# Patient Record
Sex: FEMALE | Race: BLACK OR AFRICAN AMERICAN | NOT HISPANIC OR LATINO | Employment: OTHER | URBAN - METROPOLITAN AREA
[De-identification: names, ages, dates, MRNs, and addresses within clinical notes are randomized per-mention and may not be internally consistent; named-entity substitution may affect disease eponyms.]

---

## 2017-05-09 ENCOUNTER — HOSPITAL ENCOUNTER (EMERGENCY)
Facility: HOSPITAL | Age: 52
Discharge: HOME | End: 2017-05-09
Attending: EMERGENCY MEDICINE
Payer: COMMERCIAL

## 2017-05-09 VITALS
TEMPERATURE: 98 F | SYSTOLIC BLOOD PRESSURE: 157 MMHG | DIASTOLIC BLOOD PRESSURE: 77 MMHG | RESPIRATION RATE: 20 BRPM | WEIGHT: 147 LBS | OXYGEN SATURATION: 97 % | BODY MASS INDEX: 24.49 KG/M2 | HEART RATE: 92 BPM | HEIGHT: 65 IN

## 2017-05-09 DIAGNOSIS — J45.51 SEVERE PERSISTENT ASTHMA WITH ACUTE EXACERBATION: Primary | ICD-10-CM

## 2017-05-09 LAB
ALBUMIN SERPL BCP-MCNC: 3.4 G/DL
ALP SERPL-CCNC: 46 U/L
ALT SERPL W/O P-5'-P-CCNC: 8 U/L
ANION GAP SERPL CALC-SCNC: 5 MMOL/L
AST SERPL-CCNC: 16 U/L
BASOPHILS # BLD AUTO: 0.03 K/UL
BASOPHILS NFR BLD: 0.6 %
BILIRUB SERPL-MCNC: 0.4 MG/DL
BNP SERPL-MCNC: 82 PG/ML
BUN SERPL-MCNC: 9 MG/DL
CALCIUM SERPL-MCNC: 8.3 MG/DL
CHLORIDE SERPL-SCNC: 104 MMOL/L
CO2 SERPL-SCNC: 27 MMOL/L
CREAT SERPL-MCNC: 0.7 MG/DL
DIFFERENTIAL METHOD: ABNORMAL
EOSINOPHIL # BLD AUTO: 0.4 K/UL
EOSINOPHIL NFR BLD: 7.9 %
ERYTHROCYTE [DISTWIDTH] IN BLOOD BY AUTOMATED COUNT: 14.5 %
EST. GFR  (AFRICAN AMERICAN): >60 ML/MIN/1.73 M^2
EST. GFR  (NON AFRICAN AMERICAN): >60 ML/MIN/1.73 M^2
GLUCOSE SERPL-MCNC: 98 MG/DL
HCT VFR BLD AUTO: 34.4 %
HGB BLD-MCNC: 11.1 G/DL
LYMPHOCYTES # BLD AUTO: 1.8 K/UL
LYMPHOCYTES NFR BLD: 37.8 %
MCH RBC QN AUTO: 28 PG
MCHC RBC AUTO-ENTMCNC: 32.3 %
MCV RBC AUTO: 87 FL
MONOCYTES # BLD AUTO: 0.4 K/UL
MONOCYTES NFR BLD: 9.2 %
NEUTROPHILS # BLD AUTO: 2.1 K/UL
NEUTROPHILS NFR BLD: 44.5 %
PLATELET # BLD AUTO: 289 K/UL
PMV BLD AUTO: 9 FL
POTASSIUM SERPL-SCNC: 4.1 MMOL/L
PROT SERPL-MCNC: 7.1 G/DL
RBC # BLD AUTO: 3.97 M/UL
SODIUM SERPL-SCNC: 136 MMOL/L
TROPONIN I SERPL DL<=0.01 NG/ML-MCNC: <0.006 NG/ML
WBC # BLD AUTO: 4.66 K/UL

## 2017-05-09 PROCEDURE — 25000003 PHARM REV CODE 250: Performed by: EMERGENCY MEDICINE

## 2017-05-09 PROCEDURE — 84484 ASSAY OF TROPONIN QUANT: CPT

## 2017-05-09 PROCEDURE — 80053 COMPREHEN METABOLIC PANEL: CPT

## 2017-05-09 PROCEDURE — 94640 AIRWAY INHALATION TREATMENT: CPT

## 2017-05-09 PROCEDURE — 83880 ASSAY OF NATRIURETIC PEPTIDE: CPT

## 2017-05-09 PROCEDURE — 93005 ELECTROCARDIOGRAM TRACING: CPT

## 2017-05-09 PROCEDURE — 99284 EMERGENCY DEPT VISIT MOD MDM: CPT

## 2017-05-09 PROCEDURE — 63600175 PHARM REV CODE 636 W HCPCS: Performed by: EMERGENCY MEDICINE

## 2017-05-09 PROCEDURE — 85025 COMPLETE CBC W/AUTO DIFF WBC: CPT

## 2017-05-09 PROCEDURE — 25000242 PHARM REV CODE 250 ALT 637 W/ HCPCS: Performed by: EMERGENCY MEDICINE

## 2017-05-09 RX ORDER — AZITHROMYCIN 250 MG/1
250 TABLET, FILM COATED ORAL DAILY
Qty: 4 TABLET | Refills: 0 | Status: SHIPPED | OUTPATIENT
Start: 2017-05-10 | End: 2017-05-14

## 2017-05-09 RX ORDER — ALBUTEROL SULFATE 90 UG/1
1-2 AEROSOL, METERED RESPIRATORY (INHALATION) EVERY 4 HOURS PRN
Qty: 1 INHALER | Refills: 1 | Status: ON HOLD | OUTPATIENT
Start: 2017-05-09 | End: 2018-04-14

## 2017-05-09 RX ORDER — BUDESONIDE AND FORMOTEROL FUMARATE DIHYDRATE 160; 4.5 UG/1; UG/1
2 AEROSOL RESPIRATORY (INHALATION) EVERY 12 HOURS
Qty: 3 INHALER | Refills: 0 | Status: ON HOLD | OUTPATIENT
Start: 2017-05-09 | End: 2018-04-14

## 2017-05-09 RX ORDER — AZITHROMYCIN 250 MG/1
500 TABLET, FILM COATED ORAL
Status: COMPLETED | OUTPATIENT
Start: 2017-05-09 | End: 2017-05-09

## 2017-05-09 RX ORDER — ASPIRIN 325 MG
325 TABLET ORAL
Status: COMPLETED | OUTPATIENT
Start: 2017-05-09 | End: 2017-05-09

## 2017-05-09 RX ORDER — IPRATROPIUM BROMIDE AND ALBUTEROL SULFATE 2.5; .5 MG/3ML; MG/3ML
3 SOLUTION RESPIRATORY (INHALATION)
Status: COMPLETED | OUTPATIENT
Start: 2017-05-09 | End: 2017-05-09

## 2017-05-09 RX ORDER — FLUTICASONE FUROATE AND VILANTEROL 200; 25 UG/1; UG/1
1 POWDER RESPIRATORY (INHALATION) DAILY
COMMUNITY
End: 2017-05-09

## 2017-05-09 RX ORDER — PREDNISONE 20 MG/1
40 TABLET ORAL DAILY
Qty: 12 TABLET | Refills: 0 | Status: SHIPPED | OUTPATIENT
Start: 2017-05-09 | End: 2017-05-15

## 2017-05-09 RX ORDER — ALBUTEROL SULFATE 2.5 MG/.5ML
2.5 SOLUTION RESPIRATORY (INHALATION) EVERY 4 HOURS PRN
Qty: 50 MG | Refills: 1 | Status: ON HOLD | OUTPATIENT
Start: 2017-05-09 | End: 2018-04-14

## 2017-05-09 RX ORDER — PREDNISONE 20 MG/1
60 TABLET ORAL
Status: COMPLETED | OUTPATIENT
Start: 2017-05-09 | End: 2017-05-09

## 2017-05-09 RX ADMIN — ASPIRIN 325 MG ORAL TABLET 325 MG: 325 PILL ORAL at 08:05

## 2017-05-09 RX ADMIN — PREDNISONE 60 MG: 20 TABLET ORAL at 08:05

## 2017-05-09 RX ADMIN — IPRATROPIUM BROMIDE AND ALBUTEROL SULFATE 3 ML: .5; 3 SOLUTION RESPIRATORY (INHALATION) at 08:05

## 2017-05-09 RX ADMIN — AZITHROMYCIN 500 MG: 250 TABLET, FILM COATED ORAL at 09:05

## 2017-05-09 NOTE — ED TRIAGE NOTES
Pt presents to ER with c/o difficulty breathing.  Audible wheezing noted.  Pt reports she is out of neb medication.  PMx asthma.  Productive cough, white sputum.

## 2017-05-09 NOTE — ED NOTES
Verbal order to discontinue second order for chest X ray. One chest x ray had already been completed. Second order entered in error.

## 2017-05-09 NOTE — ED AVS SNAPSHOT
[facility]  [place] [address]               Aurora Ornelas   2017  7:31 AM   ED    Description:  Female : 1965   Department:  [facility]           Your Care was Coordinated By:     Provider Role From To    Rukhsana Barton MD Attending Provider 17 0734 --      Reason for Visit     Chest Pain           Diagnoses this Visit        Comments    Severe persistent asthma with acute exacerbation    -  Primary       ED Disposition     None           To Do List           Follow-up Information     Schedule an appointment as soon as possible for a visit with Follow up with your primary care doctor this week.        Follow up with [facility].    Specialty:  Emergency Medicine    Why:  If symptoms worsen    Contact information:    [place] [id number] [phone]       These Medications        Disp Refills Start End    albuterol 90 mcg/actuation inhaler 1 Inhaler 1 2017    Inhale 1-2 puffs into the lungs every 4 (four) hours as needed for Wheezing. - Inhalation    Pharmacy: [id number] - [place], [place] - [address] Ph #: [phone]       albuterol sulfate 2.5 mg/0.5 mL Nebu 50 mg 1 2017    Take 2.5 mg by nebulization every 4 (four) hours as needed. - Nebulization    Pharmacy: [facility] - [place], [place] - [address] Ph #: [phone]       predniSONE (DELTASONE) 20 MG tablet 12 tablet 0 2017 5/15/2017    Take 2 tablets (40 mg total) by mouth once daily. - Oral    Pharmacy: [facility] - [place], [place] - [address] Ph #: [phone]       azithromycin (Z-KATHY) 250 MG tablet 4 tablet 0 5/10/2017 2017    Take 1 tablet (250 mg total) by mouth once daily. - Oral    Pharmacy: [facility] - [place], [place] - [address] Ph #: [phone]       budesonide-formoterol 160-4.5 mcg (SYMBICORT)  160-4.5 mcg/actuation HFAA 3 Inhaler 0 5/9/2017 5/9/2018    Inhale 2 puffs into the lungs every 12 (twelve) hours. Controller - Inhalation    Pharmacy: [address] [place] [address] [address]  #: [phone]         [facility]     [facility] Nurse Care Line - 24/7 Assistance  Unless otherwise directed by your provider, please contact [facility], our nurse care line that is available for 24/7 assistance.     Registered nurses in the [facility] Center provide: appointment scheduling, clinical advisement, health education, and other advisory services.  Call: [phone] (toll free)               Medications           Message regarding Medications     Verify the changes and/or additions to your medication regime listed below are the same as discussed with your clinician today.  If any of these changes or additions are incorrect, please notify your healthcare provider.        START taking these NEW medications        Refills    azithromycin (Z-KATHY) 250 MG tablet 0    Starting on: 5/10/2017    Sig: Take 1 tablet (250 mg total) by mouth once daily.    Class: Normal    Route: Oral    budesonide-formoterol 160-4.5 mcg (SYMBICORT) 160-4.5 mcg/actuation HFAA 0    Sig: Inhale 2 puffs into the lungs every 12 (twelve) hours. Controller    Class: Print    Route: Inhalation      These medications were administered today        Dose Freq    aspirin tablet 325 mg 325 mg ED 1 Time    Sig: Take 1 tablet (325 mg total) by mouth ED 1 Time.    Class: Normal    Route: Oral    albuterol-ipratropium 2.5mg-0.5mg/3mL nebulizer solution 3 mL 3 mL Every 5 min    Sig: Take 3 mLs by nebulization every 5 (five) minutes.    Class: Normal    Route: Nebulization    predniSONE tablet 60 mg 60 mg ED 1 Time    Sig: Take 3 tablets (60 mg total) by mouth ED 1 Time.    Class: Normal    Route: Oral    azithromycin tablet 500 mg 500 mg ED 1 Time    Sig: Take 2 tablets (500 mg total) by mouth ED 1 Time.    Class:  Normal    Route: Oral      CHANGE how you are taking these medications     Start Taking Instead of    albuterol 90 mcg/actuation inhaler albuterol 90 mcg/actuation inhaler    Dosage:  Inhale 1-2 puffs into the lungs every 4 (four) hours as needed for Wheezing. Dosage:  Inhale 1 puff into the lungs every 4 (four) hours as needed for Wheezing.    predniSONE (DELTASONE) 20 MG tablet predniSONE (DELTASONE) 20 MG tablet    Dosage:  Take 2 tablets (40 mg total) by mouth once daily. Dosage:  Take 20 mg by mouth once daily.      STOP taking these medications     fluticasone-vilanterol (BREO) 200-25 mcg/dose DsDv diskus inhaler Inhale 1 puff into the lungs once daily. Controller    hydrocodone-acetaminophen 5-325mg (NORCO) 5-325 mg per tablet Take 1 tablet by mouth every 6 (six) hours as needed (breakthrough pain).    fluticasone-salmeterol 500-50 mcg/dose (ADVAIR DISKUS) 500-50 mcg/dose DsDv diskus inhaler Inhale 1 puff into the lungs 2 (two) times daily.           Verify that the below list of medications is an accurate representation of the medications you are currently taking.  If none reported, the list may be blank. If incorrect, please contact your healthcare provider. Carry this list with you in case of emergency.           Current Medications     lisinopril-hydrochlorothiazide (PRINZIDE,ZESTORETIC) 20-12.5 mg per tablet Take 1 tablet by mouth once daily.    albuterol 90 mcg/actuation inhaler Inhale 1-2 puffs into the lungs every 4 (four) hours as needed for Wheezing.    albuterol sulfate 2.5 mg/0.5 mL Nebu Take 2.5 mg by nebulization every 4 (four) hours as needed.    azithromycin (Z-KATHY) 250 MG tablet Starting on May 10, 2017. Take 1 tablet (250 mg total) by mouth once daily.    budesonide-formoterol 160-4.5 mcg (SYMBICORT) 160-4.5 mcg/actuation HFAA Inhale 2 puffs into the lungs every 12 (twelve) hours. Controller    meloxicam (MOBIC) 7.5 MG tablet Take 1 tablet (7.5 mg total) by mouth once daily.    pantoprazole  "(PROTONIX) 40 MG tablet Take 40 mg by mouth once daily.    predniSONE (DELTASONE) 20 MG tablet Take 2 tablets (40 mg total) by mouth once daily.           Clinical Reference Information           Your Vitals Were     BP Pulse Temp Resp Height Weight    157/77 (BP Location: Left arm, Patient Position: Lying, BP Method: Automatic) 92 98.2 °F (36.8 °C) 20 5' 5" (1.651 m) 66.7 kg (147 lb)    SpO2 BMI             97% 24.46 kg/m2         Allergies as of 5/9/2017     No Known Allergies      Immunizations Administered on Date of Encounter - 5/9/2017     None      ED Micro, Lab, POCT     Start Ordered       Status Ordering Provider    05/09/17 0738 05/09/17 0738  CBC auto differential  STAT      Final result     05/09/17 0738 05/09/17 0738  Comprehensive metabolic panel  STAT      Final result     05/09/17 0738 05/09/17 0738  Troponin I  Once      Final result     05/09/17 0738 05/09/17 0738  B-Type natriuretic peptide (BNP)  STAT      Final result       ED Imaging Orders     Start Ordered       Status Ordering Provider    05/09/17 0801 05/09/17 0800  X-Ray Chest PA And Lateral  1 time imaging      Acknowledged     05/09/17 0738 05/09/17 0738  X-Ray Chest PA And Lateral  1 time imaging      Final result       Discharge References/Attachments     ASTHMA, ACUTE (ADULT) (ENGLISH)       [facility] complies with applicable Federal civil rights laws and does not discriminate on the basis of race, color, national origin, age, disability, or sex.        Language Assistance Services     ATTENTION: Language assistance services are available, free of charge. Please call [phone].      ATENCIÓN: Si habla español, tiene a mendez disposición servicios gratuitos de asistencia lingüística. Llame al [phone].     CHÚ Ý: N?u b?n nói Ti?ng Vi?t, có các d?ch v? h? tr? ngôn ng? mi?n phí dành cho b?n. G?i s? [phone].        "

## 2017-05-09 NOTE — ED PROVIDER NOTES
"Encounter Date: 5/9/2017    SCRIBE #1 NOTE: I, Charis Callejas, am scribing for, and in the presence of,  Rukhsana Barton MD. I have scribed the following portions of the note - Other sections scribed: HPI and ROS.       History     Chief Complaint   Patient presents with    Chest Pain     States she has chest pains that started this morning     Review of patient's allergies indicates:  No Known Allergies  Chief Complaint: Chest Pain    HPI: This 51 y.o. Female with deafness, asthma, arthritis, anemia, DM and HTN presents to the ED c/o generalized chest pain and shortness of breath. Symptoms have been constant since onset yesterday. Symptoms worsened at 6 am this morning. Chest pain is described as "lungs hurting". There's associated SOB, fatigue, congestion and a productive cough with white phlegm. Patient is out of her nebulizer treatment. Symptoms consistent with previous acute asthma exacerbations.  She denies any fevers at this time.       The history is provided by the patient. A  was used.     Past Medical History:   Diagnosis Date    Anemia     Arthritis     Asthma     Deaf     Hypertension      No past surgical history on file.  No family history on file.  Social History   Substance Use Topics    Smoking status: Never Smoker    Smokeless tobacco: Not on file    Alcohol use No     Review of Systems   Constitutional: Positive for fatigue. Negative for chills and fever.   HENT: Positive for congestion. Negative for ear pain and sore throat.    Eyes: Negative for visual disturbance.   Respiratory: Positive for cough and shortness of breath.    Cardiovascular: Positive for chest pain.   Gastrointestinal: Negative for abdominal pain, constipation, diarrhea, nausea and vomiting.   Genitourinary: Negative for difficulty urinating and dysuria.   Musculoskeletal: Negative for back pain.   Skin: Negative for rash.   Neurological: Negative for dizziness, weakness, light-headedness and " headaches.       Physical Exam   Initial Vitals   BP Pulse Resp Temp SpO2   05/09/17 0729 05/09/17 0729 05/09/17 0729 05/09/17 0729 05/09/17 0729   152/81 90 18 98.2 °F (36.8 °C) 96 %     Physical Exam   Constitutional: Well-developed, Well-nourished, No acute distressed, Alert  HENT: Normocephalic, Atraumatic, Moist mucous membranes  Eyes: Conjunctiva normal, PERRL, EOMI  Neck: Supple, ROM normal, no JVD  Cardiac: RRR, no murmurs  Pulmonary/Chest wall: Expiratory wheezing throughout, no chest wall tenderness  Abdomen: Soft, nontender, nondistended, no rebound, no guarding  Musc: Normal ROM, No obvious joint swelling  Lymph: No lower extremity edema  Neuro: oriented x 3, no focal neurologic deficit  Skin: Pink, warm, dry.  No rashes  Psych: Behavior normal, Mood and affect normal    Previous medical record and nursing documentation reviewed where available.          ED Course   Procedures  Labs Reviewed - No data to display  EKG Readings: (Independently Interpreted)   Initial Reading: No STEMI. Rhythm: Normal Sinus Rhythm. Ectopy: No Ectopy. Conduction: Normal. ST Segments: Normal ST Segments. T Waves: Normal. Clinical Impression: Normal Sinus Rhythm       X-Rays:   Independently Interpreted Readings:   Chest X-Ray: Normal heart size.  No infiltrates.  No acute abnormalities.     Medical Decision Making:   Independently Interpreted Test(s):   I have ordered and independently interpreted X-rays - see prior notes.  I have ordered and independently interpreted EKG Reading(s) - see prior notes  Clinical Tests:   Lab Tests: Ordered and Reviewed  Radiological Study: Ordered and Reviewed  Medical Tests: Ordered and Reviewed  ED Management:  51 year old female with history of asthma presents to the ED with chest pain and shortness of breath.  Patient out of her medication.  DDx would include acute asthma exacerbation, pneumonia, bronchitis, ACS, heart failure, PTX.  EKG nonischemic and troponin normal in setting of symptoms  for >6 hours - doubt ACS.  BNP negative and no pulmonary edema on CXR - doubt CHF.  CXR without signs of PNA or PTX.  Patient was treated with duonebs, prednisone with significant improvement in symptoms.  I suspect that her symptoms are secondary to acute asthma exacerbation.  Will discharge home.  Will refill all medication and prescribe azithromycin to cover for possible atypical pneumonia triggering symtpoms.  Patient encouraged to return if worse and follow up closely with PCP.             Scribe Attestation:   Scribe #1: I performed the above scribed service and the documentation accurately describes the services I performed. I attest to the accuracy of the note.    Attending Attestation:           Physician Attestation for Scribe:  Physician Attestation Statement for Scribe #1: I, Rukhsana Barton MD, reviewed documentation, as scribed by Charis Callejas in my presence, and it is both accurate and complete.                 ED Course     Clinical Impression:   There were no encounter diagnoses.     ...............................     Rukhsana Barton MD  06/19/17 0573

## 2017-05-09 NOTE — ED NOTES
HECTOR at bedside for interpretation.  Pt hearing impaired.  Physician at bedside.   Ashlee [id number].

## 2017-08-04 ENCOUNTER — HOSPITAL ENCOUNTER (EMERGENCY)
Facility: HOSPITAL | Age: 52
Discharge: HOME | End: 2017-08-05
Attending: EMERGENCY MEDICINE
Payer: COMMERCIAL

## 2017-08-04 DIAGNOSIS — T14.90XA INJURY: ICD-10-CM

## 2017-08-04 DIAGNOSIS — S93.402A SPRAIN OF LEFT ANKLE, UNSPECIFIED LIGAMENT, INITIAL ENCOUNTER: Primary | ICD-10-CM

## 2017-08-04 PROCEDURE — 96372 THER/PROPH/DIAG INJ SC/IM: CPT

## 2017-08-04 PROCEDURE — 99284 EMERGENCY DEPT VISIT MOD MDM: CPT | Mod: 25

## 2017-08-05 VITALS
DIASTOLIC BLOOD PRESSURE: 60 MMHG | RESPIRATION RATE: 18 BRPM | OXYGEN SATURATION: 100 % | BODY MASS INDEX: 24.13 KG/M2 | WEIGHT: 145 LBS | TEMPERATURE: 99 F | SYSTOLIC BLOOD PRESSURE: 125 MMHG | HEART RATE: 83 BPM

## 2017-08-05 PROCEDURE — 63600175 PHARM REV CODE 636 W HCPCS: Performed by: NURSE PRACTITIONER

## 2017-08-05 RX ORDER — KETOROLAC TROMETHAMINE 30 MG/ML
30 INJECTION, SOLUTION INTRAMUSCULAR; INTRAVENOUS
Status: COMPLETED | OUTPATIENT
Start: 2017-08-05 | End: 2017-08-05

## 2017-08-05 RX ORDER — NAPROXEN 500 MG/1
500 TABLET ORAL 2 TIMES DAILY PRN
Qty: 30 TABLET | Refills: 0 | Status: SHIPPED | OUTPATIENT
Start: 2017-08-05 | End: 2019-02-27

## 2017-08-05 RX ADMIN — KETOROLAC TROMETHAMINE 30 MG: 30 INJECTION, SOLUTION INTRAMUSCULAR at 02:08

## 2017-08-05 NOTE — ED NOTES
[facility] 's  service used to communicate with pt , inform her of plan of care, and answer her questions.

## 2017-08-05 NOTE — DISCHARGE INSTRUCTIONS
Follow-up with your primary care physician and/or orthopedic surgery for further evaluation and management.    Take pain medication as needed and only as prescribed.    Return to the emergency department for any new or worsening symptoms.

## 2017-08-05 NOTE — ED PROVIDER NOTES
Encounter Date: 8/4/2017    SCRIBE #1 NOTE: I, Lázaro Lamas, am scribing for, and in the presence of, Sterilng Up NP. Other sections scribed: HPI, ROS.       History     Chief Complaint   Patient presents with    Ankle Pain     L ankle pain after slip and fall     CC: Ankle Pain, Fall  HPI: This 51 y.o. female with deafness, arthritis, asthma, HTN and Hx of hysterectomy presents to the ED c/o  L ankle pain s/p fall at home tonight. Pt reports that she got up from bed and then suddenly fell due to unexpected leg cramps. Pt denies LOC, syncope, head injury. Pt denies any other injuries.             The history is provided by the patient. The history is limited by a language barrier (pt is deaf). No  was used (communicated via written English).     Review of patient's allergies indicates:  No Known Allergies  Past Medical History:   Diagnosis Date    Anemia     Arthritis     Asthma     Deaf     Hypertension      Past Surgical History:   Procedure Laterality Date    HYSTERECTOMY       No family history on file.  Social History   Substance Use Topics    Smoking status: Never Smoker    Smokeless tobacco: Not on file    Alcohol use No     Review of Systems   Constitutional: Negative for chills and fever.   HENT: Negative for congestion and sore throat.    Respiratory: Negative for apnea, shortness of breath and wheezing.    Cardiovascular: Negative for chest pain and leg swelling.   Gastrointestinal: Negative for abdominal pain, nausea and vomiting.   Musculoskeletal: Positive for arthralgias (L ankle) and myalgias (leg cramps, resolved).   Skin: Negative for rash.   Neurological: Negative for syncope, weakness and light-headedness.   Psychiatric/Behavioral: The patient is not nervous/anxious.        Physical Exam     Initial Vitals [08/04/17 2105]   BP Pulse Resp Temp SpO2   136/84 (!) 112 18 98.3 °F (36.8 °C) 100 %      MAP       101.33         Physical Exam    Nursing note and vitals  reviewed.  Constitutional: She appears well-developed and well-nourished. She is not diaphoretic. No distress.   Patient is deaf. Communication achieved via writing   HENT:   Head: Normocephalic and atraumatic.   Right Ear: External ear normal.   Left Ear: External ear normal.   Nose: Nose normal.   Eyes: Conjunctivae and EOM are normal. Pupils are equal, round, and reactive to light. Right eye exhibits no discharge. Left eye exhibits no discharge. No scleral icterus.   Neck: Normal range of motion. Neck supple. No thyromegaly present. No tracheal deviation present. No JVD present.   Cardiovascular: Normal rate, regular rhythm, normal heart sounds and intact distal pulses. Exam reveals no gallop and no friction rub.    No murmur heard.  Pulmonary/Chest: Breath sounds normal. No stridor. No respiratory distress. She has no wheezes. She has no rhonchi. She has no rales.   Abdominal: Soft. Bowel sounds are normal. She exhibits no distension and no mass. There is no tenderness. There is no rebound and no guarding.   Musculoskeletal: Normal range of motion. She exhibits no edema or tenderness.   Diffuse left ankle pain and TTP secondary to tripping and falling   Lymphadenopathy:     She has no cervical adenopathy.   Neurological: She is alert and oriented to person, place, and time. She has normal strength and normal reflexes. She displays normal reflexes. No cranial nerve deficit or sensory deficit.   Skin: Skin is warm and dry. No rash and no abscess noted. No erythema. No pallor.   Psychiatric: She has a normal mood and affect. Her behavior is normal. Judgment and thought content normal.         ED Course   Procedures  Labs Reviewed - No data to display          Medical Decision Making:   ED Management:  This is a 51-year-old female presenting emergency department complaining of left ankle pain secondary to getting out of bed and experiencing cramping in her calves causing her to fall and twist her ankle. Of note,  patient is deaf and communication was achieved via writing. Patient denies head injury, LOC, syncope, or any other associated injuries or symptoms. On exam patient is afebrile, well-appearing, and in no apparent distress. There is diffuse tenderness to palpation of the left ankle and left shin. No palpable or obvious deformity, bony step-off, or other abnormality. There is no swelling, erythema, or warmth. No evidence of compartment syndrome. Range of motion intact but slightly limited secondary to pain. X-ray of the left ankle is negative. Based on these findings I suspect left ankle sprain secondary to mechanical fall. I considered but doubt acute fracture, dislocation, septic arthritis, Lisfranc injury, among others.    Instructed patient to follow-up with PCP. Applied Ace wrap and ordered Toradol in the ED. Strict return precautions given. Patient is stable for discharge and outpatient follow-up. Pt discharged home with instructions for follow up and supportive care. Pt expressed understanding of all information and instructions given.   Other:   I have discussed this case with another health care provider.       <> Summary of the Discussion: Case discussed with my attending physician Franki Oakes M.D. who agreed with the assessment and plan.            Scribe Attestation:   Scribe #1: I performed the above scribed service and the documentation accurately describes the services I performed. I attest to the accuracy of the note.    Attending Attestation:           Physician Attestation for Scribe:  Physician Attestation Statement for Scribe #1: I, Sterling Up NP, reviewed documentation, as scribed by Lázaro Lamas in my presence, and it is both accurate and complete.                 ED Course     Clinical Impression:   The primary encounter diagnosis was Sprain of left ankle, unspecified ligament, initial encounter. A diagnosis of Injury was also pertinent to this visit.    Disposition:   Disposition:  Discharged  Condition: Stable                        Sterling Up NP  08/05/17 0159       Sterling Up NP  08/05/17 0204       Sterling Up, ZAIN  08/05/17 0205

## 2017-12-20 ENCOUNTER — HOSPITAL ENCOUNTER (EMERGENCY)
Facility: HOSPITAL | Age: 52
Discharge: HOME | End: 2017-12-20
Attending: EMERGENCY MEDICINE
Payer: COMMERCIAL

## 2017-12-20 VITALS
RESPIRATION RATE: 19 BRPM | WEIGHT: 150 LBS | SYSTOLIC BLOOD PRESSURE: 129 MMHG | TEMPERATURE: 99 F | DIASTOLIC BLOOD PRESSURE: 75 MMHG | BODY MASS INDEX: 25.61 KG/M2 | HEART RATE: 103 BPM | OXYGEN SATURATION: 97 % | HEIGHT: 64 IN

## 2017-12-20 DIAGNOSIS — J45.901 REACTIVE AIRWAY DISEASE WITH ACUTE EXACERBATION, UNSPECIFIED ASTHMA SEVERITY, UNSPECIFIED WHETHER PERSISTENT: Primary | ICD-10-CM

## 2017-12-20 DIAGNOSIS — R05.9 COUGH: ICD-10-CM

## 2017-12-20 DIAGNOSIS — J45.901 EXACERBATION OF ASTHMA, UNSPECIFIED ASTHMA SEVERITY, UNSPECIFIED WHETHER PERSISTENT: ICD-10-CM

## 2017-12-20 PROCEDURE — 63600175 PHARM REV CODE 636 W HCPCS: Performed by: PHYSICIAN ASSISTANT

## 2017-12-20 PROCEDURE — 99284 EMERGENCY DEPT VISIT MOD MDM: CPT | Mod: 25

## 2017-12-20 PROCEDURE — 94640 AIRWAY INHALATION TREATMENT: CPT

## 2017-12-20 PROCEDURE — 25000242 PHARM REV CODE 250 ALT 637 W/ HCPCS: Performed by: PHYSICIAN ASSISTANT

## 2017-12-20 RX ORDER — AZITHROMYCIN 250 MG/1
TABLET, FILM COATED ORAL
Qty: 6 TABLET | Refills: 0 | Status: SHIPPED | OUTPATIENT
Start: 2017-12-20 | End: 2017-12-25

## 2017-12-20 RX ORDER — ALBUTEROL SULFATE 90 UG/1
1-2 AEROSOL, METERED RESPIRATORY (INHALATION) EVERY 6 HOURS PRN
Qty: 3 INHALER | Refills: 2 | Status: ON HOLD | OUTPATIENT
Start: 2017-12-20 | End: 2018-04-14 | Stop reason: HOSPADM

## 2017-12-20 RX ORDER — IPRATROPIUM BROMIDE AND ALBUTEROL SULFATE 2.5; .5 MG/3ML; MG/3ML
3 SOLUTION RESPIRATORY (INHALATION) EVERY 6 HOURS PRN
Qty: 1 BOX | Refills: 2 | Status: ON HOLD | OUTPATIENT
Start: 2017-12-20 | End: 2018-04-14

## 2017-12-20 RX ORDER — IPRATROPIUM BROMIDE AND ALBUTEROL SULFATE 2.5; .5 MG/3ML; MG/3ML
3 SOLUTION RESPIRATORY (INHALATION)
Status: COMPLETED | OUTPATIENT
Start: 2017-12-20 | End: 2017-12-20

## 2017-12-20 RX ORDER — FERROUS SULFATE 325(65) MG
325 TABLET, DELAYED RELEASE (ENTERIC COATED) ORAL
COMMUNITY
End: 2019-02-27

## 2017-12-20 RX ORDER — PREDNISONE 20 MG/1
TABLET ORAL
Qty: 18 TABLET | Refills: 0 | Status: ON HOLD | OUTPATIENT
Start: 2017-12-20 | End: 2018-04-14 | Stop reason: HOSPADM

## 2017-12-20 RX ORDER — PREDNISONE 20 MG/1
60 TABLET ORAL
Status: COMPLETED | OUTPATIENT
Start: 2017-12-20 | End: 2017-12-20

## 2017-12-20 RX ADMIN — PREDNISONE 60 MG: 20 TABLET ORAL at 09:12

## 2017-12-20 RX ADMIN — IPRATROPIUM BROMIDE AND ALBUTEROL SULFATE 3 ML: .5; 3 SOLUTION RESPIRATORY (INHALATION) at 09:12

## 2017-12-20 RX ADMIN — IPRATROPIUM BROMIDE AND ALBUTEROL SULFATE 3 ML: .5; 3 SOLUTION RESPIRATORY (INHALATION) at 08:12

## 2017-12-20 NOTE — DISCHARGE INSTRUCTIONS
Take medications as prescribed.  Take entire course of antibiotics to protect from possible pneumonia.  Follow-up with your primary care physician for reevaluation and further management.  Return to this ED if you begin with chest pain, if you begin with shortness of breath, if you begin with fever, if any other problems occur.

## 2017-12-20 NOTE — ED PROVIDER NOTES
"Encounter Date: 12/20/2017    SCRIBE #1 NOTE: I, Elisabeth Sukhjinder, am scribing for, and in the presence of,  Talon Azul PA-C. I have scribed the following portions of the note - Other sections scribed: HPI and ROS.       History     Chief Complaint   Patient presents with    Cough     and chest congestion since this morning around 6am, "I also ran out of my medicine for my breathing treatment"     CC: Cough    HPI: This 52 y.o female who has asthma, anemia, arthritis, deaf, and hypertension presents to the ED c/o acute onset, constant cough with associated shortness of breath and wheezing for the past 2 days.  Patient states her symptoms are a result of an exacerbation of her asthma.  Patient reports she has been attempting treatment with inhaler.  Patient also reports having a nebulizer at home, but reports she is currently out of the solution for her treatments.  Patient denies fever, chills, nausea, emesis, diarrhea, abdominal pain, chest pain, back pain, rash, or any other associated symptoms.  No alleviating factors.      The history is provided by the patient. A  was used (Yes[place]).     Review of patient's allergies indicates:  No Known Allergies  Past Medical History:   Diagnosis Date    Anemia     Arthritis     Asthma     Deaf     Hypertension      Past Surgical History:   Procedure Laterality Date    HYSTERECTOMY       History reviewed. No pertinent family history.  Social History   Substance Use Topics    Smoking status: Never Smoker    Smokeless tobacco: Never Used    Alcohol use No     Review of Systems   Constitutional: Negative for chills and fever.   HENT: Negative for ear pain and sore throat.    Eyes: Negative for pain.   Respiratory: Positive for cough, shortness of breath and wheezing.    Cardiovascular: Negative for chest pain.   Gastrointestinal: Negative for abdominal pain, diarrhea, nausea and vomiting.   Genitourinary: Negative for dysuria.   Musculoskeletal: " Negative for back pain.   Skin: Negative for rash.   Neurological: Negative for headaches.       Physical Exam     Initial Vitals [12/20/17 0755]   BP Pulse Resp Temp SpO2   (!) 147/70 (!) 117 20 98.1 °F (36.7 °C) 97 %      MAP       95.67         Physical Exam    Nursing note and vitals reviewed.  Constitutional: She appears well-developed and well-nourished. She is not diaphoretic. No distress.   HENT:   Head: Normocephalic and atraumatic.   Eyes: Conjunctivae and EOM are normal. Pupils are equal, round, and reactive to light.   Neck: Normal range of motion. Neck supple. No tracheal deviation present.   Cardiovascular: Normal heart sounds.   Pulmonary/Chest: No stridor.   Mild inspiratory wheeze to left lung field.  No rhonchi.  No stridor.  Normal air entry.  No rib retractions, no nasal flaring.   Abdominal: Soft. Bowel sounds are normal. She exhibits no distension. There is no tenderness.   Musculoskeletal: Normal range of motion. She exhibits no tenderness.   Lymphadenopathy:     She has no cervical adenopathy.   Neurological: She is alert and oriented to person, place, and time.   Skin: Skin is warm and dry. Capillary refill takes less than 2 seconds.   Psychiatric: She has a normal mood and affect. Her behavior is normal. Judgment and thought content normal.         ED Course   Procedures  Labs Reviewed - No data to display          Medical Decision Making:   Initial Assessment:   52-year-old female chief complaint asthma exacerbation times 2-3 days.  Differential Diagnosis:   Reactive airway disease, asthma exacerbation, URI, influenza, pharyngitis, pneumonia, bronchitis  Clinical Tests:   Radiological Study: Ordered and Reviewed  ED Management:  Patient overall well-appearing, in no acute distress, afebrile, she is tachycardic but normotensive.    Patient presents to ED complaining of asthma exacerbation ×2-3 days.  She admits to nonproductive cough and wheeze.  She denies fever or chills.  She denies  recent illness or recent sick contacts.  No recent antibiotic usage, no recent hospitalization.  She denies shortness of breath, but does admit to worsening wheeze.  On exam she is well-appearing, nontoxic.  She does exhibit mild expiratory wheeze to left lung field.  No rhonchi.  No rib retractions or nasal flaring, no accessory muscle usage.  She is in no acute respiratory distress.  Normal air entry, oropharynx unremarkable.  Bilateral tympanic membranes and ear canals within normal limits.  No mastoid tenderness.  Neck remains supple.  I do suspect viral URI which is most likely exacerbated patient's underlying asthma.  Patient was treated with 3 nebulized treatments in ED.  She states she feels much better after treatments.  I will discharge with short course of steroids, refill all of her asthma medications, and have her follow-up with her primary care physician.  Patient does understand and agree with this treatment plan.  X-ray negative for bony abnormality, consolidation, effusion, or pneumothorax.  I will empirically cover with azithromycin to prevent atypical pneumonia as patient states she does have frequent exacerbations.  Vitals are reassuring.  She is afebrile.  I do feel she is safe and stable for discharge and management as an outpatient.  Return precautions given.      Other:   I have discussed this case with another health care provider.       <> Summary of the Discussion: I have discussed this case with .             Scribe Attestation:   Scribe #1: I performed the above scribed service and the documentation accurately describes the services I performed. I attest to the accuracy of the note.    Attending Attestation:           Physician Attestation for Scribe:  Physician Attestation Statement for Scribe #1: I, Talon Azul PA-C, reviewed documentation, as scribed by Elisabeth Slaughter in my presence, and it is both accurate and complete.                 ED Course      Clinical Impression:    The primary encounter diagnosis was Reactive airway disease with acute exacerbation, unspecified asthma severity, unspecified whether persistent. Diagnoses of Exacerbation of asthma, unspecified asthma severity, unspecified whether persistent and Cough were also pertinent to this visit.    Disposition:   Disposition: Discharged  Condition: Stable                        Talon Azul PA-C  12/20/17 1204

## 2018-03-11 ENCOUNTER — HOSPITAL ENCOUNTER (EMERGENCY)
Facility: HOSPITAL | Age: 53
Discharge: HOME | End: 2018-03-11
Payer: COMMERCIAL

## 2018-03-11 VITALS
BODY MASS INDEX: 26.66 KG/M2 | WEIGHT: 160 LBS | HEART RATE: 86 BPM | OXYGEN SATURATION: 96 % | DIASTOLIC BLOOD PRESSURE: 75 MMHG | HEIGHT: 65 IN | SYSTOLIC BLOOD PRESSURE: 149 MMHG | RESPIRATION RATE: 17 BRPM | TEMPERATURE: 99 F

## 2018-03-11 DIAGNOSIS — M79.673 FOOT PAIN: ICD-10-CM

## 2018-03-11 LAB
B-HCG UR QL: NEGATIVE
CTP QC/QA: YES

## 2018-03-11 PROCEDURE — 81025 URINE PREGNANCY TEST: CPT | Performed by: NURSE PRACTITIONER

## 2018-03-11 PROCEDURE — 25000003 PHARM REV CODE 250: Performed by: NURSE PRACTITIONER

## 2018-03-11 PROCEDURE — 99284 EMERGENCY DEPT VISIT MOD MDM: CPT | Mod: 25

## 2018-03-11 RX ORDER — TRAMADOL HYDROCHLORIDE 50 MG/1
50 TABLET ORAL EVERY 8 HOURS PRN
Qty: 12 TABLET | Refills: 0 | Status: SHIPPED | OUTPATIENT
Start: 2018-03-11 | End: 2019-02-27

## 2018-03-11 RX ORDER — TRAMADOL HYDROCHLORIDE 50 MG/1
50 TABLET ORAL
Status: COMPLETED | OUTPATIENT
Start: 2018-03-11 | End: 2018-03-11

## 2018-03-11 RX ADMIN — TRAMADOL HYDROCHLORIDE 50 MG: 50 TABLET, FILM COATED ORAL at 11:03

## 2018-03-12 NOTE — DISCHARGE INSTRUCTIONS
Take medication as directed.  Follow-up with your primary care physician for continued care and management.  You may ice, elevate and rest your foot.    Please follow up with podiatry for continued management.    Please return to the Emergency Department for any new or worsening symptoms including:  fever, chest pain, shortness of breath, loss of consciousness, dizziness, weakness, or any other concerns.     Please follow up with your Primary Care Provider within in the week. If you do not have one, you may contact the one listed on your discharge paperwork or you may also call the [facility] Appointment Desk at [phone] to schedule an appointment with one.     Please take all medication as prescribed.

## 2018-03-12 NOTE — ED NOTES
Past Medical History:   Diagnosis Date    Anemia     Arthritis     Asthma     Deaf     Hypertension      Pt  Presented to ed with co right foot ( toes)cramping that started 6 wks ago and she was prescribed Tramadol, but she has run out. Pt reports hx of arthritis, and state that the cramps come and goes, but the last couple of times she was nt able to stand because the pain was so bad.

## 2018-03-12 NOTE — ED PROVIDER NOTES
Encounter Date: 3/11/2018    SCRIBE #1 NOTE: I, Yury Cabrera, am scribing for, and in the presence of,  Crista Rice NP. I have scribed the following portions of the note - Other sections scribed: HPI, ROS.       History     Chief Complaint   Patient presents with    Foot Pain     Pt c/o right foot pain after trip and fall. Pt states that its painful to stand or walk, states it feels like cramps.. Pt thinks she may have arthritis. Denies hitting head or LOC     CC: Foot Pain    HPI: This 52 y.o. Female with PMHx HTN, anemia, arthritis, asthma, deaf, and PSHx hysterectomy presents to the ED c/o worsening R plantar foot pain which began tonight, resolved on its own, and came back after a few minutes. Pt says she tried to stand up and the ball of her foot began cramping. Pt says she could not put her foot flat on the ground. Pt reports exacerbation when bearing weight. Pt says she can still ambulate with difficulty. Pt denies falling. Pt denies chest pain, SOB, and dizziness. No prior tx reported. Pt reports hx of arthritis in her b/l feet for the past couple years. Pt says she usually takes Tramadol prescribed by her PCP as tx but ran out 2 months ago; pt says she needs a refill. Pt denies seeing an orthopedic surgeon.      The history is provided by the patient. No  was used.     Review of patient's allergies indicates:  No Known Allergies  Past Medical History:   Diagnosis Date    Anemia     Arthritis     Asthma     Deaf     Hypertension      Past Surgical History:   Procedure Laterality Date    HYSTERECTOMY       No family history on file.  Social History   Substance Use Topics    Smoking status: Never Smoker    Smokeless tobacco: Never Used    Alcohol use No     Review of Systems   Constitutional: Negative for fever.   HENT: Negative for sore throat.    Respiratory: Negative for shortness of breath.    Cardiovascular: Negative for chest pain.   Gastrointestinal: Negative for nausea.    Genitourinary: Negative for dysuria.   Musculoskeletal: Negative for back pain.        (+) R plantar foot cramping   Skin: Negative for rash.   Neurological: Negative for dizziness and weakness.   Hematological: Does not bruise/bleed easily.       Physical Exam     Initial Vitals [03/11/18 2149]   BP Pulse Resp Temp SpO2   139/70 86 18 98.8 °F (37.1 °C) 98 %      MAP       93         Physical Exam    Nursing note and vitals reviewed.  Constitutional: Vital signs are normal. She appears well-developed and well-nourished. She is cooperative.  Non-toxic appearance. She does not have a sickly appearance. She does not appear ill. No distress.   Patient is deaf and thus uses sign language.  The information for this encounter was derived using an    HENT:   Head: Normocephalic and atraumatic.   Eyes: Conjunctivae and EOM are normal. Pupils are equal, round, and reactive to light.   Neck: Normal range of motion.   Cardiovascular: Normal rate, regular rhythm and normal heart sounds.   No murmur heard.  Pulmonary/Chest: Effort normal and breath sounds normal. No respiratory distress. She has no wheezes. She exhibits no tenderness.   Abdominal: Soft. Bowel sounds are normal. She exhibits no distension and no mass. There is no tenderness. There is no rebound and no guarding.   Musculoskeletal: Normal range of motion.   Neurological: She is alert and oriented to person, place, and time. She has normal reflexes.   Skin: Skin is warm and dry. Capillary refill takes less than 2 seconds. No pallor.   Psychiatric: She has a normal mood and affect. Her behavior is normal. Judgment and thought content normal.         ED Course   Procedures  Labs Reviewed   POCT URINE PREGNANCY          X-Rays:   Independently Interpreted Readings:   Other Readings:  Right foot x-ray shows no acute fractures, no osseous destruction, joint spaces are congruent, tarsometatarsal alignment is preserved, subcutaneous tissues are within normal  limits          APC / Resident Notes:   Aurora Ornelas is a 52 y.o. female who presents to the Emergency Department for evaluation of  Foot pain. Patient is deaf and thus uses sign language.  The information for this encounter was derived using an .  She reports having right foot pain for several months.  She was seen at her primary care physician's office last month and given tramadol.  She reports taking this daily however she ran out a couple days ago.  She called his office on Friday to have a refill however the physician was not in the office.  She states that the pain is progressively worsened over the weekend.  It is located to the plantar side on the ball of the foot.  She states today she was up moving around more than normal and this aggravated the pain.  She was walking and states her foot cramped up.  She reports that she is walking at an odd angle trying to alleviate the pain and cant walk flat on her foot.  She denies numbness, tingling, weakness, injury, swelling, chest pain, shortness of breath, calf pain.    Physical Exam shows a non-toxic, afebrile, and well appearing female. Pertinent exam findings include breath sounds are clear and equal in all fields, normal effort, heart tones normal, abdomen is soft and nontender with bowel sounds present in all quadrants, no calf pain or tenderness,no skin rashes or lesions noted, right foot has no erythema, edema or ecchymosis, distal pulses are intact bilaterally, sensation is intact bilaterally, full range of motion of both ankles, refill less than 2 seconds, tenderness to palpation noted to ball of right foot.     Vital Signs Are Reassuring. If available, previous records reviewed.     RESULTS: Right foot x-ray shows no acute fractures, no osseous destruction, joint spaces are congruent, tarsometatarsal alignment is preserved, subcutaneous tissues are within normal limits.    My overall impression is foot pain. I considered but do not suspect  at this time sprain/sprain, fracture, dislocation, spur.     The patient was deemed safe and stable for discharge.    ED Course: Tramadol. D/C Meds: Tramadol. Additional D/C Information: Follow-up with primary care provider and podiatry as soon as possible.  Take medications as directed.  Rest and elevate foot.  ED return precautions given.  The diagnosis, treatment plan, instructions for follow-up and reevaluation with PCP as well as ED return precautions were discussed and understanding was verbalized. All questions or concerns have been addressed. Patient was discharged home with an instructional sheet which gave not only information regarding the most likely diagnoses but also information regarding when to return to the emergency department for alarming symptoms and when to seek further care.      This case was discussed with Dr. Quinteros who is in agreement with my assessment and plan.          Scribe Attestation:   Scribe #1: I performed the above scribed service and the documentation accurately describes the services I performed. I attest to the accuracy of the note.    Attending Attestation:           Physician Attestation for Scribe:  Physician Attestation Statement for Scribe #1: I, Shari Rice NP, reviewed documentation, as scribed by Yury Cabrera in my presence, and it is both accurate and complete.                    Clinical Impression:   The encounter diagnosis was Foot pain.    Disposition:   Disposition: Discharged  Condition: Stable                        Shari Rice NP  03/11/18 1485

## 2018-04-13 ENCOUNTER — HOSPITAL ENCOUNTER (INPATIENT)
Facility: HOSPITAL | Age: 53
LOS: 1 days | Discharge: HOME | DRG: 190 | End: 2018-04-14
Attending: EMERGENCY MEDICINE | Admitting: INTERNAL MEDICINE
Payer: COMMERCIAL

## 2018-04-13 DIAGNOSIS — J45.51 SEVERE PERSISTENT ASTHMA WITH ACUTE EXACERBATION: Primary | ICD-10-CM

## 2018-04-13 DIAGNOSIS — J44.9 CHRONIC OBSTRUCTIVE PULMONARY DISEASE, UNSPECIFIED COPD TYPE: ICD-10-CM

## 2018-04-13 DIAGNOSIS — R07.9 CHEST PAIN, UNSPECIFIED TYPE: ICD-10-CM

## 2018-04-13 DIAGNOSIS — R06.02 SOB (SHORTNESS OF BREATH): ICD-10-CM

## 2018-04-13 DIAGNOSIS — R07.9 CHEST PAIN: ICD-10-CM

## 2018-04-13 PROBLEM — E87.6 HYPOKALEMIA: Status: ACTIVE | Noted: 2018-04-13

## 2018-04-13 PROBLEM — D64.9 ANEMIA: Status: ACTIVE | Noted: 2018-04-13

## 2018-04-13 PROBLEM — J96.01 ACUTE HYPOXEMIC RESPIRATORY FAILURE: Status: ACTIVE | Noted: 2018-04-13

## 2018-04-13 PROBLEM — E44.1 MILD MALNUTRITION: Status: ACTIVE | Noted: 2018-04-13

## 2018-04-13 PROBLEM — I10 HYPERTENSION: Status: ACTIVE | Noted: 2018-04-13

## 2018-04-13 PROBLEM — R07.89 CHEST PAIN, ATYPICAL: Status: ACTIVE | Noted: 2018-04-13

## 2018-04-13 PROBLEM — R07.89 CHEST PAIN, ATYPICAL: Status: RESOLVED | Noted: 2018-04-13 | Resolved: 2018-04-13

## 2018-04-13 PROBLEM — R73.9 HYPERGLYCEMIA: Status: ACTIVE | Noted: 2018-04-13

## 2018-04-13 PROBLEM — R06.03 RESPIRATORY DISTRESS: Status: ACTIVE | Noted: 2018-04-13

## 2018-04-13 PROBLEM — H91.90 DEAF: Status: ACTIVE | Noted: 2018-04-13

## 2018-04-13 LAB
ALBUMIN SERPL BCP-MCNC: 3.4 G/DL
ALP SERPL-CCNC: 52 U/L
ALT SERPL W/O P-5'-P-CCNC: 19 U/L
ANION GAP SERPL CALC-SCNC: 6 MMOL/L
AST SERPL-CCNC: 28 U/L
BASOPHILS # BLD AUTO: 0.01 K/UL
BASOPHILS NFR BLD: 0.2 %
BILIRUB SERPL-MCNC: 0.4 MG/DL
BILIRUB UR QL STRIP: NEGATIVE
BNP SERPL-MCNC: 227 PG/ML
BUN SERPL-MCNC: 7 MG/DL
CALCIUM SERPL-MCNC: 8.6 MG/DL
CHLORIDE SERPL-SCNC: 100 MMOL/L
CLARITY UR: CLEAR
CO2 SERPL-SCNC: 29 MMOL/L
COLOR UR: ABNORMAL
CREAT SERPL-MCNC: 0.8 MG/DL
DIFFERENTIAL METHOD: ABNORMAL
EOSINOPHIL # BLD AUTO: 0.4 K/UL
EOSINOPHIL NFR BLD: 6.8 %
ERYTHROCYTE [DISTWIDTH] IN BLOOD BY AUTOMATED COUNT: 19.9 %
EST. GFR  (AFRICAN AMERICAN): >60 ML/MIN/1.73 M^2
EST. GFR  (NON AFRICAN AMERICAN): >60 ML/MIN/1.73 M^2
ESTIMATED PA SYSTOLIC PRESSURE: 37.81
GLUCOSE SERPL-MCNC: 176 MG/DL
GLUCOSE UR QL STRIP: ABNORMAL
HCT VFR BLD AUTO: 31.1 %
HGB BLD-MCNC: 9.1 G/DL
HGB UR QL STRIP: NEGATIVE
KETONES UR QL STRIP: NEGATIVE
LEUKOCYTE ESTERASE UR QL STRIP: NEGATIVE
LYMPHOCYTES # BLD AUTO: 1.3 K/UL
LYMPHOCYTES NFR BLD: 23.9 %
MCH RBC QN AUTO: 22.6 PG
MCHC RBC AUTO-ENTMCNC: 29.3 G/DL
MCV RBC AUTO: 77 FL
MONOCYTES # BLD AUTO: 0.3 K/UL
MONOCYTES NFR BLD: 5.1 %
NEUTROPHILS # BLD AUTO: 3.4 K/UL
NEUTROPHILS NFR BLD: 64 %
NITRITE UR QL STRIP: NEGATIVE
PH UR STRIP: 6 [PH] (ref 5–8)
PLATELET # BLD AUTO: 328 K/UL
PMV BLD AUTO: 8.9 FL
POTASSIUM SERPL-SCNC: 3.4 MMOL/L
PROT SERPL-MCNC: 7.3 G/DL
PROT UR QL STRIP: NEGATIVE
RBC # BLD AUTO: 4.02 M/UL
RETIRED EF AND QEF - SEE NOTES: 55 (ref 55–65)
SODIUM SERPL-SCNC: 135 MMOL/L
SP GR UR STRIP: 1.01 (ref 1–1.03)
TRICUSPID VALVE REGURGITATION: NORMAL
TROPONIN I SERPL DL<=0.01 NG/ML-MCNC: 0.02 NG/ML
TROPONIN I SERPL DL<=0.01 NG/ML-MCNC: 0.22 NG/ML
URN SPEC COLLECT METH UR: ABNORMAL
UROBILINOGEN UR STRIP-ACNC: NEGATIVE EU/DL
WBC # BLD AUTO: 5.31 K/UL

## 2018-04-13 PROCEDURE — 25000003 PHARM REV CODE 250: Performed by: INTERNAL MEDICINE

## 2018-04-13 PROCEDURE — 93306 TTE W/DOPPLER COMPLETE: CPT

## 2018-04-13 PROCEDURE — 99285 EMERGENCY DEPT VISIT HI MDM: CPT | Mod: 25

## 2018-04-13 PROCEDURE — 83880 ASSAY OF NATRIURETIC PEPTIDE: CPT

## 2018-04-13 PROCEDURE — 99222 1ST HOSP IP/OBS MODERATE 55: CPT | Mod: ,,, | Performed by: INTERNAL MEDICINE

## 2018-04-13 PROCEDURE — 63600175 PHARM REV CODE 636 W HCPCS: Performed by: EMERGENCY MEDICINE

## 2018-04-13 PROCEDURE — 80053 COMPREHEN METABOLIC PANEL: CPT

## 2018-04-13 PROCEDURE — 81003 URINALYSIS AUTO W/O SCOPE: CPT

## 2018-04-13 PROCEDURE — 93306 TTE W/DOPPLER COMPLETE: CPT | Mod: 26,,, | Performed by: INTERNAL MEDICINE

## 2018-04-13 PROCEDURE — 85025 COMPLETE CBC W/AUTO DIFF WBC: CPT

## 2018-04-13 PROCEDURE — 93010 ELECTROCARDIOGRAM REPORT: CPT | Mod: ,,, | Performed by: INTERNAL MEDICINE

## 2018-04-13 PROCEDURE — 94640 AIRWAY INHALATION TREATMENT: CPT

## 2018-04-13 PROCEDURE — 25000242 PHARM REV CODE 250 ALT 637 W/ HCPCS: Performed by: EMERGENCY MEDICINE

## 2018-04-13 PROCEDURE — 63600175 PHARM REV CODE 636 W HCPCS: Performed by: INTERNAL MEDICINE

## 2018-04-13 PROCEDURE — 21400001 HC TELEMETRY ROOM

## 2018-04-13 PROCEDURE — 25000003 PHARM REV CODE 250: Performed by: EMERGENCY MEDICINE

## 2018-04-13 PROCEDURE — 36415 COLL VENOUS BLD VENIPUNCTURE: CPT

## 2018-04-13 PROCEDURE — 84484 ASSAY OF TROPONIN QUANT: CPT

## 2018-04-13 PROCEDURE — 25000242 PHARM REV CODE 250 ALT 637 W/ HCPCS: Performed by: INTERNAL MEDICINE

## 2018-04-13 PROCEDURE — 93005 ELECTROCARDIOGRAM TRACING: CPT

## 2018-04-13 RX ORDER — IBUPROFEN 200 MG
16 TABLET ORAL
Status: DISCONTINUED | OUTPATIENT
Start: 2018-04-13 | End: 2018-04-14 | Stop reason: HOSPADM

## 2018-04-13 RX ORDER — LISINOPRIL AND HYDROCHLOROTHIAZIDE 12.5; 2 MG/1; MG/1
1 TABLET ORAL DAILY
Status: DISCONTINUED | OUTPATIENT
Start: 2018-04-13 | End: 2018-04-13

## 2018-04-13 RX ORDER — LISINOPRIL 20 MG/1
20 TABLET ORAL DAILY
Status: DISCONTINUED | OUTPATIENT
Start: 2018-04-13 | End: 2018-04-14 | Stop reason: HOSPADM

## 2018-04-13 RX ORDER — IPRATROPIUM BROMIDE AND ALBUTEROL SULFATE 2.5; .5 MG/3ML; MG/3ML
3 SOLUTION RESPIRATORY (INHALATION)
Status: DISCONTINUED | OUTPATIENT
Start: 2018-04-13 | End: 2018-04-13

## 2018-04-13 RX ORDER — BUDESONIDE AND FORMOTEROL FUMARATE DIHYDRATE 160; 4.5 UG/1; UG/1
2 AEROSOL RESPIRATORY (INHALATION) EVERY 12 HOURS
Status: DISCONTINUED | OUTPATIENT
Start: 2018-04-13 | End: 2018-04-13

## 2018-04-13 RX ORDER — HYDROCHLOROTHIAZIDE 12.5 MG/1
12.5 TABLET ORAL DAILY
Status: DISCONTINUED | OUTPATIENT
Start: 2018-04-13 | End: 2018-04-14 | Stop reason: HOSPADM

## 2018-04-13 RX ORDER — GLUCAGON 1 MG
1 KIT INJECTION
Status: DISCONTINUED | OUTPATIENT
Start: 2018-04-13 | End: 2018-04-14 | Stop reason: HOSPADM

## 2018-04-13 RX ORDER — FLUTICASONE FUROATE AND VILANTEROL 100; 25 UG/1; UG/1
1 POWDER RESPIRATORY (INHALATION) DAILY
Status: DISCONTINUED | OUTPATIENT
Start: 2018-04-13 | End: 2018-04-14 | Stop reason: HOSPADM

## 2018-04-13 RX ORDER — PREDNISONE 20 MG/1
40 TABLET ORAL DAILY
Status: COMPLETED | OUTPATIENT
Start: 2018-04-13 | End: 2018-04-14

## 2018-04-13 RX ORDER — ASPIRIN 325 MG
325 TABLET ORAL
Status: COMPLETED | OUTPATIENT
Start: 2018-04-13 | End: 2018-04-13

## 2018-04-13 RX ORDER — INSULIN ASPART 100 [IU]/ML
0-5 INJECTION, SOLUTION INTRAVENOUS; SUBCUTANEOUS
Status: DISCONTINUED | OUTPATIENT
Start: 2018-04-13 | End: 2018-04-14 | Stop reason: HOSPADM

## 2018-04-13 RX ORDER — IBUPROFEN 200 MG
24 TABLET ORAL
Status: DISCONTINUED | OUTPATIENT
Start: 2018-04-13 | End: 2018-04-14 | Stop reason: HOSPADM

## 2018-04-13 RX ORDER — ENOXAPARIN SODIUM 100 MG/ML
40 INJECTION SUBCUTANEOUS EVERY 24 HOURS
Status: DISCONTINUED | OUTPATIENT
Start: 2018-04-13 | End: 2018-04-14 | Stop reason: HOSPADM

## 2018-04-13 RX ORDER — IPRATROPIUM BROMIDE AND ALBUTEROL SULFATE 2.5; .5 MG/3ML; MG/3ML
3 SOLUTION RESPIRATORY (INHALATION)
Status: COMPLETED | OUTPATIENT
Start: 2018-04-13 | End: 2018-04-13

## 2018-04-13 RX ORDER — IPRATROPIUM BROMIDE AND ALBUTEROL SULFATE 2.5; .5 MG/3ML; MG/3ML
3 SOLUTION RESPIRATORY (INHALATION) EVERY 6 HOURS PRN
Status: DISCONTINUED | OUTPATIENT
Start: 2018-04-13 | End: 2018-04-14 | Stop reason: HOSPADM

## 2018-04-13 RX ADMIN — HYDROCHLOROTHIAZIDE 12.5 MG: 12.5 TABLET ORAL at 04:04

## 2018-04-13 RX ADMIN — ASPIRIN 325 MG ORAL TABLET 325 MG: 325 PILL ORAL at 08:04

## 2018-04-13 RX ADMIN — PREDNISONE 40 MG: 20 TABLET ORAL at 11:04

## 2018-04-13 RX ADMIN — IPRATROPIUM BROMIDE AND ALBUTEROL SULFATE 3 ML: .5; 2.5 SOLUTION RESPIRATORY (INHALATION) at 08:04

## 2018-04-13 RX ADMIN — LISINOPRIL 20 MG: 20 TABLET ORAL at 04:04

## 2018-04-13 RX ADMIN — IPRATROPIUM BROMIDE AND ALBUTEROL SULFATE 3 ML: .5; 2.5 SOLUTION RESPIRATORY (INHALATION) at 07:04

## 2018-04-13 RX ADMIN — ENOXAPARIN SODIUM 40 MG: 100 INJECTION SUBCUTANEOUS at 04:04

## 2018-04-13 NOTE — ED TRIAGE NOTES
Pt arrived to ed via ems. Ems reported pt was 74% on Ra. Given 125 mg solumedrol, on Bipap. Pt's O2 now 97% on 3L nasal cannula. Pt presents audible wheezing after breathing treatment, tightness of the chest, denies nausea, vomiting, fever, chill. Pt reports dizziness, asking for medication for dizziness.

## 2018-04-13 NOTE — CONSULTS
[facility]  Cardiology  Consult Note    Patient Name: Aurora Ornelas  MRN: [id number]  Admission Date: 4/13/2018  Hospital Length of Stay: 0 days  Code Status: Full Code   Attending Provider: Sander Godoy MD   Consulting Provider: Burak Burger MD  Primary Care Physician: Primary Doctor No  Principal Problem:<principal problem not specified>    Patient information was obtained from patient and ER records.     Consults  Subjective:     Chief Complaint:  SOB     HPI:   HPI: This 52 y.o F with anemia, arthritis, asthma, deaf and HTN presents to the ED via EMS c/o acute onset of constant and worsening SOB with associated chest tightness and chest pain which began when she woke up at 0702. The chest tightness increases with walking. SOB worse over the last week. Also reports SOB with ling flat in bed over the last 1 week. Has been out of daily inhalers x 1-2 month. The pt reports wheezing, fatigue and dizziness. EMS reports the pt was tachycardic, with a BP of 190/100, O2 of 74% on RA and 36 breaths per minute on arrival. EMS administered solumedrol, combivent and CPAP with significant improvement. Currently, the pt reports gradual onset of a headache. The pt denies fever, chills, N/V/D, dysuria, urinary frequency and urgency. The pt does not smoke and no one at home smokes. No recent chemical or dust exposure. No hx of blood clots/PE risk factors or recent falls. No recent hospital admissions. The pt states she was intubated in 2014.     EKG NSR NSSTT changes    Denies prior cardiac Hx    Past Medical History:   Diagnosis Date    Anemia     Arthritis     Asthma     Deaf     Hypertension        Past Surgical History:   Procedure Laterality Date    HYSTERECTOMY         Review of patient's allergies indicates:  No Known Allergies    No current facility-administered medications on file prior to encounter.      Current Outpatient Prescriptions on File Prior to Encounter   Medication Sig     albuterol 90 mcg/actuation inhaler Inhale 1-2 puffs into the lungs every 4 (four) hours as needed for Wheezing.    albuterol 90 mcg/actuation inhaler Inhale 1-2 puffs into the lungs every 6 (six) hours as needed for Wheezing or Shortness of Breath. Rescue    albuterol sulfate 2.5 mg/0.5 mL Nebu Take 2.5 mg by nebulization every 4 (four) hours as needed.    albuterol-ipratropium 2.5mg-0.5mg/3mL (DUO-NEB) 0.5 mg-3 mg(2.5 mg base)/3 mL nebulizer solution Take 3 mLs by nebulization every 6 (six) hours as needed for Wheezing or Shortness of Breath. Rescue    budesonide-formoterol 160-4.5 mcg (SYMBICORT) 160-4.5 mcg/actuation HFAA Inhale 2 puffs into the lungs every 12 (twelve) hours. Controller    ferrous sulfate 325 (65 FE) MG EC tablet Take 325 mg by mouth 3 (three) times daily with meals.    lisinopril-hydrochlorothiazide (PRINZIDE,ZESTORETIC) 20-12.5 mg per tablet Take 1 tablet by mouth once daily.    meloxicam (MOBIC) 7.5 MG tablet Take 1 tablet (7.5 mg total) by mouth once daily.    naproxen (NAPROSYN) 500 MG tablet Take 1 tablet (500 mg total) by mouth 2 (two) times daily as needed (for pain).    pantoprazole (PROTONIX) 40 MG tablet Take 40 mg by mouth once daily.    predniSONE (DELTASONE) 20 MG tablet Take 60mg (3 tabs) for 3 days, then take 40mg (2 tabs) for 3 days, then take 20mg (1 tab) for 3 days.    traMADol (ULTRAM) 50 mg tablet Take 1 tablet (50 mg total) by mouth every 8 (eight) hours as needed for Pain.     Family History     None        Social History Main Topics    Smoking status: Never Smoker    Smokeless tobacco: Never Used    Alcohol use No    Drug use: No    Sexual activity: Yes     Partners: Male     Birth control/ protection: None     Review of Systems   Constitution: Negative for decreased appetite.   HENT: Negative for ear discharge.    Eyes: Negative for blurred vision.   Respiratory: Negative for hemoptysis.    Endocrine: Negative for polyphagia.   Hematologic/Lymphatic:  Negative for adenopathy.   Skin: Negative for color change.   Musculoskeletal: Negative for joint swelling.   Neurological: Negative for brief paralysis.   Psychiatric/Behavioral: Negative for hallucinations.     Objective:     Vital Signs (Most Recent):  Pulse: 94 (04/13/18 1224)  Resp: 20 (04/13/18 0847)  BP: (!) 156/84 (04/13/18 1224)  SpO2: 97 % (04/13/18 1224) Vital Signs (24h Range):  Pulse:  [] 94  Resp:  [20-28] 20  SpO2:  [97 %-100 %] 97 %  BP: (129-156)/(81-85) 156/84     Weight: 68 kg (150 lb)  Body mass index is 24.21 kg/m².    SpO2: 97 %  O2 Device (Oxygen Therapy): nasal cannula    No intake or output data in the 24 hours ending 04/13/18 1236    Lines/Drains/Airways     Peripheral Intravenous Line                 Peripheral IV - Single Lumen 04/13/18 0700 Left Hand less than 1 day                Physical Exam   Constitutional: She is oriented to person, place, and time. She appears well-developed and well-nourished.   HENT:   Head: Normocephalic and atraumatic.   Eyes: Conjunctivae are normal. Pupils are equal, round, and reactive to light.   Neck: Normal range of motion. Neck supple.   Cardiovascular: Normal rate, normal heart sounds and intact distal pulses.    Pulmonary/Chest: Effort normal and breath sounds normal.   Abdominal: Soft. Bowel sounds are normal.   Musculoskeletal: Normal range of motion.   Neurological: She is alert and oriented to person, place, and time.   Skin: Skin is warm and dry.       Significant Labs: All pertinent lab results from the last 24 hours have been reviewed.    Significant Imaging: Echocardiogram: 2D echo with color flow doppler: No results found for this or any previous visit.    Assessment and Plan:     Chest pain, atypical    R/O MI. Check echo. If no MI ok to do out patient stress test        Asthma exacerbation    Per primary            VTE Risk Mitigation         Ordered     Place HÉCTOR hose  Until discontinued      04/13/18 1121     IP VTE LOW RISK PATIENT   Once      04/13/18 1121          Thank you for your consult. I will sign off. Please contact us if you have any additional questions.    Burak Burger MD  Cardiology   [facility]

## 2018-04-13 NOTE — SUBJECTIVE & OBJECTIVE
Past Medical History:   Diagnosis Date    Anemia     Arthritis     Asthma     Deaf     Hypertension        Past Surgical History:   Procedure Laterality Date    HYSTERECTOMY         Review of patient's allergies indicates:  No Known Allergies    No current facility-administered medications on file prior to encounter.      Current Outpatient Prescriptions on File Prior to Encounter   Medication Sig    albuterol 90 mcg/actuation inhaler Inhale 1-2 puffs into the lungs every 4 (four) hours as needed for Wheezing.    albuterol 90 mcg/actuation inhaler Inhale 1-2 puffs into the lungs every 6 (six) hours as needed for Wheezing or Shortness of Breath. Rescue    albuterol sulfate 2.5 mg/0.5 mL Nebu Take 2.5 mg by nebulization every 4 (four) hours as needed.    albuterol-ipratropium 2.5mg-0.5mg/3mL (DUO-NEB) 0.5 mg-3 mg(2.5 mg base)/3 mL nebulizer solution Take 3 mLs by nebulization every 6 (six) hours as needed for Wheezing or Shortness of Breath. Rescue    budesonide-formoterol 160-4.5 mcg (SYMBICORT) 160-4.5 mcg/actuation HFAA Inhale 2 puffs into the lungs every 12 (twelve) hours. Controller    ferrous sulfate 325 (65 FE) MG EC tablet Take 325 mg by mouth 3 (three) times daily with meals.    lisinopril-hydrochlorothiazide (PRINZIDE,ZESTORETIC) 20-12.5 mg per tablet Take 1 tablet by mouth once daily.    meloxicam (MOBIC) 7.5 MG tablet Take 1 tablet (7.5 mg total) by mouth once daily.    naproxen (NAPROSYN) 500 MG tablet Take 1 tablet (500 mg total) by mouth 2 (two) times daily as needed (for pain).    pantoprazole (PROTONIX) 40 MG tablet Take 40 mg by mouth once daily.    predniSONE (DELTASONE) 20 MG tablet Take 60mg (3 tabs) for 3 days, then take 40mg (2 tabs) for 3 days, then take 20mg (1 tab) for 3 days.    traMADol (ULTRAM) 50 mg tablet Take 1 tablet (50 mg total) by mouth every 8 (eight) hours as needed for Pain.     Family History     None        Social History Main Topics    Smoking status: Never  Smoker    Smokeless tobacco: Never Used    Alcohol use No    Drug use: No    Sexual activity: Yes     Partners: Male     Birth control/ protection: None     Review of Systems   Constitutional: Negative for activity change, appetite change, chills, diaphoresis, fatigue and fever.   HENT: Negative for congestion.    Respiratory: Positive for shortness of breath and wheezing. Negative for choking and chest tightness.    Gastrointestinal: Negative for abdominal distention, nausea and vomiting.   Genitourinary: Negative for difficulty urinating.   Musculoskeletal: Negative for arthralgias.   Psychiatric/Behavioral: Negative for agitation and behavioral problems.     Objective:     Vital Signs (Most Recent):  Pulse: 94 (04/13/18 1224)  Resp: 20 (04/13/18 0847)  BP: (!) 156/84 (04/13/18 1224)  SpO2: 97 % (04/13/18 1224) Vital Signs (24h Range):  Pulse:  [] 94  Resp:  [20-28] 20  SpO2:  [97 %-100 %] 97 %  BP: (129-156)/(81-85) 156/84     Weight: 68 kg (150 lb)  Body mass index is 24.21 kg/m².    Physical Exam   Constitutional: She is oriented to person, place, and time. She appears well-developed and well-nourished.   HENT:   Head: Normocephalic and atraumatic.   Cardiovascular: Normal rate and regular rhythm.  Exam reveals no gallop and no friction rub.    Pulmonary/Chest: Effort normal and breath sounds normal. No respiratory distress. She has no wheezes. She has no rales. She exhibits no tenderness.   Abdominal: Soft. Bowel sounds are normal. She exhibits no distension. There is no tenderness. There is no rebound and no guarding.   Neurological: She is alert and oriented to person, place, and time.   Skin: Skin is warm and dry.   No lower ext edema.    Psychiatric: She has a normal mood and affect. Her behavior is normal.   Vitals reviewed.          Significant Labs:   BMP:   Recent Labs  Lab 04/13/18 0824   *   *   K 3.4*      CO2 29   BUN 7   CREATININE 0.8   CALCIUM 8.6*     CBC:   Recent  Labs  Lab 04/13/18  0824   WBC 5.31   HGB 9.1*   HCT 31.1*          Significant Imaging:  CXR negative.   .  Sinus tachy- no acute changes on EKG.     All labs reviewed and interpreted by myself.

## 2018-04-13 NOTE — PLAN OF CARE
Problem: Fall Risk (Adult)  Goal: Identify Related Risk Factors and Signs and Symptoms  Related risk factors and signs and symptoms are identified upon initiation of Human Response Clinical Practice Guideline (CPG)   Outcome: Ongoing (interventions implemented as appropriate)   04/13/18 1708   Fall Risk   Related Risk Factors (Fall Risk) fatigue/slow reaction;polypharmacy;environment unfamiliar      04/13/18 1708   Fall Risk   Related Risk Factors (Fall Risk) fatigue/slow reaction;polypharmacy;environment unfamiliar   Signs and Symptoms (Fall Risk) presence of risk factors

## 2018-04-13 NOTE — ASSESSMENT & PLAN NOTE
Ran out of inhalers recently.  Improved greatly with breathing treatments and solumedrol.  Now no 02 requirements and normal lung exam.  Continue po prednisone. Breathing treatments.  Home likely on 4/13.

## 2018-04-13 NOTE — H&P
[facility] Hospital Medicine  History & Physical    Patient Name: Aurora Ornelas  MRN: [id number]  Admission Date: 4/13/2018  Attending Physician: Sander Godoy MD   Primary Care Provider: Primary Doctor No         Patient information was obtained from patient and ER records.     Subjective:     Principal Problem:Asthma exacerbation    Chief Complaint:   Chief Complaint   Patient presents with    Shortness of Breath     pt started with sob this am approx 7 am.  also w/ chest tightness and dizziness.  EMS states original sat at 74%.  with CPAP, combivent, and solumedrol...sats up to 100%.  Presents with audiable wheezing.        HPI: Mrs. Ornelas is a 53 yo F who is deaf, presents to the ED with a CC of SOB that started this am.  The history is limited at this time due to unable to find the code for the [employer] sign language Ipad.  The history is mainly obtained from the ED record.  The patient has not been admitted to the hospital in the past here. She woke up this am wheezing and SOB. She was noted to have run out of her inhalers a couple of weeks ago. EMS found her with 02 sats of 72% and started on breathing treatments, steroids and bi-pap.  She evidently responded very well and now is in her room resting comfortably on RA and has a completely normal lung exam. There is a history of some SOB lying flat over the past week and she presents with a BNP of 227 in the setting of normal renal function. She currently does not indicate any complaint.     Past Medical History:   Diagnosis Date    Anemia     Arthritis     Asthma     Deaf     Hypertension        Past Surgical History:   Procedure Laterality Date    HYSTERECTOMY         Review of patient's allergies indicates:  No Known Allergies    No current facility-administered medications on file prior to encounter.      Current Outpatient Prescriptions on File Prior to Encounter   Medication Sig    albuterol 90 mcg/actuation inhaler Inhale  1-2 puffs into the lungs every 4 (four) hours as needed for Wheezing.    albuterol 90 mcg/actuation inhaler Inhale 1-2 puffs into the lungs every 6 (six) hours as needed for Wheezing or Shortness of Breath. Rescue    albuterol sulfate 2.5 mg/0.5 mL Nebu Take 2.5 mg by nebulization every 4 (four) hours as needed.    albuterol-ipratropium 2.5mg-0.5mg/3mL (DUO-NEB) 0.5 mg-3 mg(2.5 mg base)/3 mL nebulizer solution Take 3 mLs by nebulization every 6 (six) hours as needed for Wheezing or Shortness of Breath. Rescue    budesonide-formoterol 160-4.5 mcg (SYMBICORT) 160-4.5 mcg/actuation HFAA Inhale 2 puffs into the lungs every 12 (twelve) hours. Controller    ferrous sulfate 325 (65 FE) MG EC tablet Take 325 mg by mouth 3 (three) times daily with meals.    lisinopril-hydrochlorothiazide (PRINZIDE,ZESTORETIC) 20-12.5 mg per tablet Take 1 tablet by mouth once daily.    meloxicam (MOBIC) 7.5 MG tablet Take 1 tablet (7.5 mg total) by mouth once daily.    naproxen (NAPROSYN) 500 MG tablet Take 1 tablet (500 mg total) by mouth 2 (two) times daily as needed (for pain).    pantoprazole (PROTONIX) 40 MG tablet Take 40 mg by mouth once daily.    predniSONE (DELTASONE) 20 MG tablet Take 60mg (3 tabs) for 3 days, then take 40mg (2 tabs) for 3 days, then take 20mg (1 tab) for 3 days.    traMADol (ULTRAM) 50 mg tablet Take 1 tablet (50 mg total) by mouth every 8 (eight) hours as needed for Pain.     Family History     None        Social History Main Topics    Smoking status: Never Smoker    Smokeless tobacco: Never Used    Alcohol use No    Drug use: No    Sexual activity: Yes     Partners: Male     Birth control/ protection: None     Review of Systems   Constitutional: Negative for activity change, appetite change, chills, diaphoresis, fatigue and fever.   HENT: Negative for congestion.    Respiratory: Positive for shortness of breath and wheezing. Negative for choking and chest tightness.    Gastrointestinal: Negative  for abdominal distention, nausea and vomiting.   Genitourinary: Negative for difficulty urinating.   Musculoskeletal: Negative for arthralgias.   Psychiatric/Behavioral: Negative for agitation and behavioral problems.     Objective:     Vital Signs (Most Recent):  Pulse: 94 (04/13/18 1224)  Resp: 20 (04/13/18 0847)  BP: (!) 156/84 (04/13/18 1224)  SpO2: 97 % (04/13/18 1224) Vital Signs (24h Range):  Pulse:  [] 94  Resp:  [20-28] 20  SpO2:  [97 %-100 %] 97 %  BP: (129-156)/(81-85) 156/84     Weight: 68 kg (150 lb)  Body mass index is 24.21 kg/m².    Physical Exam   Constitutional: She is oriented to person, place, and time. She appears well-developed and well-nourished.   HENT:   Head: Normocephalic and atraumatic.   Cardiovascular: Normal rate and regular rhythm.  Exam reveals no gallop and no friction rub.    Pulmonary/Chest: Effort normal and breath sounds normal. No respiratory distress. She has no wheezes. She has no rales. She exhibits no tenderness.   Abdominal: Soft. Bowel sounds are normal. She exhibits no distension. There is no tenderness. There is no rebound and no guarding.   Neurological: She is alert and oriented to person, place, and time.   Skin: Skin is warm and dry.   No lower ext edema.    Psychiatric: She has a normal mood and affect. Her behavior is normal.   Vitals reviewed.          Significant Labs:   BMP:   Recent Labs  Lab 04/13/18  0824   *   *   K 3.4*      CO2 29   BUN 7   CREATININE 0.8   CALCIUM 8.6*     CBC:   Recent Labs  Lab 04/13/18 0824   WBC 5.31   HGB 9.1*   HCT 31.1*          Significant Imaging:  CXR negative.   .  Sinus tachy- no acute changes on EKG.     All labs reviewed and interpreted by myself.       Assessment/Plan:     * Asthma exacerbation    Ran out of inhalers recently.  Improved greatly with breathing treatments and solumedrol.  Now no 02 requirements and normal lung exam.  Continue po prednisone. Breathing treatments.  Home  likely on 4/13.           Acute hypoxemic respiratory failure    From above- has resolved.  72% on RA by EMS.           Respiratory distress    From #1- has resolved.           Hypokalemia    Minimal- will follow.             Mild malnutrition    Will discuss with patient          Hyperglycemia    Likely from steroids.           Hypertension    Will continue home meds.           Anemia    Anemia of chronic disease- no acute issues.           Deaf    Noted.         atypical CP. Cards consulted.  Signed off.    Elevated BNP- will check echo.     VTE Risk Mitigation         Ordered     Place HÉCTOR hose  Until discontinued      04/13/18 1121     IP VTE LOW RISK PATIENT  Once      04/13/18 1121        Echo of heart.  Prednisone. Breathing treatments. Likely home in am.  On RA. Lung exam normal.      Sander Morales MD  Department of Hospital Medicine   [facility] [place]

## 2018-04-13 NOTE — PLAN OF CARE
Problem: Patient Care Overview  Goal: Plan of Care Review  Outcome: Ongoing (interventions implemented as appropriate)   04/13/18 3327   Coping/Psychosocial   Plan Of Care Reviewed With patient

## 2018-04-13 NOTE — HOSPITAL COURSE
The patient presents to the hospital with a CC of sob and hypoxia. The patient was noted to have wheezing by EMS and ED doctor and quickly improved with steroids. By the time I examined her just a couple hours after admission- she was resting comfortably on RA in her room and had a completely normal lung exam. Echo was obtained and was normal. Her initial trop was negative but subsequent ones were positive. Cards was consulted. Of note, the patient was found to have an 02 sat of 72% when EMS arrived.  I spoke to Dr. Burger who was aware of the elevated troponin and noted that the patient could go home and follow up with him as out patient for out patient stress.  The patient clinically improved and her CC resolved. She will go home on a steroid taper.  Inhalers Rx's were refilled.  Activity as tolerated. Diet- low NA.

## 2018-04-13 NOTE — HPI
HPI: This 52 y.o F with anemia, arthritis, asthma, deaf and HTN presents to the ED via EMS c/o acute onset of constant and worsening SOB with associated chest tightness and chest pain which began when she woke up at 0702. The chest tightness increases with walking. SOB worse over the last week. Also reports SOB with ling flat in bed over the last 1 week. Has been out of daily inhalers x 1-2 month. The pt reports wheezing, fatigue and dizziness. EMS reports the pt was tachycardic, with a BP of 190/100, O2 of 74% on RA and 36 breaths per minute on arrival. EMS administered solumedrol, combivent and CPAP with significant improvement. Currently, the pt reports gradual onset of a headache. The pt denies fever, chills, N/V/D, dysuria, urinary frequency and urgency. The pt does not smoke and no one at home smokes. No recent chemical or dust exposure. No hx of blood clots/PE risk factors or recent falls. No recent hospital admissions. The pt states she was intubated in 2014.     EKG NSR NSSTT changes    Denies prior cardiac Hx

## 2018-04-13 NOTE — ED PROVIDER NOTES
Encounter Date: 4/13/2018    SCRIBE #1 NOTE: I, Jagdeep Marley, am scribing for, and in the presence of,  Jakub Renteria MD. I have scribed the following portions of the note - Other sections scribed: HPI, ROS and PE.       History     Chief Complaint   Patient presents with    Shortness of Breath     pt started with sob this am approx 7 am.  also w/ chest tightness and dizziness.  EMS states original sat at 74%.  with CPAP, combivent, and solumedrol...sats up to 100%.  Presents with audiable wheezing.     CC: Shortness of Breath     HPI: This 52 y.o F with anemia, arthritis, asthma, deaf and HTN presents to the ED via EMS c/o acute onset of constant and worsening SOB with associated chest tightness and chest pain which began when she woke up at 0702. The chest tightness increases with walking. SOB worse over the last week. Also reports SOB with ling flat in bed over the last 1 week. Has been out of daily inhalers x 1-2 month. The pt reports wheezing, fatigue and dizziness. EMS reports the pt was tachycardic, with a BP of 190/100, O2 of 74% on RA and 36 breaths per minute on arrival. EMS administered solumedrol, combivent and CPAP with significant improvement. Currently, the pt reports gradual onset of a headache. The pt denies fever, chills, N/V/D, dysuria, urinary frequency and urgency. The pt does not smoke and no one at home smokes. No recent chemical or dust exposure. No hx of blood clots/PE risk factors or recent falls. No recent hospital admissions. The pt states she was intubated in 2014.          The history is provided by the patient. A  was used ([employer]).     Review of patient's allergies indicates:  No Known Allergies  Past Medical History:   Diagnosis Date    Anemia     Arthritis     Asthma     Deaf     Hypertension      Past Surgical History:   Procedure Laterality Date    HYSTERECTOMY       History reviewed. No pertinent family history.  Social History   Substance Use  Topics    Smoking status: Never Smoker    Smokeless tobacco: Never Used    Alcohol use No     Review of Systems   Constitutional: Positive for fatigue. Negative for chills, diaphoresis and fever.   HENT: Negative for rhinorrhea and sore throat.    Eyes: Negative for redness.   Respiratory: Positive for chest tightness, shortness of breath and wheezing. Negative for cough.    Cardiovascular: Positive for chest pain.   Gastrointestinal: Negative for abdominal pain, diarrhea, nausea and vomiting.   Genitourinary: Negative for dysuria, frequency and urgency.   Musculoskeletal: Negative for back pain and neck pain.   Skin: Negative for rash.   Neurological: Positive for dizziness.   Psychiatric/Behavioral: The patient is not nervous/anxious.        Physical Exam     Initial Vitals [04/13/18 0753]   BP Pulse Resp Temp SpO2   129/84 (!) 117 (!) 28 -- 97 %      MAP       99         Physical Exam    Nursing note and vitals reviewed.  Constitutional: She appears well-developed and well-nourished.  Non-toxic appearance. She does not appear ill.   HENT:   Head: Normocephalic and atraumatic.   Eyes: EOM are normal.   Neck: Neck supple.   Cardiovascular: Normal rate, regular rhythm, normal heart sounds and intact distal pulses.   Pulmonary/Chest: Effort normal. No respiratory distress. She has wheezes (diffuse, expiratory) in the right upper field, the right middle field, the right lower field, the left upper field, the left middle field and the left lower field.   No orthopnea.    Abdominal: Soft. Normal appearance and bowel sounds are normal. She exhibits no distension. There is no tenderness.   Musculoskeletal: Normal range of motion. She exhibits no edema.   No lower extremity edema. No signs of fluid overload.   Neurological: She is alert and oriented to person, place, and time.   No focal deficits noted on exam, following commands and moving all 4 extremities spontaneously.    Skin: Skin is warm and dry.   Psychiatric:  She has a normal mood and affect.         ED Course   Procedures  Labs Reviewed   CBC W/ AUTO DIFFERENTIAL - Abnormal; Notable for the following:        Result Value    Hemoglobin 9.1 (*)     Hematocrit 31.1 (*)     MCV 77 (*)     MCH 22.6 (*)     MCHC 29.3 (*)     RDW 19.9 (*)     MPV 8.9 (*)     All other components within normal limits   COMPREHENSIVE METABOLIC PANEL - Abnormal; Notable for the following:     Sodium 135 (*)     Potassium 3.4 (*)     Glucose 176 (*)     Calcium 8.6 (*)     Albumin 3.4 (*)     Alkaline Phosphatase 52 (*)     Anion Gap 6 (*)     All other components within normal limits   B-TYPE NATRIURETIC PEPTIDE - Abnormal; Notable for the following:      (*)     All other components within normal limits   URINALYSIS - Abnormal; Notable for the following:     Glucose, UA 1+ (*)     All other components within normal limits   TROPONIN I     EKG Readings: (Independently Interpreted)   Initial Reading: No STEMI. Previous EKG: Compared with most recent EKG Previous EKG Date: 5/17/2017. Rhythm: Normal Sinus Rhythm. Heart Rate: 88.       X-Rays:   Independently Interpreted Readings:   Chest X-Ray: Cardiomegaly present. Mild cardiomegaly. Mild increased pulmonary vasculature     Medical Decision Making:   Initial Assessment:   52 y.o F with anemia, arthritis, asthma, deaf and HTN presents to the ED via EMS c/o acute onset of constant and worsening SOB with associated chest tightness and chest pain which began when she woke up at 0702.   Differential Diagnosis:   Differential dx includes COPD/asthma exacerbation vs CHF/ACS vs pneumonia vs anemia vs metabolic derangement. Exam consistent with COPD/asthama exacerbation but will get cardiac w/u 2/2 to complaints.   Clinical Tests:   Lab Tests: Ordered  Radiological Study: Ordered  Medical Tests: Ordered            Scribe Attestation:   Scribe #1: I performed the above scribed service and the documentation accurately describes the services I  performed. I attest to the accuracy of the note.    Attending Attestation:           Physician Attestation for Scribe:  Physician Attestation Statement for Scribe #1: I, Binta Renteria MD, reviewed documentation, as scribed by Jagdeep Marley in my presence, and it is both accurate and complete.          update note: Patient CP has relsoved. Breathing has improved. Labs show troponin at 0.024 (previous <0.006) with BNP at 227. CXR with mild cardiomegaly and mildly increased pulmonary vasculature. Will consult for further ACS/CHF w/u and nebs. Awaiting hospitalitis acceptance.    10:28 AM 4/13/2017  Binta Renteria MD     Update note: Dr. Godoy is accepting MD.    11:35 AM 4/13/2018  Binta Renteria MD               Clinical Impression:   The primary encounter diagnosis was Chronic obstructive pulmonary disease, unspecified COPD type. Diagnoses of Chest pain and Chest pain, unspecified type were also pertinent to this visit.                           Binta Renteria MD  04/13/18 9221

## 2018-04-13 NOTE — HPI
Mrs. Ornelas is a 51 yo F who is deaf, presents to the ED with a CC of SOB that started this am.  The history is limited at this time due to unable to find the code for the [employer] sign language Ipad.  The history is mainly obtained from the ED record.  The patient has not been admitted to the hospital in the past here. She woke up this am wheezing and SOB. She was noted to have run out of her inhalers a couple of weeks ago. EMS found her with 02 sats of 72% and started on breathing treatments, steroids and bi-pap.  She evidently responded very well and now is in her room resting comfortably on RA and has a completely normal lung exam. There is a history of some SOB lying flat over the past week and she presents with a BNP of 227 in the setting of normal renal function. She currently does not indicate any complaint.

## 2018-04-13 NOTE — SUBJECTIVE & OBJECTIVE
Past Medical History:   Diagnosis Date    Anemia     Arthritis     Asthma     Deaf     Hypertension        Past Surgical History:   Procedure Laterality Date    HYSTERECTOMY         Review of patient's allergies indicates:  No Known Allergies    No current facility-administered medications on file prior to encounter.      Current Outpatient Prescriptions on File Prior to Encounter   Medication Sig    albuterol 90 mcg/actuation inhaler Inhale 1-2 puffs into the lungs every 4 (four) hours as needed for Wheezing.    albuterol 90 mcg/actuation inhaler Inhale 1-2 puffs into the lungs every 6 (six) hours as needed for Wheezing or Shortness of Breath. Rescue    albuterol sulfate 2.5 mg/0.5 mL Nebu Take 2.5 mg by nebulization every 4 (four) hours as needed.    albuterol-ipratropium 2.5mg-0.5mg/3mL (DUO-NEB) 0.5 mg-3 mg(2.5 mg base)/3 mL nebulizer solution Take 3 mLs by nebulization every 6 (six) hours as needed for Wheezing or Shortness of Breath. Rescue    budesonide-formoterol 160-4.5 mcg (SYMBICORT) 160-4.5 mcg/actuation HFAA Inhale 2 puffs into the lungs every 12 (twelve) hours. Controller    ferrous sulfate 325 (65 FE) MG EC tablet Take 325 mg by mouth 3 (three) times daily with meals.    lisinopril-hydrochlorothiazide (PRINZIDE,ZESTORETIC) 20-12.5 mg per tablet Take 1 tablet by mouth once daily.    meloxicam (MOBIC) 7.5 MG tablet Take 1 tablet (7.5 mg total) by mouth once daily.    naproxen (NAPROSYN) 500 MG tablet Take 1 tablet (500 mg total) by mouth 2 (two) times daily as needed (for pain).    pantoprazole (PROTONIX) 40 MG tablet Take 40 mg by mouth once daily.    predniSONE (DELTASONE) 20 MG tablet Take 60mg (3 tabs) for 3 days, then take 40mg (2 tabs) for 3 days, then take 20mg (1 tab) for 3 days.    traMADol (ULTRAM) 50 mg tablet Take 1 tablet (50 mg total) by mouth every 8 (eight) hours as needed for Pain.     Family History     None        Social History Main Topics    Smoking status: Never  Smoker    Smokeless tobacco: Never Used    Alcohol use No    Drug use: No    Sexual activity: Yes     Partners: Male     Birth control/ protection: None     Review of Systems   Constitution: Negative for decreased appetite.   HENT: Negative for ear discharge.    Eyes: Negative for blurred vision.   Respiratory: Negative for hemoptysis.    Endocrine: Negative for polyphagia.   Hematologic/Lymphatic: Negative for adenopathy.   Skin: Negative for color change.   Musculoskeletal: Negative for joint swelling.   Neurological: Negative for brief paralysis.   Psychiatric/Behavioral: Negative for hallucinations.     Objective:     Vital Signs (Most Recent):  Pulse: 94 (04/13/18 1224)  Resp: 20 (04/13/18 0847)  BP: (!) 156/84 (04/13/18 1224)  SpO2: 97 % (04/13/18 1224) Vital Signs (24h Range):  Pulse:  [] 94  Resp:  [20-28] 20  SpO2:  [97 %-100 %] 97 %  BP: (129-156)/(81-85) 156/84     Weight: 68 kg (150 lb)  Body mass index is 24.21 kg/m².    SpO2: 97 %  O2 Device (Oxygen Therapy): nasal cannula    No intake or output data in the 24 hours ending 04/13/18 1236    Lines/Drains/Airways     Peripheral Intravenous Line                 Peripheral IV - Single Lumen 04/13/18 0700 Left Hand less than 1 day                Physical Exam   Constitutional: She is oriented to person, place, and time. She appears well-developed and well-nourished.   HENT:   Head: Normocephalic and atraumatic.   Eyes: Conjunctivae are normal. Pupils are equal, round, and reactive to light.   Neck: Normal range of motion. Neck supple.   Cardiovascular: Normal rate, normal heart sounds and intact distal pulses.    Pulmonary/Chest: Effort normal and breath sounds normal.   Abdominal: Soft. Bowel sounds are normal.   Musculoskeletal: Normal range of motion.   Neurological: She is alert and oriented to person, place, and time.   Skin: Skin is warm and dry.       Significant Labs: All pertinent lab results from the last 24 hours have been  reviewed.    Significant Imaging: Echocardiogram: 2D echo with color flow doppler: No results found for this or any previous visit.

## 2018-04-14 VITALS
SYSTOLIC BLOOD PRESSURE: 127 MMHG | OXYGEN SATURATION: 97 % | BODY MASS INDEX: 25.01 KG/M2 | HEART RATE: 80 BPM | RESPIRATION RATE: 20 BRPM | TEMPERATURE: 98 F | DIASTOLIC BLOOD PRESSURE: 63 MMHG | WEIGHT: 155.63 LBS | HEIGHT: 66 IN

## 2018-04-14 PROBLEM — E87.6 HYPOKALEMIA: Status: RESOLVED | Noted: 2018-04-13 | Resolved: 2018-04-14

## 2018-04-14 PROBLEM — R79.89 ELEVATED TROPONIN: Status: RESOLVED | Noted: 2018-04-14 | Resolved: 2018-04-14

## 2018-04-14 PROBLEM — J96.01 ACUTE HYPOXEMIC RESPIRATORY FAILURE: Status: RESOLVED | Noted: 2018-04-13 | Resolved: 2018-04-14

## 2018-04-14 PROBLEM — R79.89 ELEVATED TROPONIN: Status: ACTIVE | Noted: 2018-04-14

## 2018-04-14 PROBLEM — R06.03 RESPIRATORY DISTRESS: Status: RESOLVED | Noted: 2018-04-13 | Resolved: 2018-04-14

## 2018-04-14 LAB
POCT GLUCOSE: 106 MG/DL (ref 70–110)
POCT GLUCOSE: 118 MG/DL (ref 70–110)
POCT GLUCOSE: 140 MG/DL (ref 70–110)
POCT GLUCOSE: 99 MG/DL (ref 70–110)
TROPONIN I SERPL DL<=0.01 NG/ML-MCNC: 0.18 NG/ML

## 2018-04-14 PROCEDURE — 63600175 PHARM REV CODE 636 W HCPCS: Performed by: EMERGENCY MEDICINE

## 2018-04-14 PROCEDURE — 93005 ELECTROCARDIOGRAM TRACING: CPT

## 2018-04-14 PROCEDURE — 25000003 PHARM REV CODE 250: Performed by: INTERNAL MEDICINE

## 2018-04-14 PROCEDURE — 84484 ASSAY OF TROPONIN QUANT: CPT

## 2018-04-14 PROCEDURE — 99232 SBSQ HOSP IP/OBS MODERATE 35: CPT | Mod: ,,, | Performed by: INTERNAL MEDICINE

## 2018-04-14 PROCEDURE — 36415 COLL VENOUS BLD VENIPUNCTURE: CPT

## 2018-04-14 RX ORDER — PREDNISONE 20 MG/1
TABLET ORAL
Qty: 18 TABLET | Refills: 0 | Status: SHIPPED | OUTPATIENT
Start: 2018-04-14 | End: 2019-02-27

## 2018-04-14 RX ORDER — ALBUTEROL SULFATE 2.5 MG/.5ML
2.5 SOLUTION RESPIRATORY (INHALATION) EVERY 4 HOURS PRN
Qty: 50 MG | Refills: 10 | Status: SHIPPED | OUTPATIENT
Start: 2018-04-14 | End: 2019-02-27

## 2018-04-14 RX ORDER — ALBUTEROL SULFATE 90 UG/1
1-2 AEROSOL, METERED RESPIRATORY (INHALATION) EVERY 4 HOURS PRN
Qty: 1 INHALER | Refills: 10 | Status: SHIPPED | OUTPATIENT
Start: 2018-04-14 | End: 2019-05-18

## 2018-04-14 RX ORDER — BUDESONIDE AND FORMOTEROL FUMARATE DIHYDRATE 160; 4.5 UG/1; UG/1
2 AEROSOL RESPIRATORY (INHALATION) EVERY 12 HOURS
Qty: 3 INHALER | Refills: 10 | Status: SHIPPED | OUTPATIENT
Start: 2018-04-14 | End: 2019-02-27

## 2018-04-14 RX ORDER — IPRATROPIUM BROMIDE AND ALBUTEROL SULFATE 2.5; .5 MG/3ML; MG/3ML
3 SOLUTION RESPIRATORY (INHALATION) EVERY 6 HOURS PRN
Qty: 1 BOX | Refills: 10 | Status: SHIPPED | OUTPATIENT
Start: 2018-04-14 | End: 2019-02-27

## 2018-04-14 RX ORDER — TRAMADOL HYDROCHLORIDE 50 MG/1
50 TABLET ORAL EVERY 8 HOURS PRN
Status: DISCONTINUED | OUTPATIENT
Start: 2018-04-14 | End: 2018-04-14 | Stop reason: HOSPADM

## 2018-04-14 RX ADMIN — PREDNISONE 40 MG: 20 TABLET ORAL at 10:04

## 2018-04-14 RX ADMIN — LISINOPRIL 20 MG: 20 TABLET ORAL at 10:04

## 2018-04-14 RX ADMIN — TRAMADOL HYDROCHLORIDE 50 MG: 50 TABLET, FILM COATED ORAL at 10:04

## 2018-04-14 RX ADMIN — HYDROCHLOROTHIAZIDE 12.5 MG: 12.5 TABLET ORAL at 10:04

## 2018-04-14 NOTE — ASSESSMENT & PLAN NOTE
Suspect demand ischemia from respiratory issues and not an ACS. Ok to do stress test as out patient. Could do stress test Monday if still in the hospital

## 2018-04-14 NOTE — NURSING
Report received from MANOJ Mera. Patient resting comfortably, no complaints, no acute distress noted. Plan of care reviewed with patient. Instructed patient to call for assistance before ambulating, side rails up x3, bed alarm set, call light in reach, non skid socks in use. Patient verbalized understanding of instructions. Will continue to monitor.

## 2018-04-14 NOTE — ASSESSMENT & PLAN NOTE
Ran out of inhalers recently.  Improved greatly with breathing treatments and solumedrol.  Now no 02 requirements and normal lung exam.  Continue po prednisone. Breathing treatments.  This as resolved.

## 2018-04-14 NOTE — NURSING
In preparation for discharge, d/c'ed patient's tele monitor,  NSR prior to removal, d/c'ed patient's saline lock, applied pressure to site, secured site with tape and gauze. Discharge instructions given to patient and spouse at bedside. Patient verbalized understanding of instructions. Patient states willingness to comply.

## 2018-04-14 NOTE — PROGRESS NOTES
[facility] Hospital Medicine  Progress Note    Patient Name: Aurora Ornelas  MRN: [id number]  Patient Class: IP- Inpatient   Admission Date: 4/13/2018  Length of Stay: 1 days  Attending Physician: Sander Godoy MD  Primary Care Provider: Primary Doctor No        Subjective:     Principal Problem:Asthma exacerbation    HPI:  Mrs. Ornelas is a 53 yo F who is deaf, presents to the ED with a CC of SOB that started this am.  The history is limited at this time due to unable to find the code for the Dip[employer] sign language Ipad.  The history is mainly obtained from the ED record.  The patient has not been admitted to the hospital in the past here. She woke up this am wheezing and SOB. She was noted to have run out of her inhalers a couple of weeks ago. EMS found her with 02 sats of 72% and started on breathing treatments, steroids and bi-pap.  She evidently responded very well and now is in her room resting comfortably on RA and has a completely normal lung exam. There is a history of some SOB lying flat over the past week and she presents with a BNP of 227 in the setting of normal renal function. She currently does not indicate any complaint.     Hospital Course:  The patient presents to the hospital with a CC of sob and hypoxia. The patient was noted to have wheezing by EMS and ED doctor and quickly improved with steroids. By the time I examined her just a couple hours after admission- she was resting comfortably on RA in her room and had a completely normal lung exam. Echo was obtained and was normal. Her initial trop was negative but subsequent ones were positive. Cards was consulted.     Interval History: No new issues. Comfortable on RA.     Review of Systems   Constitutional: Negative for activity change.   Respiratory: Negative for chest tightness and shortness of breath.    Cardiovascular: Negative for chest pain.   Gastrointestinal: Negative for abdominal pain.   Genitourinary: Negative for  difficulty urinating.     Objective:     Vital Signs (Most Recent):  Temp: 97.9 °F (36.6 °C) (04/14/18 0709)  Pulse: 85 (04/14/18 0709)  Resp: 18 (04/14/18 0506)  BP: 137/75 (04/14/18 0709)  SpO2: 97 % (04/14/18 0709) Vital Signs (24h Range):  Temp:  [97.4 °F (36.3 °C)-98.8 °F (37.1 °C)] 97.9 °F (36.6 °C)  Pulse:  [] 85  Resp:  [18-20] 18  SpO2:  [94 %-100 %] 97 %  BP: (131-157)/(69-85) 137/75     Weight: 70.6 kg (155 lb 10.3 oz)  Body mass index is 25.12 kg/m².    Intake/Output Summary (Last 24 hours) at 04/14/18 0759  Last data filed at 04/14/18 0506   Gross per 24 hour   Intake              240 ml   Output                0 ml   Net              240 ml      Physical Exam   Constitutional: She is oriented to person, place, and time. She appears well-developed and well-nourished.   HENT:   Head: Normocephalic and atraumatic.   Pulmonary/Chest: Effort normal and breath sounds normal. No respiratory distress. She has no wheezes. She has no rales.   Neurological: She is alert and oriented to person, place, and time.       Significant Labs:   BMP:   Recent Labs  Lab 04/13/18  0824   *   *   K 3.4*      CO2 29   BUN 7   CREATININE 0.8   CALCIUM 8.6*     CBC:   Recent Labs  Lab 04/13/18  0824   WBC 5.31   HGB 9.1*   HCT 31.1*          Significant Imaging:   Reviewed trop trend.       Assessment/Plan:      * Asthma exacerbation    Ran out of inhalers recently.  Improved greatly with breathing treatments and solumedrol.  Now no 02 requirements and normal lung exam.  Continue po prednisone. Breathing treatments.  This as resolved.           Acute hypoxemic respiratory failure    From above- has resolved.  72% on RA by EMS.           Elevated troponin    May have been from hypoxemic stress with 02 sats in the 70's.  Troponin bump. Will notify cards. Likely stress test today. NPO for now.         Respiratory distress    From #1- has resolved.           Hypokalemia    Minimal- will follow.              Mild malnutrition    Will discuss with patient          Hyperglycemia    Likely from steroids.           Hypertension    Will continue home meds.           Anemia    Anemia of chronic disease- no acute issues.           Deaf    Noted.           VTE Risk Mitigation         Ordered     enoxaparin injection 40 mg  Daily     Route:  Subcutaneous        04/13/18 1312     Place HÉCTOR hose  Until discontinued      04/13/18 1121     IP VTE LOW RISK PATIENT  Once      04/13/18 1121        Cards consult. Likely stress. NPO for now. Clinically doing well.    Addendum:  11:51am.  Spoke with cards.  OK to have stress test as out patient. Will d/c to home today.       Sander Morales MD  Department of Hospital Medicine   [facility]

## 2018-04-14 NOTE — HOSPITAL COURSE
Less SOB, denies CP  Troponin increased to 0.2 then 0.17    Echo 4/13/18    1 - Normal left ventricular systolic function (EF 55-60%).     2 - Concentric remodeling.     3 - The estimated PA systolic pressure is 38 mmHg.     4 - Trivial tricuspid regurgitation.

## 2018-04-14 NOTE — PROGRESS NOTES
[facility]  Cardiology  Progress Note    Patient Name: Aurora Ornelas  MRN: [id number]  Admission Date: 4/13/2018  Hospital Length of Stay: 1 days  Code Status: Full Code   Attending Physician: Sander Godoy MD   Primary Care Physician: Primary Doctor No  Expected Discharge Date:   Principal Problem:Asthma exacerbation    Subjective:     Hospital Course:   Less SOB, denies CP  Troponin increased to 0.2 then 0.17    Echo 4/13/18    1 - Normal left ventricular systolic function (EF 55-60%).     2 - Concentric remodeling.     3 - The estimated PA systolic pressure is 38 mmHg.     4 - Trivial tricuspid regurgitation.     Interval History:     Review of Systems   Constitution: Negative for decreased appetite.   HENT: Negative for ear discharge.    Eyes: Negative for blurred vision.   Respiratory: Negative for hemoptysis.    Endocrine: Negative for polyphagia.   Hematologic/Lymphatic: Negative for adenopathy.   Skin: Negative for color change.   Musculoskeletal: Negative for joint swelling.   Neurological: Negative for brief paralysis.   Psychiatric/Behavioral: Negative for hallucinations.     Objective:     Vital Signs (Most Recent):  Temp: 97.9 °F (36.6 °C) (04/14/18 0709)  Pulse: 85 (04/14/18 0715)  Resp: 18 (04/14/18 0506)  BP: 137/75 (04/14/18 0709)  SpO2: 97 % (04/14/18 0709) Vital Signs (24h Range):  Temp:  [97.4 °F (36.3 °C)-98.8 °F (37.1 °C)] 97.9 °F (36.6 °C)  Pulse:  [] 85  Resp:  [18] 18  SpO2:  [94 %-99 %] 97 %  BP: (131-157)/(69-85) 137/75     Weight: 70.6 kg (155 lb 10.3 oz)  Body mass index is 25.12 kg/m².     SpO2: 97 %  O2 Device (Oxygen Therapy): room air      Intake/Output Summary (Last 24 hours) at 04/14/18 0947  Last data filed at 04/14/18 0900   Gross per 24 hour   Intake              360 ml   Output                0 ml   Net              360 ml       Lines/Drains/Airways     Peripheral Intravenous Line                 Peripheral IV - Single Lumen 04/13/18 0700 Left Hand  1 day                Physical Exam   Constitutional: She is oriented to person, place, and time. She appears well-developed and well-nourished.   HENT:   Head: Normocephalic and atraumatic.   Eyes: Conjunctivae are normal. Pupils are equal, round, and reactive to light.   Neck: Normal range of motion. Neck supple.   Cardiovascular: Normal rate, normal heart sounds and intact distal pulses.    Pulmonary/Chest: Effort normal and breath sounds normal.   Abdominal: Soft. Bowel sounds are normal.   Musculoskeletal: Normal range of motion.   Neurological: She is alert and oriented to person, place, and time.   Skin: Skin is warm and dry.       Significant Labs: All pertinent lab results from the last 24 hours have been reviewed.    Significant Imaging: Echocardiogram:   2D echo with color flow doppler:   Results for orders placed or performed during the hospital encounter of 04/13/18   2D echo with color flow doppler   Result Value Ref Range    EF 55 55 - 65    Est. PA Systolic Pressure 37.81     Tricuspid Valve Regurgitation TRIVIAL      Assessment and Plan:     Brief HPI:     * Asthma exacerbation    Per primary        Elevated troponin    Suspect demand ischemia from respiratory issues and not an ACS. Ok to do stress test as out patient. Could do stress test Monday if still in the hospital            VTE Risk Mitigation         Ordered     enoxaparin injection 40 mg  Daily     Route:  Subcutaneous        04/13/18 1312     Place HÉCTOR hose  Until discontinued      04/13/18 1121     IP VTE LOW RISK PATIENT  Once      04/13/18 1121          Burak Burger MD  Cardiology  [facility]

## 2018-04-14 NOTE — SUBJECTIVE & OBJECTIVE
Interval History: No new issues. Comfortable on RA.     Review of Systems   Constitutional: Negative for activity change.   Respiratory: Negative for chest tightness and shortness of breath.    Cardiovascular: Negative for chest pain.   Gastrointestinal: Negative for abdominal pain.   Genitourinary: Negative for difficulty urinating.     Objective:     Vital Signs (Most Recent):  Temp: 97.9 °F (36.6 °C) (04/14/18 0709)  Pulse: 85 (04/14/18 0709)  Resp: 18 (04/14/18 0506)  BP: 137/75 (04/14/18 0709)  SpO2: 97 % (04/14/18 0709) Vital Signs (24h Range):  Temp:  [97.4 °F (36.3 °C)-98.8 °F (37.1 °C)] 97.9 °F (36.6 °C)  Pulse:  [] 85  Resp:  [18-20] 18  SpO2:  [94 %-100 %] 97 %  BP: (131-157)/(69-85) 137/75     Weight: 70.6 kg (155 lb 10.3 oz)  Body mass index is 25.12 kg/m².    Intake/Output Summary (Last 24 hours) at 04/14/18 0759  Last data filed at 04/14/18 0506   Gross per 24 hour   Intake              240 ml   Output                0 ml   Net              240 ml      Physical Exam   Constitutional: She is oriented to person, place, and time. She appears well-developed and well-nourished.   HENT:   Head: Normocephalic and atraumatic.   Pulmonary/Chest: Effort normal and breath sounds normal. No respiratory distress. She has no wheezes. She has no rales.   Neurological: She is alert and oriented to person, place, and time.       Significant Labs:   BMP:   Recent Labs  Lab 04/13/18  0824   *   *   K 3.4*      CO2 29   BUN 7   CREATININE 0.8   CALCIUM 8.6*     CBC:   Recent Labs  Lab 04/13/18 0824   WBC 5.31   HGB 9.1*   HCT 31.1*          Significant Imaging:   Reviewed trop trend.

## 2018-04-14 NOTE — PROGRESS NOTES
WRITTEN DISCHARGE INFORMATION:    Follow-up Information     Burak Burger MD. Schedule an appointment as soon as possible for a visit in 1 week.    Specialty:  Cardiology  Contact information:  [address] [place] [address] [phone]             Priyanka Castle MD. Call in 3 days.    Specialty:  Internal Medicine  Contact information:  [address] [id number] [phone]               Things that YOU are responsible for to Manage Your Care At Home:  1. Getting your prescriptions filled.  2. Taking you medications as directed. DO NOT MISS ANY DOSES!  3. Going to your follow-up doctor appointments. This is important because it allows the doctor to monitor your progress and to determine if any changes need to be made to your treatment plan.                                                         Help at Home  After discharge for assistance [facility] Line 24/7 assistance  [phone]   Thank you for choosing [facility] for your care.  Please answer any calls you may receive from [facility]. Your [facility] Healthcare Manager is,  Aileen Garcia RN [facility] [phone]

## 2018-04-14 NOTE — NURSING
Patient unable to afford her prescription for Symbicort ($374 her cost). MD Godoy notified. No new orders received

## 2018-04-14 NOTE — PLAN OF CARE
Problem: Fall Risk (Adult)  Goal: Identify Related Risk Factors and Signs and Symptoms  Related risk factors and signs and symptoms are identified upon initiation of Human Response Clinical Practice Guideline (CPG)   Outcome: Ongoing (interventions implemented as appropriate)   04/14/18 0224   Fall Risk   Related Risk Factors (Fall Risk) inadequate lighting;slipper/uneven surfaces;environment unfamiliar   Signs and Symptoms (Fall Risk) presence of risk factors     Goal: Absence of Falls  Patient will demonstrate the desired outcomes by discharge/transition of care.   Outcome: Ongoing (interventions implemented as appropriate)   04/14/18 0224   Fall Risk (Adult)   Absence of Falls making progress toward outcome       Problem: Pressure Ulcer Risk (Emanuel Scale) (Adult,Obstetrics,Pediatric)  Goal: Skin Integrity  Patient will demonstrate the desired outcomes by discharge/transition of care.   Outcome: Ongoing (interventions implemented as appropriate)   04/14/18 0224   Pressure Ulcer Risk (Emanuel Scale) (Adult,Obstetrics,Pediatric)   Skin Integrity making progress toward outcome

## 2018-04-14 NOTE — ASSESSMENT & PLAN NOTE
May have been from hypoxemic stress with 02 sats in the 70's.  Troponin bump. Will notify cards. Likely stress test today. NPO for now.

## 2018-04-14 NOTE — PLAN OF CARE
Problem: Pressure Ulcer Risk (Emanuel Scale) (Adult,Obstetrics,Pediatric)  Intervention: Prevent/Manage Excess Moisture   04/14/18 1635   Hygiene Care   Perineal Care absorbent pad changed   Skin Interventions   Skin Protection Tubing/devices free from skin contact;Pouching devices     Intervention: Maintain Head of Bed Elevation Less Than 30 Degrees as Tolerated   04/14/18 1500   Positioning   Head of Bed (HOB) HOB at 30-45 degrees     Intervention: Prevent/Minimize Sheer/Friction Injuries   04/13/18 1935 04/14/18 0715 04/14/18 1635   Skin Interventions   Pressure Reduction Devices Pressure-redistributing mattress utilized --  --    Pressure Reduction Techniques --  weight shift assistance provided;frequent weight shift encouraged --    Positioning   Positioning/Transfer Devices --  --  pillows     Intervention: Turn/Reposition Often   04/14/18 0715 04/14/18 1500   Skin Interventions   Pressure Reduction Techniques weight shift assistance provided;frequent weight shift encouraged --    Positioning   Body Position --  positioned/repositioned independently       Goal: Identify Related Risk Factors and Signs and Symptoms  Related risk factors and signs and symptoms are identified upon initiation of Human Response Clinical Practice Guideline (CPG)   Outcome: Ongoing (interventions implemented as appropriate)   04/14/18 1635   Pressure Ulcer Risk (Emanuel Scale)   Related Risk Factors (Pressure Ulcer Risk (Emanuel Scale)) mobility impaired;tissue perfusion altered     Goal: Skin Integrity  Patient will demonstrate the desired outcomes by discharge/transition of care.   Outcome: Ongoing (interventions implemented as appropriate)   04/14/18 0224   Pressure Ulcer Risk (Emanuel Scale) (Adult,Obstetrics,Pediatric)   Skin Integrity making progress toward outcome

## 2018-04-14 NOTE — DISCHARGE SUMMARY
[facility] Hospital Medicine  Discharge Summary      Patient Name: Aurora Ornelas  MRN: [id number]  Admission Date: 4/13/2018  Hospital Length of Stay: 1 days  Discharge Date and Time:  04/14/2018 12:01 PM  Attending Physician: Sander Godoy MD   Discharging Provider: Sander Godoy MD  Primary Care Provider: Primary Doctor No      HPI:   Mrs. Ornelas is a 53 yo F who is deaf, presents to the ED with a CC of SOB that started this am.  The history is limited at this time due to unable to find the code for the [employer] sign language Ipad.  The history is mainly obtained from the ED record.  The patient has not been admitted to the hospital in the past here. She woke up this am wheezing and SOB. She was noted to have run out of her inhalers a couple of weeks ago. EMS found her with 02 sats of 72% and started on breathing treatments, steroids and bi-pap.  She evidently responded very well and now is in her room resting comfortably on RA and has a completely normal lung exam. There is a history of some SOB lying flat over the past week and she presents with a BNP of 227 in the setting of normal renal function. She currently does not indicate any complaint.     * No surgery found *      Hospital Course:   The patient presents to the hospital with a CC of sob and hypoxia. The patient was noted to have wheezing by EMS and ED doctor and quickly improved with steroids. By the time I examined her just a couple hours after admission- she was resting comfortably on RA in her room and had a completely normal lung exam. Echo was obtained and was normal. Her initial trop was negative but subsequent ones were positive. Cards was consulted. Of note, the patient was found to have an 02 sat of 72% when EMS arrived.  I spoke to Dr. Burger who was aware of the elevated troponin and noted that the patient could go home and follow up with him as out patient for out patient stress.  The patient clinically improved  and her CC resolved. She will go home on a steroid taper.  Inhalers Rx's were refilled.  Activity as tolerated. Diet- low NA.           Consults:   Consults         Status Ordering Provider     Inpatient consult to Cardiology  Once     Provider:  Burak Burger MD    Acknowledged MARILU CHAN          No new Assessment & Plan notes have been filed under this hospital service since the last note was generated.  Service: Hospital Medicine    Final Active Diagnoses:    Diagnosis Date Noted POA    Deaf [H91.90] 04/13/2018 Yes    Anemia [D64.9] 04/13/2018 Yes    Hypertension [I10] 04/13/2018 Yes    Hyperglycemia [R73.9] 04/13/2018 Yes    Mild malnutrition [E44.1] 04/13/2018 Yes      Problems Resolved During this Admission:    Diagnosis Date Noted Date Resolved POA    PRINCIPAL PROBLEM:  Asthma exacerbation [J45.901]  04/14/2018 Yes    Acute hypoxemic respiratory failure [J96.01] 04/13/2018 04/14/2018 Yes    Elevated troponin [R74.8] 04/14/2018 04/14/2018 No    Respiratory distress [R06.03] 04/13/2018 04/14/2018 Yes    Chronic obstructive pulmonary disease [J44.9] 04/13/2018 04/13/2018 Yes    Chest pain, atypical [R07.89] 04/13/2018 04/13/2018 Yes    Hypokalemia [E87.6] 04/13/2018 04/14/2018 Yes       Discharged Condition: good    Disposition: Home or Self Care    Follow Up:  Follow-up Information     Primary Doctor No In 1 week.               Patient Instructions:     Diet Cardiac     Activity as tolerated         Significant Diagnostic Studies:     Pending Diagnostic Studies:     None         Medications:  Reconciled Home Medications:      Medication List      CHANGE how you take these medications    * albuterol 90 mcg/actuation inhaler  Inhale 1-2 puffs into the lungs every 4 (four) hours as needed for Wheezing.  What changed:  Another medication with the same name was removed. Continue taking this medication, and follow the directions you see here.     * albuterol sulfate 2.5 mg/0.5 mL Nebu  Take  2.5 mg by nebulization every 4 (four) hours as needed.  What changed:  Another medication with the same name was removed. Continue taking this medication, and follow the directions you see here.     predniSONE 20 MG tablet  Commonly known as:  DELTASONE  3 tablets by mouth daily x 3 days 2 tablets by mouth daily x 3 days 1 tablet by mouth daily x 3 days  What changed:  additional instructions        * This list has 2 medication(s) that are the same as other medications prescribed for you. Read the directions carefully, and ask your doctor or other care provider to review them with you.            CONTINUE taking these medications    albuterol-ipratropium 2.5mg-0.5mg/3mL 0.5 mg-3 mg(2.5 mg base)/3 mL nebulizer solution  Commonly known as:  DUO-NEB  Take 3 mLs by nebulization every 6 (six) hours as needed for Wheezing or Shortness of Breath. Rescue     budesonide-formoterol 160-4.5 mcg 160-4.5 mcg/actuation Hfaa  Commonly known as:  SYMBICORT  Inhale 2 puffs into the lungs every 12 (twelve) hours. Controller     ferrous sulfate 325 (65 FE) MG EC tablet  Take 325 mg by mouth 3 (three) times daily with meals.     lisinopril-hydrochlorothiazide 20-12.5 mg per tablet  Commonly known as:  PRINZIDE,ZESTORETIC  Take 1 tablet by mouth once daily.     meloxicam 7.5 MG tablet  Commonly known as:  MOBIC  Take 1 tablet (7.5 mg total) by mouth once daily.     naproxen 500 MG tablet  Commonly known as:  NAPROSYN  Take 1 tablet (500 mg total) by mouth 2 (two) times daily as needed (for pain).     pantoprazole 40 MG tablet  Commonly known as:  PROTONIX  Take 40 mg by mouth once daily.     traMADol 50 mg tablet  Commonly known as:  ULTRAM  Take 1 tablet (50 mg total) by mouth every 8 (eight) hours as needed for Pain.            Indwelling Lines/Drains at time of discharge:   Lines/Drains/Airways          No matching active lines, drains, or airways          Time spent on the discharge of patient < 30 minutes  Patient was seen and  examined on the date of discharge and determined to be suitable for discharge.    Patient had a better than expected response to treatment and will be discharged to home today.     Sander Morales MD  Department of Hospital Medicine  [facility]

## 2018-04-14 NOTE — SUBJECTIVE & OBJECTIVE
Interval History:     Review of Systems   Constitution: Negative for decreased appetite.   HENT: Negative for ear discharge.    Eyes: Negative for blurred vision.   Respiratory: Negative for hemoptysis.    Endocrine: Negative for polyphagia.   Hematologic/Lymphatic: Negative for adenopathy.   Skin: Negative for color change.   Musculoskeletal: Negative for joint swelling.   Neurological: Negative for brief paralysis.   Psychiatric/Behavioral: Negative for hallucinations.     Objective:     Vital Signs (Most Recent):  Temp: 97.9 °F (36.6 °C) (04/14/18 0709)  Pulse: 85 (04/14/18 0715)  Resp: 18 (04/14/18 0506)  BP: 137/75 (04/14/18 0709)  SpO2: 97 % (04/14/18 0709) Vital Signs (24h Range):  Temp:  [97.4 °F (36.3 °C)-98.8 °F (37.1 °C)] 97.9 °F (36.6 °C)  Pulse:  [] 85  Resp:  [18] 18  SpO2:  [94 %-99 %] 97 %  BP: (131-157)/(69-85) 137/75     Weight: 70.6 kg (155 lb 10.3 oz)  Body mass index is 25.12 kg/m².     SpO2: 97 %  O2 Device (Oxygen Therapy): room air      Intake/Output Summary (Last 24 hours) at 04/14/18 0947  Last data filed at 04/14/18 0900   Gross per 24 hour   Intake              360 ml   Output                0 ml   Net              360 ml       Lines/Drains/Airways     Peripheral Intravenous Line                 Peripheral IV - Single Lumen 04/13/18 0700 Left Hand 1 day                Physical Exam   Constitutional: She is oriented to person, place, and time. She appears well-developed and well-nourished.   HENT:   Head: Normocephalic and atraumatic.   Eyes: Conjunctivae are normal. Pupils are equal, round, and reactive to light.   Neck: Normal range of motion. Neck supple.   Cardiovascular: Normal rate, normal heart sounds and intact distal pulses.    Pulmonary/Chest: Effort normal and breath sounds normal.   Abdominal: Soft. Bowel sounds are normal.   Musculoskeletal: Normal range of motion.   Neurological: She is alert and oriented to person, place, and time.   Skin: Skin is warm and dry.        Significant Labs: All pertinent lab results from the last 24 hours have been reviewed.    Significant Imaging: Echocardiogram:   2D echo with color flow doppler:   Results for orders placed or performed during the hospital encounter of 04/13/18   2D echo with color flow doppler   Result Value Ref Range    EF 55 55 - 65    Est. PA Systolic Pressure 37.81     Tricuspid Valve Regurgitation TRIVIAL

## 2018-04-30 ENCOUNTER — OFFICE VISIT (OUTPATIENT)
Dept: CARDIOLOGY | Facility: CLINIC | Age: 53
End: 2018-04-30
Payer: MEDICARE

## 2018-04-30 VITALS
HEART RATE: 105 BPM | SYSTOLIC BLOOD PRESSURE: 136 MMHG | HEIGHT: 66 IN | WEIGHT: 156 LBS | OXYGEN SATURATION: 98 % | DIASTOLIC BLOOD PRESSURE: 78 MMHG | BODY MASS INDEX: 25.07 KG/M2

## 2018-04-30 DIAGNOSIS — H91.90 DEAFNESS, UNSPECIFIED LATERALITY: Primary | ICD-10-CM

## 2018-04-30 DIAGNOSIS — R79.89 ELEVATED TROPONIN: ICD-10-CM

## 2018-04-30 DIAGNOSIS — I10 HYPERTENSION, UNSPECIFIED TYPE: ICD-10-CM

## 2018-04-30 DIAGNOSIS — Z91.89 AT RISK FOR CORONARY ARTERY DISEASE: ICD-10-CM

## 2018-04-30 DIAGNOSIS — R07.89 CHEST PAIN, ATYPICAL: ICD-10-CM

## 2018-04-30 DIAGNOSIS — J45.51 SEVERE PERSISTENT ASTHMA WITH ACUTE EXACERBATION: ICD-10-CM

## 2018-04-30 PROCEDURE — 99214 OFFICE O/P EST MOD 30 MIN: CPT | Mod: S$PBB,,, | Performed by: INTERNAL MEDICINE

## 2018-04-30 PROCEDURE — 99214 OFFICE O/P EST MOD 30 MIN: CPT | Mod: PBBFAC,PO | Performed by: INTERNAL MEDICINE

## 2018-04-30 PROCEDURE — 99999 PR PBB SHADOW E&M-EST. PATIENT-LVL IV: CPT | Mod: PBBFAC,,, | Performed by: INTERNAL MEDICINE

## 2018-04-30 NOTE — PROGRESS NOTES
Subjective:    Patient ID:  Aurora Ornelas is a 52 y.o. female who presents for follow-up of Hospital Follow Up      HPI     Admitted 4/13/18  Mrs. Ornelas is a 51 yo F who is deaf, presents to the ED with a CC of SOB that started this am.  The history is limited at this time due to unable to find the code for the [employer] sign language Ipad.  The history is mainly obtained from the ED record.  The patient has not been admitted to the hospital in the past here. She woke up this am wheezing and SOB. She was noted to have run out of her inhalers a couple of weeks ago. EMS found her with 02 sats of 72% and started on breathing treatments, steroids and bi-pap.  She evidently responded very well and now is in her room resting comfortably on RA and has a completely normal lung exam. There is a history of some SOB lying flat over the past week and she presents with a BNP of 227 in the setting of normal renal function. She currently does not indicate any complaint.     The patient presents to the hospital with a CC of sob and hypoxia. The patient was noted to have wheezing by EMS and ED doctor and quickly improved with steroids. By the time I examined her just a couple hours after admission- she was resting comfortably on RA in her room and had a completely normal lung exam. Echo was obtained and was normal. Her initial trop was negative but subsequent ones were positive. Cards was consulted. Of note, the patient was found to have an 02 sat of 72% when EMS arrived.  I spoke to Dr. Burger who was aware of the elevated troponin and noted that the patient could go home and follow up with him as out patient for out patient stress.  The patient clinically improved and her CC resolved. She will go home on a steroid taper.  Inhalers Rx's were refilled.  Activity as tolerated. Diet- low NA.      Troponin increased to 0.2 then 0.17      Suspect demand ischemia from respiratory issues and not an ACS. Ok to do stress test as out patient           Echo 4/13/18    1 - Normal left ventricular systolic function (EF 55-60%).     2 - Concentric remodeling.     3 - The estimated PA systolic pressure is 38 mmHg.     4 - Trivial tricuspid regurgitation.      Less SOB since discharge  Denies CP      Review of Systems   Constitution: Negative for decreased appetite.   HENT: Negative for ear discharge.    Eyes: Negative for blurred vision.   Respiratory: Negative for hemoptysis.    Endocrine: Negative for polyphagia.   Hematologic/Lymphatic: Negative for adenopathy.   Skin: Negative for color change.   Musculoskeletal: Negative for joint swelling.   Neurological: Negative for brief paralysis.   Psychiatric/Behavioral: Negative for hallucinations.        Objective:    Physical Exam   Constitutional: She is oriented to person, place, and time. She appears well-developed and well-nourished.   HENT:   Head: Normocephalic and atraumatic.   Eyes: Conjunctivae are normal. Pupils are equal, round, and reactive to light.   Neck: Normal range of motion. Neck supple.   Cardiovascular: Normal rate, normal heart sounds and intact distal pulses.    Pulmonary/Chest: Effort normal and breath sounds normal.   Abdominal: Soft. Bowel sounds are normal.   Musculoskeletal: Normal range of motion.   Neurological: She is alert and oriented to person, place, and time.   Skin: Skin is warm and dry.         Assessment:       1. Deafness, unspecified laterality    2. Hypertension, unspecified type    3. Severe persistent asthma with acute exacerbation         Plan:       [place] myoview for recently elevated troponin

## 2018-05-02 ENCOUNTER — HOSPITAL ENCOUNTER (OUTPATIENT)
Dept: CARDIOLOGY | Facility: HOSPITAL | Age: 53
Discharge: HOME | End: 2018-05-02
Attending: INTERNAL MEDICINE
Payer: COMMERCIAL

## 2018-05-02 ENCOUNTER — HOSPITAL ENCOUNTER (OUTPATIENT)
Dept: RADIOLOGY | Facility: HOSPITAL | Age: 53
Discharge: HOME | End: 2018-05-02
Attending: INTERNAL MEDICINE
Payer: COMMERCIAL

## 2018-05-02 DIAGNOSIS — Z91.89 AT RISK FOR CORONARY ARTERY DISEASE: ICD-10-CM

## 2018-05-02 DIAGNOSIS — R07.89 CHEST PAIN, ATYPICAL: ICD-10-CM

## 2018-05-02 DIAGNOSIS — H91.90 DEAFNESS, UNSPECIFIED LATERALITY: ICD-10-CM

## 2018-05-02 DIAGNOSIS — R79.89 ELEVATED TROPONIN: ICD-10-CM

## 2018-05-02 DIAGNOSIS — I10 HYPERTENSION, UNSPECIFIED TYPE: ICD-10-CM

## 2018-05-02 DIAGNOSIS — J45.51 SEVERE PERSISTENT ASTHMA WITH ACUTE EXACERBATION: ICD-10-CM

## 2018-05-02 LAB — DIASTOLIC DYSFUNCTION: NO

## 2018-05-02 PROCEDURE — 93016 CV STRESS TEST SUPVJ ONLY: CPT | Mod: ,,, | Performed by: INTERNAL MEDICINE

## 2018-05-02 PROCEDURE — 78452 HT MUSCLE IMAGE SPECT MULT: CPT | Mod: 26,,, | Performed by: INTERNAL MEDICINE

## 2018-05-02 PROCEDURE — 63600175 PHARM REV CODE 636 W HCPCS

## 2018-05-02 PROCEDURE — 93018 CV STRESS TEST I&R ONLY: CPT | Mod: ,,, | Performed by: INTERNAL MEDICINE

## 2018-05-02 PROCEDURE — A9502 TC99M TETROFOSMIN: HCPCS

## 2018-05-02 PROCEDURE — 93017 CV STRESS TEST TRACING ONLY: CPT

## 2018-05-02 RX ORDER — REGADENOSON 0.08 MG/ML
INJECTION, SOLUTION INTRAVENOUS
Status: DISPENSED
Start: 2018-05-02 | End: 2018-05-02

## 2018-05-18 ENCOUNTER — OFFICE VISIT (OUTPATIENT)
Dept: CARDIOLOGY | Facility: CLINIC | Age: 53
End: 2018-05-18
Payer: COMMERCIAL

## 2018-05-18 VITALS
DIASTOLIC BLOOD PRESSURE: 82 MMHG | HEART RATE: 97 BPM | RESPIRATION RATE: 15 BRPM | WEIGHT: 156.06 LBS | BODY MASS INDEX: 25.08 KG/M2 | SYSTOLIC BLOOD PRESSURE: 139 MMHG | HEIGHT: 66 IN

## 2018-05-18 DIAGNOSIS — H91.90 DEAFNESS, UNSPECIFIED LATERALITY: Primary | ICD-10-CM

## 2018-05-18 DIAGNOSIS — R07.89 CHEST PAIN, ATYPICAL: ICD-10-CM

## 2018-05-18 DIAGNOSIS — J45.51 SEVERE PERSISTENT ASTHMA WITH ACUTE EXACERBATION: ICD-10-CM

## 2018-05-18 DIAGNOSIS — I10 HYPERTENSION, UNSPECIFIED TYPE: ICD-10-CM

## 2018-05-18 PROCEDURE — 99213 OFFICE O/P EST LOW 20 MIN: CPT | Mod: S$GLB,,, | Performed by: INTERNAL MEDICINE

## 2018-05-18 PROCEDURE — 99999 PR PBB SHADOW E&M-EST. PATIENT-LVL III: CPT | Mod: PBBFAC,,, | Performed by: INTERNAL MEDICINE

## 2018-05-18 NOTE — PROGRESS NOTES
Subjective:    Patient ID:  Aurora Ornelas is a 52 y.o. female who presents for follow-up of Results      HPI     Admitted 4/13/18  Mrs. Ornelas is a 53 yo F who is deaf, presents to the ED with a CC of SOB that started this am.  The history is limited at this time due to unable to find the code for the [employer] sign language Ipad.  The history is mainly obtained from the ED record.  The patient has not been admitted to the hospital in the past here. She woke up this am wheezing and SOB. She was noted to have run out of her inhalers a couple of weeks ago. EMS found her with 02 sats of 72% and started on breathing treatments, steroids and bi-pap.  She evidently responded very well and now is in her room resting comfortably on RA and has a completely normal lung exam. There is a history of some SOB lying flat over the past week and she presents with a BNP of 227 in the setting of normal renal function. She currently does not indicate any complaint.      The patient presents to the hospital with a CC of sob and hypoxia. The patient was noted to have wheezing by EMS and ED doctor and quickly improved with steroids. By the time I examined her just a couple hours after admission- she was resting comfortably on RA in her room and had a completely normal lung exam. Echo was obtained and was normal. Her initial trop was negative but subsequent ones were positive. Cards was consulted. Of note, the patient was found to have an 02 sat of 72% when EMS arrived.  I spoke to Dr. Burger who was aware of the elevated troponin and noted that the patient could go home and follow up with him as out patient for out patient stress.  The patient clinically improved and her CC resolved. She will go home on a steroid taper.  Inhalers Rx's were refilled.  Activity as tolerated. Diet- low NA.       Troponin increased to 0.2 then 0.17       Suspect demand ischemia from respiratory issues and not an ACS. Ok to do stress test as out patient             Echo 4/13/18    1 - Normal left ventricular systolic function (EF 55-60%).     2 - Concentric remodeling.     3 - The estimated PA systolic pressure is 38 mmHg.     4 - Trivial tricuspid regurgitation.     Stress test 5/2/18  LVEF: >= 70 %  Impression: NORMAL MYOCARDIAL PERFUSION  1. The perfusion scan is free of evidence for myocardial ischemia or injury.   2. Resting wall motion is physiologic.   3. Resting LV function is normal.     Denies CP or SOB  Needs clearance for upcoming carpal tunnel surgery      Review of Systems   Constitution: Negative for decreased appetite.   HENT: Negative for ear discharge.    Eyes: Negative for blurred vision.   Respiratory: Negative for hemoptysis.    Endocrine: Negative for polyphagia.   Hematologic/Lymphatic: Negative for adenopathy.   Skin: Negative for color change.   Musculoskeletal: Negative for joint swelling.   Neurological: Negative for brief paralysis.   Psychiatric/Behavioral: Negative for hallucinations.        Objective:    Physical Exam   Constitutional: She is oriented to person, place, and time. She appears well-developed and well-nourished.   HENT:   Head: Normocephalic and atraumatic.   Eyes: Conjunctivae are normal. Pupils are equal, round, and reactive to light.   Neck: Normal range of motion. Neck supple.   Cardiovascular: Normal rate, normal heart sounds and intact distal pulses.    Pulmonary/Chest: Effort normal and breath sounds normal.   Abdominal: Soft. Bowel sounds are normal.   Musculoskeletal: Normal range of motion.   Neurological: She is alert and oriented to person, place, and time.   Skin: Skin is warm and dry.         Assessment:       1. Deafness, unspecified laterality    2. Severe persistent asthma with acute exacerbation    3. Hypertension, unspecified type    4. Chest pain, atypical         Plan:       Cleared for carpal tunnel surgery at low cardiac risk  OV 6 months

## 2018-08-30 NOTE — NURSING
New orders noted for NPO due to elevated troponin. Notified patient however patient has already consumed 100% of breakfast tray. MD Godoy notified of patient eating. Notified Dr. Burger of patients elevated troponin. Per MD ok for patient to eat, stress test will not be done today.   Bill For Surgical Tray: no

## 2019-02-27 ENCOUNTER — HOSPITAL ENCOUNTER (INPATIENT)
Facility: HOSPITAL | Age: 54
LOS: 2 days | Discharge: HOME HEALTH | DRG: 812 | End: 2019-03-01
Attending: EMERGENCY MEDICINE | Admitting: EMERGENCY MEDICINE
Payer: COMMERCIAL

## 2019-02-27 ENCOUNTER — ANESTHESIA EVENT (OUTPATIENT)
Dept: ENDOSCOPY | Facility: HOSPITAL | Age: 54
DRG: 812 | End: 2019-02-27
Payer: COMMERCIAL

## 2019-02-27 DIAGNOSIS — R07.9 CHEST PAIN: ICD-10-CM

## 2019-02-27 DIAGNOSIS — R07.89 CHEST PAIN, ATYPICAL: ICD-10-CM

## 2019-02-27 DIAGNOSIS — K44.9 LARGE HIATAL HERNIA: Chronic | ICD-10-CM

## 2019-02-27 DIAGNOSIS — E44.1 MILD MALNUTRITION: ICD-10-CM

## 2019-02-27 DIAGNOSIS — I10 ESSENTIAL HYPERTENSION: ICD-10-CM

## 2019-02-27 DIAGNOSIS — H91.90 DEAFNESS, UNSPECIFIED LATERALITY: ICD-10-CM

## 2019-02-27 DIAGNOSIS — R79.89 ELEVATED TROPONIN: ICD-10-CM

## 2019-02-27 DIAGNOSIS — D64.9 SYMPTOMATIC ANEMIA: Primary | ICD-10-CM

## 2019-02-27 DIAGNOSIS — K29.01 GASTROINTESTINAL HEMORRHAGE ASSOCIATED WITH ACUTE GASTRITIS: ICD-10-CM

## 2019-02-27 PROBLEM — K92.2 GI BLEEDING: Status: ACTIVE | Noted: 2019-02-27

## 2019-02-27 LAB
ABO + RH BLD: NORMAL
ALBUMIN SERPL BCP-MCNC: 3.2 G/DL
ALBUMIN SERPL BCP-MCNC: 3.5 G/DL
ALP SERPL-CCNC: 51 U/L
ALP SERPL-CCNC: 56 U/L
ALT SERPL W/O P-5'-P-CCNC: 11 U/L
ALT SERPL W/O P-5'-P-CCNC: 12 U/L
ANION GAP SERPL CALC-SCNC: 8 MMOL/L
ANION GAP SERPL CALC-SCNC: 9 MMOL/L
ANISOCYTOSIS BLD QL SMEAR: ABNORMAL
AORTIC ROOT ANNULUS: 2.31 CM
AORTIC VALVE CUSP SEPERATION: 1.61 CM
ASCENDING AORTA: 2.13 CM
AST SERPL-CCNC: 15 U/L
AST SERPL-CCNC: 17 U/L
AV INDEX (PROSTH): 0.97
AV MEAN GRADIENT: 3.98 MMHG
AV PEAK GRADIENT: 7.18 MMHG
AV VALVE AREA: 3.12 CM2
AV VELOCITY RATIO: 0.91
BASOPHILS # BLD AUTO: 0.02 K/UL
BASOPHILS NFR BLD: 0.2 %
BILIRUB SERPL-MCNC: 0.3 MG/DL
BILIRUB SERPL-MCNC: 0.3 MG/DL
BLD GP AB SCN CELLS X3 SERPL QL: NORMAL
BLD PROD TYP BPU: NORMAL
BLOOD UNIT EXPIRATION DATE: NORMAL
BLOOD UNIT TYPE CODE: 7300
BLOOD UNIT TYPE: NORMAL
BNP SERPL-MCNC: 222 PG/ML
BSA FOR ECHO PROCEDURE: 1.77 M2
BUN SERPL-MCNC: 8 MG/DL
BUN SERPL-MCNC: 8 MG/DL
CALCIUM SERPL-MCNC: 8.1 MG/DL
CALCIUM SERPL-MCNC: 8.3 MG/DL
CHLORIDE SERPL-SCNC: 107 MMOL/L
CHLORIDE SERPL-SCNC: 107 MMOL/L
CO2 SERPL-SCNC: 20 MMOL/L
CO2 SERPL-SCNC: 21 MMOL/L
CODING SYSTEM: NORMAL
CREAT SERPL-MCNC: 0.7 MG/DL
CREAT SERPL-MCNC: 0.7 MG/DL
CV ECHO LV RWT: 0.5 CM
DIFFERENTIAL METHOD: ABNORMAL
DISPENSE STATUS: NORMAL
DOP CALC AO PEAK VEL: 1.34 M/S
DOP CALC AO VTI: 25.27 CM
DOP CALC LVOT AREA: 3.2 CM2
DOP CALC LVOT DIAMETER: 2.02 CM
DOP CALC LVOT PEAK VEL: 1.22 M/S
DOP CALC LVOT STROKE VOLUME: 78.8 CM3
DOP CALCLVOT PEAK VEL VTI: 24.6 CM
E WAVE DECELERATION TIME: 85.46 MSEC
E/A RATIO: 1.58
E/E' RATIO: 21.71
ECHO LV POSTERIOR WALL: 1.11 CM (ref 0.6–1.1)
EOSINOPHIL # BLD AUTO: 0.3 K/UL
EOSINOPHIL NFR BLD: 2.9 %
ERYTHROCYTE [DISTWIDTH] IN BLOOD BY AUTOMATED COUNT: 20.3 %
EST. GFR  (AFRICAN AMERICAN): >60 ML/MIN/1.73 M^2
EST. GFR  (AFRICAN AMERICAN): >60 ML/MIN/1.73 M^2
EST. GFR  (NON AFRICAN AMERICAN): >60 ML/MIN/1.73 M^2
EST. GFR  (NON AFRICAN AMERICAN): >60 ML/MIN/1.73 M^2
FERRITIN SERPL-MCNC: 3 NG/ML
FOLATE SERPL-MCNC: 9.4 NG/ML
FRACTIONAL SHORTENING: 35 % (ref 28–44)
GLUCOSE SERPL-MCNC: 107 MG/DL
GLUCOSE SERPL-MCNC: 99 MG/DL
HCT VFR BLD AUTO: 28.9 %
HGB BLD-MCNC: 8.8 G/DL
HYPOCHROMIA BLD QL SMEAR: ABNORMAL
INTERVENTRICULAR SEPTUM: 1.21 CM (ref 0.6–1.1)
IRON SERPL-MCNC: 13 UG/DL
IVRT: 0.07 MSEC
LA MAJOR: 5.19 CM
LA MINOR: 5.23 CM
LA WIDTH: 4.41 CM
LEFT ATRIUM SIZE: 4.52 CM
LEFT ATRIUM VOLUME INDEX: 50.7 ML/M2
LEFT ATRIUM VOLUME: 88.27 CM3
LEFT INTERNAL DIMENSION IN SYSTOLE: 2.9 CM (ref 2.1–4)
LEFT VENTRICLE DIASTOLIC VOLUME INDEX: 52.71 ML/M2
LEFT VENTRICLE DIASTOLIC VOLUME: 91.72 ML
LEFT VENTRICLE MASS INDEX: 107.7 G/M2
LEFT VENTRICLE SYSTOLIC VOLUME INDEX: 18.5 ML/M2
LEFT VENTRICLE SYSTOLIC VOLUME: 32.12 ML
LEFT VENTRICULAR INTERNAL DIMENSION IN DIASTOLE: 4.48 CM (ref 3.5–6)
LEFT VENTRICULAR MASS: 187.4 G
LV LATERAL E/E' RATIO: 21.71
LV SEPTAL E/E' RATIO: 21.71
LYMPHOCYTES # BLD AUTO: 1.1 K/UL
LYMPHOCYTES NFR BLD: 12.8 %
MCH RBC QN AUTO: 22.7 PG
MCHC RBC AUTO-ENTMCNC: 30.4 G/DL
MCV RBC AUTO: 75 FL
MONOCYTES # BLD AUTO: 0.5 K/UL
MONOCYTES NFR BLD: 6.2 %
MV PEAK A VEL: 0.96 M/S
MV PEAK E VEL: 1.52 M/S
NEUTROPHILS # BLD AUTO: 6.8 K/UL
NEUTROPHILS NFR BLD: 77.9 %
OVALOCYTES BLD QL SMEAR: ABNORMAL
PISA TR MAX VEL: 3.59 M/S
PLATELET # BLD AUTO: 280 K/UL
PMV BLD AUTO: 10.1 FL
POLYCHROMASIA BLD QL SMEAR: ABNORMAL
POTASSIUM SERPL-SCNC: 4 MMOL/L
POTASSIUM SERPL-SCNC: 4.2 MMOL/L
PROT SERPL-MCNC: 6.5 G/DL
PROT SERPL-MCNC: 6.7 G/DL
PULM VEIN S/D RATIO: 0.76
PV PEAK D VEL: 0.96 M/S
PV PEAK S VEL: 0.73 M/S
PV PEAK VELOCITY: 1.01 CM/S
RA MAJOR: 4.73 CM
RA PRESSURE: 3 MMHG
RA WIDTH: 3.83 CM
RBC # BLD AUTO: 3.87 M/UL
RETICS/RBC NFR AUTO: 1.7 %
RIGHT VENTRICULAR END-DIASTOLIC DIMENSION: 3.57 CM
RV TISSUE DOPPLER FREE WALL SYSTOLIC VELOCITY 1 (APICAL 4 CHAMBER VIEW): 55.71 M/S
SATURATED IRON: 2 %
SINUS: 2.4 CM
SODIUM SERPL-SCNC: 136 MMOL/L
SODIUM SERPL-SCNC: 136 MMOL/L
STJ: 2.01 CM
TDI LATERAL: 0.07
TDI SEPTAL: 0.07
TDI: 0.07
TOTAL IRON BINDING CAPACITY: 554 UG/DL
TR MAX PG: 51.55 MMHG
TRANS ERYTHROCYTES VOL PATIENT: NORMAL ML
TRANSFERRIN SERPL-MCNC: 374 MG/DL
TRICUSPID ANNULAR PLANE SYSTOLIC EXCURSION: 2.38 CM
TROPONIN I SERPL DL<=0.01 NG/ML-MCNC: 0.01 NG/ML
TROPONIN I SERPL DL<=0.01 NG/ML-MCNC: <0.006 NG/ML
TV REST PULMONARY ARTERY PRESSURE: 55 MMHG
VIT B12 SERPL-MCNC: 425 PG/ML
WBC # BLD AUTO: 8.73 K/UL

## 2019-02-27 PROCEDURE — 63600175 PHARM REV CODE 636 W HCPCS: Performed by: EMERGENCY MEDICINE

## 2019-02-27 PROCEDURE — 21400001 HC TELEMETRY ROOM

## 2019-02-27 PROCEDURE — 86920 COMPATIBILITY TEST SPIN: CPT

## 2019-02-27 PROCEDURE — 93010 EKG 12-LEAD: ICD-10-PCS | Mod: ,,, | Performed by: INTERNAL MEDICINE

## 2019-02-27 PROCEDURE — 99223 PR INITIAL HOSPITAL CARE,LEVL III: ICD-10-PCS | Mod: ,,, | Performed by: INTERNAL MEDICINE

## 2019-02-27 PROCEDURE — 25000003 PHARM REV CODE 250: Performed by: EMERGENCY MEDICINE

## 2019-02-27 PROCEDURE — P9021 RED BLOOD CELLS UNIT: HCPCS

## 2019-02-27 PROCEDURE — 99285 EMERGENCY DEPT VISIT HI MDM: CPT | Mod: 25

## 2019-02-27 PROCEDURE — 36430 TRANSFUSION BLD/BLD COMPNT: CPT

## 2019-02-27 PROCEDURE — 85025 COMPLETE CBC W/AUTO DIFF WBC: CPT

## 2019-02-27 PROCEDURE — 84484 ASSAY OF TROPONIN QUANT: CPT | Mod: 91

## 2019-02-27 PROCEDURE — 93010 ELECTROCARDIOGRAM REPORT: CPT | Mod: ,,, | Performed by: INTERNAL MEDICINE

## 2019-02-27 PROCEDURE — 99223 1ST HOSP IP/OBS HIGH 75: CPT | Mod: ,,, | Performed by: INTERNAL MEDICINE

## 2019-02-27 PROCEDURE — 25000003 PHARM REV CODE 250: Performed by: INTERNAL MEDICINE

## 2019-02-27 PROCEDURE — C9113 INJ PANTOPRAZOLE SODIUM, VIA: HCPCS | Performed by: EMERGENCY MEDICINE

## 2019-02-27 PROCEDURE — 82728 ASSAY OF FERRITIN: CPT

## 2019-02-27 PROCEDURE — 85045 AUTOMATED RETICULOCYTE COUNT: CPT

## 2019-02-27 PROCEDURE — 80053 COMPREHEN METABOLIC PANEL: CPT

## 2019-02-27 PROCEDURE — 96374 THER/PROPH/DIAG INJ IV PUSH: CPT

## 2019-02-27 PROCEDURE — 86901 BLOOD TYPING SEROLOGIC RH(D): CPT

## 2019-02-27 PROCEDURE — 36415 COLL VENOUS BLD VENIPUNCTURE: CPT

## 2019-02-27 PROCEDURE — 82607 VITAMIN B-12: CPT

## 2019-02-27 PROCEDURE — 83880 ASSAY OF NATRIURETIC PEPTIDE: CPT

## 2019-02-27 PROCEDURE — 83540 ASSAY OF IRON: CPT

## 2019-02-27 PROCEDURE — 93005 ELECTROCARDIOGRAM TRACING: CPT

## 2019-02-27 PROCEDURE — 82746 ASSAY OF FOLIC ACID SERUM: CPT

## 2019-02-27 RX ORDER — SODIUM CHLORIDE 9 MG/ML
INJECTION, SOLUTION INTRAVENOUS CONTINUOUS
Status: DISCONTINUED | OUTPATIENT
Start: 2019-02-27 | End: 2019-03-01 | Stop reason: HOSPADM

## 2019-02-27 RX ORDER — HYDROCODONE BITARTRATE AND ACETAMINOPHEN 500; 5 MG/1; MG/1
TABLET ORAL
Status: DISCONTINUED | OUTPATIENT
Start: 2019-02-27 | End: 2019-03-01 | Stop reason: HOSPADM

## 2019-02-27 RX ORDER — SODIUM CHLORIDE 0.9 % (FLUSH) 0.9 %
5 SYRINGE (ML) INJECTION
Status: DISCONTINUED | OUTPATIENT
Start: 2019-02-27 | End: 2019-03-01 | Stop reason: HOSPADM

## 2019-02-27 RX ORDER — ONDANSETRON 2 MG/ML
4 INJECTION INTRAMUSCULAR; INTRAVENOUS EVERY 8 HOURS PRN
Status: DISCONTINUED | OUTPATIENT
Start: 2019-02-27 | End: 2019-03-01 | Stop reason: HOSPADM

## 2019-02-27 RX ORDER — AMLODIPINE BESYLATE 5 MG/1
5 TABLET ORAL DAILY
Status: DISCONTINUED | OUTPATIENT
Start: 2019-02-27 | End: 2019-02-28

## 2019-02-27 RX ORDER — HYDRALAZINE HYDROCHLORIDE 20 MG/ML
10 INJECTION INTRAMUSCULAR; INTRAVENOUS EVERY 6 HOURS PRN
Status: DISCONTINUED | OUTPATIENT
Start: 2019-02-27 | End: 2019-03-01 | Stop reason: HOSPADM

## 2019-02-27 RX ORDER — AMOXICILLIN 250 MG
1 CAPSULE ORAL 2 TIMES DAILY
Status: DISCONTINUED | OUTPATIENT
Start: 2019-02-27 | End: 2019-03-01 | Stop reason: HOSPADM

## 2019-02-27 RX ORDER — PANTOPRAZOLE SODIUM 40 MG/10ML
80 INJECTION, POWDER, LYOPHILIZED, FOR SOLUTION INTRAVENOUS 2 TIMES DAILY
Status: DISCONTINUED | OUTPATIENT
Start: 2019-02-27 | End: 2019-02-28

## 2019-02-27 RX ORDER — MORPHINE SULFATE 10 MG/ML
2 INJECTION INTRAMUSCULAR; INTRAVENOUS; SUBCUTANEOUS EVERY 4 HOURS PRN
Status: DISCONTINUED | OUTPATIENT
Start: 2019-02-27 | End: 2019-03-01 | Stop reason: HOSPADM

## 2019-02-27 RX ORDER — ALBUTEROL SULFATE 90 UG/1
2 AEROSOL, METERED RESPIRATORY (INHALATION) EVERY 4 HOURS PRN
Status: DISCONTINUED | OUTPATIENT
Start: 2019-02-27 | End: 2019-03-01 | Stop reason: HOSPADM

## 2019-02-27 RX ORDER — ASPIRIN 325 MG
325 TABLET ORAL
Status: COMPLETED | OUTPATIENT
Start: 2019-02-27 | End: 2019-02-27

## 2019-02-27 RX ORDER — ACETAMINOPHEN 325 MG/1
650 TABLET ORAL EVERY 8 HOURS PRN
Status: DISCONTINUED | OUTPATIENT
Start: 2019-02-27 | End: 2019-03-01 | Stop reason: HOSPADM

## 2019-02-27 RX ADMIN — PANTOPRAZOLE SODIUM 80 MG: 40 INJECTION, POWDER, FOR SOLUTION INTRAVENOUS at 09:02

## 2019-02-27 RX ADMIN — SODIUM CHLORIDE: 0.9 INJECTION, SOLUTION INTRAVENOUS at 06:02

## 2019-02-27 RX ADMIN — PANTOPRAZOLE SODIUM 8 MG/HR: 40 INJECTION, POWDER, FOR SOLUTION INTRAVENOUS at 05:02

## 2019-02-27 RX ADMIN — PANTOPRAZOLE SODIUM 80 MG: 40 INJECTION, POWDER, FOR SOLUTION INTRAVENOUS at 05:02

## 2019-02-27 RX ADMIN — SENNOSIDES AND DOCUSATE SODIUM 1 TABLET: 8.6; 5 TABLET ORAL at 09:02

## 2019-02-27 RX ADMIN — AMLODIPINE BESYLATE 5 MG: 5 TABLET ORAL at 09:02

## 2019-02-27 RX ADMIN — SODIUM CHLORIDE: 0.9 INJECTION, SOLUTION INTRAVENOUS at 09:02

## 2019-02-27 RX ADMIN — ASPIRIN 325 MG ORAL TABLET 325 MG: 325 PILL ORAL at 02:02

## 2019-02-27 NOTE — ED PROVIDER NOTES
Encounter Date: 2019       History     Chief Complaint   Patient presents with    Chest Pain     Pt c/o mid sternal CP x2 days with nausea.     53-year-old deaf female with a history of hypertension, diabetes, hyperlipidemia presenting with chest pain while walking earlier today.  Patient states chest pain is midsternal, pinching like, similar to prior episodes of heartburn.  She states it has been constant since then.  She is attempted antacids at home without relief.  She notes a few episodes of nausea without vomiting. She also notes substantial shortness of breath while the chest pain is intense.  She denies hemoptysis, lower extremity edema, steroid or hormone use, history of PE.  She was admitted about a year and a half ago was noted to have an elevated troponin with a negative stress test.  Patient notes she has been out of her hypertension medications.  Of note, the patient's father  at a young age of likely heart attack though she is unsure exactly how old he was.  She also notes back pain that started approximately 3 days ago worse with movement and similar to prior episodes of low back pain.          Review of patient's allergies indicates:  No Known Allergies  Past Medical History:   Diagnosis Date    Anemia     Arthritis     Asthma     Deaf     Diabetes mellitus     Hyperlipidemia     Hypertension      Past Surgical History:   Procedure Laterality Date    HYSTERECTOMY       History reviewed. No pertinent family history.  Social History     Tobacco Use    Smoking status: Never Smoker    Smokeless tobacco: Never Used   Substance Use Topics    Alcohol use: No    Drug use: No     Review of Systems   Constitutional: Negative for fever.   HENT: Negative for sore throat.    Respiratory: Positive for chest tightness and shortness of breath.    Cardiovascular: Positive for chest pain and leg swelling.   Gastrointestinal: Positive for nausea.   Genitourinary: Negative for dysuria.    Musculoskeletal: Positive for back pain.   Skin: Negative for rash.   Neurological: Negative for weakness.   Hematological: Does not bruise/bleed easily.   Psychiatric/Behavioral: Negative for confusion.       Physical Exam     Initial Vitals [02/27/19 0101]   BP Pulse Resp Temp SpO2   (!) 140/65 99 16 98.3 °F (36.8 °C) 99 %      MAP       --         Physical Exam    Nursing note and vitals reviewed.  Constitutional: She appears well-developed and well-nourished. She is not diaphoretic. No distress.   HENT:   Head: Normocephalic and atraumatic.   Nose: Nose normal.   Eyes: EOM are normal. Pupils are equal, round, and reactive to light. No scleral icterus.   Neck: Normal range of motion. Neck supple.   Cardiovascular: Normal rate, regular rhythm, normal heart sounds and intact distal pulses. Exam reveals no gallop and no friction rub.    No murmur heard.  Pulmonary/Chest: Breath sounds normal. No stridor. No respiratory distress. She has no wheezes. She has no rhonchi. She has no rales.   Abdominal: Soft. Normal appearance and bowel sounds are normal. She exhibits no distension. There is no tenderness. There is no rebound and no guarding.   Genitourinary: Rectal exam shows guaiac positive stool. Guaiac positive stool. : Acceptable.  Genitourinary Comments: Guaiac positive dark stool   Musculoskeletal: Normal range of motion. She exhibits no edema or tenderness.   Neurological: She is alert and oriented to person, place, and time. No cranial nerve deficit. GCS score is 15. GCS eye subscore is 4. GCS verbal subscore is 5. GCS motor subscore is 6.   Skin: Skin is warm and dry. No rash noted.   Psychiatric: She has a normal mood and affect. Her behavior is normal.         ED Course   Procedures  Labs Reviewed   CBC W/ AUTO DIFFERENTIAL   COMPREHENSIVE METABOLIC PANEL   TROPONIN I   TROPONIN I   B-TYPE NATRIURETIC PEPTIDE          Imaging Results          X-Ray Chest PA And Lateral (Final result)   Result time 02/27/19 01:45:52    Final result by Vicotria Sams MD (02/27/19 01:45:52)                 Impression:      No acute cardiopulmonary process identified.    Moderate-sized hiatal hernia.      Electronically signed by: Victoria Sams MD  Date:    02/27/2019  Time:    01:45             Narrative:    EXAMINATION:  XR CHEST PA AND LATERAL    CLINICAL HISTORY:  Chest Pain;    TECHNIQUE:  PA and lateral views of the chest were performed.    COMPARISON:  April 2018.    FINDINGS:  Cardiac silhouette is normal in size.  Lungs are symmetrically expanded.  No evidence of focal consolidative process, pneumothorax, or significant effusion.  Unchanged rounded retrocardiac opacity and lucency is seen over the lower chest on the right likely reflecting a moderate-sized hiatal hernia.  No acute osseous abnormality identified.                                 Medical Decision Making:   Initial Assessment:   The interview and exam was performed using [employer]  service for sign language.  Patient presenting with chest pain with exertion associated with nausea and shortness of breath. Review of prior medical records reveals elevated troponin with a negative stress test.  Pain has subsided significantly but is still ongoing.  Patient also has a history of bronchitis apparently but did not have medications for her nebulizer.  On exam, patient is well-appearing and in no acute distress. Vital signs normal, afebrile, not hypertensive, breathing and satting well. Minimal if any wheezing bilaterally, good air movement.  Trace pitting edema to her lower extremities. Differential includes ACS, less likely PE, dissection, pneumonia, pneumothorax.  Patient without tachycardia, tachypnea, on arrival.  Wells score 0.  Unable to apply perc rule as the patient is greater than 50 but otherwise normal. Will get chest x-ray, labs, EKG, and reassess.  ED Management:  EKG shows normal sinus rhythm, rate 90s, no ST segment  elevation or depression concerning for acute ischemia.  Hemoglobin extremely low.  Guaiac positive. Suspect GI bleed resulting in anemia.  S patient got up for the bathroom, patient was does become significantly short of breath and noted chest pain.  EKG at that time showed significant ST segment depressions.  After sitting down at rest, her symptoms resolved and her EKG normalized.  Suspect she is having demand ischemia in the setting of critical anemia.  Patient ordered for 3 units blood.  Ordered bed rest for the patient.  Will admit the patient for anemia, get Cardiology and GI consult.  Patient started on Protonix drip Protonix bolus and made NPO.                      Clinical Impression:       ICD-10-CM ICD-9-CM   1. Chest pain R07.9 786.50         Disposition:   Disposition: Admitted  Condition: Stable                        Shalom Landa MD  02/27/19 0630

## 2019-02-27 NOTE — ASSESSMENT & PLAN NOTE
Likely secondary to anemia  Echocardiogram and stress test has been ordered by Cardiology at this time  Troponin appears to be within normal limits at this time  Monitor on telemetry

## 2019-02-27 NOTE — SUBJECTIVE & OBJECTIVE
Past Medical History:   Diagnosis Date    Anemia     Arthritis     Asthma     Deaf     Diabetes mellitus     Hyperlipidemia     Hypertension        Past Surgical History:   Procedure Laterality Date    HYSTERECTOMY         Review of patient's allergies indicates:  No Known Allergies    No current facility-administered medications on file prior to encounter.      Current Outpatient Medications on File Prior to Encounter   Medication Sig    albuterol 90 mcg/actuation inhaler Inhale 1-2 puffs into the lungs every 4 (four) hours as needed for Wheezing.    [DISCONTINUED] albuterol sulfate 2.5 mg/0.5 mL Nebu Take 2.5 mg by nebulization every 4 (four) hours as needed.    [DISCONTINUED] albuterol-ipratropium 2.5mg-0.5mg/3mL (DUO-NEB) 0.5 mg-3 mg(2.5 mg base)/3 mL nebulizer solution Take 3 mLs by nebulization every 6 (six) hours as needed for Wheezing or Shortness of Breath. Rescue    [DISCONTINUED] budesonide-formoterol 160-4.5 mcg (SYMBICORT) 160-4.5 mcg/actuation HFAA Inhale 2 puffs into the lungs every 12 (twelve) hours. Controller    [DISCONTINUED] ferrous sulfate 325 (65 FE) MG EC tablet Take 325 mg by mouth 3 (three) times daily with meals.    [DISCONTINUED] lisinopril-hydrochlorothiazide (PRINZIDE,ZESTORETIC) 20-12.5 mg per tablet Take 1 tablet by mouth once daily.    [DISCONTINUED] meloxicam (MOBIC) 7.5 MG tablet Take 1 tablet (7.5 mg total) by mouth once daily.    [DISCONTINUED] naproxen (NAPROSYN) 500 MG tablet Take 1 tablet (500 mg total) by mouth 2 (two) times daily as needed (for pain).    [DISCONTINUED] pantoprazole (PROTONIX) 40 MG tablet Take 40 mg by mouth once daily.    [DISCONTINUED] predniSONE (DELTASONE) 20 MG tablet 3 tablets by mouth daily x 3 days  2 tablets by mouth daily x 3 days  1 tablet by mouth daily x 3 days    [DISCONTINUED] traMADol (ULTRAM) 50 mg tablet Take 1 tablet (50 mg total) by mouth every 8 (eight) hours as needed for Pain.     Family History     None         Tobacco Use    Smoking status: Never Smoker    Smokeless tobacco: Never Used   Substance and Sexual Activity    Alcohol use: No    Drug use: No    Sexual activity: Yes     Partners: Male     Birth control/protection: None     Review of Systems   Respiratory: Negative for shortness of breath.    Cardiovascular: Positive for chest pain.   Gastrointestinal: Negative for abdominal pain, blood in stool, constipation, diarrhea, nausea and vomiting.   All other systems reviewed and are negative.    Objective:     Vital Signs (Most Recent):  Temp: 97.8 °F (36.6 °C) (02/27/19 0807)  Pulse: 91 (02/27/19 0807)  Resp: 16 (02/27/19 0807)  BP: (!) 166/80 (02/27/19 0807)  SpO2: 99 % (02/27/19 0807) Vital Signs (24h Range):  Temp:  [97.8 °F (36.6 °C)-98.4 °F (36.9 °C)] 97.8 °F (36.6 °C)  Pulse:  [] 91  Resp:  [16-43] 16  SpO2:  [99 %-100 %] 99 %  BP: (137-173)/(65-80) 166/80     Weight: 70.8 kg (156 lb 1.4 oz)  Body mass index is 27.65 kg/m².    Physical Exam   Constitutional: Vital signs are normal. She appears well-developed. She is cooperative. No distress.   HENT:   Head: Normocephalic and atraumatic.   Mouth/Throat: Mucous membranes are normal.   Eyes: Pupils are equal, round, and reactive to light.   Cardiovascular: Normal rate and regular rhythm.   No murmur heard.  No chest wall tenderness   Pulmonary/Chest: Effort normal and breath sounds normal.   Abdominal: Soft. Normal appearance and bowel sounds are normal. She exhibits no distension. There is no tenderness.   Neurological: She is alert. She has normal strength. No cranial nerve deficit or sensory deficit.   Psychiatric: She has a normal mood and affect. Judgment normal. Cognition and memory are normal.         CRANIAL NERVES     CN III, IV, VI   Pupils are equal, round, and reactive to light.       Significant Labs:   Recent Lab Results       02/27/19  0459   02/27/19  0315   02/27/19  0227        Unit Blood Type Code   7300[P]          7300[P]           7300[P]       Unit Expiration   [id number][P]          [id number][P]          [id number][P]       Unit Blood Type   B POS[P]          B POS[P]          B POS[P]       Albumin 3.2   3.5     Alkaline Phosphatase 51   56     ALT 11   12     Anion Gap 8   9     Aniso     Moderate     AST 15   17     Baso #     0.02     Basophil%     0.5     Total Bilirubin 0.3  Comment:  For infants and newborns, interpretation of results should be based  on gestational age, weight and in agreement with clinical  observations.  Premature Infant recommended reference ranges:  Up to 24 hours.............<8.0 mg/dL  Up to 48 hours............<12.0 mg/dL  3-5 days..................<15.0 mg/dL  6-29 days.................<15.0 mg/dL     0.3  Comment:  For infants and newborns, interpretation of results should be based  on gestational age, weight and in agreement with clinical  observations.  Premature Infant recommended reference ranges:  Up to 24 hours.............<8.0 mg/dL  Up to 48 hours............<12.0 mg/dL  3-5 days..................<15.0 mg/dL  6-29 days.................<15.0 mg/dL       BNP     222  Comment:  Values of less than 100 pg/ml are consistent with non-CHF populations.     BUN, Bld 8   8     Calcium 8.1   8.3     Chloride 107   107     CO2 21   20     CODING SYSTEM   [id number][P]          [id number][P]          [id number][P]       Creatinine 0.7   0.7     Differential Method     Automated     DISPENSE STATUS   CROSSMATCHED[P]          CROSSMATCHED[P]          CROSSMATCHED[P]       eGFR if  >60   >60     eGFR if non  >60  Comment:  Calculation used to obtain the estimated glomerular filtration  rate (eGFR) is the CKD-EPI equation.      >60  Comment:  Calculation used to obtain the estimated glomerular filtration  rate (eGFR) is the CKD-EPI equation.        Eos #     0.1     Eosinophil%     2.7     Ferritin     3     Glucose 99   107     Gran # (ANC)     2.3     Gran%     56.5     Group & Rh   B  POS       Hematocrit     17.9     Hemoglobin     4.9     Hypo     Moderate     INDIRECT GREGORY   NEG       Iron     13     Lymph #     1.2     Lymph%     30.4     MCH     19.7     MCHC     27.4     MCV     72     Mono #     0.4     Mono%     9.9     MPV     9.3     Ovalocytes     Occasional     Platelets     326     Potassium 4.0   4.2     PRODUCT CODE   [id number][P]          [id number][P]          [id number][P]       Total Protein 6.5   6.7     RBC     2.49     RDW     20.0     Retic     1.7     Saturated Iron     2     Sodium 136   136     Target Cells     Occasional     TIBC     554     Transferrin     374     Troponin I 0.013  Comment:  The reference interval for Troponin I represents the 99th percentile   cutoff   for our facility and is consistent with 3rd generation assay   performance.     0.009  Comment:  The reference interval for Troponin I represents the 99th percentile   cutoff   for our facility and is consistent with 3rd generation assay   performance.       UNIT NUMBER   [id number][P]          [id number][P]          [id number][P]       WBC     4.04           Significant Imaging: I have reviewed all pertinent imaging results/findings within the past 24 hours.

## 2019-02-27 NOTE — NURSING
Pt became tachycardic(120s), SOB, and had increased CP after ambulating from bathroom. New EKG printed and MD updated. MD then went over blood consent with patient and completed fecal occult test with myself as witness.

## 2019-02-27 NOTE — ASSESSMENT & PLAN NOTE
Patient with guaiac-positive stool  GI has been consulted by the ED, will await further recommendations  Continue on Protonix 80 mg IV b.i.d.  Keep NPO at this time until we can evaluate whether patient will need EGD during this hospitalization

## 2019-02-27 NOTE — HPI
53-year-old deaf female with a history of hypertension, diabetes, hyperlipidemia presenting with chest pain while walking earlier today.  Patient states chest pain is midsternal, pinching like, similar to prior episodes of heartburn.  She states it has been constant since then.  She is attempted antacids at home without relief.  She notes a few episodes of nausea without vomiting. She also notes substantial shortness of breath while the chest pain is intense.  She denies hemoptysis, lower extremity edema, steroid or hormone use, history of PE.  She was admitted about a year and a half ago was noted to have an elevated troponin with a negative stress test.  Patient notes she has been out of her hypertension medications.  Of note, the patient's father  at a young age of likely heart attack though she is unsure exactly how old he was.  She also notes back pain that started approximately 3 days ago worse with movement and similar to prior episodes of low back pain.    Currently CP free  EKG NSR minor NSSTT changes  Troponin negative x 2    Known to me  Admitted 18  Mrs. Ornelas is a 53 yo F who is deaf, presents to the ED with a CC of SOB that started this am.  The history is limited at this time due to unable to find the code for the [facility] sign language Ipad.  The history is mainly obtained from the ED record.  The patient has not been admitted to the hospital in the past here. She woke up this am wheezing and SOB. She was noted to have run out of her inhalers a couple of weeks ago. EMS found her with 02 sats of 72% and started on breathing treatments, steroids and bi-pap.  She evidently responded very well and now is in her room resting comfortably on RA and has a completely normal lung exam. There is a history of some SOB lying flat over the past week and she presents with a BNP of 227 in the setting of normal renal function. She currently does not indicate any complaint.      The patient presents to the  hospital with a CC of sob and hypoxia. The patient was noted to have wheezing by EMS and ED doctor and quickly improved with steroids. By the time I examined her just a couple hours after admission- she was resting comfortably on RA in her room and had a completely normal lung exam. Echo was obtained and was normal. Her initial trop was negative but subsequent ones were positive. Cards was consulted. Of note, the patient was found to have an 02 sat of 72% when EMS arrived.  I spoke to Dr. Burger who was aware of the elevated troponin and noted that the patient could go home and follow up with him as out patient for out patient stress.  The patient clinically improved and her CC resolved. She will go home on a steroid taper.  Inhalers Rx's were refilled.  Activity as tolerated. Diet- low NA.       Troponin increased to 0.2 then 0.17       Suspect demand ischemia from respiratory issues and not an ACS. Ok to do stress test as out patient            Echo 4/13/18    1 - Normal left ventricular systolic function (EF 55-60%).     2 - Concentric remodeling.     3 - The estimated PA systolic pressure is 38 mmHg.     4 - Trivial tricuspid regurgitation.      Stress test 5/2/18  LVEF: >= 70 %  Impression: NORMAL MYOCARDIAL PERFUSION  1. The perfusion scan is free of evidence for myocardial ischemia or injury.   2. Resting wall motion is physiologic.   3. Resting LV function is normal.

## 2019-02-27 NOTE — HPI
Patient is a 53-year-old female with a past medical history of hypertension, asthma, deafness that presents with chest pain. History is obtained through using a .  Patient states that approximately 3 days ago, she began having pain in the center of her chest that felt like a gripping sensation.  She says at rest it was 6/10 intensity, however worsened with ambulating.  Says that she did not have any shortness of breath along with ambulating, only chest pain. She denies any fevers, nausea, vomiting, abdominal pain, diarrhea, constipation.  Patient had not been taking any medications for the chest pain, but given the persistence of it, she decided to come into the ED.    On initial evaluation in the ED, she was noted to have a hemoglobin 4.9.  She denies having any recent rectal bleeding, no black stools, hematuria, hematemesis.  Patient has not had any alcohol use recently, denies any NSAID use other than Aleve 1 tablet twice a day.  Says that she was previously on tramadol, however she was taking this as prescribed and she has been out of this medication for several weeks.

## 2019-02-27 NOTE — CONSULTS
Gastroenterology    CC: Chest pain, weakness.    History of present illness: The patient is a 53 year old female with a history of HTN, DM and hyperlipidemia presenting with chest pain and severe anemia.  She reports acute, persistent, non-radiating mid-sternal chest pain that began yesterday.  The pain is described as a pinching sensation.  She notes some associated shortness of breath and weakness as well.  No history of similar episodes in the past.  She took an antacid without any relief in her symptoms.  She denies any abdominal pain, nausea or vomiting.  She was noted to be severely anemic in the ED.  She denies any overt gastrointestinal bleeding, including hematemesis, melena or hematochezia.  She does not take NSAIDs.  Her last colonoscopy was in Jan 2016 and revealed diverticulosis.    Past medical history:  Hypertension.  Diabetes mellitus, type 2.  Hyperlipidemia.    Surgical history:  Hysterectomy.    Social history:  Tobacco use: denies.  Alcohol use: denies.  Illicit drug use: denies.    Family history:  No history of GI malignancy.    Allergies:  No known drug allergies.    Medications:    Current Facility-Administered Medications:     0.9%  NaCl infusion (for blood administration), , Intravenous, Q24H PRN, Shalom Landa MD    0.9%  NaCl infusion, , Intravenous, Continuous, Shalom Landa MD    acetaminophen tablet 650 mg, 650 mg, Oral, Q8H PRN, Shalom Landa MD    albuterol inhaler 2 puff, 2 puff, Inhalation, Q4H PRN, Shalom Landa MD    amLODIPine tablet 5 mg, 5 mg, Oral, Daily, Burak Burger MD, 5 mg at 02/27/19 0931    hydrALAZINE injection 10 mg, 10 mg, Intravenous, Q6H PRN, Burak Burger MD    morphine injection 2 mg, 2 mg, Intravenous, Q4H PRN, Shalom Landa MD    ondansetron injection 4 mg, 4 mg, Intravenous, Q8H PRN, Shalom Landa MD    ondansetron injection 4 mg, 4 mg, Intravenous, Q8H PRN, Shalom Landa MD    pantoprazole  injection 80 mg, 80 mg, Intravenous, BID, Shalom Landa MD, 80 mg at 02/27/19 0533    senna-docusate 8.6-50 mg per tablet 1 tablet, 1 tablet, Oral, BID, Shalom Landa MD, 1 tablet at 02/27/19 0930    sodium chloride 0.9% flush 5 mL, 5 mL, Intravenous, PRN, Shalom Landa MD    sodium chloride 0.9% flush 5 mL, 5 mL, Intravenous, PRN, Shalom Landa MD    Review of Systems  Constitutional: Positive for weakness. No weight loss, fever or chills.  Skin: No rash or jaundice.    Eyes: No icterus or discharge   ENT: No congestion, ear pain, or sore throat.    Endocrine: No diabetes or thyroid problems.   Cardiovascular: Positive for chest pain. No palpitations.    Respiratory: Positive for dyspnea on exertion. No cough, congestion, or wheezing.  Gastrointestinal: Negative except as documented in history of present illness.    Genitourinary: No dysuria or hematuria.    Musculoskeletal: No back pain or joint pain.    Neurologic: No headache, dizziness or confusion.   Hematologic: No unusual bruising or bleeding.   Psychiatric: No anxiety or depression.    Physical Examination  Wt Readings from Last 3 Encounters:   02/27/19 70.8 kg (156 lb 1.4 oz)   05/18/18 70.8 kg (156 lb 1.4 oz)   04/30/18 70.8 kg (156 lb)     Temp Readings from Last 3 Encounters:   02/27/19 98.2 °F (36.8 °C) (Oral)   04/14/18 98.1 °F (36.7 °C) (Oral)   03/11/18 98.5 °F (36.9 °C) (Oral)     BP Readings from Last 3 Encounters:   02/27/19 (!) 168/75   05/18/18 139/82   04/30/18 136/78     Pulse Readings from Last 3 Encounters:   02/27/19 86   05/18/18 97   04/30/18 105     General: Obese AAF in no acute distress.    Head: Normocephalic, atraumatic. Neck is supple.  Eyes: EOMI, anicteric sclera, normal conjunctiva    Ears: Hearing grossly intact, normal external exam.  Chest: Chest nontender, with clear breath sounds bilaterally.  No wheezes, rales, or rhonchi.   Cardiovascular: Regular rate and rhythm.  S1 and S2, without  murmurs, gallops, or rubs.   Abdomen: Soft, nontender, nondistended, normal bowel sounds.  No guarding or rebound.  Musculoskeletal: Good range of motion.  Extremities without clubbing, cyanosis or edema.  Neurologic: Alert and oriented x3. No focal or sensory deficits.  Psychiatric: Appropriate mood and affect.  No suicidal ideation.  Skin: No rash, no pallor or lesions.    Labs:  Lab Results   Component Value Date    WBC 4.04 02/27/2019    HGB 4.9 (LL) 02/27/2019    MCV 72 (L) 02/27/2019    MCH 19.7 (L) 02/27/2019    MCHC 27.4 (L) 02/27/2019    RDW 20.0 (H) 02/27/2019     02/27/2019    MPV 9.3 02/27/2019    PLTEST Adequate 10/20/2010     Lab Results   Component Value Date    PROT 6.5 02/27/2019    ALBUMIN 3.2 (L) 02/27/2019    BILITOT 0.3 02/27/2019    AST 15 02/27/2019    ALKPHOS 51 (L) 02/27/2019    ALT 11 02/27/2019     Impression: 53 year old female with a history of HTN, DM and hyperlipidemia presenting with chest pain and severe iron deficiency anemia. Appears to be chronic component to anemia after reviewing prior labs (Hgb 9.1 in April). No overt bleeding. Last colon exam in Jan 2016 as detailed in HPI. May have chronic/slow blood loss from SB angiodysplasias. Receiving blood transfusion this afternoon. Undergoing cardiac work-up to rule out ischemia (planned for echo/stress test).    Plan:   1.  EGD tomorrow once resuscitated with pRBCs.  2.  NPO after MN. Anesthesia consult.  3.  PPI.

## 2019-02-27 NOTE — ED TRIAGE NOTES
Pt arrived via personal transportation with c/o cramping mid-sternal CP and SOB all day. C/O nausea she attributed to pain. Pt attempted to treat with her nebulizer (which she ran out of). Believes her SOB is due to weather and feels like bronchitis. Pt attempted to see PCP, but couldn't get an appointment til 4/29. Pt is deaf. ASL interpretor Ashlee, ID# [id number] used.

## 2019-02-27 NOTE — ASSESSMENT & PLAN NOTE
Currently noted to be 4.9 at time of admission  Transfuse 3 units PRBCs  Recheck hemoglobin 1 hr after transfusion is completed  Appears that on initial iron studies, patient is iron deficient  Will need to be started on iron supplementation after she has been evaluated by GI if she is okay for p.o. intake  Continue monitor chest pain, improved at time of examination, however still present.  Check q.day CBC

## 2019-02-27 NOTE — H&P
[facility] Medicine  History & Physical    Patient Name: Aurora Ornelas  MRN: [id number]  Admission Date: 2/27/2019  Attending Physician: George Argueta DO  Primary Care Provider: Primary Doctor No         Patient information was obtained from patient and ER records.     Subjective:     Principal Problem:Symptomatic anemia    Chief Complaint:   Chief Complaint   Patient presents with    Chest Pain     Pt c/o mid sternal CP x2 days with nausea.        HPI: Patient is a 53-year-old female with a past medical history of hypertension, asthma, deafness that presents with chest pain. History is obtained through using a .  Patient states that approximately 3 days ago, she began having pain in the center of her chest that felt like a gripping sensation.  She says at rest it was 6/10 intensity, however worsened with ambulating.  Says that she did not have any shortness of breath along with ambulating, only chest pain. She denies any fevers, nausea, vomiting, abdominal pain, diarrhea, constipation.  Patient had not been taking any medications for the chest pain, but given the persistence of it, she decided to come into the ED.    On initial evaluation in the ED, she was noted to have a hemoglobin 4.9.  She denies having any recent rectal bleeding, no black stools, hematuria, hematemesis.  Patient has not had any alcohol use recently, denies any NSAID use other than Aleve 1 tablet twice a day.  Says that she was previously on tramadol, however she was taking this as prescribed and she has been out of this medication for several weeks.    Past Medical History:   Diagnosis Date    Anemia     Arthritis     Asthma     Deaf     Diabetes mellitus     Hyperlipidemia     Hypertension        Past Surgical History:   Procedure Laterality Date    HYSTERECTOMY         Review of patient's allergies indicates:  No Known Allergies    No current facility-administered medications on  file prior to encounter.      Current Outpatient Medications on File Prior to Encounter   Medication Sig    albuterol 90 mcg/actuation inhaler Inhale 1-2 puffs into the lungs every 4 (four) hours as needed for Wheezing.    [DISCONTINUED] albuterol sulfate 2.5 mg/0.5 mL Nebu Take 2.5 mg by nebulization every 4 (four) hours as needed.    [DISCONTINUED] albuterol-ipratropium 2.5mg-0.5mg/3mL (DUO-NEB) 0.5 mg-3 mg(2.5 mg base)/3 mL nebulizer solution Take 3 mLs by nebulization every 6 (six) hours as needed for Wheezing or Shortness of Breath. Rescue    [DISCONTINUED] budesonide-formoterol 160-4.5 mcg (SYMBICORT) 160-4.5 mcg/actuation HFAA Inhale 2 puffs into the lungs every 12 (twelve) hours. Controller    [DISCONTINUED] ferrous sulfate 325 (65 FE) MG EC tablet Take 325 mg by mouth 3 (three) times daily with meals.    [DISCONTINUED] lisinopril-hydrochlorothiazide (PRINZIDE,ZESTORETIC) 20-12.5 mg per tablet Take 1 tablet by mouth once daily.    [DISCONTINUED] meloxicam (MOBIC) 7.5 MG tablet Take 1 tablet (7.5 mg total) by mouth once daily.    [DISCONTINUED] naproxen (NAPROSYN) 500 MG tablet Take 1 tablet (500 mg total) by mouth 2 (two) times daily as needed (for pain).    [DISCONTINUED] pantoprazole (PROTONIX) 40 MG tablet Take 40 mg by mouth once daily.    [DISCONTINUED] predniSONE (DELTASONE) 20 MG tablet 3 tablets by mouth daily x 3 days  2 tablets by mouth daily x 3 days  1 tablet by mouth daily x 3 days    [DISCONTINUED] traMADol (ULTRAM) 50 mg tablet Take 1 tablet (50 mg total) by mouth every 8 (eight) hours as needed for Pain.     Family History     None        Tobacco Use    Smoking status: Never Smoker    Smokeless tobacco: Never Used   Substance and Sexual Activity    Alcohol use: No    Drug use: No    Sexual activity: Yes     Partners: Male     Birth control/protection: None     Review of Systems   Respiratory: Negative for shortness of breath.    Cardiovascular: Positive for chest pain.    Gastrointestinal: Negative for abdominal pain, blood in stool, constipation, diarrhea, nausea and vomiting.   All other systems reviewed and are negative.    Objective:     Vital Signs (Most Recent):  Temp: 97.8 °F (36.6 °C) (02/27/19 0807)  Pulse: 91 (02/27/19 0807)  Resp: 16 (02/27/19 0807)  BP: (!) 166/80 (02/27/19 0807)  SpO2: 99 % (02/27/19 0807) Vital Signs (24h Range):  Temp:  [97.8 °F (36.6 °C)-98.4 °F (36.9 °C)] 97.8 °F (36.6 °C)  Pulse:  [] 91  Resp:  [16-43] 16  SpO2:  [99 %-100 %] 99 %  BP: (137-173)/(65-80) 166/80     Weight: 70.8 kg (156 lb 1.4 oz)  Body mass index is 27.65 kg/m².    Physical Exam   Constitutional: Vital signs are normal. She appears well-developed. She is cooperative. No distress.   HENT:   Head: Normocephalic and atraumatic.   Mouth/Throat: Mucous membranes are normal.   Eyes: Pupils are equal, round, and reactive to light.   Cardiovascular: Normal rate and regular rhythm.   No murmur heard.  No chest wall tenderness   Pulmonary/Chest: Effort normal and breath sounds normal.   Abdominal: Soft. Normal appearance and bowel sounds are normal. She exhibits no distension. There is no tenderness.   Neurological: She is alert. She has normal strength. No cranial nerve deficit or sensory deficit.   Psychiatric: She has a normal mood and affect. Judgment normal. Cognition and memory are normal.         CRANIAL NERVES     CN III, IV, VI   Pupils are equal, round, and reactive to light.       Significant Labs:   Recent Lab Results       02/27/19  0459   02/27/19  0315   02/27/19  0227        Unit Blood Type Code   7300[P]          7300[P]          7300[P]       Unit Expiration   [id number][P]          [id number][P]          [id number][P]       Unit Blood Type   B POS[P]          B POS[P]          B POS[P]       Albumin 3.2   3.5     Alkaline Phosphatase 51   56     ALT 11   12     Anion Gap 8   9     Aniso     Moderate     AST 15   17     Baso #     0.02     Basophil%     0.5      Total Bilirubin 0.3  Comment:  For infants and newborns, interpretation of results should be based  on gestational age, weight and in agreement with clinical  observations.  Premature Infant recommended reference ranges:  Up to 24 hours.............<8.0 mg/dL  Up to 48 hours............<12.0 mg/dL  3-5 days..................<15.0 mg/dL  6-29 days.................<15.0 mg/dL     0.3  Comment:  For infants and newborns, interpretation of results should be based  on gestational age, weight and in agreement with clinical  observations.  Premature Infant recommended reference ranges:  Up to 24 hours.............<8.0 mg/dL  Up to 48 hours............<12.0 mg/dL  3-5 days..................<15.0 mg/dL  6-29 days.................<15.0 mg/dL       BNP     222  Comment:  Values of less than 100 pg/ml are consistent with non-CHF populations.     BUN, Bld 8   8     Calcium 8.1   8.3     Chloride 107   107     CO2 21   20     CODING SYSTEM   [id number][P]          [id number][P]          [id number][P]       Creatinine 0.7   0.7     Differential Method     Automated     DISPENSE STATUS   CROSSMATCHED[P]          CROSSMATCHED[P]          CROSSMATCHED[P]       eGFR if  >60   >60     eGFR if non  >60  Comment:  Calculation used to obtain the estimated glomerular filtration  rate (eGFR) is the CKD-EPI equation.      >60  Comment:  Calculation used to obtain the estimated glomerular filtration  rate (eGFR) is the CKD-EPI equation.        Eos #     0.1     Eosinophil%     2.7     Ferritin     3     Glucose 99   107     Gran # (ANC)     2.3     Gran%     56.5     Group & Rh   B POS       Hematocrit     17.9     Hemoglobin     4.9     Hypo     Moderate     INDIRECT GREGORY   NEG       Iron     13     Lymph #     1.2     Lymph%     30.4     MCH     19.7     MCHC     27.4     MCV     72     Mono #     0.4     Mono%     9.9     MPV     9.3     Ovalocytes     Occasional     Platelets     326     Potassium 4.0   4.2      PRODUCT CODE   [id number][P]          [id number][P]          [id number][P]       Total Protein 6.5   6.7     RBC     2.49     RDW     20.0     Retic     1.7     Saturated Iron     2     Sodium 136   136     Target Cells     Occasional     TIBC     554     Transferrin     374     Troponin I 0.013  Comment:  The reference interval for Troponin I represents the 99th percentile   cutoff   for our facility and is consistent with 3rd generation assay   performance.     0.009  Comment:  The reference interval for Troponin I represents the 99th percentile   cutoff   for our facility and is consistent with 3rd generation assay   performance.       UNIT NUMBER   [id number][P]          [id number][P]          [id number][P]       WBC     4.04           Significant Imaging: I have reviewed all pertinent imaging results/findings within the past 24 hours.    Assessment/Plan:     * Symptomatic anemia    Currently noted to be 4.9 at time of admission  Transfuse 3 units PRBCs  Recheck hemoglobin 1 hr after transfusion is completed  Appears that on initial iron studies, patient is iron deficient  Will need to be started on iron supplementation after she has been evaluated by GI if she is okay for p.o. intake  Continue monitor chest pain, improved at time of examination, however still present.  Check q.day CBC     GI bleeding    Patient with guaiac-positive stool  GI has been consulted by the ED, will await further recommendations  Continue on Protonix 80 mg IV b.i.d.  Keep NPO at this time until we can evaluate whether patient will need EGD during this hospitalization         Chest pain    Likely secondary to anemia  Echocardiogram and stress test has been ordered by Cardiology at this time  Troponin appears to be within normal limits at this time  Monitor on telemetry       Hypertension    Blood pressure noted to be elevated this time  Cardiology has evaluated and started patient on Norvasc 5 mg daily  Add p.r.n. hydralazine for  systolic blood pressure greater than 160  Unable to reconcile home antihypertensives as patient does not have any medications with her at this time       Deaf    Patient able use sign language  Transition provided by ASL  using eye pad on the floor         VTE Risk Mitigation (From admission, onward)        Ordered     IP VTE LOW RISK PATIENT  Once      02/27/19 0609     Place HÉCTOR hose  Until discontinued      02/27/19 0609     Place sequential compression device  Until discontinued      02/27/19 0609             George Argueta DO  Department of Hospital Medicine   [facility]

## 2019-02-27 NOTE — CONSULTS
[facility]  Cardiology  Consult Note    Patient Name: Aurora Ornelas  MRN: [id number]  Admission Date: 2019  Hospital Length of Stay: 0 days  Code Status: Full Code   Attending Provider: George Argueta DO   Consulting Provider: Burak Burger MD  Primary Care Physician: Primary Doctor No  Principal Problem:<principal problem not specified>    Patient information was obtained from patient and ER records.     Consults  Subjective:     Chief Complaint:  CP     HPI:   53-year-old deaf female with a history of hypertension, diabetes, hyperlipidemia presenting with chest pain while walking earlier today.  Patient states chest pain is midsternal, pinching like, similar to prior episodes of heartburn.  She states it has been constant since then.  She is attempted antacids at home without relief.  She notes a few episodes of nausea without vomiting. She also notes substantial shortness of breath while the chest pain is intense.  She denies hemoptysis, lower extremity edema, steroid or hormone use, history of PE.  She was admitted about a year and a half ago was noted to have an elevated troponin with a negative stress test.  Patient notes she has been out of her hypertension medications.  Of note, the patient's father  at a young age of likely heart attack though she is unsure exactly how old he was.  She also notes back pain that started approximately 3 days ago worse with movement and similar to prior episodes of low back pain.    Currently CP free  EKG NSR minor NSSTT changes  Troponin negative x 2    Known to me  Admitted 18  Mrs. Ornelas is a 51 yo F who is deaf, presents to the ED with a CC of SOB that started this am.  The history is limited at this time due to unable to find the code for the [employer] sign language Ipad.  The history is mainly obtained from the ED record.  The patient has not been admitted to the hospital in the past here. She woke up this am wheezing and SOB. She was  noted to have run out of her inhalers a couple of weeks ago. EMS found her with 02 sats of 72% and started on breathing treatments, steroids and bi-pap.  She evidently responded very well and now is in her room resting comfortably on RA and has a completely normal lung exam. There is a history of some SOB lying flat over the past week and she presents with a BNP of 227 in the setting of normal renal function. She currently does not indicate any complaint.      The patient presents to the hospital with a CC of sob and hypoxia. The patient was noted to have wheezing by EMS and ED doctor and quickly improved with steroids. By the time I examined her just a couple hours after admission- she was resting comfortably on RA in her room and had a completely normal lung exam. Echo was obtained and was normal. Her initial trop was negative but subsequent ones were positive. Cards was consulted. Of note, the patient was found to have an 02 sat of 72% when EMS arrived.  I spoke to Dr. Burger who was aware of the elevated troponin and noted that the patient could go home and follow up with him as out patient for out patient stress.  The patient clinically improved and her CC resolved. She will go home on a steroid taper.  Inhalers Rx's were refilled.  Activity as tolerated. Diet- low NA.       Troponin increased to 0.2 then 0.17       Suspect demand ischemia from respiratory issues and not an ACS. Ok to do stress test as out patient            Echo 4/13/18    1 - Normal left ventricular systolic function (EF 55-60%).     2 - Concentric remodeling.     3 - The estimated PA systolic pressure is 38 mmHg.     4 - Trivial tricuspid regurgitation.      Stress test 5/2/18  LVEF: >= 70 %  Impression: NORMAL MYOCARDIAL PERFUSION  1. The perfusion scan is free of evidence for myocardial ischemia or injury.   2. Resting wall motion is physiologic.   3. Resting LV function is normal.       Past Medical History:   Diagnosis Date    Anemia      Arthritis     Asthma     Deaf     Diabetes mellitus     Hyperlipidemia     Hypertension        Past Surgical History:   Procedure Laterality Date    HYSTERECTOMY         Review of patient's allergies indicates:  No Known Allergies    No current facility-administered medications on file prior to encounter.      Current Outpatient Medications on File Prior to Encounter   Medication Sig    albuterol 90 mcg/actuation inhaler Inhale 1-2 puffs into the lungs every 4 (four) hours as needed for Wheezing.    [DISCONTINUED] albuterol sulfate 2.5 mg/0.5 mL Nebu Take 2.5 mg by nebulization every 4 (four) hours as needed.    [DISCONTINUED] albuterol-ipratropium 2.5mg-0.5mg/3mL (DUO-NEB) 0.5 mg-3 mg(2.5 mg base)/3 mL nebulizer solution Take 3 mLs by nebulization every 6 (six) hours as needed for Wheezing or Shortness of Breath. Rescue    [DISCONTINUED] budesonide-formoterol 160-4.5 mcg (SYMBICORT) 160-4.5 mcg/actuation HFAA Inhale 2 puffs into the lungs every 12 (twelve) hours. Controller    [DISCONTINUED] ferrous sulfate 325 (65 FE) MG EC tablet Take 325 mg by mouth 3 (three) times daily with meals.    [DISCONTINUED] lisinopril-hydrochlorothiazide (PRINZIDE,ZESTORETIC) 20-12.5 mg per tablet Take 1 tablet by mouth once daily.    [DISCONTINUED] meloxicam (MOBIC) 7.5 MG tablet Take 1 tablet (7.5 mg total) by mouth once daily.    [DISCONTINUED] naproxen (NAPROSYN) 500 MG tablet Take 1 tablet (500 mg total) by mouth 2 (two) times daily as needed (for pain).    [DISCONTINUED] pantoprazole (PROTONIX) 40 MG tablet Take 40 mg by mouth once daily.    [DISCONTINUED] predniSONE (DELTASONE) 20 MG tablet 3 tablets by mouth daily x 3 days  2 tablets by mouth daily x 3 days  1 tablet by mouth daily x 3 days    [DISCONTINUED] traMADol (ULTRAM) 50 mg tablet Take 1 tablet (50 mg total) by mouth every 8 (eight) hours as needed for Pain.     Family History     None        Tobacco Use    Smoking status: Never Smoker     Smokeless tobacco: Never Used   Substance and Sexual Activity    Alcohol use: No    Drug use: No    Sexual activity: Yes     Partners: Male     Birth control/protection: None     Review of Systems   Constitution: Negative for decreased appetite.   HENT: Negative for ear discharge.    Eyes: Negative for blurred vision.   Respiratory: Negative for hemoptysis.    Endocrine: Negative for polyphagia.   Hematologic/Lymphatic: Negative for adenopathy.   Skin: Negative for color change.   Musculoskeletal: Negative for joint swelling.   Genitourinary: Negative for bladder incontinence.   Neurological: Negative for brief paralysis.   Psychiatric/Behavioral: Negative for hallucinations.   Allergic/Immunologic: Negative for hives.     Objective:     Vital Signs (Most Recent):  Temp: 97.8 °F (36.6 °C) (02/27/19 0807)  Pulse: 91 (02/27/19 0807)  Resp: 16 (02/27/19 0807)  BP: (!) 166/80 (02/27/19 0807)  SpO2: 99 % (02/27/19 0807) Vital Signs (24h Range):  Temp:  [97.8 °F (36.6 °C)-98.4 °F (36.9 °C)] 97.8 °F (36.6 °C)  Pulse:  [] 91  Resp:  [16-43] 16  SpO2:  [99 %-100 %] 99 %  BP: (137-173)/(65-80) 166/80     Weight: 70.8 kg (156 lb 1.4 oz)  Body mass index is 27.65 kg/m².    SpO2: 99 %  O2 Device (Oxygen Therapy): room air    No intake or output data in the 24 hours ending 02/27/19 0919    Lines/Drains/Airways     Peripheral Intravenous Line                 Peripheral IV - Single Lumen 02/27/19 0227 Right Antecubital less than 1 day         Peripheral IV - Single Lumen 02/27/19 0526 Left Wrist less than 1 day                Physical Exam   Constitutional: She is oriented to person, place, and time. She appears well-developed and well-nourished.   HENT:   Head: Normocephalic and atraumatic.   Eyes: Conjunctivae are normal. Pupils are equal, round, and reactive to light.   Neck: Normal range of motion. Neck supple.   Cardiovascular: Normal rate, normal heart sounds and intact distal pulses.   Pulmonary/Chest: Effort normal  and breath sounds normal.   Abdominal: Soft. Bowel sounds are normal.   Musculoskeletal: Normal range of motion.   Neurological: She is alert and oriented to person, place, and time.   Skin: Skin is warm and dry.       Significant Labs: All pertinent lab results from the last 24 hours have been reviewed.    Significant Imaging: Echocardiogram:   2D echo with color flow doppler:   Results for orders placed or performed during the hospital encounter of 04/13/18   2D echo with color flow doppler   Result Value Ref Range    QEF 55 55 - 65    Est. PA Systolic Pressure 37.81     Tricuspid Valve Regurgitation TRIVIAL      Assessment and Plan:     Chest pain    Ruled out for MI. Echo and stress today - ok for d/c if negative for ischemia     Hypertension    Add norvasc     Deaf              VTE Risk Mitigation (From admission, onward)        Ordered     IP VTE LOW RISK PATIENT  Once      02/27/19 0609     Place HÉCTOR hose  Until discontinued      02/27/19 0609     Place sequential compression device  Until discontinued      02/27/19 0609        Hb 4.9. Patient receiving blood. Will reschedule stress test for AM if stable    Thank you for your consult. I will follow-up with patient. Please contact us if you have any additional questions.    Burak Burger MD  Cardiology   [facility]

## 2019-02-27 NOTE — SUBJECTIVE & OBJECTIVE
Past Medical History:   Diagnosis Date    Anemia     Arthritis     Asthma     Deaf     Diabetes mellitus     Hyperlipidemia     Hypertension        Past Surgical History:   Procedure Laterality Date    HYSTERECTOMY         Review of patient's allergies indicates:  No Known Allergies    No current facility-administered medications on file prior to encounter.      Current Outpatient Medications on File Prior to Encounter   Medication Sig    albuterol 90 mcg/actuation inhaler Inhale 1-2 puffs into the lungs every 4 (four) hours as needed for Wheezing.    [DISCONTINUED] albuterol sulfate 2.5 mg/0.5 mL Nebu Take 2.5 mg by nebulization every 4 (four) hours as needed.    [DISCONTINUED] albuterol-ipratropium 2.5mg-0.5mg/3mL (DUO-NEB) 0.5 mg-3 mg(2.5 mg base)/3 mL nebulizer solution Take 3 mLs by nebulization every 6 (six) hours as needed for Wheezing or Shortness of Breath. Rescue    [DISCONTINUED] budesonide-formoterol 160-4.5 mcg (SYMBICORT) 160-4.5 mcg/actuation HFAA Inhale 2 puffs into the lungs every 12 (twelve) hours. Controller    [DISCONTINUED] ferrous sulfate 325 (65 FE) MG EC tablet Take 325 mg by mouth 3 (three) times daily with meals.    [DISCONTINUED] lisinopril-hydrochlorothiazide (PRINZIDE,ZESTORETIC) 20-12.5 mg per tablet Take 1 tablet by mouth once daily.    [DISCONTINUED] meloxicam (MOBIC) 7.5 MG tablet Take 1 tablet (7.5 mg total) by mouth once daily.    [DISCONTINUED] naproxen (NAPROSYN) 500 MG tablet Take 1 tablet (500 mg total) by mouth 2 (two) times daily as needed (for pain).    [DISCONTINUED] pantoprazole (PROTONIX) 40 MG tablet Take 40 mg by mouth once daily.    [DISCONTINUED] predniSONE (DELTASONE) 20 MG tablet 3 tablets by mouth daily x 3 days  2 tablets by mouth daily x 3 days  1 tablet by mouth daily x 3 days    [DISCONTINUED] traMADol (ULTRAM) 50 mg tablet Take 1 tablet (50 mg total) by mouth every 8 (eight) hours as needed for Pain.     Family History     None         Tobacco Use    Smoking status: Never Smoker    Smokeless tobacco: Never Used   Substance and Sexual Activity    Alcohol use: No    Drug use: No    Sexual activity: Yes     Partners: Male     Birth control/protection: None     Review of Systems   Constitution: Negative for decreased appetite.   HENT: Negative for ear discharge.    Eyes: Negative for blurred vision.   Respiratory: Negative for hemoptysis.    Endocrine: Negative for polyphagia.   Hematologic/Lymphatic: Negative for adenopathy.   Skin: Negative for color change.   Musculoskeletal: Negative for joint swelling.   Genitourinary: Negative for bladder incontinence.   Neurological: Negative for brief paralysis.   Psychiatric/Behavioral: Negative for hallucinations.   Allergic/Immunologic: Negative for hives.     Objective:     Vital Signs (Most Recent):  Temp: 97.8 °F (36.6 °C) (02/27/19 0807)  Pulse: 91 (02/27/19 0807)  Resp: 16 (02/27/19 0807)  BP: (!) 166/80 (02/27/19 0807)  SpO2: 99 % (02/27/19 0807) Vital Signs (24h Range):  Temp:  [97.8 °F (36.6 °C)-98.4 °F (36.9 °C)] 97.8 °F (36.6 °C)  Pulse:  [] 91  Resp:  [16-43] 16  SpO2:  [99 %-100 %] 99 %  BP: (137-173)/(65-80) 166/80     Weight: 70.8 kg (156 lb 1.4 oz)  Body mass index is 27.65 kg/m².    SpO2: 99 %  O2 Device (Oxygen Therapy): room air    No intake or output data in the 24 hours ending 02/27/19 0919    Lines/Drains/Airways     Peripheral Intravenous Line                 Peripheral IV - Single Lumen 02/27/19 0227 Right Antecubital less than 1 day         Peripheral IV - Single Lumen 02/27/19 0526 Left Wrist less than 1 day                Physical Exam   Constitutional: She is oriented to person, place, and time. She appears well-developed and well-nourished.   HENT:   Head: Normocephalic and atraumatic.   Eyes: Conjunctivae are normal. Pupils are equal, round, and reactive to light.   Neck: Normal range of motion. Neck supple.   Cardiovascular: Normal rate, normal heart sounds and  intact distal pulses.   Pulmonary/Chest: Effort normal and breath sounds normal.   Abdominal: Soft. Bowel sounds are normal.   Musculoskeletal: Normal range of motion.   Neurological: She is alert and oriented to person, place, and time.   Skin: Skin is warm and dry.       Significant Labs: All pertinent lab results from the last 24 hours have been reviewed.    Significant Imaging: Echocardiogram:   2D echo with color flow doppler:   Results for orders placed or performed during the hospital encounter of 04/13/18   2D echo with color flow doppler   Result Value Ref Range    QEF 55 55 - 65    Est. PA Systolic Pressure 37.81     Tricuspid Valve Regurgitation TRIVIAL

## 2019-02-27 NOTE — ASSESSMENT & PLAN NOTE
Blood pressure noted to be elevated this time  Cardiology has evaluated and started patient on Norvasc 5 mg daily  Add p.r.n. hydralazine for systolic blood pressure greater than 160  Unable to reconcile home antihypertensives as patient does not have any medications with her at this time

## 2019-02-28 ENCOUNTER — ANESTHESIA (OUTPATIENT)
Dept: ENDOSCOPY | Facility: HOSPITAL | Age: 54
DRG: 812 | End: 2019-02-28
Payer: COMMERCIAL

## 2019-02-28 PROBLEM — K44.9 LARGE HIATAL HERNIA: Chronic | Status: ACTIVE | Noted: 2019-02-28

## 2019-02-28 LAB
ALBUMIN SERPL BCP-MCNC: 3.1 G/DL
ALP SERPL-CCNC: 54 U/L
ALT SERPL W/O P-5'-P-CCNC: 11 U/L
ANION GAP SERPL CALC-SCNC: 7 MMOL/L
ANISOCYTOSIS BLD QL SMEAR: ABNORMAL
AST SERPL-CCNC: 16 U/L
BASOPHILS # BLD AUTO: 0.02 K/UL
BASOPHILS # BLD AUTO: 0.02 K/UL
BASOPHILS NFR BLD: 0.5 %
BASOPHILS NFR BLD: 0.5 %
BILIRUB SERPL-MCNC: 1.1 MG/DL
BUN SERPL-MCNC: 6 MG/DL
CALCIUM SERPL-MCNC: 8.4 MG/DL
CHLORIDE SERPL-SCNC: 108 MMOL/L
CO2 SERPL-SCNC: 21 MMOL/L
CREAT SERPL-MCNC: 0.7 MG/DL
DIFFERENTIAL METHOD: ABNORMAL
DIFFERENTIAL METHOD: ABNORMAL
EOSINOPHIL # BLD AUTO: 0.1 K/UL
EOSINOPHIL # BLD AUTO: 0.2 K/UL
EOSINOPHIL NFR BLD: 2.7 %
EOSINOPHIL NFR BLD: 4.5 %
ERYTHROCYTE [DISTWIDTH] IN BLOOD BY AUTOMATED COUNT: 19.4 %
ERYTHROCYTE [DISTWIDTH] IN BLOOD BY AUTOMATED COUNT: 20 %
EST. GFR  (AFRICAN AMERICAN): >60 ML/MIN/1.73 M^2
EST. GFR  (NON AFRICAN AMERICAN): >60 ML/MIN/1.73 M^2
GLUCOSE SERPL-MCNC: 77 MG/DL
HCT VFR BLD AUTO: 17.9 %
HCT VFR BLD AUTO: 31.3 %
HGB BLD-MCNC: 4.9 G/DL
HGB BLD-MCNC: 9.6 G/DL
HYPOCHROMIA BLD QL SMEAR: ABNORMAL
LYMPHOCYTES # BLD AUTO: 1.1 K/UL
LYMPHOCYTES # BLD AUTO: 1.2 K/UL
LYMPHOCYTES NFR BLD: 26.3 %
LYMPHOCYTES NFR BLD: 30.4 %
MCH RBC QN AUTO: 19.7 PG
MCH RBC QN AUTO: 23.2 PG
MCHC RBC AUTO-ENTMCNC: 27.4 G/DL
MCHC RBC AUTO-ENTMCNC: 30.7 G/DL
MCV RBC AUTO: 72 FL
MCV RBC AUTO: 76 FL
MONOCYTES # BLD AUTO: 0.4 K/UL
MONOCYTES # BLD AUTO: 0.4 K/UL
MONOCYTES NFR BLD: 9.5 %
MONOCYTES NFR BLD: 9.9 %
NEUTROPHILS # BLD AUTO: 2.3 K/UL
NEUTROPHILS # BLD AUTO: 2.5 K/UL
NEUTROPHILS NFR BLD: 56.5 %
NEUTROPHILS NFR BLD: 59.2 %
OVALOCYTES BLD QL SMEAR: ABNORMAL
PLATELET # BLD AUTO: 260 K/UL
PLATELET # BLD AUTO: 326 K/UL
PMV BLD AUTO: 9.1 FL
PMV BLD AUTO: 9.3 FL
POTASSIUM SERPL-SCNC: 4.1 MMOL/L
PROT SERPL-MCNC: 6.5 G/DL
RBC # BLD AUTO: 2.49 M/UL
RBC # BLD AUTO: 4.14 M/UL
SODIUM SERPL-SCNC: 136 MMOL/L
TARGETS BLD QL SMEAR: ABNORMAL
WBC # BLD AUTO: 4.04 K/UL
WBC # BLD AUTO: 4.22 K/UL

## 2019-02-28 PROCEDURE — 99232 SBSQ HOSP IP/OBS MODERATE 35: CPT | Mod: GT,,, | Performed by: INTERNAL MEDICINE

## 2019-02-28 PROCEDURE — 36415 COLL VENOUS BLD VENIPUNCTURE: CPT

## 2019-02-28 PROCEDURE — 21400001 HC TELEMETRY ROOM

## 2019-02-28 PROCEDURE — D9220A PRA ANESTHESIA: Mod: CRNA,,, | Performed by: NURSE ANESTHETIST, CERTIFIED REGISTERED

## 2019-02-28 PROCEDURE — 25000003 PHARM REV CODE 250: Performed by: INTERNAL MEDICINE

## 2019-02-28 PROCEDURE — 85025 COMPLETE CBC W/AUTO DIFF WBC: CPT

## 2019-02-28 PROCEDURE — 99900035 HC TECH TIME PER 15 MIN (STAT)

## 2019-02-28 PROCEDURE — 63600175 PHARM REV CODE 636 W HCPCS: Performed by: EMERGENCY MEDICINE

## 2019-02-28 PROCEDURE — 25000003 PHARM REV CODE 250: Performed by: EMERGENCY MEDICINE

## 2019-02-28 PROCEDURE — D9220A PRA ANESTHESIA: Mod: ANES,,, | Performed by: ANESTHESIOLOGY

## 2019-02-28 PROCEDURE — D9220A PRA ANESTHESIA: ICD-10-PCS | Mod: CRNA,,, | Performed by: NURSE ANESTHETIST, CERTIFIED REGISTERED

## 2019-02-28 PROCEDURE — 25000242 PHARM REV CODE 250 ALT 637 W/ HCPCS: Performed by: INTERNAL MEDICINE

## 2019-02-28 PROCEDURE — 63600175 PHARM REV CODE 636 W HCPCS: Performed by: NURSE ANESTHETIST, CERTIFIED REGISTERED

## 2019-02-28 PROCEDURE — D9220A PRA ANESTHESIA: ICD-10-PCS | Mod: ANES,,, | Performed by: ANESTHESIOLOGY

## 2019-02-28 PROCEDURE — 94640 AIRWAY INHALATION TREATMENT: CPT

## 2019-02-28 PROCEDURE — C9113 INJ PANTOPRAZOLE SODIUM, VIA: HCPCS | Performed by: EMERGENCY MEDICINE

## 2019-02-28 PROCEDURE — 43235 EGD DIAGNOSTIC BRUSH WASH: CPT | Performed by: INTERNAL MEDICINE

## 2019-02-28 PROCEDURE — C9113 INJ PANTOPRAZOLE SODIUM, VIA: HCPCS | Performed by: INTERNAL MEDICINE

## 2019-02-28 PROCEDURE — 37000009 HC ANESTHESIA EA ADD 15 MINS: Performed by: INTERNAL MEDICINE

## 2019-02-28 PROCEDURE — 80053 COMPREHEN METABOLIC PANEL: CPT

## 2019-02-28 PROCEDURE — 99232 PR SUBSEQUENT HOSPITAL CARE,LEVL II: ICD-10-PCS | Mod: GT,,, | Performed by: INTERNAL MEDICINE

## 2019-02-28 PROCEDURE — 63600175 PHARM REV CODE 636 W HCPCS: Performed by: INTERNAL MEDICINE

## 2019-02-28 PROCEDURE — 37000008 HC ANESTHESIA 1ST 15 MINUTES: Performed by: INTERNAL MEDICINE

## 2019-02-28 RX ORDER — ALBUTEROL SULFATE 2.5 MG/.5ML
2.5 SOLUTION RESPIRATORY (INHALATION) EVERY 12 HOURS
Status: DISCONTINUED | OUTPATIENT
Start: 2019-02-28 | End: 2019-03-01 | Stop reason: HOSPADM

## 2019-02-28 RX ORDER — AMLODIPINE BESYLATE 5 MG/1
10 TABLET ORAL DAILY
Status: DISCONTINUED | OUTPATIENT
Start: 2019-02-28 | End: 2019-03-01 | Stop reason: HOSPADM

## 2019-02-28 RX ORDER — PANTOPRAZOLE SODIUM 40 MG/10ML
40 INJECTION, POWDER, LYOPHILIZED, FOR SOLUTION INTRAVENOUS
Status: DISCONTINUED | OUTPATIENT
Start: 2019-02-28 | End: 2019-03-01

## 2019-02-28 RX ORDER — PROPOFOL 10 MG/ML
VIAL (ML) INTRAVENOUS
Status: DISCONTINUED | OUTPATIENT
Start: 2019-02-28 | End: 2019-02-28

## 2019-02-28 RX ORDER — LIDOCAINE HCL/PF 100 MG/5ML
SYRINGE (ML) INTRAVENOUS
Status: DISCONTINUED | OUTPATIENT
Start: 2019-02-28 | End: 2019-02-28

## 2019-02-28 RX ORDER — ALBUTEROL SULFATE 2.5 MG/.5ML
2.5 SOLUTION RESPIRATORY (INHALATION) EVERY 4 HOURS PRN
Status: DISCONTINUED | OUTPATIENT
Start: 2019-02-28 | End: 2019-02-28

## 2019-02-28 RX ADMIN — ALBUTEROL SULFATE 2.5 MG: 2.5 SOLUTION RESPIRATORY (INHALATION) at 08:02

## 2019-02-28 RX ADMIN — SENNOSIDES AND DOCUSATE SODIUM 1 TABLET: 8.6; 5 TABLET ORAL at 09:02

## 2019-02-28 RX ADMIN — PROPOFOL 50 MG: 10 INJECTION, EMULSION INTRAVENOUS at 08:02

## 2019-02-28 RX ADMIN — SENNOSIDES AND DOCUSATE SODIUM 1 TABLET: 8.6; 5 TABLET ORAL at 10:02

## 2019-02-28 RX ADMIN — SODIUM CHLORIDE: 0.9 INJECTION, SOLUTION INTRAVENOUS at 12:02

## 2019-02-28 RX ADMIN — PANTOPRAZOLE SODIUM 40 MG: 40 INJECTION, POWDER, FOR SOLUTION INTRAVENOUS at 04:02

## 2019-02-28 RX ADMIN — IRON SUCROSE 100 MG: 20 INJECTION, SOLUTION INTRAVENOUS at 12:02

## 2019-02-28 RX ADMIN — LIDOCAINE HYDROCHLORIDE 100 MG: 20 INJECTION, SOLUTION INTRAVENOUS at 08:02

## 2019-02-28 RX ADMIN — SODIUM CHLORIDE: 0.9 INJECTION, SOLUTION INTRAVENOUS at 09:02

## 2019-02-28 RX ADMIN — AMLODIPINE BESYLATE 10 MG: 5 TABLET ORAL at 10:02

## 2019-02-28 NOTE — PROGRESS NOTES
[facility]  Cardiology  Progress Note    Patient Name: Aurora Ornelas  MRN: [id number]  Admission Date: 2/27/2019  Hospital Length of Stay: 1 days  Code Status: Full Code   Attending Physician: George Argueta DO   Primary Care Physician: Primary Doctor No  Expected Discharge Date:   Principal Problem:Symptomatic anemia    Subjective:     Hospital Course:   2-28 Denies CP. Currently in endoscopy. Stress test postponed due to anemia and need for transfusion\    Echo 2/27/19  · Normal left ventricular systolic function. The estimated ejection fraction is 60%  · Concentric left ventricular hypertrophy.  · Grade II (moderate) left ventricular diastolic dysfunction consistent with pseudonormalization.  · Normal right ventricular systolic function.  · Mild left atrial enlargement.  · Mild-to-moderate mitral regurgitation.  · Mild tricuspid regurgitation.  · The estimated PA systolic pressure is 55 mm Hg  Pulmonary hypertension present.    Interval History:     Review of Systems   Constitution: Negative for decreased appetite.   HENT: Negative for ear discharge.    Eyes: Negative for blurred vision.   Respiratory: Negative for hemoptysis.    Endocrine: Negative for polyphagia.   Hematologic/Lymphatic: Negative for adenopathy.   Skin: Negative for color change.   Musculoskeletal: Negative for joint swelling.   Genitourinary: Negative for bladder incontinence.   Neurological: Negative for brief paralysis.   Psychiatric/Behavioral: Negative for hallucinations.   Allergic/Immunologic: Negative for hives.     Objective:     Vital Signs (Most Recent):  Temp: 97.8 °F (36.6 °C) (02/28/19 0757)  Pulse: 86 (02/28/19 0757)  Resp: 18 (02/28/19 0757)  BP: (!) 181/90 (02/28/19 0757)  SpO2: 98 % (02/28/19 0757) Vital Signs (24h Range):  Temp:  [97.8 °F (36.6 °C)-98.4 °F (36.9 °C)] 97.8 °F (36.6 °C)  Pulse:  [76-98] 86  Resp:  [18] 18  SpO2:  [94 %-100 %] 98 %  BP: (142-181)/(67-98) 181/90     Weight: 70.6 kg (155 lb 10.3  oz)  Body mass index is 27.57 kg/m².     SpO2: 98 %  O2 Device (Oxygen Therapy): room air      Intake/Output Summary (Last 24 hours) at 2/28/2019 0836  Last data filed at 2/28/2019 0600  Gross per 24 hour   Intake 4005 ml   Output --   Net 4005 ml       Lines/Drains/Airways     Airway                 Airway - Non-Surgical 02/28/19 0759 Nasal Cannula less than 1 day          Peripheral Intravenous Line                 Peripheral IV - Single Lumen 02/27/19 0227 Right Antecubital 1 day         Peripheral IV - Single Lumen 02/27/19 0526 Left Wrist 1 day                Physical Exam   Constitutional: She is oriented to person, place, and time. She appears well-developed and well-nourished.   HENT:   Head: Normocephalic and atraumatic.   Eyes: Conjunctivae are normal. Pupils are equal, round, and reactive to light.   Neck: Normal range of motion. Neck supple.   Cardiovascular: Normal rate, normal heart sounds and intact distal pulses.   Pulmonary/Chest: Effort normal and breath sounds normal.   Abdominal: Soft. Bowel sounds are normal.   Musculoskeletal: Normal range of motion.   Neurological: She is alert and oriented to person, place, and time.   Skin: Skin is warm and dry.       Significant Labs: All pertinent lab results from the last 24 hours have been reviewed.    Significant Imaging: Echocardiogram:   2D echo with color flow doppler:   Results for orders placed or performed during the hospital encounter of 04/13/18   2D echo with color flow doppler   Result Value Ref Range    QEF 55 55 - 65    Est. PA Systolic Pressure 37.81     Tricuspid Valve Regurgitation TRIVIAL      Assessment and Plan:     Brief HPI:     * Symptomatic anemia    S/P transfusion. Further w/u per GI     Chest pain    Ruled out for MI. Echo with good EF. Likely triggered by anemia. Stress test in AM     Hypertension    Add norvasc     Deaf              VTE Risk Mitigation (From admission, onward)        Ordered     IP VTE LOW RISK PATIENT  Once       02/27/19 0609     Place HÉCTOR hose  Until discontinued      02/27/19 0609     Place sequential compression device  Until discontinued      02/27/19 0609          Burak Burger MD  Cardiology  [facility]

## 2019-02-28 NOTE — ASSESSMENT & PLAN NOTE
Likely secondary to anemia  Resolved  Echocardiogram and stress test has been ordered by Cardiology at this time  Troponin appears to be within normal limits at this time  Monitor on telemetry

## 2019-02-28 NOTE — ANESTHESIA PREPROCEDURE EVALUATION
02/28/2019  Aurora Ornelas is a 53 y.o., female.    Anesthesia Evaluation    I have reviewed the Patient Summary Reports.    I have reviewed the Nursing Notes.      Review of Systems  Anesthesia Hx:  No problems with previous Anesthesia  Denies Family Hx of Anesthesia complications.   Denies Personal Hx of Anesthesia complications.   Social:  Non-Smoker, No Alcohol Use    Hematology/Oncology:         -- Anemia: Hematology Comments: Symptomatic anemia. Hemoglobin 4.9 yesterday; s/p 3u PRBC    Hgb today 9.6   Cardiovascular:   Hypertension Denies MI.   Denies CABG/stent.  Denies Dysrhythmias.   Denies Angina. Elevated troponin; cardiology consulted; ruled out for ACS    2D echo:  · Normal left ventricular systolic function. The estimated ejection fraction is 60%  · Concentric left ventricular hypertrophy.  · Grade II (moderate) left ventricular diastolic dysfunction consistent with pseudonormalization.  · Normal right ventricular systolic function.  · Mild left atrial enlargement.  · Mild-to-moderate mitral regurgitation.  · Mild tricuspid regurgitation.  · The estimated PA systolic pressure is 55 mm Hg  · Pulmonary hypertension present.          Pulmonary:   Asthma Mild SOB currently   Hepatic/GI:  Hepatic/GI Normal    Neurological:  Neurology Normal    Endocrine:   Diabetes        Physical Exam  General:  Well nourished, Obesity    Airway/Jaw/Neck:  Airway Findings: Mouth Opening: Normal Tongue: Normal  Mallampati: II  TM Distance: 4 - 6 cm      Dental:  Dental Findings: In tact   Chest/Lungs:  Chest/Lungs Findings: Clear to auscultation     Heart/Vascular:  Heart Findings: Rate: Normal  Rhythm: Regular Rhythm        Mental Status:  Mental Status Findings:  Cooperative, Alert and Oriented       Wt Readings from Last 3 Encounters:   02/28/19 70.6 kg (155 lb 10.3 oz)   05/18/18 70.8 kg (156 lb 1.4 oz)    04/30/18 70.8 kg (156 lb)     Temp Readings from Last 3 Encounters:   02/28/19 36.6 °C (97.8 °F)   04/14/18 36.7 °C (98.1 °F) (Oral)   03/11/18 36.9 °C (98.5 °F) (Oral)     BP Readings from Last 3 Encounters:   02/28/19 (!) 167/88   05/18/18 139/82   04/30/18 136/78     Pulse Readings from Last 3 Encounters:   02/28/19 83   05/18/18 97   04/30/18 105     Lab Results   Component Value Date    WBC 8.73 02/27/2019    HGB 8.8 (L) 02/27/2019    HCT 28.9 (L) 02/27/2019    MCV 75 (L) 02/27/2019     02/27/2019     CMP  Sodium   Date Value Ref Range Status   02/27/2019 136 136 - 145 mmol/L Final     Potassium   Date Value Ref Range Status   02/27/2019 4.0 3.5 - 5.1 mmol/L Final     Chloride   Date Value Ref Range Status   02/27/2019 107 95 - 110 mmol/L Final     CO2   Date Value Ref Range Status   02/27/2019 21 (L) 23 - 29 mmol/L Final     Glucose   Date Value Ref Range Status   02/27/2019 99 70 - 110 mg/dL Final     BUN, Bld   Date Value Ref Range Status   02/27/2019 8 6 - 20 mg/dL Final     Creatinine   Date Value Ref Range Status   02/27/2019 0.7 0.5 - 1.4 mg/dL Final     Calcium   Date Value Ref Range Status   02/27/2019 8.1 (L) 8.7 - 10.5 mg/dL Final     Total Protein   Date Value Ref Range Status   02/27/2019 6.5 6.0 - 8.4 g/dL Final     Albumin   Date Value Ref Range Status   02/27/2019 3.2 (L) 3.5 - 5.2 g/dL Final     Total Bilirubin   Date Value Ref Range Status   02/27/2019 0.3 0.1 - 1.0 mg/dL Final     Comment:     For infants and newborns, interpretation of results should be based  on gestational age, weight and in agreement with clinical  observations.  Premature Infant recommended reference ranges:  Up to 24 hours.............<8.0 mg/dL  Up to 48 hours............<12.0 mg/dL  3-5 days..................<15.0 mg/dL  6-29 days.................<15.0 mg/dL       Alkaline Phosphatase   Date Value Ref Range Status   02/27/2019 51 (L) 55 - 135 U/L Final     AST   Date Value Ref Range Status   02/27/2019 15 10  - 40 U/L Final     ALT   Date Value Ref Range Status   02/27/2019 11 10 - 44 U/L Final     Anion Gap   Date Value Ref Range Status   02/27/2019 8 8 - 16 mmol/L Final     eGFR if    Date Value Ref Range Status   02/27/2019 >60 >60 mL/min/1.73 m^2 Final     eGFR if non    Date Value Ref Range Status   02/27/2019 >60 >60 mL/min/1.73 m^2 Final     Comment:     Calculation used to obtain the estimated glomerular filtration  rate (eGFR) is the CKD-EPI equation.            Anesthesia Plan  Type of Anesthesia, risks & benefits discussed:  Anesthesia Type:  MAC, general  Patient's Preference:   Intra-op Monitoring Plan: standard ASA monitors  Intra-op Monitoring Plan Comments:   Post Op Pain Control Plan: multimodal analgesia and per primary service following discharge from PACU  Post Op Pain Control Plan Comments:   Induction:   IV  Beta Blocker:  Patient is not currently on a Beta-Blocker (No further documentation required).       Informed Consent: Patient understands risks and agrees with Anesthesia plan.  Questions answered. Anesthesia consent signed with patient.  ASA Score: 2     Day of Surgery Review of History & Physical: I have interviewed and examined the patient. I have reviewed the patient's H&P dated:    H&P update referred to the surgeon.     Anesthesia Plan Notes: H&P is obtained via chart review and pt interview via HECTOR.  Consent obtained via HECTOR language line.        Ready For Surgery From Anesthesia Perspective.

## 2019-02-28 NOTE — ASSESSMENT & PLAN NOTE
Patient with guaiac-positive stool, however on EGD, no active bleeding noted  Appreciate help from GI  Continue on Protonix 40 mg IV b.i.d., we will need to take oral Protonix twice a day for 2 months per GI recommendations  Start on a clear liquid diet and advance as tolerated

## 2019-02-28 NOTE — PROGRESS NOTES
"TN met with patient and patient's  Reji at bedside to introduce self as  and to explain the roles of case management.  TN reviewed and provided  "[facility] Packet", "Discharge Planning Begins on Admission"  discussed "Help at Home". Reji stated patient will discharge home when stable.Contact information added to white board.      "

## 2019-02-28 NOTE — PROGRESS NOTES
[facility] Medicine  Progress Note    Patient Name: Aurora Ornelas  MRN: [id number]  Patient Class: IP- Inpatient   Admission Date: 2/27/2019  Length of Stay: 1 days  Attending Physician: George Argueta DO  Primary Care Provider: Primary Doctor No        Subjective:     Principal Problem:Symptomatic anemia    HPI:  Patient is a 53-year-old female with a past medical history of hypertension, asthma, deafness that presents with chest pain. History is obtained through using a .  Patient states that approximately 3 days ago, she began having pain in the center of her chest that felt like a gripping sensation.  She says at rest it was 6/10 intensity, however worsened with ambulating.  Says that she did not have any shortness of breath along with ambulating, only chest pain. She denies any fevers, nausea, vomiting, abdominal pain, diarrhea, constipation.  Patient had not been taking any medications for the chest pain, but given the persistence of it, she decided to come into the ED.    On initial evaluation in the ED, she was noted to have a hemoglobin 4.9.  She denies having any recent rectal bleeding, no black stools, hematuria, hematemesis.  Patient has not had any alcohol use recently, denies any NSAID use other than Aleve 1 tablet twice a day.  Says that she was previously on tramadol, however she was taking this as prescribed and she has been out of this medication for several weeks.    Hospital Course:  No notes on file    Interval History:  Patient seen examined at bedside this afternoon using ASL .  She is seen status post EGD today.  She is complaining of some esophageal soreness, otherwise no complaints of abdominal pain, nausea, vomiting.  No acute overnight events noted. States that she is feeling much better.    Review of Systems   Constitutional: Negative for fever.   HENT: Positive for sore throat.    Respiratory: Negative for shortness of  breath.    Gastrointestinal: Negative for abdominal pain, nausea and vomiting.   All other systems reviewed and are negative.    Objective:     Vital Signs (Most Recent):  Temp: 98.2 °F (36.8 °C) (02/28/19 1200)  Pulse: 80 (02/28/19 1200)  Resp: 16 (02/28/19 1200)  BP: (!) 146/81 (02/28/19 1200)  SpO2: 98 % (02/28/19 1200) Vital Signs (24h Range):  Temp:  [97.8 °F (36.6 °C)-98.4 °F (36.9 °C)] 98.2 °F (36.8 °C)  Pulse:  [80-98] 80  Resp:  [14-18] 16  SpO2:  [94 %-100 %] 98 %  BP: (135-181)/(67-98) 146/81     Weight: 70.6 kg (155 lb 10.3 oz)  Body mass index is 27.57 kg/m².    Intake/Output Summary (Last 24 hours) at 2/28/2019 1249  Last data filed at 2/28/2019 0845  Gross per 24 hour   Intake 4155 ml   Output --   Net 4155 ml      Physical Exam   Constitutional: She is oriented to person, place, and time. Vital signs are normal. She appears well-developed. She is cooperative. No distress.   HENT:   Mouth/Throat: Mucous membranes are normal.   Cardiovascular: Normal rate and regular rhythm.   No murmur heard.  Pulmonary/Chest: Effort normal and breath sounds normal.   Abdominal: Soft. Normal appearance and bowel sounds are normal. She exhibits no distension. There is no tenderness.   Neurological: She is alert and oriented to person, place, and time.   Psychiatric: She has a normal mood and affect. Her speech is normal. Judgment normal. Cognition and memory are normal.       Significant Labs:   Recent Lab Results       02/28/19  0634   02/27/19  1838   02/27/19  1333        Albumin 3.1         Alkaline Phosphatase 54         ALT 11         Anion Gap 7         Aniso   Moderate       Ao root annulus     2.31     Ascending aorta     2.13     Ao peak clare     1.34     Ao VTI     25.27     AST 16         AV valve area     3.12     AORTIC VALVE CUSP SEPERATION     1.61     AV mean gradient     3.98     AV peak gradient     7.18     AV Velocity Ratio     0.91     Baso # 0.02 0.02       Basophil% 0.5 0.2       Total Bilirubin  1.1  Comment:  For infants and newborns, interpretation of results should be based  on gestational age, weight and in agreement with clinical  observations.  Premature Infant recommended reference ranges:  Up to 24 hours.............<8.0 mg/dL  Up to 48 hours............<12.0 mg/dL  3-5 days..................<15.0 mg/dL  6-29 days.................<15.0 mg/dL           BSA     1.77     BUN, Bld 6         Calcium 8.4         Chloride 108         CO2 21         Creatinine 0.7         Left Ventricle Relative Wall Thickness     0.50     Differential Method Automated Automated       AV index (prosthetic)     0.97     E/A ratio     1.58     E/E' ratio     21.71     eGFR if  >60         eGFR if non  >60  Comment:  Calculation used to obtain the estimated glomerular filtration  rate (eGFR) is the CKD-EPI equation.            Eos # 0.2 0.3       Eosinophil% 4.5 2.9       E wave decelartion time     85.46     FS     35     Glucose 77         Gran # (ANC) 2.5 6.8       Gran% 59.2 77.9       Hematocrit 31.3 28.9       Hemoglobin 9.6 8.8       Hypo   Moderate       IVRT     0.07     IVS     1.21     LA WIDTH     4.41     Left Atrium Major Axis     5.19     Left Atrium Minor Axis     5.23     LA size     4.52     LA volume     88.27     LA Volume Index     50.7     LVOT area     3.20     LV LATERAL E/E' RATIO     21.71     LV SEPTAL E/E' RATIO     21.71     LV Diastolic Volume     91.72     LV Diastolic Volume Index     52.71     LVIDD     4.48     LVIDS     2.90     LV mass     187.40     LV Mass Index     107.7     LVOT diameter     2.02     LVOT peak clare     [id number]     LVOT stroke volume     78.80     LVOT peak VTI     24.60     LV Systolic Volume     32.12     LV Systolic Volume Index     18.5     Lymph # 1.1 1.1       Lymph% 26.3 12.8       MCH 23.2 22.7       MCHC 30.7 30.4       MCV 76 75       Mean e'     0.07     Mono # 0.4 0.5       Mono% 9.5 6.2       MPV 9.1 10.1       MV Peak A  Leroy     0.96     MV Peak E Leroy     1.52     Ovalocytes   Occasional       Platelets 260 280       Poly   Occasional       Potassium 4.1         Total Protein 6.5         PV Peak D Leroy     0.96     PV Peak S Leroy     0.73     PV PEAK VELOCITY     1.01     Pulm vein S/D ratio     0.76     PW     1.11     Right Atrial Pressure (from IVC)     3     RA Major Axis     4.73     RA Width     3.83     RBC 4.14 3.87       RDW 19.4 20.3       RV S'     55.71     RVDD     3.57     Sinus     2.40     Sodium 136         STJ     2.01     TAPSE     2.38     TDI SEPTAL     0.07     TDI LATERAL     0.07     Triscuspid Valve Regurgitation Peak Gradient     51.55     TR Max Leroy     3.59     Troponin I   <0.006  Comment:  The reference interval for Troponin I represents the 99th percentile   cutoff   for our facility and is consistent with 3rd generation assay   performance.         TV rest pulmonary artery pressure     55     WBC 4.22 8.73             Significant Imaging: I have reviewed all pertinent imaging results/findings within the past 24 hours.    Assessment/Plan:      * Symptomatic anemia    Noted to be 4.9 at time of admission  Transfused 3 units PRBCs  Appears that on initial iron studies, patient is iron deficient  Patient underwent EGD this morning, EGD report has been reviewed  No signs of active bleeding noted  IV Venofer here x3 doses per GI recommendations after which time patient will be started on oral iron supplementation  Patient feeling much better after transfusion.  Hemoglobin improved up to 9.6 today.     Large hiatal hernia    Noted on EGD  The patient also noted to have esophagitis  Continue on PPI       GI bleeding    Patient with guaiac-positive stool, however on EGD, no active bleeding noted  Appreciate help from GI  Continue on Protonix 40 mg IV b.i.d., we will need to take oral Protonix twice a day for 2 months per GI recommendations  Start on a clear liquid diet and advance as tolerated         Chest pain     Likely secondary to anemia  Resolved  Echocardiogram and stress test has been ordered by Cardiology at this time  Troponin appears to be within normal limits at this time  Monitor on telemetry       Hypertension    Blood pressure noted to be elevated this time  Cardiology has evaluated and started patient on Norvasc 5 mg daily  Add p.r.n. hydralazine for systolic blood pressure greater than 160  Unable to reconcile home antihypertensives as patient does not have any medications with her at this time       Deaf    Patient able use sign language  Transition provided by ASL  using eye pad on the floor         VTE Risk Mitigation (From admission, onward)        Ordered     IP VTE LOW RISK PATIENT  Once      02/27/19 0609     Place HÉCTOR hose  Until discontinued      02/27/19 0609     Place sequential compression device  Until discontinued      02/27/19 0609        Disposition-start on clear liquid diet today and advance as tolerated.  Continue to monitor hemoglobin levels.  Transfuse for hemoglobin less than 7.  Possible discharge in the next 48-72 hours.      George Argueta DO  Department of Hospital Medicine   [facility]

## 2019-02-28 NOTE — [employer] PATIENT INSTRUCTIONS
Discharge Summary/Instructions after an Endoscopic Procedure  Patient Name: Aurora Ornelas  Patient MRN: [id number]  Patient YOB: 1965  Thursday, February 28, 2019  Yolanda Gary MD  RESTRICTIONS:  During your procedure today, you received medications for sedation.  These   medications may affect your judgment, balance and coordination.  Therefore,   for 24 hours, you have the following restrictions:   - DO NOT drive a car, operate machinery, make legal/financial decisions,   sign important papers or drink alcohol.    ACTIVITY:  Today: no heavy lifting, straining or running due to procedural   sedation/anesthesia.  The following day: return to full activity including work.  DIET:  Eat and drink normally unless instructed otherwise.     TREATMENT FOR COMMON SIDE EFFECTS:  - Mild abdominal pain, nausea, belching, bloating or excessive gas:  rest,   eat lightly and use a heating pad.  - Sore Throat: treat with throat lozenges and/or gargle with warm salt   water.  - Because air was used during the procedure, expelling large amounts of air   from your rectum or belching is normal.  - If a bowel prep was taken, you may not have a bowel movement for 1-3 days.    This is normal.  SYMPTOMS TO WATCH FOR AND REPORT TO YOUR PHYSICIAN:  1. Abdominal pain or bloating, other than gas cramps.  2. Chest pain.  3. Back pain.  4. Signs of infection such as: chills or fever occurring within 24 hours   after the procedure.  5. Rectal bleeding, which would show as bright red, maroon, or black stools.   (A tablespoon of blood from the rectum is not serious, especially if   hemorrhoids are present.)  6. Vomiting.  7. Weakness or dizziness.  GO DIRECTLY TO THE NEAREST EMERGENCY ROOM IF YOU HAVE ANY OF THE FOLLOWING:      Difficulty breathing              Chills and/or fever over 101 F   Persistent vomiting and/or vomiting blood   Severe abdominal pain   Severe chest pain   Black, tarry stools   Bleeding- more than one  tablespoon   Any other symptom or condition that you feel may need urgent attention  Your doctor recommends these additional instructions:  If any biopsies were taken, your doctors clinic will contact you in 1 to 2   weeks with any results.  - Return patient to hospital [facility] for ongoing care.   - Resume previous diet today.   - Follow an antireflux regimen indefinitely.   - Use Protonix (pantoprazole) 40 mg PO BID for 2 months then daily.   - Recommend an iron supplement (IV venofer while inpatient - ordered, then   ferrous sulfate 325 mg BID for 3 months.   - Return to GI clinic in 1 month. If anemia does not resolve with treatment   of esophagitis and replacing iron stores, may need to consider repeat   colonsocopy +/- capsule endoscopy.  - The findings and recommendations were discussed with the patient and their   family.  For questions, problems or results please call your physician - Yolanda Gary MD at Work:  [phone].  [facility] can be reached at [phone]     IF A COMPLICATION OR EMERGENCY SITUATION ARISES AND YOU ARE UNABLE TO REACH   YOUR PHYSICIAN - GO DIRECTLY TO THE EMERGENCY ROOM.  Yolanda Gary MD  2/28/2019 8:59:48 AM  This report has been verified and signed electronically.  [employer]

## 2019-02-28 NOTE — PLAN OF CARE
Problem: Anemia  Goal: Anemia Symptom Improvement  Outcome: Ongoing (interventions implemented as appropriate)  Intervention: Monitor and Manage Anemia   02/28/19 0334   Monitor and Manage Anemia   Fatigue Management activity schedule adjusted   Monitor and Manage Anemia   Oral Nutrition Promotion rest periods promoted         Problem: Tissue Perfusion Altered  Goal: Improved Tissue Perfusion  Outcome: Ongoing (interventions implemented as appropriate)  Intervention: Optimize Tissue Perfusion   02/28/19 0334   Prevent Additional Skin Injury   Pressure Reduction Techniques frequent weight shift encouraged

## 2019-02-28 NOTE — ASSESSMENT & PLAN NOTE
Noted to be 4.9 at time of admission  Transfused 3 units PRBCs  Appears that on initial iron studies, patient is iron deficient  Patient underwent EGD this morning, EGD report has been reviewed  No signs of active bleeding noted  IV Venofer here x3 doses per GI recommendations after which time patient will be started on oral iron supplementation  Patient feeling much better after transfusion.  Hemoglobin improved up to 9.6 today.

## 2019-02-28 NOTE — NURSING TRANSFER
Nursing Transfer Note      2/28/2019     Transfer To: Endo    Transfer via wheelchair    Transfer with cardiac monitoring    Transported by transport    Chart send with patient: Yes

## 2019-02-28 NOTE — NURSING
Report given to MANOJ Barry at bedside. NAD noted. Safety precautions maintained. Chart check done.

## 2019-02-28 NOTE — SUBJECTIVE & OBJECTIVE
Interval History:  Patient seen examined at bedside this afternoon using ASL .  She is seen status post EGD today.  She is complaining of some esophageal soreness, otherwise no complaints of abdominal pain, nausea, vomiting.  No acute overnight events noted. States that she is feeling much better.    Review of Systems   Constitutional: Negative for fever.   HENT: Positive for sore throat.    Respiratory: Negative for shortness of breath.    Gastrointestinal: Negative for abdominal pain, nausea and vomiting.   All other systems reviewed and are negative.    Objective:     Vital Signs (Most Recent):  Temp: 98.2 °F (36.8 °C) (02/28/19 1200)  Pulse: 80 (02/28/19 1200)  Resp: 16 (02/28/19 1200)  BP: (!) 146/81 (02/28/19 1200)  SpO2: 98 % (02/28/19 1200) Vital Signs (24h Range):  Temp:  [97.8 °F (36.6 °C)-98.4 °F (36.9 °C)] 98.2 °F (36.8 °C)  Pulse:  [80-98] 80  Resp:  [14-18] 16  SpO2:  [94 %-100 %] 98 %  BP: (135-181)/(67-98) 146/81     Weight: 70.6 kg (155 lb 10.3 oz)  Body mass index is 27.57 kg/m².    Intake/Output Summary (Last 24 hours) at 2/28/2019 1249  Last data filed at 2/28/2019 0845  Gross per 24 hour   Intake 4155 ml   Output --   Net 4155 ml      Physical Exam   Constitutional: She is oriented to person, place, and time. Vital signs are normal. She appears well-developed. She is cooperative. No distress.   HENT:   Mouth/Throat: Mucous membranes are normal.   Cardiovascular: Normal rate and regular rhythm.   No murmur heard.  Pulmonary/Chest: Effort normal and breath sounds normal.   Abdominal: Soft. Normal appearance and bowel sounds are normal. She exhibits no distension. There is no tenderness.   Neurological: She is alert and oriented to person, place, and time.   Psychiatric: She has a normal mood and affect. Her speech is normal. Judgment normal. Cognition and memory are normal.       Significant Labs:   Recent Lab Results       02/28/19  0634   02/27/19  1838   02/27/19  1333        Albumin  3.1         Alkaline Phosphatase 54         ALT 11         Anion Gap 7         Aniso   Moderate       Ao root annulus     2.31     Ascending aorta     2.13     Ao peak clare     1.34     Ao VTI     25.27     AST 16         AV valve area     3.12     AORTIC VALVE CUSP SEPERATION     1.61     AV mean gradient     3.98     AV peak gradient     7.18     AV Velocity Ratio     0.91     Baso # 0.02 0.02       Basophil% 0.5 0.2       Total Bilirubin 1.1  Comment:  For infants and newborns, interpretation of results should be based  on gestational age, weight and in agreement with clinical  observations.  Premature Infant recommended reference ranges:  Up to 24 hours.............<8.0 mg/dL  Up to 48 hours............<12.0 mg/dL  3-5 days..................<15.0 mg/dL  6-29 days.................<15.0 mg/dL           BSA     1.77     BUN, Bld 6         Calcium 8.4         Chloride 108         CO2 21         Creatinine 0.7         Left Ventricle Relative Wall Thickness     0.50     Differential Method Automated Automated       AV index (prosthetic)     0.97     E/A ratio     1.58     E/E' ratio     21.71     eGFR if  >60         eGFR if non  >60  Comment:  Calculation used to obtain the estimated glomerular filtration  rate (eGFR) is the CKD-EPI equation.            Eos # 0.2 0.3       Eosinophil% 4.5 2.9       E wave decelartion time     85.46     FS     35     Glucose 77         Gran # (ANC) 2.5 6.8       Gran% 59.2 77.9       Hematocrit 31.3 28.9       Hemoglobin 9.6 8.8       Hypo   Moderate       IVRT     0.07     IVS     1.21     LA WIDTH     4.41     Left Atrium Major Axis     5.19     Left Atrium Minor Axis     5.23     LA size     4.52     LA volume     88.27     LA Volume Index     50.7     LVOT area     3.20     LV LATERAL E/E' RATIO     21.71     LV SEPTAL E/E' RATIO     21.71     LV Diastolic Volume     91.72     LV Diastolic Volume Index     52.71     LVIDD     4.48     LVIDS     2.90      LV mass     187.40     LV Mass Index     107.7     LVOT diameter     2.02     LVOT peak leroy     [id number]     LVOT stroke volume     78.80     LVOT peak VTI     24.60     LV Systolic Volume     32.12     LV Systolic Volume Index     18.5     Lymph # 1.1 1.1       Lymph% 26.3 12.8       MCH 23.2 22.7       MCHC 30.7 30.4       MCV 76 75       Mean e'     0.07     Mono # 0.4 0.5       Mono% 9.5 6.2       MPV 9.1 10.1       MV Peak A Leroy     0.96     MV Peak E Leroy     1.52     Ovalocytes   Occasional       Platelets 260 280       Poly   Occasional       Potassium 4.1         Total Protein 6.5         PV Peak D Leroy     0.96     PV Peak S Leroy     0.73     PV PEAK VELOCITY     1.01     Pulm vein S/D ratio     0.76     PW     1.11     Right Atrial Pressure (from IVC)     3     RA Major Axis     4.73     RA Width     3.83     RBC 4.14 3.87       RDW 19.4 20.3       RV S'     55.71     RVDD     3.57     Sinus     2.40     Sodium 136         STJ     2.01     TAPSE     2.38     TDI SEPTAL     0.07     TDI LATERAL     0.07     Triscuspid Valve Regurgitation Peak Gradient     51.55     TR Max Leroy     3.59     Troponin I   <0.006  Comment:  The reference interval for Troponin I represents the 99th percentile   cutoff   for our facility and is consistent with 3rd generation assay   performance.         TV rest pulmonary artery pressure     55     WBC 4.22 8.73             Significant Imaging: I have reviewed all pertinent imaging results/findings within the past 24 hours.

## 2019-02-28 NOTE — SUBJECTIVE & OBJECTIVE
Interval History:     Review of Systems   Constitution: Negative for decreased appetite.   HENT: Negative for ear discharge.    Eyes: Negative for blurred vision.   Respiratory: Negative for hemoptysis.    Endocrine: Negative for polyphagia.   Hematologic/Lymphatic: Negative for adenopathy.   Skin: Negative for color change.   Musculoskeletal: Negative for joint swelling.   Genitourinary: Negative for bladder incontinence.   Neurological: Negative for brief paralysis.   Psychiatric/Behavioral: Negative for hallucinations.   Allergic/Immunologic: Negative for hives.     Objective:     Vital Signs (Most Recent):  Temp: 97.8 °F (36.6 °C) (02/28/19 0757)  Pulse: 86 (02/28/19 0757)  Resp: 18 (02/28/19 0757)  BP: (!) 181/90 (02/28/19 0757)  SpO2: 98 % (02/28/19 0757) Vital Signs (24h Range):  Temp:  [97.8 °F (36.6 °C)-98.4 °F (36.9 °C)] 97.8 °F (36.6 °C)  Pulse:  [76-98] 86  Resp:  [18] 18  SpO2:  [94 %-100 %] 98 %  BP: (142-181)/(67-98) 181/90     Weight: 70.6 kg (155 lb 10.3 oz)  Body mass index is 27.57 kg/m².     SpO2: 98 %  O2 Device (Oxygen Therapy): room air      Intake/Output Summary (Last 24 hours) at 2/28/2019 0836  Last data filed at 2/28/2019 0600  Gross per 24 hour   Intake 4005 ml   Output --   Net 4005 ml       Lines/Drains/Airways     Airway                 Airway - Non-Surgical 02/28/19 0759 Nasal Cannula less than 1 day          Peripheral Intravenous Line                 Peripheral IV - Single Lumen 02/27/19 0227 Right Antecubital 1 day         Peripheral IV - Single Lumen 02/27/19 0526 Left Wrist 1 day                Physical Exam   Constitutional: She is oriented to person, place, and time. She appears well-developed and well-nourished.   HENT:   Head: Normocephalic and atraumatic.   Eyes: Conjunctivae are normal. Pupils are equal, round, and reactive to light.   Neck: Normal range of motion. Neck supple.   Cardiovascular: Normal rate, normal heart sounds and intact distal pulses.   Pulmonary/Chest:  Effort normal and breath sounds normal.   Abdominal: Soft. Bowel sounds are normal.   Musculoskeletal: Normal range of motion.   Neurological: She is alert and oriented to person, place, and time.   Skin: Skin is warm and dry.       Significant Labs: All pertinent lab results from the last 24 hours have been reviewed.    Significant Imaging: Echocardiogram:   2D echo with color flow doppler:   Results for orders placed or performed during the hospital encounter of 04/13/18   2D echo with color flow doppler   Result Value Ref Range    QEF 55 55 - 65    Est. PA Systolic Pressure 37.81     Tricuspid Valve Regurgitation TRIVIAL

## 2019-02-28 NOTE — ANESTHESIA POSTPROCEDURE EVALUATION
"Anesthesia Post Evaluation    Patient: Aurora Ornelas    Procedure(s) Performed: Procedure(s) (LRB):  EGD (ESOPHAGOGASTRODUODENOSCOPY) (N/A)    Final Anesthesia Type: MAC  Patient location during evaluation: GI PACU  Patient participation: Yes- Able to Participate  Level of consciousness: awake  Post-procedure vital signs: reviewed and stable  Pain management: adequate  Airway patency: patent  PONV status at discharge: No PONV  Anesthetic complications: no      Cardiovascular status: hemodynamically stable  Respiratory status: unassisted and spontaneous ventilation  Hydration status: euvolemic  Follow-up not needed.        Visit Vitals  BP (!) 154/72   Pulse 83   Temp 36.7 °C (98 °F) (Oral)   Resp 18   Ht 5' 3" (1.6 m)   Wt 70.6 kg (155 lb 10.3 oz)   LMP 07/07/2016   SpO2 98%   Breastfeeding? No   BMI 27.57 kg/m²       Pain/Edi Score: Edi Score: 10 (2/28/2019  9:14 AM)  Modified Edi Score: 20 (2/28/2019  9:14 AM)        "

## 2019-02-28 NOTE — HOSPITAL COURSE
2-28 Denies CP. Currently in endoscopy. Stress test postponed due to anemia and need for transfusion\    Echo 2/27/19  · Normal left ventricular systolic function. The estimated ejection fraction is 60%  · Concentric left ventricular hypertrophy.  · Grade II (moderate) left ventricular diastolic dysfunction consistent with pseudonormalization.  · Normal right ventricular systolic function.  · Mild left atrial enlargement.  · Mild-to-moderate mitral regurgitation.  · Mild tricuspid regurgitation.  · The estimated PA systolic pressure is 55 mm Hg  · Pulmonary hypertension present.

## 2019-02-28 NOTE — TRANSFER OF CARE
"Anesthesia Transfer of Care Note    Patient: Aurora Ornelas    Procedure(s) Performed: Procedure(s) (LRB):  EGD (ESOPHAGOGASTRODUODENOSCOPY) (N/A)    Patient location: PACU    Anesthesia Type: MAC    Transport from OR: Transported from OR on room air with adequate spontaneous ventilation    Post pain: adequate analgesia    Post assessment: no apparent anesthetic complications and tolerated procedure well    Post vital signs: stable    Level of consciousness: responds to stimulation    Nausea/Vomiting: no nausea/vomiting    Complications: none    Transfer of care protocol was followed      Last vitals:   Visit Vitals  /69 (BP Location: Left arm, Patient Position: Lying)   Pulse 97   Temp 36.7 °C (98 °F) (Oral)   Resp 14   Ht 5' 3" (1.6 m)   Wt 70.6 kg (155 lb 10.3 oz)   LMP 07/07/2016   SpO2 96%   Breastfeeding? No   BMI 27.57 kg/m²     "

## 2019-03-01 VITALS
BODY MASS INDEX: 27.15 KG/M2 | HEART RATE: 97 BPM | TEMPERATURE: 98 F | DIASTOLIC BLOOD PRESSURE: 55 MMHG | RESPIRATION RATE: 16 BRPM | SYSTOLIC BLOOD PRESSURE: 111 MMHG | OXYGEN SATURATION: 97 % | HEIGHT: 63 IN | WEIGHT: 153.25 LBS

## 2019-03-01 LAB
ALBUMIN SERPL BCP-MCNC: 3.1 G/DL
ALP SERPL-CCNC: 52 U/L
ALT SERPL W/O P-5'-P-CCNC: 13 U/L
ANION GAP SERPL CALC-SCNC: 8 MMOL/L
AST SERPL-CCNC: 17 U/L
BASOPHILS # BLD AUTO: 0.01 K/UL
BASOPHILS NFR BLD: 0.2 %
BILIRUB SERPL-MCNC: 0.7 MG/DL
BUN SERPL-MCNC: 6 MG/DL
CALCIUM SERPL-MCNC: 8.3 MG/DL
CHLORIDE SERPL-SCNC: 108 MMOL/L
CO2 SERPL-SCNC: 21 MMOL/L
CREAT SERPL-MCNC: 0.7 MG/DL
CV STRESS BASE HR: 91 BPM
DIASTOLIC BLOOD PRESSURE: 84 MMHG
DIFFERENTIAL METHOD: ABNORMAL
EOSINOPHIL # BLD AUTO: 0.2 K/UL
EOSINOPHIL NFR BLD: 4.2 %
ERYTHROCYTE [DISTWIDTH] IN BLOOD BY AUTOMATED COUNT: 19.7 %
EST. GFR  (AFRICAN AMERICAN): >60 ML/MIN/1.73 M^2
EST. GFR  (NON AFRICAN AMERICAN): >60 ML/MIN/1.73 M^2
GLUCOSE SERPL-MCNC: 76 MG/DL
HCT VFR BLD AUTO: 31.7 %
HGB BLD-MCNC: 9.6 G/DL
LYMPHOCYTES # BLD AUTO: 1.1 K/UL
LYMPHOCYTES NFR BLD: 25.7 %
MCH RBC QN AUTO: 23.2 PG
MCHC RBC AUTO-ENTMCNC: 30.3 G/DL
MCV RBC AUTO: 77 FL
MONOCYTES # BLD AUTO: 0.6 K/UL
MONOCYTES NFR BLD: 13.6 %
NEUTROPHILS # BLD AUTO: 2.4 K/UL
NEUTROPHILS NFR BLD: 56.3 %
NUC STRESS EJECTION FRACTION: 64 %
OHS CV CPX 85 PERCENT MAX PREDICTED HEART RATE MALE: 135
OHS CV CPX MAX PREDICTED HEART RATE: 158
OHS CV CPX PATIENT IS FEMALE: 1
OHS CV CPX PATIENT IS MALE: 0
OHS CV CPX PEAK DIASTOLIC BLOOD PRESSURE: 79 MMHG
OHS CV CPX PEAK HEAR RATE: 109 BPM
OHS CV CPX PEAK RATE PRESSURE PRODUCT: NORMAL
OHS CV CPX PEAK SYSTOLIC BLOOD PRESSURE: 162 MMHG
OHS CV CPX PERCENT MAX PREDICTED HEART RATE ACHIEVED: 69
OHS CV CPX RATE PRESSURE PRODUCT PRESENTING: NORMAL
PLATELET # BLD AUTO: 255 K/UL
PMV BLD AUTO: 9.1 FL
POTASSIUM SERPL-SCNC: 3.8 MMOL/L
PROT SERPL-MCNC: 6.5 G/DL
RBC # BLD AUTO: 4.14 M/UL
SODIUM SERPL-SCNC: 137 MMOL/L
SYSTOLIC BLOOD PRESSURE: 144 MMHG
WBC # BLD AUTO: 4.28 K/UL

## 2019-03-01 PROCEDURE — 25000242 PHARM REV CODE 250 ALT 637 W/ HCPCS: Performed by: INTERNAL MEDICINE

## 2019-03-01 PROCEDURE — 63600175 PHARM REV CODE 636 W HCPCS: Performed by: INTERNAL MEDICINE

## 2019-03-01 PROCEDURE — C9113 INJ PANTOPRAZOLE SODIUM, VIA: HCPCS | Performed by: INTERNAL MEDICINE

## 2019-03-01 PROCEDURE — 94761 N-INVAS EAR/PLS OXIMETRY MLT: CPT

## 2019-03-01 PROCEDURE — 36415 COLL VENOUS BLD VENIPUNCTURE: CPT

## 2019-03-01 PROCEDURE — 85025 COMPLETE CBC W/AUTO DIFF WBC: CPT

## 2019-03-01 PROCEDURE — 80053 COMPREHEN METABOLIC PANEL: CPT

## 2019-03-01 PROCEDURE — 25000003 PHARM REV CODE 250: Performed by: EMERGENCY MEDICINE

## 2019-03-01 PROCEDURE — 94640 AIRWAY INHALATION TREATMENT: CPT

## 2019-03-01 PROCEDURE — 25000003 PHARM REV CODE 250: Performed by: INTERNAL MEDICINE

## 2019-03-01 RX ORDER — AMLODIPINE BESYLATE 10 MG/1
10 TABLET ORAL DAILY
Qty: 30 TABLET | Refills: 1 | Status: ON HOLD | OUTPATIENT
Start: 2019-03-02 | End: 2020-12-05

## 2019-03-01 RX ORDER — REGADENOSON 0.08 MG/ML
0.4 INJECTION, SOLUTION INTRAVENOUS ONCE
Status: COMPLETED | OUTPATIENT
Start: 2019-03-01 | End: 2019-03-01

## 2019-03-01 RX ORDER — FERROUS SULFATE 325(65) MG
325 TABLET ORAL
Refills: 0 | Status: ON HOLD | COMMUNITY
Start: 2019-03-01 | End: 2020-12-05 | Stop reason: SDUPTHER

## 2019-03-01 RX ORDER — PANTOPRAZOLE SODIUM 40 MG/1
40 TABLET, DELAYED RELEASE ORAL 2 TIMES DAILY
Qty: 60 TABLET | Refills: 1 | Status: ON HOLD | OUTPATIENT
Start: 2019-03-01 | End: 2020-12-05

## 2019-03-01 RX ORDER — PANTOPRAZOLE SODIUM 40 MG/1
40 TABLET, DELAYED RELEASE ORAL 2 TIMES DAILY
Status: DISCONTINUED | OUTPATIENT
Start: 2019-03-01 | End: 2019-03-01 | Stop reason: HOSPADM

## 2019-03-01 RX ADMIN — ALBUTEROL SULFATE 2.5 MG: 2.5 SOLUTION RESPIRATORY (INHALATION) at 07:03

## 2019-03-01 RX ADMIN — IRON SUCROSE 100 MG: 20 INJECTION, SOLUTION INTRAVENOUS at 08:03

## 2019-03-01 RX ADMIN — REGADENOSON 0.4 MG: 0.08 INJECTION, SOLUTION INTRAVENOUS at 10:03

## 2019-03-01 RX ADMIN — SENNOSIDES AND DOCUSATE SODIUM 1 TABLET: 8.6; 5 TABLET ORAL at 08:03

## 2019-03-01 RX ADMIN — SODIUM CHLORIDE: 0.9 INJECTION, SOLUTION INTRAVENOUS at 05:03

## 2019-03-01 RX ADMIN — PANTOPRAZOLE SODIUM 40 MG: 40 INJECTION, POWDER, FOR SOLUTION INTRAVENOUS at 05:03

## 2019-03-01 RX ADMIN — AMLODIPINE BESYLATE 10 MG: 5 TABLET ORAL at 08:03

## 2019-03-01 NOTE — PLAN OF CARE
Problem: Fall Injury Risk  Goal: Absence of Fall and Fall-Related Injury  Outcome: Ongoing (interventions implemented as appropriate)  Intervention: Identify and Manage Contributors to Fall Injury Risk   02/28/19 1935 03/01/19 0449   Identify and Manage Contributors to Fall Injury Risk   Self-Care Promotion --  independence encouraged;BADL personal objects within reach   Manage Acute Allergic Reaction   Medication Review/Management medications reviewed --      Intervention: Promote Injury-Free Environment   02/28/19 1935 03/01/19 0400   Optimize Balance and Safe Activity   Safety Promotion/Fall Prevention --  assistive device/personal item within reach;bed alarm refused;Fall Risk reviewed with patient/family;Fall Risk signage in place;nonskid shoes/socks when out of bed;medications reviewed;side rails raised x 2;instructed to call staff for mobility   Optimize [place] and Functional Mobility   Environmental Safety Modification assistive device/personal items within reach --

## 2019-03-01 NOTE — NURSING
Patient returned to room via wheelchair from scheduled procedure. Patient awake, alert, oriented x4. Tele monitoring in progress. No apparent distress noted at this time.

## 2019-03-01 NOTE — NURSING TRANSFER
Nursing Transfer Note      3/1/2019     Transfer To: cardiology    Transfer via wheelchair    Transfer with cardiac monitoring    Transported by transport    Chart send with patient: Yes

## 2019-03-01 NOTE — NURSING
Discharge instructions given to patient. Patient ambulated with her daughter to the family vehicle. No distress noted.

## 2019-03-01 NOTE — CARE UPDATE
NST:  · Normal myocardial perfusion scan  · There were no arrhythmias during stress.  · There was no ST segment deviation noted during stress.  · The perfusion scan is free of evidence from myocardial ischemia or injury.  · There is a mild intensity defect in the anterolateral wall of the left ventricle, secondary to breast attenuation.  · LVEF post stress is 64%  · The EKG portion of this study is negative for myocardial ischemia.    ECHO:  · Normal left ventricular systolic function. The estimated ejection fraction is 60%  · Concentric left ventricular hypertrophy.  · Grade II (moderate) left ventricular diastolic dysfunction consistent with pseudonormalization.  · Normal right ventricular systolic function.  · Mild left atrial enlargement.  · Mild-to-moderate mitral regurgitation.  · Mild tricuspid regurgitation.  · The estimated PA systolic pressure is 55 mm Hg  · Pulmonary hypertension present.     No further recommendations from CV standpoint.  We will currently see as needed.

## 2019-03-01 NOTE — PROGRESS NOTES
WRITTEN HEALTHCARE DISCHARGE INFORMATION     Things that YOU are responsible for to Manage Your Care At Home:  1. Getting your prescriptions filled.  2. Taking you medications as directed. DO NOT MISS ANY DOSES!  3. Going to your follow-up doctor appointments. This is important because it allows the doctor to monitor your progress and to determine if any changes need to be made to your treatment plan.    If you are unable to make your follow up appointments, please call the number listed and reschedule this appointment.     After discharge, if you need assistance, you can call [facility] On Call [facility] Line for 24/7 assistance at [phone]    Thank you for choosing [facility] and allowing us to care for you.   From your care manager: Cristy ALEX,TN @ [phone]     You should receive a call from [facility] Discharge Department within 48-72 hours to help manage your care after discharge. Please try to make sure that you answer your phone for this important phone call.     Follow-up Information     Burak Burger MD On 3/14/2019.    Specialty:  Cardiology  Why:  Appointment scheduled for Thursday March 14th at 10:30am  Contact information:  [address]  [address]  [facility] [address]  [phone]             [facility] In 1 week.    Why:  Please call to schedule your PCP appt in 1 week   Contact information:  [address] [place] [address]  [phone]             [facility] APPOINT[facility] In 2 weeks.    Why:  Please call to follow up in regards to your GI appt in 2 weeks  Contact information:  [address] [facility] [place] [address]  [phone]             [facility].    Specialties:  DME Provider, Home Health Services  Why:  A [facility] will call you to arrange your home visits  Contact information:  [address]  [address]  [place] [id number] [phone]

## 2019-03-01 NOTE — PROGRESS NOTES
TN contacted Dr. Dugan's office to schedule patient's GI appt. TN informed that patient owes a balance and was sent to collections. Appt cant be made.

## 2019-03-01 NOTE — PLAN OF CARE
[facility] HOME HEALTH ORDERS  FACE TO FACE ENCOUNTER    Patient Name: Aurora Ornelas  YOB: 1965    PCP: Primary Doctor No   PCP Address: None  PCP Phone Number: None  PCP Fax: None    Encounter Date: 03/01/2019    Admit to Home Health    Diagnoses:  Active Hospital Problems    Diagnosis  POA    *Symptomatic anemia [D64.9]  Yes    Large hiatal hernia [K44.9]  Yes     Chronic    Chest pain [R07.9]  Yes    GI bleeding [K92.2]  Yes    Hypertension [I10]  Yes    Deaf [H91.90]  Yes      Resolved Hospital Problems   No resolved problems to display.       Future Appointments   Date Time Provider Department Center   3/14/2019 10:30 AM Burak Burger MD [facility] CARDIO [facility]     Follow-up Information     Burak Burger MD On 3/14/2019.    Specialty:  Cardiology  Why:  Appointment scheduled for Thursday March 14th at 10:30am  Contact information:  [address]  [address]  [facility] [address]  [phone]             [facility] In 1 week.    Why:  Please call to schedule your PCP appt in 1 week   Contact information:  [address] [place] [address]  [phone]             Yolanda Gary MD In 2 weeks.    Specialty:  Gastroenterology  Contact information:  [address]  [address]  [facility] [address] [phone]                     I have seen and examined this patient face to face today. My clinical findings that support the need for the home health skilled services and home bound status are the following:  Weakness/numbness causing balance and gait disturbance due to Weakness/Debility and Anemia making it taxing to leave home.    Allergies:Review of patient's allergies indicates:  No Known Allergies    Diet: diabetic diet: 1800 calorie    Activities: activity as tolerated    Nursing:   SN to complete comprehensive assessment including routine vital signs. Instruct on disease process and s/s of complications to report to MD. Review/verify  medication list sent home with the patient at time of discharge  and instruct patient/caregiver as needed. Frequency may be adjusted depending on start of care date.    Notify MD if SBP > 160 or < 90; DBP > 90 or < 50; HR > 120 or < 50; Temp > 101.      CONSULTS:    Physical Therapy to evaluate and treat. Evaluate for home safety and equipment needs; Establish/upgrade home exercise program. Perform / instruct on therapeutic exercises, gait training, transfer training, and Range of Motion.  Occupational Therapy to evaluate and treat. Evaluate home environment for safety and equipment needs. Perform/Instruct on transfers, ADL training, ROM, and therapeutic exercises.   to evaluate for community resources/long-range planning.      Medications: Review discharge medications with patient and family and provide education.      Current Discharge Medication List      START taking these medications    Details   amLODIPine (NORVASC) 10 MG tablet Take 1 tablet (10 mg total) by mouth once daily.  Qty: 30 tablet, Refills: 1      ferrous sulfate (FEOSOL) 325 mg (65 mg iron) Tab tablet Take 1 tablet (325 mg total) by mouth daily with breakfast.  Refills: 0      pantoprazole (PROTONIX) 40 MG tablet Take 1 tablet (40 mg total) by mouth 2 (two) times daily.  Qty: 60 tablet, Refills: 1         CONTINUE these medications which have NOT CHANGED    Details   albuterol 90 mcg/actuation inhaler Inhale 1-2 puffs into the lungs every 4 (four) hours as needed for Wheezing.  Qty: 1 Inhaler, Refills: 10    Associated Diagnoses: Severe persistent asthma with acute exacerbation             I certify that this patient is confined to her home and needs intermittent skilled nursing care, physical therapy and occupational therapy.

## 2019-03-01 NOTE — PLAN OF CARE
"TN reviewed follow up appointment information as well as  "Anemia discharge instructions" handout with patient. Patient is in agreement and verbalized an understanding with a head nod. Placed discharge information in blue discharge folder. TN also reviewed patient responsibility checklist with her.   1. Going to follow up appointments   2. Picking up rx from the pharmacy when discharged  3. Taking her medications as prescribed     Patient informed that [facility] will contact her to schedule her home visits.      Patient's nurse,Imani, informed that patient can discharge from  standpoint.    Patient stated her daughter is coming to pick her up.        03/01/19 1613   Final Note   Assessment Type Final Discharge Note   Anticipated Discharge Disposition Home-Health   What phone number can be called within the next 1-3 days to see how you are doing after discharge? ([phone])   Hospital Follow Up  Appt(s) scheduled? Yes   Discharge plans and expectations educations in teach back method with documentation complete? Yes   Right Care Referral Info   Post Acute Recommendation Home-care   Facility Name ([facility] )     "

## 2019-03-02 NOTE — DISCHARGE SUMMARY
[facility] Hospital Medicine  Discharge Summary      Patient Name: Aurora Ornelas  MRN: [id number]  Admission Date: 2/27/2019  Hospital Length of Stay: 2 days  Discharge Date and Time: 3/1/2019  5:55 PM  Attending Physician: Aleja Sterling MD  Discharging Provider: Aleja Sterling MD  Primary Care Provider: Primary Doctor No      HPI:   Patient is a 53-year-old female with a past medical history of hypertension, asthma, deafness that presents with chest pain. History is obtained through using a .  Patient states that approximately 3 days ago, she began having pain in the center of her chest that felt like a gripping sensation.  She says at rest it was 6/10 intensity, however worsened with ambulating.  Says that she did not have any shortness of breath along with ambulating, only chest pain. She denies any fevers, nausea, vomiting, abdominal pain, diarrhea, constipation.  Patient had not been taking any medications for the chest pain, but given the persistence of it, she decided to come into the ED.    On initial evaluation in the ED, she was noted to have a hemoglobin 4.9.  She denies having any recent rectal bleeding, no black stools, hematuria, hematemesis.  Patient has not had any alcohol use recently, denies any NSAID use other than Aleve 1 tablet twice a day.  Says that she was previously on tramadol, however she was taking this as prescribed and she has been out of this medication for several weeks.    Procedure(s) (LRB):  EGD (ESOPHAGOGASTRODUODENOSCOPY) (N/A)      Hospital Course:   Ms. Ornelas was admitted with chest pain and symptomatic anemia. She was hemoccult positive. GI and Cardiology were consulted. Chest pain was resolved with transfusion of 3 units of PRBC. Cardiology workup with ECHO with normal EF=60 and NST test was negative for ischemia. Pt was treated with PPI and had EGD on 2/28/19 which showed LA Grade C reflux esophagitis. Non-bleeding. Large sliding  and paraesophageal hiatal hernia. Her H/H remained stable post transfusion and patient had recent colonoscopy in 2016 that was reportedly unremarkable. Pt's had no bleeding and tolerated a diet without difficulty. H/H was 9.6/31.7 on the day of discharge. Pt to take PPI BID for her esophagitis and follow up with GI and her PCP.     Consults:   Consults (From admission, onward)        Status Ordering Provider     Inpatient consult to Gastroenterology  Once     Provider:  Yolanda Gary MD    Completed LESIA GARNER        Service: Hospital Medicine    Final Active Diagnoses:    Diagnosis Date Noted POA    PRINCIPAL PROBLEM:  Symptomatic anemia [D64.9] 04/13/2018 Yes    Large hiatal hernia [K44.9] 02/28/2019 Yes     Chronic    Chest pain [R07.9] 02/27/2019 Yes    GI bleeding [K92.2] 02/27/2019 Yes    Hypertension [I10] 04/13/2018 Yes    Deaf [H91.90] 04/13/2018 Yes      Problems Resolved During this Admission:       Discharged Condition: good    Disposition: Home or Self Care    Follow Up:  Follow-up Information     Burak Burger MD On 3/14/2019.    Specialty:  Cardiology  Why:  Appointment scheduled for Thursday March 14th at 10:30am  Contact information:  [address]  [address]  [facility] [address]  [phone]             [facility] In 1 week.    Why:  Please call to schedule your PCP appt in 1 week   Contact information:  [address] [place] [address]  [phone]             [facility] APPOINTMENT LINE [facility] In 2 weeks.    Why:  Please call to follow up in regards to your GI appt in 2 weeks  Contact information:  [address] [facility] [place] [address]  [phone]             [facility].    Specialties:  DME Provider, Home Health Services  Why:  A rep will call you to arrange your home visits  Contact information:  [address]  [facility] [address]  [place] [id number] [phone]                 Patient Instructions:      Diet diabetic     Activity as tolerated        Significant Diagnostic Studies:    Pending Diagnostic Studies:     None         Medications:  Reconciled Home Medications:      Medication List      START taking these medications    amLODIPine 10 MG tablet  Commonly known as:  NORVASC  Take 1 tablet (10 mg total) by mouth once daily.     ferrous sulfate 325 mg (65 mg iron) Tab tablet  Commonly known as:  FEOSOL  Take 1 tablet (325 mg total) by mouth daily with breakfast.     pantoprazole 40 MG tablet  Commonly known as:  PROTONIX  Take 1 tablet (40 mg total) by mouth 2 (two) times daily.        CONTINUE taking these medications    albuterol 90 mcg/actuation inhaler  Commonly known as:  PROVENTIL/VENTOLIN HFA  Inhale 1-2 puffs into the lungs every 4 (four) hours as needed for Wheezing.            Indwelling Lines/Drains at time of discharge:   Lines/Drains/Airways     Airway                 Airway - Non-Surgical 02/28/19 0759 Nasal Cannula 2 days                Time spent on the discharge of patient: 40 minutes  Patient was seen and examined on the date of discharge and determined to be suitable for discharge.         Aleja Sterling MD  Department of Hospital Medicine  [facility]

## 2019-03-02 NOTE — HOSPITAL COURSE
Ms. Ornelas was admitted with chest pain and symptomatic anemia. She was hemoccult positive. GI and Cardiology were consulted. Chest pain was resolved with transfusion of 3 units of PRBC. Cardiology workup with ECHO with normal EF=60 and NST test was negative for ischemia. Pt was treated with PPI and had EGD on 2/28/19 which showed [place] Grade C reflux esophagitis. Non-bleeding. Large sliding and paraesophageal hiatal hernia. Her H/H remained stable post transfusion and patient had recent colonoscopy in 2016 that was reportedly unremarkable. Pt's had no bleeding and tolerated a diet without difficulty. H/H was 9.6/31.7 on the day of discharge. Pt to take PPI BID for her esophagitis and follow up with GI and her PCP.

## 2019-03-14 ENCOUNTER — OFFICE VISIT (OUTPATIENT)
Dept: CARDIOLOGY | Facility: CLINIC | Age: 54
End: 2019-03-14
Payer: COMMERCIAL

## 2019-03-14 VITALS
HEART RATE: 84 BPM | WEIGHT: 153 LBS | OXYGEN SATURATION: 96 % | HEIGHT: 63 IN | DIASTOLIC BLOOD PRESSURE: 69 MMHG | BODY MASS INDEX: 27.11 KG/M2 | SYSTOLIC BLOOD PRESSURE: 166 MMHG

## 2019-03-14 DIAGNOSIS — H91.90 DEAFNESS, UNSPECIFIED LATERALITY: Primary | ICD-10-CM

## 2019-03-14 DIAGNOSIS — D64.9 SYMPTOMATIC ANEMIA: ICD-10-CM

## 2019-03-14 DIAGNOSIS — J45.51 SEVERE PERSISTENT ASTHMA WITH ACUTE EXACERBATION: ICD-10-CM

## 2019-03-14 DIAGNOSIS — I10 ESSENTIAL HYPERTENSION: ICD-10-CM

## 2019-03-14 DIAGNOSIS — R07.89 CHEST PAIN, ATYPICAL: ICD-10-CM

## 2019-03-14 DIAGNOSIS — K29.01 GASTROINTESTINAL HEMORRHAGE ASSOCIATED WITH ACUTE GASTRITIS: ICD-10-CM

## 2019-03-14 PROCEDURE — 99999 PR PBB SHADOW E&M-EST. PATIENT-LVL III: CPT | Mod: PBBFAC,,, | Performed by: INTERNAL MEDICINE

## 2019-03-14 PROCEDURE — 99214 OFFICE O/P EST MOD 30 MIN: CPT | Mod: S$GLB,,, | Performed by: INTERNAL MEDICINE

## 2019-03-14 PROCEDURE — 99214 PR OFFICE/OUTPT VISIT, EST, LEVL IV, 30-39 MIN: ICD-10-PCS | Mod: S$GLB,,, | Performed by: INTERNAL MEDICINE

## 2019-03-14 PROCEDURE — 99999 PR PBB SHADOW E&M-EST. PATIENT-LVL III: ICD-10-PCS | Mod: PBBFAC,,, | Performed by: INTERNAL MEDICINE

## 2019-03-14 NOTE — PROGRESS NOTES
Subjective:    Patient ID:  Aurora Ornelas is a 53 y.o. female who presents for follow-up of Hospital Follow Up      HPI     HTN, CP, GIB, Deafness    Echo 2/27/19  ·  Normal left ventricular systolic function. The estimated ejection fraction is 60%  · Concentric left ventricular hypertrophy.  · Grade II (moderate) left ventricular diastolic dysfunction consistent with pseudonormalization.  · Normal right ventricular systolic function.  · Mild left atrial enlargement.  · Mild-to-moderate mitral regurgitation.  · Mild tricuspid regurgitation.  · The estimated PA systolic pressure is 55 mm Hg  Pulmonary hypertension present.     Stress test 3/1/19  · Normal myocardial perfusion scan  · There were no arrhythmias during stress.  · There was no ST segment deviation noted during stress.  · The perfusion scan is free of evidence from myocardial ischemia or injury.  · There is a mild intensity defect in the anterolateral wall of the left ventricle, secondary to breast attenuation.  · LVEF post stress is 64%  · The EKG portion of this study is negative for myocardial ischemia.     Admitted 2/27/19  Patient is a 53-year-old female with a past medical history of hypertension, asthma, deafness that presents with chest pain. History is obtained through using a .  Patient states that approximately 3 days ago, she began having pain in the center of her chest that felt like a gripping sensation.  She says at rest it was 6/10 intensity, however worsened with ambulating.  Says that she did not have any shortness of breath along with ambulating, only chest pain. She denies any fevers, nausea, vomiting, abdominal pain, diarrhea, constipation.  Patient had not been taking any medications for the chest pain, but given the persistence of it, she decided to come into the ED.     On initial evaluation in the ED, she was noted to have a hemoglobin 4.9.  She denies having any recent rectal bleeding, no black stools,  hematuria, hematemesis.  Patient has not had any alcohol use recently, denies any NSAID use other than Aleve 1 tablet twice a day.  Says that she was previously on tramadol, however she was taking this as prescribed and she has been out of this medication for several weeks.    Ms. Ornelas was admitted with chest pain and symptomatic anemia. She was hemoccult positive. GI and Cardiology were consulted. Chest pain was resolved with transfusion of 3 units of PRBC. Cardiology workup with ECHO with normal EF=60 and NST test was negative for ischemia. Pt was treated with PPI and had EGD on 2/28/19 which showed [place] Grade C reflux esophagitis. Non-bleeding. Large sliding and paraesophageal hiatal hernia. Her H/H remained stable post transfusion and patient had recent colonoscopy in 2016 that was reportedly unremarkable. Pt's had no bleeding and tolerated a diet without difficulty. H/H was 9.6/31.7 on the day of discharge. Pt to take PPI BID for her esophagitis and follow up with GI and her PCP.     Denies CP or SOB since discharge        Review of Systems   Constitution: Negative for decreased appetite.   HENT: Negative for ear discharge.    Eyes: Negative for blurred vision.   Respiratory: Negative for hemoptysis.    Endocrine: Negative for polyphagia.   Hematologic/Lymphatic: Negative for adenopathy.   Skin: Negative for color change.   Musculoskeletal: Negative for joint swelling.   Genitourinary: Negative for bladder incontinence.   Neurological: Negative for brief paralysis.   Psychiatric/Behavioral: Negative for hallucinations.   Allergic/Immunologic: Negative for hives.        Objective:    Physical Exam   Constitutional: She is oriented to person, place, and time. She appears well-developed and well-nourished.   HENT:   Head: Normocephalic and atraumatic.   Eyes: Conjunctivae are normal. Pupils are equal, round, and reactive to light.   Neck: Normal range of motion. Neck supple.   Cardiovascular: Normal rate, normal  heart sounds and intact distal pulses.   Pulmonary/Chest: Effort normal and breath sounds normal.   Abdominal: Soft. Bowel sounds are normal.   Musculoskeletal: Normal range of motion.   Neurological: She is alert and oriented to person, place, and time.   Skin: Skin is warm and dry.         Assessment:       1. Deafness, unspecified laterality    2. Severe persistent asthma with acute exacerbation    3. Essential hypertension    4. Symptomatic anemia    5. Gastrointestinal hemorrhage associated with acute gastritis    6. Chest pain, atypical         Plan:       Cardiac stable  OV 6 months

## 2019-05-18 ENCOUNTER — HOSPITAL ENCOUNTER (EMERGENCY)
Facility: HOSPITAL | Age: 54
Discharge: HOME | End: 2019-05-18
Attending: EMERGENCY MEDICINE
Payer: COMMERCIAL

## 2019-05-18 VITALS
HEIGHT: 66 IN | WEIGHT: 156 LBS | SYSTOLIC BLOOD PRESSURE: 153 MMHG | HEART RATE: 88 BPM | DIASTOLIC BLOOD PRESSURE: 74 MMHG | TEMPERATURE: 99 F | OXYGEN SATURATION: 97 % | BODY MASS INDEX: 25.07 KG/M2 | RESPIRATION RATE: 20 BRPM

## 2019-05-18 DIAGNOSIS — J32.9 SINUSITIS, UNSPECIFIED CHRONICITY, UNSPECIFIED LOCATION: Primary | ICD-10-CM

## 2019-05-18 PROCEDURE — 99284 EMERGENCY DEPT VISIT MOD MDM: CPT

## 2019-05-18 RX ORDER — AZITHROMYCIN 250 MG/1
250 TABLET, FILM COATED ORAL DAILY
Qty: 6 TABLET | Refills: 0 | Status: ON HOLD | OUTPATIENT
Start: 2019-05-18 | End: 2020-12-05 | Stop reason: HOSPADM

## 2019-05-18 RX ORDER — ALBUTEROL SULFATE 90 UG/1
1-2 AEROSOL, METERED RESPIRATORY (INHALATION) EVERY 6 HOURS PRN
Qty: 2 INHALER | Refills: 2 | Status: SHIPPED | OUTPATIENT
Start: 2019-05-18 | End: 2020-01-30 | Stop reason: ALTCHOICE

## 2019-05-18 RX ORDER — PREDNISONE 50 MG/1
50 TABLET ORAL DAILY
Qty: 5 TABLET | Refills: 0 | Status: SHIPPED | OUTPATIENT
Start: 2019-05-18 | End: 2019-05-23

## 2019-05-18 RX ORDER — CETIRIZINE HYDROCHLORIDE 10 MG/1
10 TABLET ORAL DAILY
Qty: 30 TABLET | Refills: 0 | OUTPATIENT
Start: 2019-05-18 | End: 2020-01-30

## 2019-05-18 NOTE — ED TRIAGE NOTES
Pt arrived to Ed with c/o nasal congestion and chest tightness x 1 week. NAD. HX asthma. Pt connected to monitor.

## 2019-05-18 NOTE — DISCHARGE INSTRUCTIONS
Thank you for coming to our Emergency Department today. It is important to remember that some problems are difficult to diagnose and may not be found during your first visit. Be sure to follow up with your primary care doctor and review any labs/imaging that was performed with them. If you do not have a primary care doctor, you may contact the one listed on your discharge paperwork or you may also call the [facility] Appointment Desk at [phone] to schedule an appointment with one.     Return to the ER with any questions/concerns, new/concerning symptoms, worsening or failure to improve. Do not drive or make any important decisions for 24 hours if you have received any pain medications, sedatives or mood altering drugs during your ER visit.

## 2019-05-18 NOTE — ED PROVIDER NOTES
"Encounter Date: 5/18/2019       History     Chief Complaint   Patient presents with    Nasal Congestion     Nasal congestion with "asthma acting up."  C/o tightness in chest.    Chest Pain     53 y.o. female Past Medical History:  No date: Anemia  No date: Arthritis  No date: Asthma  No date: Deaf  No date: Diabetes mellitus  No date: Hyperlipidemia  No date: Hypertension     Daughter at bedside translating, presents c/o thick purulent nasal discharge x 1 week which has triggered her reactive airway disease. She endorses sinus pressure, non productive cough, no f/c, n/v, diarrhea/dysuria./ does feel chest tightness and notes that she needs an inhaler.        Review of patient's allergies indicates:  No Known Allergies  Past Medical History:   Diagnosis Date    Anemia     Arthritis     Asthma     Deaf     Diabetes mellitus     Hyperlipidemia     Hypertension      Past Surgical History:   Procedure Laterality Date    EGD (ESOPHAGOGASTRODUODENOSCOPY) N/A 2/28/2019    Performed by [facility] at [facility]O    HYSTERECTOMY       No family history on file.  Social History     Tobacco Use    Smoking status: Never Smoker    Smokeless tobacco: Never Used   Substance Use Topics    Alcohol use: No    Drug use: No     Review of Systems   Constitutional: Negative for fever.   HENT: Positive for congestion. Negative for sore throat.    Respiratory: Positive for cough and wheezing. Negative for shortness of breath.    Cardiovascular: Negative for chest pain.   Gastrointestinal: Negative for nausea.   Genitourinary: Negative for dysuria.   Musculoskeletal: Negative for back pain.   Skin: Negative for rash.   Neurological: Negative for weakness.   Hematological: Does not bruise/bleed easily.   All other systems reviewed and are negative.      Physical Exam     Initial Vitals [05/18/19 1003]   BP Pulse Resp Temp SpO2   (!) 172/77 95 18 99 °F (37.2 °C) 100 %      MAP       --         Physical Exam    Nursing note " and vitals reviewed.  Constitutional: She appears well-developed and well-nourished.   HENT:   Head: Normocephalic and atraumatic.   Eyes: Conjunctivae and EOM are normal. Pupils are equal, round, and reactive to light.   Neck: Normal range of motion.   Cardiovascular: Normal rate and regular rhythm.   Pulmonary/Chest: Breath sounds normal. No respiratory distress.   Abdominal: She exhibits no distension.   Musculoskeletal: Normal range of motion.   Neurological: She is alert. No cranial nerve deficit. GCS score is 15. GCS eye subscore is 4. GCS verbal subscore is 5. GCS motor subscore is 6.   Skin: Skin is warm and dry.   Psychiatric: She has a normal mood and affect. Thought content normal.     good air movement, in no resp distress, +ttp over sinuses, mild exp wheeze    ED Course   Procedures  Labs Reviewed - No data to display       Imaging Results    None          Medical Decision Making:   Initial Assessment:   Given duration of symptoms will treat with abx, steroids, and albuterol. Will start on zyrtec.                      Clinical Impression:       ICD-10-CM ICD-9-CM   1. Sinusitis, unspecified chronicity, unspecified location J32.9 473.9                                Denise Carroll MD  05/18/19 1015

## 2019-09-09 ENCOUNTER — HOSPITAL ENCOUNTER (EMERGENCY)
Facility: HOSPITAL | Age: 54
Discharge: HOME | End: 2019-09-09
Attending: EMERGENCY MEDICINE
Payer: MEDICARE

## 2019-09-09 VITALS
HEART RATE: 94 BPM | HEIGHT: 64 IN | SYSTOLIC BLOOD PRESSURE: 155 MMHG | RESPIRATION RATE: 18 BRPM | OXYGEN SATURATION: 97 % | BODY MASS INDEX: 26.98 KG/M2 | WEIGHT: 158 LBS | TEMPERATURE: 99 F | DIASTOLIC BLOOD PRESSURE: 73 MMHG

## 2019-09-09 DIAGNOSIS — M79.671 BILATERAL FOOT PAIN: Primary | ICD-10-CM

## 2019-09-09 DIAGNOSIS — M79.672 BILATERAL FOOT PAIN: Primary | ICD-10-CM

## 2019-09-09 PROCEDURE — 99283 EMERGENCY DEPT VISIT LOW MDM: CPT

## 2019-09-09 PROCEDURE — 25000003 PHARM REV CODE 250: Performed by: NURSE PRACTITIONER

## 2019-09-09 RX ORDER — NAPROXEN 250 MG/1
250 TABLET ORAL
Status: COMPLETED | OUTPATIENT
Start: 2019-09-09 | End: 2019-09-09

## 2019-09-09 RX ORDER — SULINDAC 150 MG/1
150 TABLET ORAL 2 TIMES DAILY
Qty: 10 TABLET | Refills: 0 | Status: ON HOLD | OUTPATIENT
Start: 2019-09-09 | End: 2020-12-05 | Stop reason: HOSPADM

## 2019-09-09 RX ADMIN — NAPROXEN 250 MG: 250 TABLET ORAL at 02:09

## 2019-09-09 NOTE — ED TRIAGE NOTES
"Pt c/o BILAT foot pain that is chronic (worsened last night). Pt reports hx of arthritis. Denies trauma, fall, numbness, tingling. Describes pain as a "throbbing", rated 8/10. Denies taking pain meds PTA  "

## 2019-09-09 NOTE — ED PROVIDER NOTES
Encounter Date: 9/9/2019       History     Chief Complaint   Patient presents with    Foot Pain     Bilateral pain to feet due to arthritis, pt. reports pain was bad at work last night and had to go home. Denies any recent injuries. Rates pain 8/10     CC: Bilat foot pain    HPI:  This 52 y.o. Female with PMHx HTN, anemia, arthritis, asthma, deaf, and PSHx hysterectomy presents to the ED c/o chronic bilat foot pain which began months ago.  Pt reports exacerbation when bearing weight. Pt says she can still ambulate with difficulty. Pt denies falling.  No prior tx reported. Pt reports hx of arthritis in her b/l feet for the past couple years. Pt says she usually takes Tramadol prescribed by her PCP as tx but ran out 2 months ago; pt says she needs a refill. Pt denies seeing a podiatrist surgeon.    The history is provided by the patient and medical records. The history is limited by a language barrier. A  was used ([employer]).     Review of patient's allergies indicates:  No Known Allergies  Past Medical History:   Diagnosis Date    Anemia     Arthritis     Asthma     Deaf     Diabetes mellitus     Hyperlipidemia     Hypertension      Past Surgical History:   Procedure Laterality Date    EGD (ESOPHAGOGASTRODUODENOSCOPY) N/A 2/28/2019    [facility] at [facility] ENDO    HYSTERECTOMY       No family history on file.  Social History     Tobacco Use    Smoking status: Never Smoker    Smokeless tobacco: Never Used   Substance Use Topics    Alcohol use: No    Drug use: No     Review of Systems   Constitutional: Negative for chills, fatigue and fever.   HENT: Negative for congestion, ear discharge, ear pain, postnasal drip, rhinorrhea, sinus pressure, sneezing, sore throat and voice change.    Eyes: Negative for discharge and itching.   Respiratory: Negative for cough, shortness of breath and wheezing.    Cardiovascular: Negative for chest pain, palpitations and leg swelling.    Gastrointestinal: Negative for abdominal pain, constipation, diarrhea, nausea and vomiting.   Endocrine: Negative for polydipsia, polyphagia and polyuria.   Genitourinary: Negative for dysuria, frequency, hematuria, urgency, vaginal bleeding, vaginal discharge and vaginal pain.   Musculoskeletal: Positive for arthralgias. Negative for myalgias.   Skin: Negative for rash and wound.   Neurological: Negative for dizziness, seizures, syncope, weakness and numbness.   Hematological: Negative for adenopathy. Does not bruise/bleed easily.   Psychiatric/Behavioral: Negative for self-injury and suicidal ideas. The patient is not nervous/anxious.        Physical Exam     Initial Vitals [09/09/19 1436]   BP Pulse Resp Temp SpO2   (!) 155/73 94 18 98.9 °F (37.2 °C) 97 %      MAP       --         Physical Exam    Nursing note and vitals reviewed.  Constitutional: She appears well-developed and well-nourished.   HENT:   Head: Normocephalic and atraumatic.   Right Ear: External ear normal.   Left Ear: External ear normal.   Nose: Nose normal.   Eyes: Conjunctivae and EOM are normal. Pupils are equal, round, and reactive to light. Right eye exhibits no discharge. Left eye exhibits no discharge.   Neck: Normal range of motion.   Abdominal: She exhibits no distension.   Musculoskeletal: Normal range of motion.        Right foot: Normal.        Left foot: Normal.   Neurological: She is alert and oriented to person, place, and time.   Skin: Skin is dry. Capillary refill takes less than 2 seconds.         ED Course   Procedures  Labs Reviewed - No data to display       Imaging Results    None                APC / Resident Notes:   Aurora Ornelas is a 52 y.o. female who presents to the Emergency Department for evaluation of  Foot pain. Patient is deaf and thus uses sign language.  The information for this encounter was derived using an .  She reports having bilateral foot pain for several months.  She was seen at her primary  care physician's office Previously, as well as this ER, and given tramadol.  She reports taking this daily however she ran out.  She denies numbness, tingling, weakness, injury, swelling, chest pain, shortness of breath, calf pain.     Physical Exam shows a non-toxic, afebrile, and well appearing female. Bilat feet normal exam.      Vital Signs Are Reassuring. If available, previous records reviewed.      My overall impression is foot pain. I considered but do not suspect at this time sprain/sprain, fracture, dislocation, spur.      The patient was deemed safe and stable for discharge.     ED Course: naprosyn. D/C Meds: clinoril. Additional D/C Information: Follow-up with primary care  podiatry as soon as possible.  Take medications as directed.  Rest and elevate foot.  ED return precautions given.  The diagnosis, treatment plan, instructions for follow-up and reevaluation with PCP as well as ED return precautions were discussed and understanding was verbalized. All questions or concerns have been addressed. Patient was discharged home with an instructional sheet which gave not only information regarding the most likely diagnoses but also information regarding when to return to the emergency department for alarming symptoms and when to seek further care.                    Clinical Impression:       ICD-10-CM ICD-9-CM   1. Bilateral foot pain M79.671 729.5    M79.672          Disposition:   Disposition: Discharged  Condition: Stable                        Andrews Washington, [facility]  09/09/19 0056

## 2019-09-09 NOTE — DISCHARGE INSTRUCTIONS
You have been given an anti-inflammatory (clinoril (sulindac)) prescription.  While taking this medication, do not use ibuprofen, motrin, advil, aleve, or any other anti-inflammatory. Return to the Emergency department for any worsening or failure to improve, otherwise follow up with your primary care provider.  Do not take medications prescribed for home use until at least 8h after meds given in ED.

## 2020-01-30 ENCOUNTER — HOSPITAL ENCOUNTER (EMERGENCY)
Facility: HOSPITAL | Age: 55
Discharge: HOME | End: 2020-01-30
Attending: EMERGENCY MEDICINE
Payer: MEDICARE

## 2020-01-30 VITALS
BODY MASS INDEX: 27.14 KG/M2 | HEART RATE: 99 BPM | WEIGHT: 159 LBS | SYSTOLIC BLOOD PRESSURE: 133 MMHG | OXYGEN SATURATION: 96 % | RESPIRATION RATE: 17 BRPM | HEIGHT: 64 IN | TEMPERATURE: 98 F | DIASTOLIC BLOOD PRESSURE: 69 MMHG

## 2020-01-30 DIAGNOSIS — J45.21 MILD INTERMITTENT ASTHMA WITH EXACERBATION: ICD-10-CM

## 2020-01-30 DIAGNOSIS — J06.9 URI WITH COUGH AND CONGESTION: Primary | ICD-10-CM

## 2020-01-30 DIAGNOSIS — R05.9 COUGH: ICD-10-CM

## 2020-01-30 LAB
CTP QC/QA: YES
POC MOLECULAR INFLUENZA A AGN: NEGATIVE
POC MOLECULAR INFLUENZA B AGN: NEGATIVE
POCT GLUCOSE: 112 MG/DL (ref 70–110)

## 2020-01-30 PROCEDURE — 25000242 PHARM REV CODE 250 ALT 637 W/ HCPCS: Performed by: NURSE PRACTITIONER

## 2020-01-30 PROCEDURE — 82962 GLUCOSE BLOOD TEST: CPT

## 2020-01-30 PROCEDURE — 94640 AIRWAY INHALATION TREATMENT: CPT

## 2020-01-30 PROCEDURE — 87502 INFLUENZA DNA AMP PROBE: CPT

## 2020-01-30 PROCEDURE — 63600175 PHARM REV CODE 636 W HCPCS: Performed by: NURSE PRACTITIONER

## 2020-01-30 PROCEDURE — 99285 EMERGENCY DEPT VISIT HI MDM: CPT | Mod: 25

## 2020-01-30 RX ORDER — ALBUTEROL SULFATE 2.5 MG/.5ML
2.5 SOLUTION RESPIRATORY (INHALATION) EVERY 4 HOURS PRN
Qty: 1 EACH | Refills: 0 | Status: ON HOLD | OUTPATIENT
Start: 2020-01-30 | End: 2020-12-05 | Stop reason: SDUPTHER

## 2020-01-30 RX ORDER — IPRATROPIUM BROMIDE AND ALBUTEROL SULFATE 2.5; .5 MG/3ML; MG/3ML
3 SOLUTION RESPIRATORY (INHALATION)
Status: COMPLETED | OUTPATIENT
Start: 2020-01-30 | End: 2020-01-30

## 2020-01-30 RX ORDER — ALBUTEROL SULFATE 90 UG/1
1-2 AEROSOL, METERED RESPIRATORY (INHALATION) EVERY 6 HOURS PRN
Qty: 6.7 G | Refills: 0 | Status: ON HOLD | OUTPATIENT
Start: 2020-01-30 | End: 2020-12-05 | Stop reason: SDUPTHER

## 2020-01-30 RX ORDER — CETIRIZINE HYDROCHLORIDE 10 MG/1
10 TABLET ORAL DAILY
Qty: 30 TABLET | Refills: 0 | Status: ON HOLD | OUTPATIENT
Start: 2020-01-30 | End: 2020-12-05 | Stop reason: HOSPADM

## 2020-01-30 RX ORDER — IPRATROPIUM BROMIDE 0.5 MG/2.5ML
500 SOLUTION RESPIRATORY (INHALATION) 4 TIMES DAILY PRN
Qty: 1 BOX | Refills: 0 | Status: ON HOLD | OUTPATIENT
Start: 2020-01-30 | End: 2020-12-05 | Stop reason: SDUPTHER

## 2020-01-30 RX ORDER — PREDNISONE 20 MG/1
40 TABLET ORAL DAILY
Qty: 10 TABLET | Refills: 0 | Status: SHIPPED | OUTPATIENT
Start: 2020-01-30 | End: 2020-02-04

## 2020-01-30 RX ORDER — PREDNISONE 20 MG/1
60 TABLET ORAL
Status: COMPLETED | OUTPATIENT
Start: 2020-01-30 | End: 2020-01-30

## 2020-01-30 RX ADMIN — IPRATROPIUM BROMIDE AND ALBUTEROL SULFATE 3 ML: .5; 3 SOLUTION RESPIRATORY (INHALATION) at 05:01

## 2020-01-30 RX ADMIN — PREDNISONE 60 MG: 20 TABLET ORAL at 05:01

## 2020-01-30 RX ADMIN — IPRATROPIUM BROMIDE AND ALBUTEROL SULFATE 3 ML: .5; 3 SOLUTION RESPIRATORY (INHALATION) at 06:01

## 2020-01-30 NOTE — ED PROVIDER NOTES
Encounter Date: 1/30/2020       History     Chief Complaint   Patient presents with    flu like symptoms     The patient reports a runny nose, cough, headache, fevers, and vomiting x 2 episodes. Symptoms started today.      1720pm: Attempting to get [employer]  via [employer]. Will preform HPI once [facility] is operational. Physical exam preformed during this initial interaction.     CC: Cough    HPI: Aurora Ornelas, a 54 y.o. female presents to the ED with productive cough, facial/sinus pain, nasal congestion, and wheezing that has been ongoing and progressive worsening for the past 4 days and worsening in the last day. She reports a history of asthma but out of her home medications. She is requesting refills of her zyrtec, albuterol, atrovent, prednisone, and tramadol. She reports the tramadol is for her chronic unchanged foot pain/arthritis. No medications or treatments have been attempted prior to arrival for symptoms.     The history is provided by the patient. A  was used ([facility] via [facility]).     Review of patient's allergies indicates:  No Known Allergies  Past Medical History:   Diagnosis Date    Anemia     Arthritis     Asthma     Deaf     Diabetes mellitus     Hyperlipidemia     Hypertension      Past Surgical History:   Procedure Laterality Date    ESOPHAGOGASTRODUODENOSCOPY N/A 2/28/2019    Procedure: EGD (ESOPHAGOGASTRODUODENOSCOPY);  Surgeon: Yolanda Gary MD;  Location: [facility];  Service: Endoscopy;  Laterality: N/A;    HYSTERECTOMY       History reviewed. No pertinent family history.  Social History     Tobacco Use    Smoking status: Never Smoker    Smokeless tobacco: Never Used   Substance Use Topics    Alcohol use: No    Drug use: No     Review of Systems   Constitutional: Negative for chills and fever.   HENT: Positive for congestion, rhinorrhea and sinus pain. Negative for sore throat and trouble swallowing.    Respiratory: Positive for cough and  wheezing. Negative for chest tightness and shortness of breath.    Gastrointestinal: Positive for vomiting (post-tussive emesis).   Genitourinary: Negative for decreased urine volume and dysuria.   Musculoskeletal: Negative for back pain and neck pain.   Skin: Negative for rash and wound.   Neurological: Negative for syncope, numbness and headaches.   Psychiatric/Behavioral: Negative for confusion.       Physical Exam     Initial Vitals   BP Pulse Resp Temp SpO2   01/30/20 1705 01/30/20 1450 01/30/20 1450 01/30/20 1705 01/30/20 1450   (!) 181/81 96 18 98.7 °F (37.1 °C) 100 %      MAP       --                Physical Exam    Nursing note and vitals reviewed.  Constitutional: She appears well-developed and well-nourished. She is not diaphoretic. She is cooperative.  Non-toxic appearance. She does not have a sickly appearance. She does not appear ill. No distress.   HENT:   Head: Normocephalic and atraumatic.   Right Ear: Tympanic membrane and external ear normal.   Left Ear: Tympanic membrane and external ear normal.   Nose: Mucosal edema and rhinorrhea present.   Mouth/Throat: Uvula is midline. No trismus in the jaw. No posterior oropharyngeal edema or posterior oropharyngeal erythema.   Boggy enlarged nasal mucosa.    Eyes: Conjunctivae and EOM are normal.   Neck: Normal range of motion. Neck supple. No tracheal deviation and normal range of motion present. No neck rigidity.   Cardiovascular: Normal rate and regular rhythm.   Pulses:       Radial pulses are 2+ on the right side, and 2+ on the left side.   Pulmonary/Chest: Effort normal. No stridor. No tachypnea and no bradypnea. No respiratory distress. She has decreased breath sounds. She has wheezes. She has no rhonchi. She has no rales. She exhibits no tenderness.   Abdominal: Soft. She exhibits no distension and no mass. There is no tenderness. There is no guarding.   Musculoskeletal: Normal range of motion.   Lymphadenopathy:        Right cervical: No  superficial cervical adenopathy present.       Left cervical: No superficial cervical adenopathy present.   Neurological: She is alert and oriented to person, place, and time. She has normal strength. No sensory deficit. Coordination and gait normal. GCS score is 15. GCS eye subscore is 4. GCS verbal subscore is 5. GCS motor subscore is 6.   Skin: Skin is warm, dry and intact. Capillary refill takes less than 2 seconds. No bruising and no rash noted. No cyanosis or erythema. Nails show no clubbing.   Psychiatric: She has a normal mood and affect. Her behavior is normal. Judgment and thought content normal.         ED Course   Procedures  Labs Reviewed   POCT GLUCOSE - Abnormal; Notable for the following components:       Result Value    POCT Glucose 112 (*)     All other components within normal limits   POCT INFLUENZA A/B MOLECULAR   POCT GLUCOSE MONITORING CONTINUOUS          Imaging Results          X-Ray Chest PA And Lateral (Final result)  Result time 01/30/20 19:14:12    Final result by Nigel Westfall MD (01/30/20 19:14:12)                 Impression:      No acute cardiothoracic abnormality.    Moderate-sized hiatal hernia.    Electronically signed by resident: Lázaro Saravia  Date:    01/30/2020  Time:    18:46    Electronically signed by: Nigel Westfall MD  Date:    01/30/2020  Time:    19:14             Narrative:    EXAMINATION:  XR CHEST PA AND LATERAL    CLINICAL HISTORY:  Cough    TECHNIQUE:  PA and lateral views of the chest were performed.    COMPARISON:  02/27/2019    FINDINGS:  Lungs are slightly hyperexpanded similar to the prior exam.  No airspace opacity, pneumothorax, or pleural effusion.  Pulmonary vasculature is within normal limits.    The cardiac silhouette is normal in size. The hilar and mediastinal contours are unremarkable.    Bones are intact. Moderate-sized hiatal hernia again noted.                                       APC / Resident Notes:   This is an evaluation of a 54 y.o. female  that presents to the Emergency Department for URI symptoms. She initially reported headache but when further clarified it was facial/sinus pain. Physical Exam shows a non-toxic, afebrile, and well appearing female.  Expiratory wheezing with diminished breath sounds. Ears and throat without signs infection.  There is rhinorrhea with posterior pharyngeal cobblestoning and boggy nasal mucosa. No cervical lymphadenopathy or meningeal signs. Abdomen is soft and nontender.  She moves all extremities. No respiratory distress or stridor on exam. Vital signs are reassuring. If available, previous records reviewed.   RESULTS: Glucose 112mg/dL, Flu Negative. CXR without evidence of focal consolidation.    Reassessment: 1825p - patient reports feeling much improved after neb treatments. No wheezing with good air movements. No retractions.     My overall impression is URI with cough and congestion and asthma exacerbation. I considered, but at this time, do not suspect when, we, strep pharyngitis, meningitis, pneumonia, pneumothorax, pleural effusion, status asthmaticus.    Refill medications requested with the exception of the tramadol.  Advised that she would need to follow up with her primary care doctor for refill of her tramadol for her chronic pain. D/C Information:  Hydration, Tylenol/ibuprofen as needed. The diagnosis, treatment plan, instructions for follow-up and reevaluation with PCP as well as ED return precautions were discussed and understanding was verbalized. All questions or concerns have been addressed.  KEVEN Talley, DANIELE-C              ED Course as of Jan 30 2020   Thu Jan 30, 2020   1806 No  via [employer] as of yet. I spoke with nursing supervisor - he will work on getting a live  through [facility].     [AF]   1814 We were able to get an  via [employer]. Using [employer] now.     [AF]      ED Course User Index  [AF] DANIELE Bates                Clinical Impression:        ICD-10-CM ICD-9-CM   1. URI with cough and congestion J06.9 465.9   2. Cough R05 786.2   3. Mild intermittent asthma with exacerbation J45.21 493.92         Disposition:   Disposition: Discharged  Condition: Stable                     Pablito Stover, DANIELE  01/30/20 2021

## 2020-01-31 NOTE — ED TRIAGE NOTES
Pt complains of nasal drainage, sore throat, headache, sinus pressure and cough since Monday.  Pt did not take any medication for her symptoms.

## 2020-01-31 NOTE — DISCHARGE INSTRUCTIONS
Please return to the Emergency Department for any new or worsening symptoms worsening pain, difficulty breathing or swallowing, fever, chest pain, shortness of breath, loss of consciousness, dizziness, weakness, or any other concerns.     Please schedule an appointment for follow up with your Primary Care Doctor as soon as possible for a recheck of your symptoms. If you do not have one, you may contact the one listed on your discharge paperwork or you may also call the [facility] Appointment Desk at [phone] to schedule an appointment with one.     Please take all medication as prescribed.

## 2020-10-20 ENCOUNTER — HOSPITAL ENCOUNTER (EMERGENCY)
Facility: HOSPITAL | Age: 55
Discharge: HOME | End: 2020-10-20
Attending: EMERGENCY MEDICINE
Payer: MEDICARE

## 2020-10-20 VITALS
HEIGHT: 64 IN | DIASTOLIC BLOOD PRESSURE: 82 MMHG | WEIGHT: 140 LBS | SYSTOLIC BLOOD PRESSURE: 184 MMHG | TEMPERATURE: 99 F | RESPIRATION RATE: 18 BRPM | OXYGEN SATURATION: 99 % | HEART RATE: 89 BPM | BODY MASS INDEX: 23.9 KG/M2
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
## 2020-10-20 PROCEDURE — 25000003 PHARM REV CODE 250: Performed by: PHYSICIAN ASSISTANT

## 2020-10-20 PROCEDURE — 99284 EMERGENCY DEPT VISIT MOD MDM: CPT | Mod: 25

## 2020-10-20 RX ORDER — METHOCARBAMOL 500 MG/1
1000 TABLET, FILM COATED ORAL
Status: COMPLETED | OUTPATIENT
Start: 2020-10-20 | End: 2020-10-20

## 2020-10-20 RX ORDER — METHOCARBAMOL 500 MG/1
1000 TABLET, FILM COATED ORAL 3 TIMES DAILY PRN
Qty: 20 TABLET | Refills: 0 | Status: SHIPPED | OUTPATIENT
Start: 2020-10-20 | End: 2020-10-25

## 2020-10-20 RX ADMIN — METHOCARBAMOL 1000 MG: 500 TABLET ORAL at 07:10
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
## 2020-10-21 NOTE — DISCHARGE INSTRUCTIONS
Get some good rest.  Heating pad to your neck and low back may help with discomfort.  Ibuprofen or Tylenol as needed for pain.  Ice to the scalp may help with discomfort.  Robaxin for any stiffness or soreness. Be aware, this medication is sedating.  Do not mix with alcohol or any other sedating medications.  Do not drive or operate machinery when taking this medication.     Please follow-up with primary for re-evaluation if your symptoms persist.  Return to this ED if any worsening pain, if you begin with severe headache, if any frequent vomiting, if any arm or leg weakness, if any chest pain or trouble breathing, if any other problems occur.

## 2020-12-02 ENCOUNTER — HOSPITAL ENCOUNTER (INPATIENT)
Facility: HOSPITAL | Age: 55
LOS: 3 days | Discharge: HOME | DRG: 812 | End: 2020-12-05
Attending: EMERGENCY MEDICINE | Admitting: INTERNAL MEDICINE
Payer: MEDICARE

## 2020-12-02 DIAGNOSIS — D64.9 SYMPTOMATIC ANEMIA: Primary | ICD-10-CM

## 2020-12-02 DIAGNOSIS — R79.89 ELEVATED TROPONIN: ICD-10-CM

## 2020-12-02 DIAGNOSIS — D50.9 IRON DEFICIENCY ANEMIA, UNSPECIFIED IRON DEFICIENCY ANEMIA TYPE: ICD-10-CM

## 2020-12-02 DIAGNOSIS — R06.02 SHORTNESS OF BREATH: ICD-10-CM

## 2020-12-02 DIAGNOSIS — R06.02 SOB (SHORTNESS OF BREATH): ICD-10-CM

## 2020-12-02 DIAGNOSIS — K20.90 ESOPHAGITIS: ICD-10-CM

## 2020-12-02 DIAGNOSIS — R07.9 LEFT-SIDED CHEST PAIN: ICD-10-CM

## 2020-12-02 PROBLEM — E66.9 OBESITY: Status: ACTIVE | Noted: 2020-12-02

## 2020-12-02 LAB
ABO + RH BLD: NORMAL
ALBUMIN SERPL BCP-MCNC: 3.5 G/DL (ref 3.5–5.2)
ALP SERPL-CCNC: 47 U/L (ref 55–135)
ALT SERPL W/O P-5'-P-CCNC: 9 U/L (ref 10–44)
ANION GAP SERPL CALC-SCNC: 11 MMOL/L (ref 8–16)
ANISOCYTOSIS BLD QL SMEAR: SLIGHT
AST SERPL-CCNC: 16 U/L (ref 10–40)
BASOPHILS # BLD AUTO: 0.01 K/UL (ref 0–0.2)
BASOPHILS NFR BLD: 0.2 % (ref 0–1.9)
BILIRUB SERPL-MCNC: 0.4 MG/DL (ref 0.1–1)
BLD GP AB SCN CELLS X3 SERPL QL: NORMAL
BLOOD GROUP ANTIBODIES SERPL: NORMAL
BNP SERPL-MCNC: 182 PG/ML (ref 0–99)
BUN SERPL-MCNC: 10 MG/DL (ref 6–20)
CALCIUM SERPL-MCNC: 8.5 MG/DL (ref 8.7–10.5)
CHLORIDE SERPL-SCNC: 107 MMOL/L (ref 95–110)
CO2 SERPL-SCNC: 20 MMOL/L (ref 23–29)
CREAT SERPL-MCNC: 0.7 MG/DL (ref 0.5–1.4)
CTP QC/QA: YES
D DIMER PPP IA.FEU-MCNC: 0.28 MG/L FEU
DIFFERENTIAL METHOD: ABNORMAL
EOSINOPHIL # BLD AUTO: 0.1 K/UL (ref 0–0.5)
EOSINOPHIL NFR BLD: 1.4 % (ref 0–8)
ERYTHROCYTE [DISTWIDTH] IN BLOOD BY AUTOMATED COUNT: 21.4 % (ref 11.5–14.5)
EST. GFR  (AFRICAN AMERICAN): >60 ML/MIN/1.73 M^2
EST. GFR  (NON AFRICAN AMERICAN): >60 ML/MIN/1.73 M^2
GLUCOSE SERPL-MCNC: 115 MG/DL (ref 70–110)
HCT VFR BLD AUTO: 14.3 % (ref 37–48.5)
HGB BLD-MCNC: 3.7 G/DL (ref 12–16)
HYPOCHROMIA BLD QL SMEAR: ABNORMAL
IMM GRANULOCYTES # BLD AUTO: 0.02 K/UL (ref 0–0.04)
IMM GRANULOCYTES NFR BLD AUTO: 0.3 % (ref 0–0.5)
IRON SERPL-MCNC: <10 UG/DL (ref 30–160)
LYMPHOCYTES # BLD AUTO: 0.9 K/UL (ref 1–4.8)
LYMPHOCYTES NFR BLD: 15.9 % (ref 18–48)
MCH RBC QN AUTO: 18.2 PG (ref 27–31)
MCHC RBC AUTO-ENTMCNC: 25.9 G/DL (ref 32–36)
MCV RBC AUTO: 70 FL (ref 82–98)
MONOCYTES # BLD AUTO: 0.4 K/UL (ref 0.3–1)
MONOCYTES NFR BLD: 7.3 % (ref 4–15)
NEUTROPHILS # BLD AUTO: 4.3 K/UL (ref 1.8–7.7)
NEUTROPHILS NFR BLD: 74.9 % (ref 38–73)
NRBC BLD-RTO: 0 /100 WBC
OVALOCYTES BLD QL SMEAR: ABNORMAL
PLATELET # BLD AUTO: 299 K/UL (ref 150–350)
PLATELET BLD QL SMEAR: ABNORMAL
PMV BLD AUTO: 10.5 FL (ref 9.2–12.9)
POIKILOCYTOSIS BLD QL SMEAR: SLIGHT
POTASSIUM SERPL-SCNC: 4.2 MMOL/L (ref 3.5–5.1)
PROCALCITONIN SERPL IA-MCNC: 0.02 NG/ML
PROT SERPL-MCNC: 7.1 G/DL (ref 6–8.4)
RBC # BLD AUTO: 2.03 M/UL (ref 4–5.4)
RETICS/RBC NFR AUTO: 1.8 % (ref 0.5–2.5)
SARS-COV-2 RDRP RESP QL NAA+PROBE: NEGATIVE
SATURATED IRON: ABNORMAL % (ref 20–50)
SODIUM SERPL-SCNC: 138 MMOL/L (ref 136–145)
TARGETS BLD QL SMEAR: ABNORMAL
TOTAL IRON BINDING CAPACITY: 613 UG/DL (ref 250–450)
TRANSFERRIN SERPL-MCNC: 414 MG/DL (ref 200–375)
TROPONIN I SERPL DL<=0.01 NG/ML-MCNC: 0.1 NG/ML (ref 0–0.03)
TROPONIN I SERPL DL<=0.01 NG/ML-MCNC: 0.12 NG/ML (ref 0–0.03)
WBC # BLD AUTO: 5.77 K/UL (ref 3.9–12.7)

## 2020-12-02 PROCEDURE — 80053 COMPREHEN METABOLIC PANEL: CPT

## 2020-12-02 PROCEDURE — 82607 VITAMIN B-12: CPT

## 2020-12-02 PROCEDURE — U0002 COVID-19 LAB TEST NON-CDC: HCPCS | Performed by: NURSE PRACTITIONER

## 2020-12-02 PROCEDURE — 84145 PROCALCITONIN (PCT): CPT

## 2020-12-02 PROCEDURE — 86978 RBC PRETREATMENT SERUM: CPT

## 2020-12-02 PROCEDURE — 25000003 PHARM REV CODE 250: Performed by: INTERNAL MEDICINE

## 2020-12-02 PROCEDURE — 85379 FIBRIN DEGRADATION QUANT: CPT

## 2020-12-02 PROCEDURE — 85045 AUTOMATED RETICULOCYTE COUNT: CPT

## 2020-12-02 PROCEDURE — 99223 PR INITIAL HOSPITAL CARE,LEVL III: ICD-10-PCS | Mod: ,,, | Performed by: INTERNAL MEDICINE

## 2020-12-02 PROCEDURE — 93010 ELECTROCARDIOGRAM REPORT: CPT | Mod: 76,,, | Performed by: INTERNAL MEDICINE

## 2020-12-02 PROCEDURE — 86870 RBC ANTIBODY IDENTIFICATION: CPT

## 2020-12-02 PROCEDURE — 83540 ASSAY OF IRON: CPT

## 2020-12-02 PROCEDURE — 63600175 PHARM REV CODE 636 W HCPCS: Performed by: INTERNAL MEDICINE

## 2020-12-02 PROCEDURE — 85025 COMPLETE CBC W/AUTO DIFF WBC: CPT

## 2020-12-02 PROCEDURE — 86901 BLOOD TYPING SEROLOGIC RH(D): CPT

## 2020-12-02 PROCEDURE — 82746 ASSAY OF FOLIC ACID SERUM: CPT

## 2020-12-02 PROCEDURE — 63600175 PHARM REV CODE 636 W HCPCS: Performed by: NURSE PRACTITIONER

## 2020-12-02 PROCEDURE — 99223 1ST HOSP IP/OBS HIGH 75: CPT | Mod: ,,, | Performed by: INTERNAL MEDICINE

## 2020-12-02 PROCEDURE — 96365 THER/PROPH/DIAG IV INF INIT: CPT

## 2020-12-02 PROCEDURE — 93010 EKG 12-LEAD: ICD-10-PCS | Mod: 76,,, | Performed by: INTERNAL MEDICINE

## 2020-12-02 PROCEDURE — 20000000 HC ICU ROOM

## 2020-12-02 PROCEDURE — 84484 ASSAY OF TROPONIN QUANT: CPT

## 2020-12-02 PROCEDURE — 93005 ELECTROCARDIOGRAM TRACING: CPT

## 2020-12-02 PROCEDURE — 84484 ASSAY OF TROPONIN QUANT: CPT | Mod: 91

## 2020-12-02 PROCEDURE — 83880 ASSAY OF NATRIURETIC PEPTIDE: CPT

## 2020-12-02 PROCEDURE — 86880 COOMBS TEST DIRECT: CPT

## 2020-12-02 PROCEDURE — 99285 EMERGENCY DEPT VISIT HI MDM: CPT | Mod: 25

## 2020-12-02 PROCEDURE — 86922 COMPATIBILITY TEST ANTIGLOB: CPT

## 2020-12-02 RX ORDER — ALBUTEROL SULFATE 90 UG/1
2 AEROSOL, METERED RESPIRATORY (INHALATION) EVERY 6 HOURS PRN
Status: DISCONTINUED | OUTPATIENT
Start: 2020-12-02 | End: 2020-12-05 | Stop reason: HOSPADM

## 2020-12-02 RX ORDER — CLONIDINE HYDROCHLORIDE 0.1 MG/1
0.3 TABLET ORAL EVERY 6 HOURS PRN
Status: DISCONTINUED | OUTPATIENT
Start: 2020-12-02 | End: 2020-12-05 | Stop reason: HOSPADM

## 2020-12-02 RX ORDER — HYDROCODONE BITARTRATE AND ACETAMINOPHEN 500; 5 MG/1; MG/1
TABLET ORAL
Status: DISCONTINUED | OUTPATIENT
Start: 2020-12-02 | End: 2020-12-05 | Stop reason: HOSPADM

## 2020-12-02 RX ORDER — PANTOPRAZOLE SODIUM 40 MG/1
40 TABLET, DELAYED RELEASE ORAL 2 TIMES DAILY
Status: DISCONTINUED | OUTPATIENT
Start: 2020-12-02 | End: 2020-12-05 | Stop reason: HOSPADM

## 2020-12-02 RX ORDER — AMLODIPINE BESYLATE 5 MG/1
10 TABLET ORAL DAILY
Status: DISCONTINUED | OUTPATIENT
Start: 2020-12-03 | End: 2020-12-05 | Stop reason: HOSPADM

## 2020-12-02 RX ADMIN — PANTOPRAZOLE SODIUM 40 MG: 40 TABLET, DELAYED RELEASE ORAL at 08:12

## 2020-12-02 RX ADMIN — IRON SUCROSE 100 MG: 20 INJECTION, SOLUTION INTRAVENOUS at 06:12

## 2020-12-02 RX ADMIN — CEFTRIAXONE 2 G: 2 INJECTION, SOLUTION INTRAVENOUS at 03:12

## 2020-12-02 NOTE — ASSESSMENT & PLAN NOTE
Likely demand ischemia from Hgb of 3.7.  Will trend out     No cardiac intervention. Out patient stress test

## 2020-12-02 NOTE — SUBJECTIVE & OBJECTIVE
Past Medical History:   Diagnosis Date    Anemia     Arthritis     Asthma     Deaf     Diabetes mellitus     Hyperlipidemia     Hypertension        Past Surgical History:   Procedure Laterality Date    ESOPHAGOGASTRODUODENOSCOPY N/A 2/28/2019    Procedure: EGD (ESOPHAGOGASTRODUODENOSCOPY);  Surgeon: Yolanda Gary MD;  Location: [facility];  Service: Endoscopy;  Laterality: N/A;    HYSTERECTOMY         Review of patient's allergies indicates:  No Known Allergies    No current facility-administered medications on file prior to encounter.      Current Outpatient Medications on File Prior to Encounter   Medication Sig    albuterol (PROVENTIL/VENTOLIN HFA) 90 mcg/actuation inhaler Inhale 1-2 puffs into the lungs every 6 (six) hours as needed for Wheezing or Shortness of Breath. Rescue    albuterol sulfate 2.5 mg/0.5 mL Nebu Take 2.5 mg by nebulization every 4 (four) hours as needed. Rescue    amLODIPine (NORVASC) 10 MG tablet Take 1 tablet (10 mg total) by mouth once daily.    azithromycin (Z-KATHY) 250 MG tablet Take 1 tablet (250 mg total) by mouth once daily. Take first 2 tablets together, then 1 every day until finished.    cetirizine (ZYRTEC) 10 MG tablet Take 1 tablet (10 mg total) by mouth once daily.    ferrous sulfate (FEOSOL) 325 mg (65 mg iron) Tab tablet Take 1 tablet (325 mg total) by mouth daily with breakfast.    ipratropium (ATROVENT) 0.02 % nebulizer solution Take 2.5 mLs (500 mcg total) by nebulization 4 (four) times daily as needed for Wheezing. Rescue    pantoprazole (PROTONIX) 40 MG tablet Take 1 tablet (40 mg total) by mouth 2 (two) times daily.    sulindac (CLINORIL) 150 MG tablet Take 1 tablet (150 mg total) by mouth 2 (two) times daily.     Family History     None        Tobacco Use    Smoking status: Never Smoker    Smokeless tobacco: Never Used   Substance and Sexual Activity    Alcohol use: No    Drug use: No    Sexual activity: Yes     Partners: Male     Birth  control/protection: None     Review of Systems   Constitutional: Negative for activity change, appetite change, chills, diaphoresis, fatigue, fever and unexpected weight change.   HENT: Negative for congestion and dental problem.    Eyes: Negative for discharge and itching.   Respiratory: Negative for apnea, cough, choking, chest tightness, shortness of breath and stridor.    Cardiovascular: Negative for chest pain, palpitations and leg swelling.   Gastrointestinal: Negative for abdominal distention, abdominal pain, anal bleeding, nausea and vomiting.   Genitourinary: Negative for difficulty urinating.   Musculoskeletal: Negative for arthralgias, back pain and gait problem.   Skin: Negative for color change and pallor.   Psychiatric/Behavioral: Negative for agitation and behavioral problems.     Objective:     Vital Signs (Most Recent):  Temp: 98.5 °F (36.9 °C) (12/02/20 1029)  Pulse: 107 (12/02/20 1603)  Resp: 20 (12/02/20 1029)  BP: (!) 145/67 (12/02/20 1602)  SpO2: 100 % (12/02/20 1602) Vital Signs (24h Range):  Temp:  [98.5 °F (36.9 °C)] 98.5 °F (36.9 °C)  Pulse:  [102-112] 107  Resp:  [20] 20  SpO2:  [100 %] 100 %  BP: (125-145)/(58-67) 145/67     Weight: 71.7 kg (158 lb)  Body mass index is 27.12 kg/m².    Physical Exam  Vitals signs and nursing note reviewed.   Constitutional:       General: She is not in acute distress.     Appearance: Normal appearance. She is obese. She is not ill-appearing, toxic-appearing or diaphoretic.   HENT:      Head: Normocephalic and atraumatic.   Cardiovascular:      Rate and Rhythm: Normal rate and regular rhythm.      Heart sounds: No murmur. No gallop.    Pulmonary:      Effort: No respiratory distress.      Breath sounds: No wheezing or rales.   Abdominal:      General: Abdomen is flat. Bowel sounds are normal. There is no distension.      Palpations: Abdomen is soft.      Tenderness: There is no abdominal tenderness.   Neurological:      Mental Status: She is alert and  oriented to person, place, and time.   Psychiatric:         Mood and Affect: Mood normal.         Behavior: Behavior normal.             Significant Labs:   BMP:   Recent Labs   Lab 12/02/20  1350   *      K 4.2      CO2 20*   BUN 10   CREATININE 0.7   CALCIUM 8.5*     CBC:   Recent Labs   Lab 12/02/20  1350   WBC 5.77   HGB 3.7*   HCT 14.3*          Significant Imaging:   CXR reviewed

## 2020-12-02 NOTE — ASSESSMENT & PLAN NOTE
Hgb is 3.7. Heme negative in the ED.  Likely from non-compliance from Iron replacement at home. 3 units of blood. Fe IV for 3 days. Patient very stable and will be transferred to the floor tomorrow.     Delay in getting blood due to anti-bodies.  Will transfuse- transfer to floor. Heme following.  GI consulted

## 2020-12-02 NOTE — ED NOTES
Lab states they will try to send someone to recollect blood. If not they will call us back and let us know.

## 2020-12-02 NOTE — ED PROVIDER NOTES
Encounter Date: 12/2/2020       History     Chief Complaint   Patient presents with    Shortness of Breath     started today    Asthma     Pt is DEAF     55-year-old female with a history of deafness, pre-diabetes, asthma, hypertension, symptomatic anemia, and others presenting for evaluation of shortness of breath and left-sided chest pain that began last night.  Pain is overlying the left ribs and as described as a tightness.  Patient states that her symptoms feel like her normal asthma exacerbations.  She reports that she tried to use her nebulizer at home but thinks that the machine is broken because it did not seem to be working like it normally does and she noticed fluid leaking from it.  She denies cough, fever, abdominal pain, nausea, vomiting, syncope, or any additional symptoms.  No known sick contacts.        Review of patient's allergies indicates:  No Known Allergies  Past Medical History:   Diagnosis Date    Anemia     Arthritis     Asthma     Deaf     Diabetes mellitus     Hyperlipidemia     Hypertension      Past Surgical History:   Procedure Laterality Date    ESOPHAGOGASTRODUODENOSCOPY N/A 2/28/2019    Procedure: EGD (ESOPHAGOGASTRODUODENOSCOPY);  Surgeon: Yolanda Gary MD;  Location: [facility];  Service: Endoscopy;  Laterality: N/A;    HYSTERECTOMY       History reviewed. No pertinent family history.  Social History     Tobacco Use    Smoking status: Never Smoker    Smokeless tobacco: Never Used   Substance Use Topics    Alcohol use: No    Drug use: No     Review of Systems   Constitutional: Negative for chills, fatigue and fever.   HENT: Negative for congestion, ear pain, nosebleeds, postnasal drip, rhinorrhea, sinus pressure and sore throat.    Eyes: Negative for photophobia and pain.   Respiratory: Positive for chest tightness ( midsternal), shortness of breath and wheezing. Negative for apnea, cough, choking and stridor.    Cardiovascular: Positive for chest pain (Left lateral  overlying the ribs). Negative for palpitations and leg swelling.   Gastrointestinal: Negative for abdominal pain, constipation, diarrhea, nausea and vomiting.   Genitourinary: Negative for dysuria, flank pain, hematuria, pelvic pain and vaginal discharge.   Musculoskeletal: Negative for arthralgias, back pain, gait problem and neck pain.   Skin: Negative for color change, pallor, rash and wound.   Neurological: Negative for dizziness, seizures, syncope, facial asymmetry, light-headedness and headaches.   Hematological: Negative for adenopathy.   Psychiatric/Behavioral: Negative for confusion. The patient is not nervous/anxious.        Physical Exam     Initial Vitals [12/02/20 1029]   BP Pulse Resp Temp SpO2   (!) 125/58 110 20 98.5 °F (36.9 °C) 100 %      MAP       --         Physical Exam    Nursing note and vitals reviewed.  Constitutional: She appears well-developed and well-nourished. She is not diaphoretic. No distress.   HENT:   Head: Normocephalic and atraumatic.   Right Ear: External ear normal.   Left Ear: External ear normal.   Nose: Nose normal.   Eyes: Conjunctivae and EOM are normal. Right eye exhibits no discharge. Left eye exhibits no discharge.   Neck: Normal range of motion. Neck supple. No tracheal deviation present.   Cardiovascular: Tachycardia present.    Pulmonary/Chest: Effort normal. No accessory muscle usage or stridor. No tachypnea. No respiratory distress. She has decreased breath sounds.   Respiratory effort is normal.  Breath sounds are diminished bilaterally in all fields.  No tachypnea or increased work of breathing.   Abdominal: Soft. She exhibits no distension. There is no abdominal tenderness.   Musculoskeletal: Normal range of motion. No tenderness.   Neurological: She is alert and oriented to person, place, and time. She has normal strength. No cranial nerve deficit or sensory deficit.   Skin: Skin is warm and dry.   Psychiatric: She has a normal mood and affect. Her behavior is  normal. Judgment and thought content normal.         ED Course   Procedures  Labs Reviewed   CBC W/ AUTO DIFFERENTIAL - Abnormal; Notable for the following components:       Result Value    RBC 2.03 (*)     Hemoglobin 3.7 (*)     Hematocrit 14.3 (*)     MCV 70 (*)     MCH 18.2 (*)     MCHC 25.9 (*)     RDW 21.4 (*)     Lymph # 0.9 (*)     Gran % 74.9 (*)     Lymph % 15.9 (*)     All other components within normal limits    Narrative:        HGB AND HCT critical result(s) called and verbal readback obtained   from VIVIAN FLORES by 1 12/02/2020 14:13  Recoll. [id number] by 1 at 12/02/2020 12:12, reason: Specimen   clotted   COMPREHENSIVE METABOLIC PANEL - Abnormal; Notable for the following components:    CO2 20 (*)     Glucose 115 (*)     Calcium 8.5 (*)     Alkaline Phosphatase 47 (*)     ALT 9 (*)     All other components within normal limits    Narrative:     Recoll. [id number] by [facility] at 12/02/2020 12:12, reason: Specimen   clotted   TROPONIN I - Abnormal; Notable for the following components:    Troponin I 0.099 (*)     All other components within normal limits    Narrative:     Recoll. [id number] by 1 at 12/02/2020 12:12, reason: Specimen   clotted   B-TYPE NATRIURETIC PEPTIDE - Abnormal; Notable for the following components:     (*)     All other components within normal limits    Narrative:     Recoll. [id number] by [facility] at 12/02/2020 12:12, reason: Specimen   clotted   IRON AND TIBC - Abnormal; Notable for the following components:    Iron <10 (*)     Transferrin 414 (*)     TIBC 613 (*)     All other components within normal limits   D DIMER, QUANTITATIVE   RETICULOCYTES   FOLATE   VITAMIN B12   PROCALCITONIN   TROPONIN I   SARS-COV-2 RDRP GENE   TYPE & SCREEN        ECG Results          EKG 12-lead (In process)  Result time 12/02/20 12:39:08    In process by Interface, Lab In [facility] (12/02/20 12:39:08)                 Narrative:    Test Reason : R07.9,    Vent. Rate : 098 BPM     Atrial Rate :  098 BPM     P-R Int : 138 ms          QRS Dur : 088 ms      QT Int : 360 ms       P-R-T Axes : 059 054 -34 degrees     QTc Int : 459 ms    Normal sinus rhythm  LVH with repolarization abnormality  Abnormal ECG  When compared with ECG of 02-DEC-2020 11:50,  No significant change was found    Referred By: [email or web address]   SELF           Confirmed By:                   In process by Interface, Lab In [facility] (12/02/20 12:32:43)                 Narrative:    Test Reason : R07.9,    Vent. Rate : 098 BPM     Atrial Rate : 098 BPM     P-R Int : 138 ms          QRS Dur : 088 ms      QT Int : 360 ms       P-R-T Axes : 059 054 -34 degrees     QTc Int : 459 ms    Normal sinus rhythm  LVH with repolarization abnormality  Abnormal ECG  When compared with ECG of 02-DEC-2020 11:50,  No significant change was found    Referred By: [email or web address]   SELF           Confirmed By:                              EKG 12-lead (In process)  Result time 12/02/20 12:39:06    In process by Interface, Lab In [facility] (12/02/20 12:39:06)                 Narrative:    Test Reason : R06.02,    Vent. Rate : 098 BPM     Atrial Rate : 098 BPM     P-R Int : 138 ms          QRS Dur : 090 ms      QT Int : 358 ms       P-R-T Axes : 041 036 -41 degrees     QTc Int : 457 ms    Normal sinus rhythm  LVH with repolarization abnormality  Cannot rule out Inferior infarct ,age undetermined  Abnormal ECG  When compared with ECG of 01-MAR-2019 11:01,  No significant change was found    Referred By: AAAREFERR   SELF           Confirmed By:                   In process by Interface, Lab In [facility] (12/02/20 12:32:55)                 Narrative:    Test Reason : R06.02,    Vent. Rate : 098 BPM     Atrial Rate : 098 BPM     P-R Int : 138 ms          QRS Dur : 090 ms      QT Int : 358 ms       P-R-T Axes : 041 036 -41 degrees     QTc Int : 457 ms    Normal sinus rhythm  LVH with repolarization abnormality  Cannot rule out Inferior infarct ,age undetermined  Abnormal ECG  When  compared with ECG of 01-MAR-2019 11:01,  No significant change was found    Referred By: [email or web address]   SELF           Confirmed By:                             Imaging Results          X-Ray Chest AP Portable (Final result)  Result time 12/02/20 12:26:55    Final result by Dex Singh MD (12/02/20 12:26:55)                 Impression:      New left middle and lower lung zone interstitial and ill-defined airspace opacities, suspicious for edema or infectious/inflammatory disease.      Electronically signed by: Dex Singh MD  Date:    12/02/2020  Time:    12:26             Narrative:    EXAMINATION:  XR CHEST AP PORTABLE    CLINICAL HISTORY:  Shortness of breath    TECHNIQUE:  Single frontal view of the chest was performed.    COMPARISON:  Prior exams dating back to 2009    FINDINGS:  Since January 30, 2020, new left middle and lower lung zone interstitial and ill-defined airspace opacities.  Right lung is clear.    No effusion or pneumothorax.    No acute bone abnormality.                                 Medical Decision Making:   History:   Old Medical Records: I decided to obtain old medical records.  Differential Diagnosis:   Asthma exacerbation, pneumonia, pneumothorax, PE, STEMI, NSTEMI, CHF, symptomatic anemia, others  Clinical Tests:   Lab Tests: Ordered and Reviewed  Radiological Study: Ordered and Reviewed  Medical Tests: Ordered and Reviewed  ED Management:  HPI and physical exam as above.    Labs remarkable for anemia with an H&H of 3.7 and 14.3.  Anemia is microcytic and hypochromic.  Patient states she is supposed to take iron pills but has not been taking them recently.  Troponin and BNP are also elevated.  Will not treat with any anticoagulation at this time given low H&H.  D-dimer is negative.  Chest x-ray shows possible left middle and left lower lung zone pneumonia.  Treated empirically with Rocephin.  Ordered procalcitonin.  Ordered anemia labs.  Also ordered type and screen and the  transfusion of 3 units of PRBCs.  Case discussed with Dr. Godoy.  Patient will be admitted to the ICU for further management due to low H&H.    Of note patient is deaf.  [employer] utilized for all communications.                             Clinical Impression:       ICD-10-CM ICD-9-CM   1. Symptomatic anemia  D64.9 285.9   2. Shortness of breath  R06.02 786.05   3. Left-sided chest pain  R07.9 786.50   4. SOB (shortness of breath)  R06.02 786.05   5. Elevated troponin  R77.8 790.6                      Disposition:   Disposition: Admitted     ED Disposition Condition    Admit                             Sterling Up NP  12/02/20 7945

## 2020-12-02 NOTE — ED TRIAGE NOTES
Pt presents to the ED via personal transportation reporting she is currently having an asthma attack that began this morning. Pt reports she has been trying to use her breathing tx machine at home and denies relief of her symptoms. Pt denies any fevers, body aches, or chills. Pt also reporting left sided anterior chest pain. Pt in NAD.

## 2020-12-02 NOTE — HPI
Mrs. Ornelas is a 54 yo F who is deaf. She presents with 2 days of SOB and fatigue and associated CP.  History mainly from ED staff as well as me with written communication. Patient found to have a Hgb of 3.7. Patient has a history of Fe def. Anemia and evidently stopped taking her Fe.  She other wise is very healthy appearing and has no complaints. Small bump in her trop to .09.  She was going to go to floor but house supervisor stated with that low of a Hgb, she needs to go to ICU. Evidently heme negative in ED.

## 2020-12-02 NOTE — ED NOTES
Called lab to get an ETA on recollecting blood on pt and states they are really busy and will send a phlebotomist as soon as possible.

## 2020-12-02 NOTE — HOSPITAL COURSE
Mrs. Ornelas is a 54 yo F who is deaf. She presents with 2 days of SOB and fatigue and associated CP.  History mainly from ED staff as well as me with written communication. Patient found to have a Hgb of 3.7. Patient has a history of Fe def. Anemia and evidently stopped taking her Fe.  Patient was heme negative in the ED. Started on Fe infusion with 3 units of PRBc's.  Patient had small bump in trop in flat pattern likely from demand ischemia. Was discussed with Cards. No intervention. Follow up with out patient stress test.  Heme/onc consulted and rec: GI consult.  Patient was started on Fe replacement.  Patient was sent to the floor on 12/4/20. Patient went for EGD and Colonoscopy on 12/4.show esophagitis,with no sign of bleeding,was started on PPI,she remains stable on floor,HH is much improved,she was asymptomatic at DC time,patient was discharged home with PPI and Iron supplement and follow up with PCP,hematology,cardiology and GI as out patient.

## 2020-12-02 NOTE — ED NOTES
Charge nurse, Telma, notified that pt needs to get a blood transfusion and needs to be moved to main side.

## 2020-12-02 NOTE — ED NOTES
Showed  EKG and states we need to repeat it in 10-15 minutes to make sure pt is not having a STEMI. ZAIN Katz, notified.

## 2020-12-02 NOTE — ASSESSMENT & PLAN NOTE
From #1.  ED mentioned possible L sided pneumonia. I don't agree. No coughing   No wbc count. Normal lung exam. Patient non- ill appearing. Monitor for now.

## 2020-12-03 LAB
BLD PROD TYP BPU: NORMAL
BLOOD GROUP ANTIBODIES SERPL: NORMAL
BLOOD UNIT EXPIRATION DATE: NORMAL
BLOOD UNIT TYPE CODE: 1700
BLOOD UNIT TYPE CODE: 7300
BLOOD UNIT TYPE CODE: 7300
BLOOD UNIT TYPE: NORMAL
CODING SYSTEM: NORMAL
DAT IGG-SP REAG RBC-IMP: NORMAL
DISPENSE STATUS: NORMAL
FOLATE SERPL-MCNC: 15 NG/ML (ref 4–24)
HCT VFR BLD AUTO: 25.1 % (ref 37–48.5)
HGB BLD-MCNC: 8.2 G/DL (ref 12–16)
TRANS ERYTHROCYTES VOL PATIENT: NORMAL ML
TROPONIN I SERPL DL<=0.01 NG/ML-MCNC: 0.11 NG/ML (ref 0–0.03)
TROPONIN I SERPL DL<=0.01 NG/ML-MCNC: 0.12 NG/ML (ref 0–0.03)
TROPONIN I SERPL DL<=0.01 NG/ML-MCNC: 0.13 NG/ML (ref 0–0.03)
VIT B12 SERPL-MCNC: 549 PG/ML (ref 210–950)

## 2020-12-03 PROCEDURE — 63600175 PHARM REV CODE 636 W HCPCS: Performed by: INTERNAL MEDICINE

## 2020-12-03 PROCEDURE — 99900035 HC TECH TIME PER 15 MIN (STAT)

## 2020-12-03 PROCEDURE — 84484 ASSAY OF TROPONIN QUANT: CPT

## 2020-12-03 PROCEDURE — 36430 TRANSFUSION BLD/BLD COMPNT: CPT

## 2020-12-03 PROCEDURE — 84165 PROTEIN E-PHORESIS SERUM: CPT

## 2020-12-03 PROCEDURE — 99222 1ST HOSP IP/OBS MODERATE 55: CPT | Mod: ,,, | Performed by: INTERNAL MEDICINE

## 2020-12-03 PROCEDURE — 85014 HEMATOCRIT: CPT

## 2020-12-03 PROCEDURE — P9021 RED BLOOD CELLS UNIT: HCPCS

## 2020-12-03 PROCEDURE — 84165 PROTEIN E-PHORESIS SERUM: CPT | Mod: 26,,, | Performed by: PATHOLOGY

## 2020-12-03 PROCEDURE — 85018 HEMOGLOBIN: CPT

## 2020-12-03 PROCEDURE — 25000003 PHARM REV CODE 250: Performed by: INTERNAL MEDICINE

## 2020-12-03 PROCEDURE — 99233 SBSQ HOSP IP/OBS HIGH 50: CPT | Mod: ,,, | Performed by: INTERNAL MEDICINE

## 2020-12-03 PROCEDURE — 94761 N-INVAS EAR/PLS OXIMETRY MLT: CPT

## 2020-12-03 PROCEDURE — 84165 PATHOLOGIST INTERPRETATION SPE: ICD-10-PCS | Mod: 26,,, | Performed by: PATHOLOGY

## 2020-12-03 PROCEDURE — 99233 PR SUBSEQUENT HOSPITAL CARE,LEVL III: ICD-10-PCS | Mod: ,,, | Performed by: INTERNAL MEDICINE

## 2020-12-03 PROCEDURE — 36415 COLL VENOUS BLD VENIPUNCTURE: CPT

## 2020-12-03 PROCEDURE — 86334 IMMUNOFIX E-PHORESIS SERUM: CPT | Mod: 26,,, | Performed by: PATHOLOGY

## 2020-12-03 PROCEDURE — 86334 PATHOLOGIST INTERPRETATION IFE: ICD-10-PCS | Mod: 26,,, | Performed by: PATHOLOGY

## 2020-12-03 PROCEDURE — 84484 ASSAY OF TROPONIN QUANT: CPT | Mod: 91

## 2020-12-03 PROCEDURE — 25000003 PHARM REV CODE 250: Performed by: SURGERY

## 2020-12-03 PROCEDURE — 86334 IMMUNOFIX E-PHORESIS SERUM: CPT

## 2020-12-03 PROCEDURE — 99222 PR INITIAL HOSPITAL CARE,LEVL II: ICD-10-PCS | Mod: ,,, | Performed by: INTERNAL MEDICINE

## 2020-12-03 PROCEDURE — 20000000 HC ICU ROOM

## 2020-12-03 RX ORDER — POLYETHYLENE GLYCOL 3350, SODIUM SULFATE ANHYDROUS, SODIUM BICARBONATE, SODIUM CHLORIDE, POTASSIUM CHLORIDE 236; 22.74; 6.74; 5.86; 2.97 G/4L; G/4L; G/4L; G/4L; G/4L
4000 POWDER, FOR SOLUTION ORAL ONCE
Status: COMPLETED | OUTPATIENT
Start: 2020-12-03 | End: 2020-12-03

## 2020-12-03 RX ORDER — ACETAMINOPHEN 325 MG/1
650 TABLET ORAL EVERY 6 HOURS PRN
Status: DISCONTINUED | OUTPATIENT
Start: 2020-12-03 | End: 2020-12-05 | Stop reason: HOSPADM

## 2020-12-03 RX ORDER — MUPIROCIN 20 MG/G
OINTMENT TOPICAL 2 TIMES DAILY
Status: DISCONTINUED | OUTPATIENT
Start: 2020-12-03 | End: 2020-12-05 | Stop reason: HOSPADM

## 2020-12-03 RX ADMIN — PANTOPRAZOLE SODIUM 40 MG: 40 TABLET, DELAYED RELEASE ORAL at 09:12

## 2020-12-03 RX ADMIN — IRON SUCROSE 100 MG: 20 INJECTION, SOLUTION INTRAVENOUS at 06:12

## 2020-12-03 RX ADMIN — ACETAMINOPHEN 650 MG: 325 TABLET ORAL at 02:12

## 2020-12-03 RX ADMIN — ACETAMINOPHEN 650 MG: 325 TABLET ORAL at 08:12

## 2020-12-03 RX ADMIN — PANTOPRAZOLE SODIUM 40 MG: 40 TABLET, DELAYED RELEASE ORAL at 08:12

## 2020-12-03 RX ADMIN — POLYETHYLENE GLYCOL 3350, SODIUM SULFATE ANHYDROUS, SODIUM BICARBONATE, SODIUM CHLORIDE, POTASSIUM CHLORIDE 4000 ML: 236; 22.74; 6.74; 5.86; 2.97 POWDER, FOR SOLUTION ORAL at 06:12

## 2020-12-03 RX ADMIN — MUPIROCIN: 20 OINTMENT TOPICAL at 09:12

## 2020-12-03 NOTE — EICU
[facility] Physician Virtual/Remote Brief Evaluation Note      Message from RN  Patient requesting Tylenol which she takes at home for chronic abdominal pain  Chart reviewed  Mandaeism needed for hemoglobin 3.7.  LFTs within normal limits  Tylenol 650 mg p.o. q.4h p.r.n. pain ordered  Order placed to do all labs with pediatric tubes      This report has been created through the use of [employer] dictation software. Typographical and content errors may occur with this process. While efforts are made to detect and correct such errors, in some cases errors will persist. For this reason, wording in this document should be considered in the proper context and not strictly verbatim

## 2020-12-03 NOTE — EICU
Rounding (Video Assessment):  No    Intervention Initiated From:  Bedside    Natalie Communicated with Bedside Nurse regarding:  Pain    Nurse Notified:  Yes    Doctor Notified:  Yes    Comments: bedside nurse   Called and reported that pt is requesting Tylenol for chronic abd pain. Informed .

## 2020-12-03 NOTE — HPI
55-year-old female with a history of deafness, pre-diabetes, asthma, hypertension, anemia presented to ED with acute onset of SOB and left-sided chest pain that began last night. JUSTYNA  present during visit.  Pain located over left ribs and as described as a tightness.  Patient attributes her symptoms to typical  asthma exacerbations. No associated cough, fevers. No melena, hematochezia or change in bowel habits. Labs shows wbc 5700/mm3   Hb 3.7   Hct 14.3 % MCV 70  plt 299k. Patient reports long standing history of anemia although uncertain of details. She reports she has taken FE supplementation intermittently. She has never undergone prbc transfusions as  she is Cheondoism.  Consulted for symptomatic anemia.

## 2020-12-03 NOTE — ASSESSMENT & PLAN NOTE
Pt with severe microcytic anemia with Hb 3.7g/dL on admission  Fe studies reveal Fe Deficiency  Pt reports hx of anemia in past and reports declined prbc transfusion due to Gnosticist beliefs  She reports she is now agreeable to undergo prbc transfusion  She reports she has never undergone C-scope  According to chart she was hospitalized in 2/2019 with similar sx's of CP and symptomatic anemia with Hb 4.9g/dL She underwent EGD on 2/28/19 which showed LA Grade C reflux esophagitis. Non-bleeding. Large sliding and paraesophageal hiatal hernia.  She also underwent prbc transfusion during hospitalization    Her H/H remained stable post transfusion and according to chart underwent  colonoscopy in 2016 that was reportedly unremarkable.     Consult GI for eval   No fam hx of blood d/o  Plan to test for co existing hemoglobinopathy once Fe stores repleted  2u PRBC transfusion and IV FE planned    Cont to monitor counts

## 2020-12-03 NOTE — PLAN OF CARE
Pt stable awaiting blood to be delivered from main campus. VSS. No signs of distress. SOB at times after ambulating to the bathroom. No falls, skin breakdown or injuries this shift.

## 2020-12-03 NOTE — NURSING
0030:Spoke with Helio in blood bank to check on status of pt's blood being sent from main campus. Still has not arrived. States he will call when ready. VSS.     0230: Pt requested Tylenol for right side abdominal pain. Ordered by Dr. Adhikari. Given and will continue to monitor. Still awaiting blood. Pt resting in bed. VSS

## 2020-12-03 NOTE — CARE UPDATE
[facility]  ICU Shift Summary  Date: 12/3/2020      COVID Test: (--)  Isolation: No active isolations     Prehospitalization: Home  Admit Date / LOS : 12/2/2020/ 1 days    Diagnosis: Symptomatic anemia    Consults:        Active: Heme Onc       Needed: N/A     Code Status: Prior   Advanced Directive: <no information>    LDA: PIV       Central Lines/Site/Justification:Patient Does Not Have Central Line       Urinary Cath/Order/Justification:Patient Does Not Have Urinary Catheter    Vasopressors/Infusions:        GOALS: Volume/ Hemodynamic: SBP <                     RASS: N/A    Pain Management: PO       Pain Controlled: yes     Rhythm: ST    Respiratory Device: Room Air                  Most Recent SBT/ SAT: N/A       MOVE Screen: PASS    VTE Prophylaxis: Pharm  Mobility: Bedrest  Stress Ulcer Prophylaxis: Yes    Dietary: PO  Tolerance: yes  /  Advancement: @ goal    I & O (24h):    Intake/Output Summary (Last 24 hours) at 12/3/2020 0513  Last data filed at 12/2/2020 1830  Gross per 24 hour   Intake 150 ml   Output --   Net 150 ml        Restraints: No    Significant Dates:  Post Op Date: N/A  Rescue Date: N/A  Imaging/ Diagnostics: N/A    Noteworthy Labs:  hgb 3.7 hct 14.3      CBC/Anemia Labs: Coags:    Recent Labs   Lab 12/02/20  1350 12/02/20  1429 12/02/20  1519   WBC 5.77  --   --    HGB 3.7*  --   --    HCT 14.3*  --   --      --   --    MCV 70*  --   --    RDW 21.4*  --   --    IRON  --   --  <10*   RETIC  --  1.8  --     No results for input(s): PT, INR, APTT in the last 168 hours.     Chemistries:   Recent Labs   Lab 12/02/20  1350      K 4.2      CO2 20*   BUN 10   CREATININE 0.7   CALCIUM 8.5*   PROT 7.1   BILITOT 0.4   ALKPHOS 47*   ALT 9*   AST 16        Cardiac Enzymes: Ejection Fractions:    Recent Labs     12/02/20  1711 12/03/20  0120   TROPONINI 0.123* 0.131*    Nuc Stress EF   Date Value Ref Range Status   03/01/2019 64 % Final        POCT Glucose: HbA1c:     No results for input(s): POCTGLUCOSE in the last 168 hours. No results found for: HGBA1C        ICU LOS 10h  Level of Care: Critical Care    Shift Summary/Plan for the shift: Awaiting blood from main campus for transfusion

## 2020-12-03 NOTE — PROGRESS NOTES
[facility]  Hematology/Oncology  Progress Note    Patient Name: Aurora Ornelas  Admission Date: 12/2/2020  Hospital Length of Stay: 1 days  Code Status: Prior     Subjective:     HPI:  55-year-old female with a history of deafness, pre-diabetes, asthma, hypertension, anemia presented to ED with acute onset of SOB and left-sided chest pain that began last night. JUSTYNA  present during visit.  Pain located over left ribs and as described as a tightness.  Patient attributes her symptoms to typical  asthma exacerbations. No associated cough, fevers. No melena, hematochezia or change in bowel habits. Labs shows wbc 5700/mm3   Hb 3.7   Hct 14.3 % MCV 70  plt 299k. Patient reports long standing history of anemia although uncertain of details. She reports she has taken FE supplementation intermittently. She has never undergone prbc transfusions as  she is Religious.  Consulted for symptomatic anemia.     Interval History: Pt with no new issues. She is undergoing 2nd unit of prbc transfusion. . No SOB/CP . No abd pain. Patient is deaf and history through written communication     Oncology Treatment Plan:   [No treatment plan]    Medications:  Continuous Infusions:  Scheduled Meds:   amLODIPine  10 mg Oral Daily    iron sucrose (VENOFER) IVPB  100 mg Intravenous Q24H    mupirocin   Nasal BID    pantoprazole  40 mg Oral BID    polyethylene glycol  4,000 mL Oral Once     PRN Meds:sodium chloride, acetaminophen, albuterol, cloNIDine     Review of Systems   Constitutional: Positive for fatigue. Negative for appetite change, fever and unexpected weight change.   Eyes: Negative for redness.   Respiratory: Negative for cough and shortness of breath.    Cardiovascular: Negative for chest pain and leg swelling.   Gastrointestinal: Negative for abdominal pain, constipation, diarrhea, nausea and vomiting.   Musculoskeletal: Negative for back pain.   Skin: Negative for color change and rash.    Neurological: Negative for dizziness and light-headedness.     Objective:     Vital Signs (Most Recent):  Temp: 97.9 °F (36.6 °C) (12/03/20 1607)  Pulse: 94 (12/03/20 1615)  Resp: 19 (12/03/20 1615)  BP: (!) 144/72 (12/03/20 1607)  SpO2: 100 % (12/03/20 1615) Vital Signs (24h Range):  Temp:  [97.8 °F (36.6 °C)-98.8 °F (37.1 °C)] 97.9 °F (36.6 °C)  Pulse:  [] 94  Resp:  [14-54] 19  SpO2:  [94 %-100 %] 100 %  BP: (116-163)/(56-79) 144/72     Weight: 71.7 kg (158 lb)  Body mass index is 27.12 kg/m².  Body surface area is 1.8 meters squared.      Intake/Output Summary (Last 24 hours) at 12/3/2020 1654  Last data filed at 12/3/2020 1300  Gross per 24 hour   Intake 915 ml   Output --   Net 915 ml       Physical Exam  Vitals signs and nursing note reviewed.   Constitutional:       General: She is not in acute distress.     Appearance: Normal appearance. She is not ill-appearing.   HENT:      Head: Normocephalic and atraumatic.   Cardiovascular:      Rate and Rhythm: Normal rate and regular rhythm.      Heart sounds: No murmur.   Pulmonary:      Effort: Pulmonary effort is normal.      Breath sounds: No wheezing.   Abdominal:      General: Abdomen is flat. Bowel sounds are normal. There is no distension.      Palpations: Abdomen is soft.      Tenderness: There is no abdominal tenderness.   Musculoskeletal: Normal range of motion.   Skin:     Coloration: Skin is not pale.      Findings: No rash.   Neurological:      Mental Status: She is alert and oriented to person, place, and time.   Psychiatric:         Mood and Affect: Mood normal.         Behavior: Behavior normal.         Significant Labs:   All pertinent labs within the past 24 hours have been reviewed    Diagnostic Results:  I have reviewed and interpreted all pertinent imaging results/findings within the past 24 hours    Assessment/Plan:     * Symptomatic anemia  Pt with severe microcytic anemia with Hb 3.7g/dL on admission  Fe studies reveal Fe  Deficiency  Pt reports hx of anemia in past and reports declined prbc transfusion due to Yarsani beliefs  She reports she is now agreeable to undergo prbc transfusion  She reports she has never undergone C-scope  According to chart she was hospitalized in 2/2019 with similar sx's of CP and symptomatic anemia with Hb 4.9g/dL She underwent EGD on 2/28/19 which showed LA Grade C reflux esophagitis. Non-bleeding. Large sliding and paraesophageal hiatal hernia.  She also underwent prbc transfusion during hospitalization    Her H/H remained stable post transfusion and according to chart underwent  colonoscopy in 2016 that was reportedly unremarkable?    GI eval planned   No fam hx of blood d/o  Plan to test for co existing hemoglobinopathy once Fe stores repleted  Pt undergoing 2u PRBC transfusion   IV FE     Cont to monitor counts             Kenzie Diaz MD  Hematology/Oncology  [facility]

## 2020-12-03 NOTE — PROGRESS NOTES
[facility] Hospital Medicine  Progress Note    Patient Name: Aurora Ornelas  MRN: [id number]  Patient Class: IP- Inpatient   Admission Date: 12/2/2020  Length of Stay: 1 days  Attending Physician: Sander Godoy MD  Primary Care Provider: To Obtain Unable        Subjective:     Principal Problem:Symptomatic anemia        HPI:  Mrs. Ornelas is a 56 yo F who is deaf. She presents with 2 days of SOB and fatigue and associated CP.  History mainly from ED staff as well as me with written communication. Patient found to have a Hgb of 3.7. Patient has a history of Fe def. Anemia and evidently stopped taking her Fe.  She other wise is very healthy appearing and has no complaints. Small bump in her trop to .09.  She was going to go to floor but house supervisor stated with that low of a Hgb, she needs to go to ICU. Evidently heme negative in ED.      Overview/Hospital Course:  Mrs. Ornelas is a 56 yo F who is deaf. She presents with 2 days of SOB and fatigue and associated CP.  History mainly from ED staff as well as me with written communication. Patient found to have a Hgb of 3.7. Patient has a history of Fe def. Anemia and evidently stopped taking her Fe.  Patient was heme negative in the ED. Started on Fe infusion with 3 units of PRBc's.  Patient had small bump in trop in flat pattern likely from demand ischemia. Discussed with Cards. No intervention. Follow up with out patient stress test.  Heme/onc consulted and rec: GI consult.  Patient was started on Fe replacement.  Patient was sent to the floor on 12/3/20    Past Medical History:   Diagnosis Date    Anemia     Arthritis     Asthma     Deaf     Diabetes mellitus     Hyperlipidemia     Hypertension        Past Surgical History:   Procedure Laterality Date    ESOPHAGOGASTRODUODENOSCOPY N/A 2/28/2019    Procedure: EGD (ESOPHAGOGASTRODUODENOSCOPY);  Surgeon: Yolanda Gary MD;  Location: [facility];  Service: Endoscopy;  Laterality: N/A;     HYSTERECTOMY         Review of patient's allergies indicates:  No Known Allergies    No current facility-administered medications on file prior to encounter.      Current Outpatient Medications on File Prior to Encounter   Medication Sig    albuterol (PROVENTIL/VENTOLIN HFA) 90 mcg/actuation inhaler Inhale 1-2 puffs into the lungs every 6 (six) hours as needed for Wheezing or Shortness of Breath. Rescue    albuterol sulfate 2.5 mg/0.5 mL Nebu Take 2.5 mg by nebulization every 4 (four) hours as needed. Rescue    amLODIPine (NORVASC) 10 MG tablet Take 1 tablet (10 mg total) by mouth once daily.    azithromycin (Z-KATHY) 250 MG tablet Take 1 tablet (250 mg total) by mouth once daily. Take first 2 tablets together, then 1 every day until finished.    cetirizine (ZYRTEC) 10 MG tablet Take 1 tablet (10 mg total) by mouth once daily.    ferrous sulfate (FEOSOL) 325 mg (65 mg iron) Tab tablet Take 1 tablet (325 mg total) by mouth daily with breakfast.    ipratropium (ATROVENT) 0.02 % nebulizer solution Take 2.5 mLs (500 mcg total) by nebulization 4 (four) times daily as needed for Wheezing. Rescue    pantoprazole (PROTONIX) 40 MG tablet Take 1 tablet (40 mg total) by mouth 2 (two) times daily.    sulindac (CLINORIL) 150 MG tablet Take 1 tablet (150 mg total) by mouth 2 (two) times daily.     Family History     None        Tobacco Use    Smoking status: Never Smoker    Smokeless tobacco: Never Used   Substance and Sexual Activity    Alcohol use: No    Drug use: No    Sexual activity: Yes     Partners: Male     Birth control/protection: None     Review of Systems   Constitutional: Negative for activity change, appetite change, chills, diaphoresis, fatigue, fever and unexpected weight change.   HENT: Negative for congestion and dental problem.    Eyes: Negative for discharge and itching.   Respiratory: Negative for apnea, cough, choking, chest tightness, shortness of breath and stridor.    Cardiovascular: Negative  for chest pain, palpitations and leg swelling.   Gastrointestinal: Negative for abdominal distention, abdominal pain, anal bleeding, nausea and vomiting.   Genitourinary: Negative for difficulty urinating.   Musculoskeletal: Negative for arthralgias, back pain and gait problem.   Skin: Negative for color change and pallor.   Psychiatric/Behavioral: Negative for agitation and behavioral problems.     Objective:     Vital Signs (Most Recent):  Temp: 98.5 °F (36.9 °C) (12/02/20 1029)  Pulse: 107 (12/02/20 1603)  Resp: 20 (12/02/20 1029)  BP: (!) 145/67 (12/02/20 1602)  SpO2: 100 % (12/02/20 1602) Vital Signs (24h Range):  Temp:  [98.5 °F (36.9 °C)] 98.5 °F (36.9 °C)  Pulse:  [102-112] 107  Resp:  [20] 20  SpO2:  [100 %] 100 %  BP: (125-145)/(58-67) 145/67     Weight: 71.7 kg (158 lb)  Body mass index is 27.12 kg/m².    Physical Exam  Vitals signs and nursing note reviewed.   Constitutional:       General: She is not in acute distress.     Appearance: Normal appearance. She is obese. She is not ill-appearing, toxic-appearing or diaphoretic.   HENT:      Head: Normocephalic and atraumatic.   Cardiovascular:      Rate and Rhythm: Normal rate and regular rhythm.      Heart sounds: No murmur. No gallop.    Pulmonary:      Effort: No respiratory distress.      Breath sounds: No wheezing or rales.   Abdominal:      General: Abdomen is flat. Bowel sounds are normal. There is no distension.      Palpations: Abdomen is soft.      Tenderness: There is no abdominal tenderness.   Neurological:      Mental Status: She is alert and oriented to person, place, and time.   Psychiatric:         Mood and Affect: Mood normal.         Behavior: Behavior normal.             Significant Labs:   BMP:   Recent Labs   Lab 12/02/20  1350   *      K 4.2      CO2 20*   BUN 10   CREATININE 0.7   CALCIUM 8.5*     CBC:   Recent Labs   Lab 12/02/20  1350   WBC 5.77   HGB 3.7*   HCT 14.3*          Significant Imaging:   CXR  reviewed       Assessment/Plan:      * Symptomatic anemia  Hgb is 3.7. Heme negative in the ED.  Likely from non-compliance from Iron replacement at home. 3 units of blood. Fe IV for 3 days. Patient very stable and will be transferred to the floor tomorrow.     Delay in getting blood due to anti-bodies.  Will transfuse- transfer to floor. Heme following.  GI consulted       Elevated troponin  Likely demand ischemia from Hgb of 3.7.  Will trend out     No cardiac intervention. Out patient stress test       Obesity  Body mass index is 27.12 kg/m².  Weight loss as out patient       Shortness of breath  From #1.  ED mentioned possible L sided pneumonia. I don't agree. No coughing   No wbc count. Normal lung exam. Patient non- ill appearing. Monitor for now.       Chest pain  None currently.  Likely from severe anemia.   Will monitor.       Mild malnutrition  Will discuss with patient       Hyperglycemia  Will check an A1c      Hypertension  Resume home meds. Benign essential      Deaf  No acute issues       Severe persistent asthma with acute exacerbation  No acute issues.          VTE Risk Mitigation (From admission, onward)         Ordered     Place sequential compression device  Until discontinued      12/02/20 5842                Discharge Planning   CHARIS:      Code Status: Prior   Is the patient medically ready for discharge?:     Reason for patient still in hospital (select all that apply): Patient unstable               Critical care time spent on the evaluation and treatment of severe organ dysfunction, review of pertinent labs and imaging studies, discussions with consulting providers and discussions with patient/family: 40 minutes.    Will transfer to the floor later today. GI consult.         Sander Morales MD  Department of Hospital Medicine   [facility]

## 2020-12-03 NOTE — H&P
[facility] Hospital Medicine  History & Physical    Patient Name: Aurora Ornelas  MRN: [id number]  Admission Date: 12/2/2020  Attending Physician: Sander Godoy MD   Primary Care Provider: To Obtain Unable         Patient information was obtained from patient and ER records.     Subjective:     Principal Problem:Symptomatic anemia    Chief Complaint:   Chief Complaint   Patient presents with    Shortness of Breath     started today    Asthma     Pt is DEAF        HPI: Mrs. Ornelas is a 56 yo F who is deaf. She presents with 2 days of SOB and fatigue and associated CP.  History mainly from ED staff as well as me with written communication. Patient found to have a Hgb of 3.7. Patient has a history of Fe def. Anemia and evidently stopped taking her Fe.  She other wise is very healthy appearing and has no complaints. Small bump in her trop to .09.  She was going to go to floor but house supervisor stated with that low of a Hgb, she needs to go to ICU. Evidently heme negative in ED.      Past Medical History:   Diagnosis Date    Anemia     Arthritis     Asthma     Deaf     Diabetes mellitus     Hyperlipidemia     Hypertension        Past Surgical History:   Procedure Laterality Date    ESOPHAGOGASTRODUODENOSCOPY N/A 2/28/2019    Procedure: EGD (ESOPHAGOGASTRODUODENOSCOPY);  Surgeon: Yolanda Gary MD;  Location: [facility];  Service: Endoscopy;  Laterality: N/A;    HYSTERECTOMY         Review of patient's allergies indicates:  No Known Allergies    No current facility-administered medications on file prior to encounter.      Current Outpatient Medications on File Prior to Encounter   Medication Sig    albuterol (PROVENTIL/VENTOLIN HFA) 90 mcg/actuation inhaler Inhale 1-2 puffs into the lungs every 6 (six) hours as needed for Wheezing or Shortness of Breath. Rescue    albuterol sulfate 2.5 mg/0.5 mL Nebu Take 2.5 mg by nebulization every 4 (four) hours as needed. Rescue    amLODIPine  (NORVASC) 10 MG tablet Take 1 tablet (10 mg total) by mouth once daily.    azithromycin (Z-KATHY) 250 MG tablet Take 1 tablet (250 mg total) by mouth once daily. Take first 2 tablets together, then 1 every day until finished.    cetirizine (ZYRTEC) 10 MG tablet Take 1 tablet (10 mg total) by mouth once daily.    ferrous sulfate (FEOSOL) 325 mg (65 mg iron) Tab tablet Take 1 tablet (325 mg total) by mouth daily with breakfast.    ipratropium (ATROVENT) 0.02 % nebulizer solution Take 2.5 mLs (500 mcg total) by nebulization 4 (four) times daily as needed for Wheezing. Rescue    pantoprazole (PROTONIX) 40 MG tablet Take 1 tablet (40 mg total) by mouth 2 (two) times daily.    sulindac (CLINORIL) 150 MG tablet Take 1 tablet (150 mg total) by mouth 2 (two) times daily.     Family History     None        Tobacco Use    Smoking status: Never Smoker    Smokeless tobacco: Never Used   Substance and Sexual Activity    Alcohol use: No    Drug use: No    Sexual activity: Yes     Partners: Male     Birth control/protection: None     Review of Systems   Constitutional: Negative for activity change, appetite change, chills, diaphoresis, fatigue, fever and unexpected weight change.   HENT: Negative for congestion and dental problem.    Eyes: Negative for discharge and itching.   Respiratory: Negative for apnea, cough, choking, chest tightness, shortness of breath and stridor.    Cardiovascular: Negative for chest pain, palpitations and leg swelling.   Gastrointestinal: Negative for abdominal distention, abdominal pain, anal bleeding, nausea and vomiting.   Genitourinary: Negative for difficulty urinating.   Musculoskeletal: Negative for arthralgias, back pain and gait problem.   Skin: Negative for color change and pallor.   Psychiatric/Behavioral: Negative for agitation and behavioral problems.     Objective:     Vital Signs (Most Recent):  Temp: 98.3 °F (36.8 °C) (12/03/20 1018)  Pulse: 101 (12/03/20 1200)  Resp: (!) 26  (12/03/20 1200)  BP: 137/68 (12/03/20 1200)  SpO2: 100 % (12/03/20 1200) Vital Signs (24h Range):  Temp:  [98.1 °F (36.7 °C)-98.8 °F (37.1 °C)] 98.3 °F (36.8 °C)  Pulse:  [] 101  Resp:  [16-54] 26  SpO2:  [94 %-100 %] 100 %  BP: (116-156)/(56-79) 137/68     Weight: 71.7 kg (158 lb)  Body mass index is 27.12 kg/m².    Physical Exam  Vitals signs and nursing note reviewed.   Constitutional:       General: She is not in acute distress.     Appearance: Normal appearance. She is obese. She is not ill-appearing, toxic-appearing or diaphoretic.   HENT:      Head: Normocephalic and atraumatic.   Cardiovascular:      Rate and Rhythm: Normal rate and regular rhythm.      Heart sounds: No murmur. No gallop.    Pulmonary:      Effort: No respiratory distress.      Breath sounds: No wheezing or rales.   Abdominal:      General: Abdomen is flat. Bowel sounds are normal. There is no distension.      Palpations: Abdomen is soft.      Tenderness: There is no abdominal tenderness.   Neurological:      Mental Status: She is alert and oriented to person, place, and time.   Psychiatric:         Mood and Affect: Mood normal.         Behavior: Behavior normal.             Significant Labs:     Assessment/Plan:     * Symptomatic anemia  Hgb is 3.7. Heme negative in the ED.  Likely from non-compliance from Iron replacement at home. 3 units of blood. Fe IV for 3 days. Patient very stable and will be transferred to the floor tomorrow.     Delay in getting blood due to anti-bodies.  Will transfuse- transfer to floor. Heme following.  GI consulted       Elevated troponin  Likely demand ischemia from Hgb of 3.7.  Will trend out     No cardiac intervention. Out patient stress test       Obesity  Body mass index is 27.12 kg/m².  Weight loss as out patient       Shortness of breath  From #1.  ED mentioned possible L sided pneumonia. I don't agree. No coughing   No wbc count. Normal lung exam. Patient non- ill appearing. Monitor for now.        Chest pain  None currently.  Likely from severe anemia.   Will monitor.       Mild malnutrition  Will discuss with patient       Hyperglycemia  Will check an A1c      Hypertension  Resume home meds. Benign essential      Deaf  No acute issues       Severe persistent asthma with acute exacerbation  No acute issues.          VTE Risk Mitigation (From admission, onward)         Ordered     Place sequential compression device  Until discontinued      12/02/20 2597              Critical care time spent on the evaluation and treatment of severe organ dysfunction, review of pertinent labs and imaging studies, discussions with consulting providers and discussions with patient/family: 40  minutes.     Sander Morales MD  Department of Hospital Medicine   [facility]

## 2020-12-03 NOTE — CARE UPDATE
[facility]  ICU Shift Summary  Date: 12/3/2020      COVID Test: (--)  Isolation: No active isolations     Prehospitalization: Home  Admit Date / LOS : 12/2/2020/ 1 days    Diagnosis: Symptomatic anemia    Consults:        Active: GI and Heme Onc       Needed: N/A     Code Status: Prior   Advanced Directive: <no information>    LDA: PIV       Central Lines/Site/Justification:Patient Does Not Have Central Line       Urinary Cath/Order/Justification:Patient Does Not Have Urinary Catheter    Vasopressors/Infusions:        GOALS: Volume/ Hemodynamic: N/A                     RASS: 0  alert and calm    Pain Management: PO       Pain Controlled: yes     Rhythm: NSR    Respiratory Device: Room Air                  Most Recent SBT/ SAT: N/A       MOVE Screen: PASS    VTE Prophylaxis: Mechanical  Mobility: Ambulatory and A: Assist  Stress Ulcer Prophylaxis: Yes    Dietary: PO  Tolerance: yes  /  Advancement: no    I & O (24h):    Intake/Output Summary (Last 24 hours) at 12/3/2020 1651  Last data filed at 12/3/2020 1300  Gross per 24 hour   Intake 915 ml   Output --   Net 915 ml        Restraints: No    Significant Dates:  Post Op Date: N/A  Rescue Date: N/A  Imaging/ Diagnostics: Cscope pending for tomorrow    Noteworthy Labs:  HGB/HCT    CBC/Anemia Labs: Coags:    Recent Labs   Lab 12/02/20  1350 12/02/20  1429 12/02/20  1519   WBC 5.77  --   --    HGB 3.7*  --   --    HCT 14.3*  --   --      --   --    MCV 70*  --   --    RDW 21.4*  --   --    IRON  --   --  <10*   RETIC  --  1.8  --    FOLATE  --   --  15.0   [id number]  --   --  549    No results for input(s): PT, INR, APTT in the last 168 hours.     Chemistries:   Recent Labs   Lab 12/02/20  1350      K 4.2      CO2 20*   BUN 10   CREATININE 0.7   CALCIUM 8.5*   PROT 7.1   BILITOT 0.4   ALKPHOS 47*   ALT 9*   AST 16        Cardiac Enzymes: Ejection Fractions:    Recent Labs     12/03/20  0120 12/03/20  0432   TROPONINI 0.131* 0.122*     Nuc Stress EF   Date Value Ref Range Status   03/01/2019 64 % Final        POCT Glucose: HbA1c:    No results for input(s): POCTGLUCOSE in the last 168 hours. No results found for: HGBA1C        ICU LOS 22h  Level of Care: OK to Transfer    Shift Summary/Plan for the shift: See care plan

## 2020-12-03 NOTE — CARE UPDATE
Transfer Summary:    Mrs. Ornelas is a 56 yo F who is deaf. She presents with 2 days of SOB and fatigue and associated CP.  History mainly from ED staff as well as me with written communication. Patient found to have a Hgb of 3.7. Patient has a history of Fe def. Anemia and evidently stopped taking her Fe.  Patient was heme negative in the ED. Started on Fe infusion with 2 units of PRBc's.  Patient had small bump in trop in flat pattern likely from demand ischemia. Discussed with Cards. No intervention. Follow up with out patient stress test.  Heme/onc consulted and rec: GI consult.  Patient was started on Fe replacement.  Patient had an EGD and colonoscopy on 12/4.  Patient was transferred to the floor on 12/4.     Transfer to floor. EGD esophagitis and hiatal hernia. C-scope pending as of this writing. 3 days of IV Fe- then resume home Fe (was not taking). Home likely on 12/5.  Patient is deaf.

## 2020-12-03 NOTE — SUBJECTIVE & OBJECTIVE
Interval History: Pt with no new issues. She is undergoing 2nd unit of prbc transfusion. . No SOB/CP . No abd pain. Patient is deaf and history through written communication     Oncology Treatment Plan:   [No treatment plan]    Medications:  Continuous Infusions:  Scheduled Meds:   amLODIPine  10 mg Oral Daily    iron sucrose (VENOFER) IVPB  100 mg Intravenous Q24H    mupirocin   Nasal BID    pantoprazole  40 mg Oral BID    polyethylene glycol  4,000 mL Oral Once     PRN Meds:sodium chloride, acetaminophen, albuterol, cloNIDine     Review of Systems   Constitutional: Positive for fatigue. Negative for appetite change, fever and unexpected weight change.   Eyes: Negative for redness.   Respiratory: Negative for cough and shortness of breath.    Cardiovascular: Negative for chest pain and leg swelling.   Gastrointestinal: Negative for abdominal pain, constipation, diarrhea, nausea and vomiting.   Musculoskeletal: Negative for back pain.   Skin: Negative for color change and rash.   Neurological: Negative for dizziness and light-headedness.     Objective:     Vital Signs (Most Recent):  Temp: 97.9 °F (36.6 °C) (12/03/20 1607)  Pulse: 94 (12/03/20 1615)  Resp: 19 (12/03/20 1615)  BP: (!) 144/72 (12/03/20 1607)  SpO2: 100 % (12/03/20 1615) Vital Signs (24h Range):  Temp:  [97.8 °F (36.6 °C)-98.8 °F (37.1 °C)] 97.9 °F (36.6 °C)  Pulse:  [] 94  Resp:  [14-54] 19  SpO2:  [94 %-100 %] 100 %  BP: (116-163)/(56-79) 144/72     Weight: 71.7 kg (158 lb)  Body mass index is 27.12 kg/m².  Body surface area is 1.8 meters squared.      Intake/Output Summary (Last 24 hours) at 12/3/2020 1654  Last data filed at 12/3/2020 1300  Gross per 24 hour   Intake 915 ml   Output --   Net 915 ml       Physical Exam  Vitals signs and nursing note reviewed.   Constitutional:       General: She is not in acute distress.     Appearance: Normal appearance. She is not ill-appearing.   HENT:      Head: Normocephalic and atraumatic.    Cardiovascular:      Rate and Rhythm: Normal rate and regular rhythm.      Heart sounds: No murmur.   Pulmonary:      Effort: Pulmonary effort is normal.      Breath sounds: No wheezing.   Abdominal:      General: Abdomen is flat. Bowel sounds are normal. There is no distension.      Palpations: Abdomen is soft.      Tenderness: There is no abdominal tenderness.   Musculoskeletal: Normal range of motion.   Skin:     Coloration: Skin is not pale.      Findings: No rash.   Neurological:      Mental Status: She is alert and oriented to person, place, and time.   Psychiatric:         Mood and Affect: Mood normal.         Behavior: Behavior normal.         Significant Labs:   All pertinent labs within the past 24 hours have been reviewed    Diagnostic Results:  I have reviewed and interpreted all pertinent imaging results/findings within the past 24 hours

## 2020-12-03 NOTE — PLAN OF CARE
12/03/20 1059   Discharge Assessment   Assessment Type Discharge Planning Assessment   Confirmed/corrected address and phone number on facesheet? Yes   Assessment information obtained from? Medical Record   Prior to hospitilization cognitive status: Alert/Oriented   Prior to hospitalization functional status: Independent   Current cognitive status: Alert/Oriented   Current Functional Status: Independent   Facility Arrived From: home   Lives With child(michela), adult   Able to Return to Prior Arrangements yes   Is patient able to care for self after discharge? Unable to determine at this time (comments)   Who are your caregiver(s) and their phone number(s)? Klarissa Ornelas [phone]   Patient's perception of discharge disposition home or selfcare   Readmission Within the Last 30 Days no previous admission in last 30 days   Patient currently being followed by outpatient case management? No   Patient currently receives any other outside agency services? No   Equipment Currently Used at Home nebulizer   Do you have any problems affording any of your prescribed medications? No   Is the patient taking medications as prescribed? yes   Does the patient have transportation home? Yes   Transportation Anticipated family or friend will provide   Dialysis Name and Scheduled days N/A   Does the patient receive services at the [facility]? No   Discharge Plan A Home with family   Discharge Plan B Home with family;Home Health   DME Needed Upon Discharge  none   Patient/Family in Agreement with Plan yes       SW attempted to contact pt daughter Klarissa at [phone], no answer. SAMIR will follow up at a later time.

## 2020-12-03 NOTE — ASSESSMENT & PLAN NOTE
Pt with severe microcytic anemia with Hb 3.7g/dL on admission  Fe studies reveal Fe Deficiency  Pt reports hx of anemia in past and reports declined prbc transfusion due to [employer] beliefs  She reports she is now agreeable to undergo prbc transfusion  She reports she has never undergone C-scope  According to chart she was hospitalized in 2/2019 with similar sx's of CP and symptomatic anemia with Hb 4.9g/dL She underwent EGD on 2/28/19 which showed LA Grade C reflux esophagitis. Non-bleeding. Large sliding and paraesophageal hiatal hernia.  She also underwent prbc transfusion during hospitalization    Her H/H remained stable post transfusion and according to chart underwent  colonoscopy in 2016 that was reportedly unremarkable?    GI eval planned   No fam hx of blood d/o  Plan to test for co existing hemoglobinopathy once Fe stores repleted  Pt undergoing 2u PRBC transfusion   IV FE     Cont to monitor counts

## 2020-12-03 NOTE — SUBJECTIVE & OBJECTIVE
Oncology Treatment Plan:   [No treatment plan]    Medications:  Continuous Infusions:  Scheduled Meds:   [START ON 12/3/2020] amLODIPine  10 mg Oral Daily    iron sucrose (VENOFER) IVPB  100 mg Intravenous Q24H    pantoprazole  40 mg Oral BID     PRN Meds:sodium chloride, albuterol, cloNIDine     Review of patient's allergies indicates:  No Known Allergies     Past Medical History:   Diagnosis Date    Anemia     Arthritis     Asthma     Deaf     Diabetes mellitus     Hyperlipidemia     Hypertension      Past Surgical History:   Procedure Laterality Date    ESOPHAGOGASTRODUODENOSCOPY N/A 2/28/2019    Procedure: EGD (ESOPHAGOGASTRODUODENOSCOPY);  Surgeon: Yolanda Gary MD;  Location: [facility];  Service: Endoscopy;  Laterality: N/A;    HYSTERECTOMY       Family History     None        Tobacco Use    Smoking status: Never Smoker    Smokeless tobacco: Never Used   Substance and Sexual Activity    Alcohol use: No    Drug use: No    Sexual activity: Yes     Partners: Male     Birth control/protection: None       Review of Systems   Constitutional: Positive for activity change and fatigue. Negative for appetite change, chills, fever and unexpected weight change.   HENT: Negative for congestion, dental problem and trouble swallowing.    Eyes: Negative for discharge and itching.   Respiratory: Positive for shortness of breath. Negative for cough.    Cardiovascular: Positive for chest pain. Negative for palpitations and leg swelling.   Gastrointestinal: Negative for abdominal distention, abdominal pain, constipation, diarrhea, nausea and vomiting.   Genitourinary: Negative for difficulty urinating and frequency.   Musculoskeletal: Negative for arthralgias, back pain and gait problem.   Skin: Negative for color change, pallor and rash.   Neurological: Negative for dizziness and light-headedness.   Psychiatric/Behavioral: Negative for behavioral problems and confusion.     Objective:     Vital Signs (Most  Recent):  Temp: 98.7 °F (37.1 °C) (12/02/20 1915)  Pulse: (!) 121 (12/02/20 2200)  Resp: (!) 38 (12/02/20 2200)  BP: 137/64 (12/02/20 2200)  SpO2: 100 % (12/02/20 2200) Vital Signs (24h Range):  Temp:  [98.1 °F (36.7 °C)-98.7 °F (37.1 °C)] 98.7 °F (37.1 °C)  Pulse:  [102-137] 121  Resp:  [20-54] 38  SpO2:  [98 %-100 %] 100 %  BP: (118-147)/(58-78) 137/64     Weight: 71.7 kg (158 lb)  Body mass index is 27.12 kg/m².  Body surface area is 1.8 meters squared.      Intake/Output Summary (Last 24 hours) at 12/2/2020 2228  Last data filed at 12/2/2020 1830  Gross per 24 hour   Intake 150 ml   Output --   Net 150 ml       Physical Exam  Vitals signs and nursing note reviewed.   Constitutional:       General: She is not in acute distress.     Appearance: Normal appearance. She is not ill-appearing.   HENT:      Head: Normocephalic and atraumatic.   Cardiovascular:      Rate and Rhythm: Tachycardia present.      Heart sounds: No murmur.   Pulmonary:      Effort: Pulmonary effort is normal.      Breath sounds: Normal breath sounds. No wheezing or rales.   Abdominal:      General: Abdomen is flat. Bowel sounds are normal. There is no distension.      Palpations: Abdomen is soft.      Tenderness: There is no abdominal tenderness.   Musculoskeletal: Normal range of motion.   Skin:     Coloration: Skin is not pale.      Findings: No rash.   Neurological:      Mental Status: She is alert and oriented to person, place, and time.   Psychiatric:         Mood and Affect: Mood normal.         Behavior: Behavior normal.         Significant Labs:   All pertinent labs within the past 24 hours have been reviewed    Diagnostic Results:  I have reviewed and interpreted all pertinent imaging results/findings within the past 24 hours

## 2020-12-03 NOTE — PLAN OF CARE
Patient remains in ICU but is pending transfer to floor.  Hgb on admit <4, 3 units of PRBC ordered.  Last unit infusing now.  Patient has tolerated well with no s/s of transfusion reaction.  All units given with fluid warmer per blood bank due to antibodies.  No signs of bleeding noticed.  Afebrile this shift.  Only complaint some SOB on ambulating to bathroom.  No pain complaints.  Tachycardia improved with blood admin.  Recheck 1 hour after last unit finishes.  Will start coloscopy prep tonight for Cscope tomorrow.  NPO after midnight.  Patient is deaf and communicated through written communication.  Addressed questions/concerns.

## 2020-12-03 NOTE — CONSULTS
[facility]  Hematology/Oncology  Consult Note    Patient Name: Aurora Ornelas  MRN: [id number]  Admission Date: 12/2/2020  Hospital Length of Stay: 0 days  Code Status: Prior   Attending Provider: Sander Godoy MD  Consulting Provider: Kenzie Diaz MD  Primary Care Physician: To Obtain Unable  Principal Problem:Symptomatic anemia    Inpatient consult to Hematology  Consult performed by: Kenzie Diaz MD  Consult ordered by: Sterling Up NP        Subjective:   Reason For Consultation: Symptomatic anemia  HPI:  55-year-old female with a history of deafness, pre-diabetes, asthma, hypertension, anemia presented to ED with acute onset of SOB and left-sided chest pain that began last night. JUSTYNA  present during visit.  Pain located over left ribs and as described as a tightness.  Patient attributes her symptoms to typical  asthma exacerbations. No associated cough, fevers. No melena, hematochezia or change in bowel habits. Labs shows wbc 5700/mm3   Hb 3.7   Hct 14.3 % MCV 70  plt 299k. Patient reports long standing history of anemia although uncertain of details. She reports she has taken FE supplementation intermittently. She has never undergone prbc transfusions as  she is Shinto.  Consulted for symptomatic anemia.     Oncology Treatment Plan:   [No treatment plan]    Medications:  Continuous Infusions:  Scheduled Meds:   [START ON 12/3/2020] amLODIPine  10 mg Oral Daily    iron sucrose (VENOFER) IVPB  100 mg Intravenous Q24H    pantoprazole  40 mg Oral BID     PRN Meds:sodium chloride, albuterol, cloNIDine     Review of patient's allergies indicates:  No Known Allergies     Past Medical History:   Diagnosis Date    Anemia     Arthritis     Asthma     Deaf     Diabetes mellitus     Hyperlipidemia     Hypertension      Past Surgical History:   Procedure Laterality Date    ESOPHAGOGASTRODUODENOSCOPY N/A 2/28/2019    Procedure: EGD  (ESOPHAGOGASTRODUODENOSCOPY);  Surgeon: Yolanda Gary MD;  Location: [facility];  Service: Endoscopy;  Laterality: N/A;    HYSTERECTOMY       Family History     None        Tobacco Use    Smoking status: Never Smoker    Smokeless tobacco: Never Used   Substance and Sexual Activity    Alcohol use: No    Drug use: No    Sexual activity: Yes     Partners: Male     Birth control/protection: None       Review of Systems   Constitutional: Positive for activity change and fatigue. Negative for appetite change, chills, fever and unexpected weight change.   HENT: Negative for congestion, dental problem and trouble swallowing.    Eyes: Negative for discharge and itching.   Respiratory: Positive for shortness of breath. Negative for cough.    Cardiovascular: Positive for chest pain. Negative for palpitations and leg swelling.   Gastrointestinal: Negative for abdominal distention, abdominal pain, constipation, diarrhea, nausea and vomiting.   Genitourinary: Negative for difficulty urinating and frequency.   Musculoskeletal: Negative for arthralgias, back pain and gait problem.   Skin: Negative for color change, pallor and rash.   Neurological: Negative for dizziness and light-headedness.   Psychiatric/Behavioral: Negative for behavioral problems and confusion.     Objective:     Vital Signs (Most Recent):  Temp: 98.7 °F (37.1 °C) (12/02/20 1915)  Pulse: (!) 121 (12/02/20 2200)  Resp: (!) 38 (12/02/20 2200)  BP: 137/64 (12/02/20 2200)  SpO2: 100 % (12/02/20 2200) Vital Signs (24h Range):  Temp:  [98.1 °F (36.7 °C)-98.7 °F (37.1 °C)] 98.7 °F (37.1 °C)  Pulse:  [102-137] 121  Resp:  [20-54] 38  SpO2:  [98 %-100 %] 100 %  BP: (118-147)/(58-78) 137/64     Weight: 71.7 kg (158 lb)  Body mass index is 27.12 kg/m².  Body surface area is 1.8 meters squared.      Intake/Output Summary (Last 24 hours) at 12/2/2020 2228  Last data filed at 12/2/2020 1830  Gross per 24 hour   Intake 150 ml   Output --   Net 150 ml       Physical  Exam  Vitals signs and nursing note reviewed.   Constitutional:       General: She is not in acute distress.     Appearance: Normal appearance. She is not ill-appearing.   HENT:      Head: Normocephalic and atraumatic.   Cardiovascular:      Rate and Rhythm: Tachycardia present.      Heart sounds: No murmur.   Pulmonary:      Effort: Pulmonary effort is normal.      Breath sounds: Normal breath sounds. No wheezing or rales.   Abdominal:      General: Abdomen is flat. Bowel sounds are normal. There is no distension.      Palpations: Abdomen is soft.      Tenderness: There is no abdominal tenderness.   Musculoskeletal: Normal range of motion.   Skin:     Coloration: Skin is not pale.      Findings: No rash.   Neurological:      Mental Status: She is alert and oriented to person, place, and time.   Psychiatric:         Mood and Affect: Mood normal.         Behavior: Behavior normal.         Significant Labs:   All pertinent labs within the past 24 hours have been reviewed    Diagnostic Results:  I have reviewed and interpreted all pertinent imaging results/findings within the past 24 hours    Assessment/Plan:     * Symptomatic anemia  Pt with severe microcytic anemia with Hb 3.7g/dL on admission  Fe studies reveal Fe Deficiency  Pt reports hx of anemia in past and reports declined prbc transfusion due to Lutheran beliefs  She reports she is now agreeable to undergo prbc transfusion  She reports she has never undergone C-scope  According to chart she was hospitalized in 2/2019 with similar sx's of CP and symptomatic anemia with Hb 4.9g/dL She underwent EGD on 2/28/19 which showed LA Grade C reflux esophagitis. Non-bleeding. Large sliding and paraesophageal hiatal hernia.  She also underwent prbc transfusion during hospitalization    Her H/H remained stable post transfusion and according to chart underwent  colonoscopy in 2016 that was reportedly unremarkable.     Consult GI for eval   No fam hx of blood d/o  Plan to  test for co existing hemoglobinopathy once Fe stores repleted  2u PRBC transfusion and IV FE planned    Cont to monitor counts         Thank you for your consult.     Kenzie Diaz MD  Hematology/Oncology  [facility]

## 2020-12-03 NOTE — SUBJECTIVE & OBJECTIVE
Past Medical History:   Diagnosis Date    Anemia     Arthritis     Asthma     Deaf     Diabetes mellitus     Hyperlipidemia     Hypertension        Past Surgical History:   Procedure Laterality Date    ESOPHAGOGASTRODUODENOSCOPY N/A 2/28/2019    Procedure: EGD (ESOPHAGOGASTRODUODENOSCOPY);  Surgeon: Yolanda Gary MD;  Location: [facility];  Service: Endoscopy;  Laterality: N/A;    HYSTERECTOMY         Review of patient's allergies indicates:  No Known Allergies    No current facility-administered medications on file prior to encounter.      Current Outpatient Medications on File Prior to Encounter   Medication Sig    albuterol (PROVENTIL/VENTOLIN HFA) 90 mcg/actuation inhaler Inhale 1-2 puffs into the lungs every 6 (six) hours as needed for Wheezing or Shortness of Breath. Rescue    albuterol sulfate 2.5 mg/0.5 mL Nebu Take 2.5 mg by nebulization every 4 (four) hours as needed. Rescue    amLODIPine (NORVASC) 10 MG tablet Take 1 tablet (10 mg total) by mouth once daily.    azithromycin (Z-KATHY) 250 MG tablet Take 1 tablet (250 mg total) by mouth once daily. Take first 2 tablets together, then 1 every day until finished.    cetirizine (ZYRTEC) 10 MG tablet Take 1 tablet (10 mg total) by mouth once daily.    ferrous sulfate (FEOSOL) 325 mg (65 mg iron) Tab tablet Take 1 tablet (325 mg total) by mouth daily with breakfast.    ipratropium (ATROVENT) 0.02 % nebulizer solution Take 2.5 mLs (500 mcg total) by nebulization 4 (four) times daily as needed for Wheezing. Rescue    pantoprazole (PROTONIX) 40 MG tablet Take 1 tablet (40 mg total) by mouth 2 (two) times daily.    sulindac (CLINORIL) 150 MG tablet Take 1 tablet (150 mg total) by mouth 2 (two) times daily.     Family History     None        Tobacco Use    Smoking status: Never Smoker    Smokeless tobacco: Never Used   Substance and Sexual Activity    Alcohol use: No    Drug use: No    Sexual activity: Yes     Partners: Male     Birth  control/protection: None     Review of Systems   Constitutional: Negative for activity change, appetite change, chills, diaphoresis, fatigue, fever and unexpected weight change.   HENT: Negative for congestion and dental problem.    Eyes: Negative for discharge and itching.   Respiratory: Negative for apnea, cough, choking, chest tightness, shortness of breath and stridor.    Cardiovascular: Negative for chest pain, palpitations and leg swelling.   Gastrointestinal: Negative for abdominal distention, abdominal pain, anal bleeding, nausea and vomiting.   Genitourinary: Negative for difficulty urinating.   Musculoskeletal: Negative for arthralgias, back pain and gait problem.   Skin: Negative for color change and pallor.   Psychiatric/Behavioral: Negative for agitation and behavioral problems.     Objective:     Vital Signs (Most Recent):  Temp: 98.3 °F (36.8 °C) (12/03/20 1018)  Pulse: 101 (12/03/20 1200)  Resp: (!) 26 (12/03/20 1200)  BP: 137/68 (12/03/20 1200)  SpO2: 100 % (12/03/20 1200) Vital Signs (24h Range):  Temp:  [98.1 °F (36.7 °C)-98.8 °F (37.1 °C)] 98.3 °F (36.8 °C)  Pulse:  [] 101  Resp:  [16-54] 26  SpO2:  [94 %-100 %] 100 %  BP: (116-156)/(56-79) 137/68     Weight: 71.7 kg (158 lb)  Body mass index is 27.12 kg/m².    Physical Exam  Vitals signs and nursing note reviewed.   Constitutional:       General: She is not in acute distress.     Appearance: Normal appearance. She is obese. She is not ill-appearing, toxic-appearing or diaphoretic.   HENT:      Head: Normocephalic and atraumatic.   Cardiovascular:      Rate and Rhythm: Normal rate and regular rhythm.      Heart sounds: No murmur. No gallop.    Pulmonary:      Effort: No respiratory distress.      Breath sounds: No wheezing or rales.   Abdominal:      General: Abdomen is flat. Bowel sounds are normal. There is no distension.      Palpations: Abdomen is soft.      Tenderness: There is no abdominal tenderness.   Neurological:      Mental  Status: She is alert and oriented to person, place, and time.   Psychiatric:         Mood and Affect: Mood normal.         Behavior: Behavior normal.             Significant Labs:

## 2020-12-03 NOTE — CONSULTS
[facility]  Consultation Note    Reason for Consult:  The primary encounter diagnosis was Symptomatic anemia. Diagnoses of Shortness of breath, Left-sided chest pain, SOB (shortness of breath), Elevated troponin, and Iron deficiency anemia, unspecified iron deficiency anemia type were also pertinent to this visit.    PCP:   To Obtain Unable   No address on file    Referring MD:  Dipika Self  No address on file    Initial History of Present Illness (HPI):  This is a 55 y.o. female here for evaluation of Fe def anemia.  Came in with Hb 3, apparently ran out of her iron supplements.  Patient is deaf and history through written communication and chart review.  Denies seeing blood or abdominal pain  Had EGD last year with severe esophagitis and large hiatal hernia          ROS:unable to obtain      Medical History:  has a past medical history of Anemia, Arthritis, Asthma, Deaf, Diabetes mellitus, Hyperlipidemia, and Hypertension.    Surgical History:  has a past surgical history that includes Hysterectomy and Esophagogastroduodenoscopy (N/A, 2/28/2019).    Family History: family history is not on file..     Social History:  reports that she has never smoked. She has never used smokeless tobacco. She reports that she does not drink alcohol or use drugs.    Review of patient's allergies indicates:  No Known Allergies    Current Discharge Medication List      CONTINUE these medications which have NOT CHANGED    Details   albuterol (PROVENTIL/VENTOLIN HFA) 90 mcg/actuation inhaler Inhale 1-2 puffs into the lungs every 6 (six) hours as needed for Wheezing or Shortness of Breath. Rescue  Qty: 6.7 g, Refills: 0      albuterol sulfate 2.5 mg/0.5 mL Nebu Take 2.5 mg by nebulization every 4 (four) hours as needed. Rescue  Qty: 1 each, Refills: 0      amLODIPine (NORVASC) 10 MG tablet Take 1 tablet (10 mg total) by mouth once daily.  Qty: 30 tablet, Refills: 1      azithromycin (Z-KATHY) 250 MG tablet Take 1  "tablet (250 mg total) by mouth once daily. Take first 2 tablets together, then 1 every day until finished.  Qty: 6 tablet, Refills: 0      cetirizine (ZYRTEC) 10 MG tablet Take 1 tablet (10 mg total) by mouth once daily.  Qty: 30 tablet, Refills: 0      ferrous sulfate (FEOSOL) 325 mg (65 mg iron) Tab tablet Take 1 tablet (325 mg total) by mouth daily with breakfast.  Refills: 0      ipratropium (ATROVENT) 0.02 % nebulizer solution Take 2.5 mLs (500 mcg total) by nebulization 4 (four) times daily as needed for Wheezing. Rescue  Qty: 1 Box, Refills: 0      pantoprazole (PROTONIX) 40 MG tablet Take 1 tablet (40 mg total) by mouth 2 (two) times daily.  Qty: 60 tablet, Refills: 1      sulindac (CLINORIL) 150 MG tablet Take 1 tablet (150 mg total) by mouth 2 (two) times daily.  Qty: 10 tablet, Refills: 0               Objective Findings:    Vital Signs:  /62   Pulse 96   Temp 97.8 °F (36.6 °C) (Axillary)   Resp 19   Ht 5' 4" (1.626 m)   Wt 71.7 kg (158 lb)   LMP 07/07/2016   SpO2 100%   Breastfeeding No   BMI 27.12 kg/m²   Body mass index is 27.12 kg/m².    Physical Exam:  General Appearance: Well appearing in no acute distress  Head:   Normocephalic, without obvious abnormality  Eyes:    No scleral icterus, EOMI  ENT: Neck supple, Lips, mucosa, and tongue normal; teeth and gums normal  Lungs: CTA bilaterally in anterior and posterior fields, no wheezes, no crackles.  Heart:  Regular rate and rhythm, S1, S2 normal, no murmurs heard  Abdomen: Soft, non tender, non distended with positive bowel sounds in all four quadrants. No hepatosplenomegaly, ascites, or mass  Extremities: 2+ pulses, no clubbing, cyanosis or edema  Skin: No rash  Neurologic: CN II-XII intact      Labs:  Lab Results   Component Value Date    WBC 5.77 12/02/2020    HGB 3.7 (LL) 12/02/2020    HCT 14.3 (LL) 12/02/2020     12/02/2020    ALT 9 (L) 12/02/2020    AST 16 12/02/2020     12/02/2020    K 4.2 12/02/2020     " 12/02/2020    CREATININE 0.7 12/02/2020    BUN 10 12/02/2020    CO2 20 (L) 12/02/2020    INR 1.0 09/17/2016       No results found for: HPYLORINTERP  No results found for: HPYLORIANTIG        Imaging:    Endoscopy:      EGD 2019 - C esophagitis and large paraesophageal hernia  Colon - states she does not know    Assessment:  1. Symptomatic anemia    2. Shortness of breath    3. Left-sided chest pain    4. SOB (shortness of breath)    5. Elevated troponin    6. Iron deficiency anemia, unspecified iron deficiency anemia type       Likely from chronic esophagitis but need to rule out colon mass with no screening > age 45    Recommendations:  1. Plan on EGD with Colonoscopy tomorrow. May be difficult to arrange as outpatient with her being hard of hearing  2. prbs resuscitation today to Hb around 7 ideally                Thank you so much for allowing me to participate in the care of Aurora Day MD

## 2020-12-04 ENCOUNTER — ANESTHESIA EVENT (OUTPATIENT)
Dept: ENDOSCOPY | Facility: HOSPITAL | Age: 55
DRG: 812 | End: 2020-12-04
Payer: MEDICARE

## 2020-12-04 ENCOUNTER — ANESTHESIA (OUTPATIENT)
Dept: ENDOSCOPY | Facility: HOSPITAL | Age: 55
DRG: 812 | End: 2020-12-04
Payer: MEDICARE

## 2020-12-04 DIAGNOSIS — D50.0 IRON DEFICIENCY ANEMIA DUE TO CHRONIC BLOOD LOSS: Primary | ICD-10-CM

## 2020-12-04 LAB
ALBUMIN SERPL ELPH-MCNC: 2.99 G/DL (ref 3.35–5.55)
ALPHA1 GLOB SERPL ELPH-MCNC: 0.62 G/DL (ref 0.17–0.41)
ALPHA2 GLOB SERPL ELPH-MCNC: 0.65 G/DL (ref 0.43–0.99)
ANISOCYTOSIS BLD QL SMEAR: ABNORMAL
B-GLOBULIN SERPL ELPH-MCNC: 0.87 G/DL (ref 0.5–1.1)
BASOPHILS # BLD AUTO: 0.04 K/UL (ref 0–0.2)
BASOPHILS NFR BLD: 0.4 % (ref 0–1.9)
DACRYOCYTES BLD QL SMEAR: ABNORMAL
DIFFERENTIAL METHOD: ABNORMAL
EOSINOPHIL # BLD AUTO: 0.3 K/UL (ref 0–0.5)
EOSINOPHIL NFR BLD: 3.2 % (ref 0–8)
ERYTHROCYTE [DISTWIDTH] IN BLOOD BY AUTOMATED COUNT: 20 % (ref 11.5–14.5)
GAMMA GLOB SERPL ELPH-MCNC: 1.06 G/DL (ref 0.67–1.58)
HCT VFR BLD AUTO: 28.8 % (ref 37–48.5)
HGB BLD-MCNC: 9 G/DL (ref 12–16)
HYPOCHROMIA BLD QL SMEAR: ABNORMAL
IMM GRANULOCYTES # BLD AUTO: 0.1 K/UL (ref 0–0.04)
IMM GRANULOCYTES NFR BLD AUTO: 1.1 % (ref 0–0.5)
INTERPRETATION SERPL IFE-IMP: NORMAL
LYMPHOCYTES # BLD AUTO: 1.3 K/UL (ref 1–4.8)
LYMPHOCYTES NFR BLD: 13.4 % (ref 18–48)
MCH RBC QN AUTO: 24.7 PG (ref 27–31)
MCHC RBC AUTO-ENTMCNC: 31.3 G/DL (ref 32–36)
MCV RBC AUTO: 79 FL (ref 82–98)
MONOCYTES # BLD AUTO: 0.6 K/UL (ref 0.3–1)
MONOCYTES NFR BLD: 6.2 % (ref 4–15)
NEUTROPHILS # BLD AUTO: 7.2 K/UL (ref 1.8–7.7)
NEUTROPHILS NFR BLD: 75.7 % (ref 38–73)
NRBC BLD-RTO: 1 /100 WBC
OVALOCYTES BLD QL SMEAR: ABNORMAL
PATHOLOGIST INTERPRETATION IFE: NORMAL
PATHOLOGIST INTERPRETATION SPE: NORMAL
PLATELET # BLD AUTO: 312 K/UL (ref 150–350)
PLATELET BLD QL SMEAR: ABNORMAL
PMV BLD AUTO: 9.4 FL (ref 9.2–12.9)
POLYCHROMASIA BLD QL SMEAR: ABNORMAL
PROT SERPL-MCNC: 6.2 G/DL (ref 6–8.4)
RBC # BLD AUTO: 3.65 M/UL (ref 4–5.4)
TARGETS BLD QL SMEAR: ABNORMAL
WBC # BLD AUTO: 9.46 K/UL (ref 3.9–12.7)

## 2020-12-04 PROCEDURE — D9220A PRA ANESTHESIA: ICD-10-PCS | Mod: CRNA,,, | Performed by: NURSE ANESTHETIST, CERTIFIED REGISTERED

## 2020-12-04 PROCEDURE — 99232 PR SUBSEQUENT HOSPITAL CARE,LEVL II: ICD-10-PCS | Mod: ,,, | Performed by: INTERNAL MEDICINE

## 2020-12-04 PROCEDURE — 37000008 HC ANESTHESIA 1ST 15 MINUTES: Performed by: INTERNAL MEDICINE

## 2020-12-04 PROCEDURE — 37000009 HC ANESTHESIA EA ADD 15 MINS: Performed by: INTERNAL MEDICINE

## 2020-12-04 PROCEDURE — 25000003 PHARM REV CODE 250: Performed by: NURSE ANESTHETIST, CERTIFIED REGISTERED

## 2020-12-04 PROCEDURE — 11000001 HC ACUTE MED/SURG PRIVATE ROOM

## 2020-12-04 PROCEDURE — D9220A PRA ANESTHESIA: Mod: ANES,,, | Performed by: ANESTHESIOLOGY

## 2020-12-04 PROCEDURE — 25000003 PHARM REV CODE 250: Performed by: INTERNAL MEDICINE

## 2020-12-04 PROCEDURE — D9220A PRA ANESTHESIA: Mod: CRNA,,, | Performed by: NURSE ANESTHETIST, CERTIFIED REGISTERED

## 2020-12-04 PROCEDURE — 99232 SBSQ HOSP IP/OBS MODERATE 35: CPT | Mod: ,,, | Performed by: INTERNAL MEDICINE

## 2020-12-04 PROCEDURE — 43235 EGD DIAGNOSTIC BRUSH WASH: CPT | Mod: 51,,, | Performed by: INTERNAL MEDICINE

## 2020-12-04 PROCEDURE — 43235 EGD DIAGNOSTIC BRUSH WASH: CPT | Performed by: INTERNAL MEDICINE

## 2020-12-04 PROCEDURE — 63600175 PHARM REV CODE 636 W HCPCS: Performed by: NURSE ANESTHETIST, CERTIFIED REGISTERED

## 2020-12-04 PROCEDURE — 63600175 PHARM REV CODE 636 W HCPCS: Performed by: INTERNAL MEDICINE

## 2020-12-04 PROCEDURE — 36415 COLL VENOUS BLD VENIPUNCTURE: CPT

## 2020-12-04 PROCEDURE — D9220A PRA ANESTHESIA: ICD-10-PCS | Mod: ANES,,, | Performed by: ANESTHESIOLOGY

## 2020-12-04 PROCEDURE — 45378 PR COLONOSCOPY,DIAGNOSTIC: ICD-10-PCS | Mod: ,,, | Performed by: INTERNAL MEDICINE

## 2020-12-04 PROCEDURE — 85025 COMPLETE CBC W/AUTO DIFF WBC: CPT

## 2020-12-04 PROCEDURE — 43235 PR EGD, FLEX, DIAGNOSTIC: ICD-10-PCS | Mod: 51,,, | Performed by: INTERNAL MEDICINE

## 2020-12-04 PROCEDURE — 45378 DIAGNOSTIC COLONOSCOPY: CPT | Mod: ,,, | Performed by: INTERNAL MEDICINE

## 2020-12-04 PROCEDURE — 99900035 HC TECH TIME PER 15 MIN (STAT)

## 2020-12-04 PROCEDURE — 45378 DIAGNOSTIC COLONOSCOPY: CPT | Performed by: INTERNAL MEDICINE

## 2020-12-04 RX ORDER — LIDOCAINE HYDROCHLORIDE 20 MG/ML
INJECTION INTRAVENOUS
Status: DISCONTINUED | OUTPATIENT
Start: 2020-12-04 | End: 2020-12-04

## 2020-12-04 RX ORDER — SODIUM CHLORIDE 9 MG/ML
INJECTION, SOLUTION INTRAVENOUS CONTINUOUS PRN
Status: DISCONTINUED | OUTPATIENT
Start: 2020-12-04 | End: 2020-12-04

## 2020-12-04 RX ORDER — PROPOFOL 10 MG/ML
INJECTION, EMULSION INTRAVENOUS
Status: DISPENSED
Start: 2020-12-04 | End: 2020-12-05

## 2020-12-04 RX ORDER — ESMOLOL HYDROCHLORIDE 10 MG/ML
INJECTION INTRAVENOUS
Status: DISPENSED
Start: 2020-12-04 | End: 2020-12-05

## 2020-12-04 RX ORDER — PROPOFOL 10 MG/ML
VIAL (ML) INTRAVENOUS
Status: DISCONTINUED | OUTPATIENT
Start: 2020-12-04 | End: 2020-12-04

## 2020-12-04 RX ORDER — HYDRALAZINE HYDROCHLORIDE 25 MG/1
25 TABLET, FILM COATED ORAL EVERY 8 HOURS
Status: DISCONTINUED | OUTPATIENT
Start: 2020-12-04 | End: 2020-12-05 | Stop reason: HOSPADM

## 2020-12-04 RX ORDER — LIDOCAINE HYDROCHLORIDE 20 MG/ML
INJECTION, SOLUTION EPIDURAL; INFILTRATION; INTRACAUDAL; PERINEURAL
Status: DISPENSED
Start: 2020-12-04 | End: 2020-12-05

## 2020-12-04 RX ADMIN — HYDRALAZINE HYDROCHLORIDE 25 MG: 25 TABLET, FILM COATED ORAL at 09:12

## 2020-12-04 RX ADMIN — MUPIROCIN: 20 OINTMENT TOPICAL at 09:12

## 2020-12-04 RX ADMIN — PROPOFOL 50 MG: 10 INJECTION, EMULSION INTRAVENOUS at 04:12

## 2020-12-04 RX ADMIN — GLYCOPYRROLATE 0.2 MG: 0.2 INJECTION, SOLUTION INTRAMUSCULAR; INTRAVITREAL at 03:12

## 2020-12-04 RX ADMIN — PANTOPRAZOLE SODIUM 40 MG: 40 TABLET, DELAYED RELEASE ORAL at 09:12

## 2020-12-04 RX ADMIN — ESMOLOL HYDROCHLORIDE 10 MG: 10 INJECTION INTRAVENOUS at 04:12

## 2020-12-04 RX ADMIN — PROPOFOL 30 MG: 10 INJECTION, EMULSION INTRAVENOUS at 04:12

## 2020-12-04 RX ADMIN — Medication 100 MG: at 04:12

## 2020-12-04 RX ADMIN — SODIUM CHLORIDE: 0.9 INJECTION, SOLUTION INTRAVENOUS at 03:12

## 2020-12-04 RX ADMIN — MUPIROCIN: 20 OINTMENT TOPICAL at 08:12

## 2020-12-04 RX ADMIN — AMLODIPINE BESYLATE 10 MG: 5 TABLET ORAL at 09:12

## 2020-12-04 RX ADMIN — PROPOFOL 20 MG: 10 INJECTION, EMULSION INTRAVENOUS at 04:12

## 2020-12-04 RX ADMIN — IRON SUCROSE 100 MG: 20 INJECTION, SOLUTION INTRAVENOUS at 07:12

## 2020-12-04 RX ADMIN — PANTOPRAZOLE SODIUM 40 MG: 40 TABLET, DELAYED RELEASE ORAL at 08:12

## 2020-12-04 RX ADMIN — PROPOFOL 120 MG: 10 INJECTION, EMULSION INTRAVENOUS at 04:12

## 2020-12-04 NOTE — PROGRESS NOTES
[facility]  Hematology/Oncology  Progress Note    Patient Name: Aurora Ornelas  Admission Date: 12/2/2020  Hospital Length of Stay: 2 days  Code Status: Prior     Subjective:     HPI:  55-year-old female with a history of deafness, pre-diabetes, asthma, hypertension, anemia presented to ED with acute onset of SOB and left-sided chest pain that began last night. JUSTYNA  present during visit.  Pain located over left ribs and as described as a tightness.  Patient attributes her symptoms to typical  asthma exacerbations. No associated cough, fevers. No melena, hematochezia or change in bowel habits. Labs shows wbc 5700/mm3   Hb 3.7   Hct 14.3 % MCV 70  plt 299k. Patient reports long standing history of anemia although uncertain of details. She reports she has taken FE supplementation intermittently. She has never undergone prbc transfusions as  she is Judaism.  Consulted for symptomatic anemia.     Interval History: the patient states she is feeling better this afternoon.  She states her wheezing has improved.  The patient denies any melena, BRBPR, hemoptysis, hematemesis, hematuria.    Oncology Treatment Plan:   [No treatment plan]    Medications:  Continuous Infusions:  Scheduled Meds:   amLODIPine  10 mg Oral Daily    hydrALAZINE  25 mg Oral Q8H    iron sucrose (VENOFER) IVPB  100 mg Intravenous Q24H    mupirocin   Nasal BID    pantoprazole  40 mg Oral BID     PRN Meds:sodium chloride, acetaminophen, albuterol, cloNIDine     Review of Systems   Constitutional: Negative for chills and fever.   Respiratory: Negative for cough and shortness of breath.    Cardiovascular: Negative for chest pain and palpitations.   Gastrointestinal: Negative for abdominal pain, constipation, diarrhea, nausea and vomiting.   Skin: Negative for rash.   Neurological: Negative for headaches.     Objective:     Vital Signs (Most Recent):  Temp: 98.2 °F (36.8 °C) (12/04/20 1200)  Pulse: 91 (12/04/20  1200)  Resp: 19 (12/04/20 1200)  BP: 128/89 (12/04/20 1200)  SpO2: 98 % (12/04/20 1200) Vital Signs (24h Range):  Temp:  [97.7 °F (36.5 °C)-98.4 °F (36.9 °C)] 98.2 °F (36.8 °C)  Pulse:  [] 91  Resp:  [14-34] 19  SpO2:  [98 %-100 %] 98 %  BP: (121-175)/(59-89) 128/89     Weight: 71.7 kg (157 lb 15.4 oz)  Body mass index is 27.11 kg/m².  Body surface area is 1.8 meters squared.      Intake/Output Summary (Last 24 hours) at 12/4/2020 1512  Last data filed at 12/3/2020 1830  Gross per 24 hour   Intake 490 ml   Output --   Net 490 ml       Physical Exam  Constitutional:       Appearance: She is well-developed.   Eyes:      General:         Right eye: No discharge.         Left eye: No discharge.   Cardiovascular:      Rate and Rhythm: Normal rate and regular rhythm.      Heart sounds: Normal heart sounds. No murmur. No friction rub. No gallop.    Pulmonary:      Effort: Pulmonary effort is normal. No respiratory distress.      Breath sounds: Normal breath sounds. No wheezing or rales.   Abdominal:      General: Bowel sounds are normal. There is no distension.      Palpations: Abdomen is soft.      Tenderness: There is no abdominal tenderness. There is no guarding or rebound.   Neurological:      Mental Status: She is alert and oriented to person, place, and time.         Significant Labs:   Recent Results (from the past 24 hour(s))   Troponin I    Collection Time: 12/03/20  7:17 PM   Result Value Ref Range    Troponin I 0.111 (H) 0.000 - 0.026 ng/mL   Hemoglobin    Collection Time: 12/03/20  7:17 PM   Result Value Ref Range    Hemoglobin 8.2 (L) 12.0 - 16.0 g/dL   Hematocrit    Collection Time: 12/03/20  7:17 PM   Result Value Ref Range    Hematocrit 25.1 (L) 37.0 - 48.5 %   CBC Auto Differential    Collection Time: 12/04/20  5:05 AM   Result Value Ref Range    WBC 9.46 3.90 - 12.70 K/uL    RBC 3.65 (L) 4.00 - 5.40 M/uL    Hemoglobin 9.0 (L) 12.0 - 16.0 g/dL    Hematocrit 28.8 (L) 37.0 - 48.5 %    MCV 79 (L) 82 -  98 fL    MCH 24.7 (L) 27.0 - 31.0 pg    MCHC 31.3 (L) 32.0 - 36.0 g/dL    RDW 20.0 (H) 11.5 - 14.5 %    Platelets 312 150 - 350 K/uL    MPV 9.4 9.2 - 12.9 fL    Immature Granulocytes 1.1 (H) 0.0 - 0.5 %    Gran # (ANC) 7.2 1.8 - 7.7 K/uL    Immature Grans (Abs) 0.10 (H) 0.00 - 0.04 K/uL    Lymph # 1.3 1.0 - 4.8 K/uL    Mono # 0.6 0.3 - 1.0 K/uL    Eos # 0.3 0.0 - 0.5 K/uL    Baso # 0.04 0.00 - 0.20 K/uL    nRBC 1 (A) 0 /100 WBC    Gran % 75.7 (H) 38.0 - 73.0 %    Lymph % 13.4 (L) 18.0 - 48.0 %    Mono % 6.2 4.0 - 15.0 %    Eosinophil % 3.2 0.0 - 8.0 %    Basophil % 0.4 0.0 - 1.9 %    Platelet Estimate Appears normal     Aniso Moderate     Poly Occasional     Hypo Occasional     Ovalocytes Occasional     Target Cells Occasional     Tear Drop Cells Occasional     Differential Method Automated          Diagnostic Results:  I have reviewed all pertinent imaging results/findings within the past 24 hours.    Assessment/Plan:     * Symptomatic anemia  -Hemoglobin is 9.0g/dL today s/p 3 units of PRBC's  -continue IV Venofer  -PT will need work up for thalassemia as an outpatient  -Gi following and to perform a colonoscopy today  -Will monitor      Hematology/Oncology service will follow-up with patient. Please contact us if you have any additional questions.       Hadley Feng MD  Hematology/Oncology  [facility] [place]

## 2020-12-04 NOTE — [employer] PATIENT INSTRUCTIONS
Discharge Summary/Instructions after an Endoscopic Procedure  Patient Name: Aurora Ornelas  Patient MRN: [id number]  Patient YOB: 1965  Friday, December 4, 2020  Felicia Bishop MD  RESTRICTIONS:  During your procedure today, you received medications for sedation.  These   medications may affect your judgment, balance and coordination.  Therefore,   for 24 hours, you have the following restrictions:   - DO NOT drive a car, operate machinery, make legal/financial decisions,   sign important papers or drink alcohol.    ACTIVITY:  Today: no heavy lifting, straining or running due to procedural   sedation/anesthesia.  The following day: return to full activity including work.  DIET:  Eat and drink normally unless instructed otherwise.     TREATMENT FOR COMMON SIDE EFFECTS:  - Mild abdominal pain, nausea, belching, bloating or excessive gas:  rest,   eat lightly and use a heating pad.  - Sore Throat: treat with throat lozenges and/or gargle with warm salt   water.  - Because air was used during the procedure, expelling large amounts of air   from your rectum or belching is normal.  - If a bowel prep was taken, you may not have a bowel movement for 1-3 days.    This is normal.  SYMPTOMS TO WATCH FOR AND REPORT TO YOUR PHYSICIAN:  1. Abdominal pain or bloating, other than gas cramps.  2. Chest pain.  3. Back pain.  4. Signs of infection such as: chills or fever occurring within 24 hours   after the procedure.  5. Rectal bleeding, which would show as bright red, maroon, or black stools.   (A tablespoon of blood from the rectum is not serious, especially if   hemorrhoids are present.)  6. Vomiting.  7. Weakness or dizziness.  GO DIRECTLY TO THE NEAREST EMERGENCY ROOM IF YOU HAVE ANY OF THE FOLLOWING:      Difficulty breathing              Chills and/or fever over 101 F   Persistent vomiting and/or vomiting blood   Severe abdominal pain   Severe chest pain   Black, tarry stools   Bleeding- more than one  tablespoon   Any other symptom or condition that you feel may need urgent attention  Your doctor recommends these additional instructions:  If any biopsies were taken, your doctors clinic will contact you in 1 to 2   weeks with any results.  - Discharge patient to home if her H/H remain stable.   - Resume previous diet.   - Continue present medications.   - Repeat colonoscopy in 10 years for screening purposes.   - I will arrange for outpatient video capsule endoscopy to evaluate her   small bowel for a source for her anemia.  - Photos were captured but did not transfer to the note.  Will attempt to   transfer photos with IT assistance.  For questions, problems or results please call your physician - Felciia Bishop MD at Work:  [phone].  [facility] can be reached at [phone]     IF A COMPLICATION OR EMERGENCY SITUATION ARISES AND YOU ARE UNABLE TO REACH   YOUR PHYSICIAN - GO DIRECTLY TO THE EMERGENCY ROOM.  Felicia Bishop MD  12/4/2020 4:59:56 PM  This report has been verified and signed electronically.  [employer]

## 2020-12-04 NOTE — NURSING
Report received from Emilio ARANDA. Patient remains free from falls and injury. No distress noted. VSS. Questions encouraged and answered. Patient verbalized understanding. Bed locked and in low position; safety maintained. Call light in reach. White board updated, and explained. No complaint of pain, n/v, or SOB.  Will continue to monitor, will continue with plan of care.

## 2020-12-04 NOTE — ASSESSMENT & PLAN NOTE
-Hemoglobin is 9.0g/dL today s/p 3 units of PRBC's  -continue IV Venofer  -PT will need work up for thalassemia as an outpatient  -Gi following and to perform a colonoscopy today  -Will monitor

## 2020-12-04 NOTE — SUBJECTIVE & OBJECTIVE
Interval History: the patient states she is feeling better this afternoon.  She states her wheezing has improved.  The patient denies any melena, BRBPR, hemoptysis, hematemesis, hematuria.    Oncology Treatment Plan:   [No treatment plan]    Medications:  Continuous Infusions:  Scheduled Meds:   amLODIPine  10 mg Oral Daily    hydrALAZINE  25 mg Oral Q8H    iron sucrose (VENOFER) IVPB  100 mg Intravenous Q24H    mupirocin   Nasal BID    pantoprazole  40 mg Oral BID     PRN Meds:sodium chloride, acetaminophen, albuterol, cloNIDine     Review of Systems   Constitutional: Negative for chills and fever.   Respiratory: Negative for cough and shortness of breath.    Cardiovascular: Negative for chest pain and palpitations.   Gastrointestinal: Negative for abdominal pain, constipation, diarrhea, nausea and vomiting.   Skin: Negative for rash.   Neurological: Negative for headaches.     Objective:     Vital Signs (Most Recent):  Temp: 98.2 °F (36.8 °C) (12/04/20 1200)  Pulse: 91 (12/04/20 1200)  Resp: 19 (12/04/20 1200)  BP: 128/89 (12/04/20 1200)  SpO2: 98 % (12/04/20 1200) Vital Signs (24h Range):  Temp:  [97.7 °F (36.5 °C)-98.4 °F (36.9 °C)] 98.2 °F (36.8 °C)  Pulse:  [] 91  Resp:  [14-34] 19  SpO2:  [98 %-100 %] 98 %  BP: (121-175)/(59-89) 128/89     Weight: 71.7 kg (157 lb 15.4 oz)  Body mass index is 27.11 kg/m².  Body surface area is 1.8 meters squared.      Intake/Output Summary (Last 24 hours) at 12/4/2020 1512  Last data filed at 12/3/2020 1830  Gross per 24 hour   Intake 490 ml   Output --   Net 490 ml       Physical Exam  Constitutional:       Appearance: She is well-developed.   Eyes:      General:         Right eye: No discharge.         Left eye: No discharge.   Cardiovascular:      Rate and Rhythm: Normal rate and regular rhythm.      Heart sounds: Normal heart sounds. No murmur. No friction rub. No gallop.    Pulmonary:      Effort: Pulmonary effort is normal. No respiratory distress.      Breath  sounds: Normal breath sounds. No wheezing or rales.   Abdominal:      General: Bowel sounds are normal. There is no distension.      Palpations: Abdomen is soft.      Tenderness: There is no abdominal tenderness. There is no guarding or rebound.   Neurological:      Mental Status: She is alert and oriented to person, place, and time.         Significant Labs:   Recent Results (from the past 24 hour(s))   Troponin I    Collection Time: 12/03/20  7:17 PM   Result Value Ref Range    Troponin I 0.111 (H) 0.000 - 0.026 ng/mL   Hemoglobin    Collection Time: 12/03/20  7:17 PM   Result Value Ref Range    Hemoglobin 8.2 (L) 12.0 - 16.0 g/dL   Hematocrit    Collection Time: 12/03/20  7:17 PM   Result Value Ref Range    Hematocrit 25.1 (L) 37.0 - 48.5 %   CBC Auto Differential    Collection Time: 12/04/20  5:05 AM   Result Value Ref Range    WBC 9.46 3.90 - 12.70 K/uL    RBC 3.65 (L) 4.00 - 5.40 M/uL    Hemoglobin 9.0 (L) 12.0 - 16.0 g/dL    Hematocrit 28.8 (L) 37.0 - 48.5 %    MCV 79 (L) 82 - 98 fL    MCH 24.7 (L) 27.0 - 31.0 pg    MCHC 31.3 (L) 32.0 - 36.0 g/dL    RDW 20.0 (H) 11.5 - 14.5 %    Platelets 312 150 - 350 K/uL    MPV 9.4 9.2 - 12.9 fL    Immature Granulocytes 1.1 (H) 0.0 - 0.5 %    Gran # (ANC) 7.2 1.8 - 7.7 K/uL    Immature Grans (Abs) 0.10 (H) 0.00 - 0.04 K/uL    Lymph # 1.3 1.0 - 4.8 K/uL    Mono # 0.6 0.3 - 1.0 K/uL    Eos # 0.3 0.0 - 0.5 K/uL    Baso # 0.04 0.00 - 0.20 K/uL    nRBC 1 (A) 0 /100 WBC    Gran % 75.7 (H) 38.0 - 73.0 %    Lymph % 13.4 (L) 18.0 - 48.0 %    Mono % 6.2 4.0 - 15.0 %    Eosinophil % 3.2 0.0 - 8.0 %    Basophil % 0.4 0.0 - 1.9 %    Platelet Estimate Appears normal     Aniso Moderate     Poly Occasional     Hypo Occasional     Ovalocytes Occasional     Target Cells Occasional     Tear Drop Cells Occasional     Differential Method Automated          Diagnostic Results:  I have reviewed all pertinent imaging results/findings within the past 24 hours.

## 2020-12-04 NOTE — ASSESSMENT & PLAN NOTE
From #1.  ED mentioned possible L sided pneumonia. I don't agree. No coughing   No wbc count. Normal lung exam. Patient non- ill appearing. Monitor for now.     resolved

## 2020-12-04 NOTE — ASSESSMENT & PLAN NOTE
Hgb is 3.7. Heme negative in the ED.  Likely from non-compliance from Iron replacement at home. 3 units of blood. Fe IV for 3 days. Patient very stable and will be transferred to the floor tomorrow.     Delay in getting blood due to anti-bodies.  Will transfuse- transfer to floor. Heme following.  GI consulted     Resolved issue. Hgb now > 9   EGD and colonoscopy today (12/4)

## 2020-12-04 NOTE — [employer] PATIENT INSTRUCTIONS
Discharge Summary/Instructions after an Endoscopic Procedure  Patient Name: Aurora Ornelas  Patient MRN: [id number]  Patient YOB: 1965  Friday, December 4, 2020  Felicia Bishop MD  RESTRICTIONS:  During your procedure today, you received medications for sedation.  These   medications may affect your judgment, balance and coordination.  Therefore,   for 24 hours, you have the following restrictions:   - DO NOT drive a car, operate machinery, make legal/financial decisions,   sign important papers or drink alcohol.    ACTIVITY:  Today: no heavy lifting, straining or running due to procedural   sedation/anesthesia.  The following day: return to full activity including work.  DIET:  Eat and drink normally unless instructed otherwise.     TREATMENT FOR COMMON SIDE EFFECTS:  - Mild abdominal pain, nausea, belching, bloating or excessive gas:  rest,   eat lightly and use a heating pad.  - Sore Throat: treat with throat lozenges and/or gargle with warm salt   water.  - Because air was used during the procedure, expelling large amounts of air   from your rectum or belching is normal.  - If a bowel prep was taken, you may not have a bowel movement for 1-3 days.    This is normal.  SYMPTOMS TO WATCH FOR AND REPORT TO YOUR PHYSICIAN:  1. Abdominal pain or bloating, other than gas cramps.  2. Chest pain.  3. Back pain.  4. Signs of infection such as: chills or fever occurring within 24 hours   after the procedure.  5. Rectal bleeding, which would show as bright red, maroon, or black stools.   (A tablespoon of blood from the rectum is not serious, especially if   hemorrhoids are present.)  6. Vomiting.  7. Weakness or dizziness.  GO DIRECTLY TO THE NEAREST EMERGENCY ROOM IF YOU HAVE ANY OF THE FOLLOWING:      Difficulty breathing              Chills and/or fever over 101 F   Persistent vomiting and/or vomiting blood   Severe abdominal pain   Severe chest pain   Black, tarry stools   Bleeding- more than one  tablespoon   Any other symptom or condition that you feel may need urgent attention  Your doctor recommends these additional instructions:  If any biopsies were taken, your doctors clinic will contact you in 1 to 2   weeks with any results.  - Proceed to colonoscopy.   - Resume previous diet.   - Continue present medications.   - PPI BID for 8 weeks then daily due to her severe esophagitis.  - Repeat EGD in 8 weeks to ensure healing of her esophagitis.  For questions, problems or results please call your physician - Felicia Bishop MD at Work:  [phone].  [facility] can be reached at [phone]     IF A COMPLICATION OR EMERGENCY SITUATION ARISES AND YOU ARE UNABLE TO REACH   YOUR PHYSICIAN - GO DIRECTLY TO THE EMERGENCY ROOM.  Felicia Bishop MD  12/4/2020 4:52:06 PM  This report has been verified and signed electronically.  [employer]

## 2020-12-04 NOTE — ANESTHESIA PREPROCEDURE EVALUATION
12/04/2020    Pre-operative evaluation for Procedure(s) (LRB):  EGD (ESOPHAGOGASTRODUODENOSCOPY) (N/A)  COLONOSCOPY (N/A)    Aurora Ornelas is a 55 y.o. female     Patient Active Problem List   Diagnosis    Severe persistent asthma with acute exacerbation    Chest pain, atypical    Deaf    Symptomatic anemia    Hypertension    Hyperglycemia    Mild malnutrition    Elevated troponin    Chest pain    GI bleeding    Large hiatal hernia    Shortness of breath    Obesity       Review of patient's allergies indicates:  No Known Allergies    No current facility-administered medications on file prior to encounter.      Current Outpatient Medications on File Prior to Encounter   Medication Sig Dispense Refill    albuterol (PROVENTIL/VENTOLIN HFA) 90 mcg/actuation inhaler Inhale 1-2 puffs into the lungs every 6 (six) hours as needed for Wheezing or Shortness of Breath. Rescue 6.7 g 0    albuterol sulfate 2.5 mg/0.5 mL Nebu Take 2.5 mg by nebulization every 4 (four) hours as needed. Rescue 1 each 0    amLODIPine (NORVASC) 10 MG tablet Take 1 tablet (10 mg total) by mouth once daily. 30 tablet 1    azithromycin (Z-KATHY) 250 MG tablet Take 1 tablet (250 mg total) by mouth once daily. Take first 2 tablets together, then 1 every day until finished. 6 tablet 0    cetirizine (ZYRTEC) 10 MG tablet Take 1 tablet (10 mg total) by mouth once daily. 30 tablet 0    ferrous sulfate (FEOSOL) 325 mg (65 mg iron) Tab tablet Take 1 tablet (325 mg total) by mouth daily with breakfast.  0    ipratropium (ATROVENT) 0.02 % nebulizer solution Take 2.5 mLs (500 mcg total) by nebulization 4 (four) times daily as needed for Wheezing. Rescue 1 Box 0    pantoprazole (PROTONIX) 40 MG tablet Take 1 tablet (40 mg total) by mouth 2 (two) times daily. 60 tablet 1    sulindac (CLINORIL) 150 MG tablet Take 1 tablet (150 mg total) by  mouth 2 (two) times daily. 10 tablet 0       Past Surgical History:   Procedure Laterality Date    ESOPHAGOGASTRODUODENOSCOPY N/A 2/28/2019    Procedure: EGD (ESOPHAGOGASTRODUODENOSCOPY);  Surgeon: Yolanda Gary MD;  Location: [facility];  Service: Endoscopy;  Laterality: N/A;    HYSTERECTOMY           VITALS  Vitals:    12/04/20 1200   BP: 128/89   Pulse: 91   Resp: 19   Temp: 36.8 °C (98.2 °F)       CBC:   Recent Labs     12/02/20  1350 12/03/20  1917 12/04/20  0505   WBC 5.77  --  9.46   RBC 2.03*  --  3.65*   HGB 3.7* 8.2* 9.0*   HCT 14.3* 25.1* 28.8*     --  312   MCV 70*  --  79*   MCH 18.2*  --  24.7*   MCHC 25.9*  --  31.3*       CMP:   Recent Labs     12/02/20  1350      K 4.2      CO2 20*   BUN 10   CREATININE 0.7   *   CALCIUM 8.5*   ALBUMIN 3.5   PROT 7.1   ALKPHOS 47*   ALT 9*   AST 16   BILITOT 0.4         2D Echo:  Results for orders placed or performed during the hospital encounter of 04/13/18   2D echo with color flow doppler   Result Value Ref Range    QEF 55 55 - 65    Est. PA Systolic Pressure 37.81     Tricuspid Valve Regurgitation TRIVIAL          Anesthesia Evaluation          Review of Systems  Hematology/Oncology:         -- Anemia:   Cardiovascular:   Hypertension   Pulmonary:   Asthma    Hepatic/GI:   Hiatal Hernia,    Endocrine:   Diabetes  Metabolic Disorders, Obesity / BMI > 30      Physical Exam  General:  Well nourished    Airway/Jaw/Neck:  Airway Findings: Mouth Opening: Normal General Airway Assessment: Adult  Mallampati: II  TM Distance: Normal, at least 6 cm         Dental:  DENTAL FINDINGS: Normal   Chest/Lungs:  Chest/Lungs Clear    Heart/Vascular:  Heart Findings: Normal Heart murmur: negative       Mental Status:  Mental Status Findings:  Cooperative, Alert and Oriented         Anesthesia Plan  Type of Anesthesia, risks & benefits discussed:  Anesthesia Type:  general  Patient's Preference:   Intra-op Monitoring Plan: standard ASA monitors  Intra-op  Monitoring Plan Comments:   Post Op Pain Control Plan:   Post Op Pain Control Plan Comments:   Induction:   IV  Beta Blocker:  Patient is not currently on a Beta-Blocker (No further documentation required).       Informed Consent: Patient understands risks and agrees with Anesthesia plan.  Questions answered. Anesthesia consent signed with patient.  ASA Score: 2     Day of Surgery Review of History & Physical:            Ready For Surgery From Anesthesia Perspective.

## 2020-12-04 NOTE — SUBJECTIVE & OBJECTIVE
Interval History: No new issues.     Review of Systems   Constitutional: Negative for activity change, appetite change, chills, diaphoresis, fatigue, fever and unexpected weight change.   HENT: Negative for congestion and dental problem.    Eyes: Negative for discharge and itching.   Respiratory: Negative for apnea, cough, choking, chest tightness, shortness of breath and stridor.    Cardiovascular: Negative for chest pain, palpitations and leg swelling.   Gastrointestinal: Negative for abdominal distention, abdominal pain, anal bleeding, nausea and vomiting.   Genitourinary: Negative for difficulty urinating.   Musculoskeletal: Negative for arthralgias, back pain and gait problem.   Skin: Negative for color change and pallor.   Psychiatric/Behavioral: Negative for agitation and behavioral problems.     Objective:     Vital Signs (Most Recent):  Temp: 97.7 °F (36.5 °C) (12/04/20 0730)  Pulse: 95 (12/04/20 0730)  Resp: 18 (12/04/20 0815)  BP: (!) 148/71 (12/04/20 0730)  SpO2: 99 % (12/04/20 0730) Vital Signs (24h Range):  Temp:  [97.7 °F (36.5 °C)-98.4 °F (36.9 °C)] 97.7 °F (36.5 °C)  Pulse:  [] 95  Resp:  [14-34] 18  SpO2:  [94 %-100 %] 99 %  BP: (121-175)/(59-87) 148/71     Weight: 71.7 kg (157 lb 15.4 oz)  Body mass index is 27.11 kg/m².    Intake/Output Summary (Last 24 hours) at 12/4/2020 0936  Last data filed at 12/3/2020 1830  Gross per 24 hour   Intake 1105 ml   Output --   Net 1105 ml      Physical Exam  Vitals signs and nursing note reviewed.   Constitutional:       General: She is not in acute distress.     Appearance: Normal appearance. She is obese. She is not ill-appearing, toxic-appearing or diaphoretic.   HENT:      Head: Normocephalic and atraumatic.   Cardiovascular:      Rate and Rhythm: Normal rate and regular rhythm.      Heart sounds: No murmur. No gallop.    Pulmonary:      Effort: No respiratory distress.      Breath sounds: No wheezing or rales.   Abdominal:      General: Abdomen is  flat. Bowel sounds are normal. There is no distension.      Palpations: Abdomen is soft.      Tenderness: There is no abdominal tenderness.   Neurological:      Mental Status: She is alert and oriented to person, place, and time.   Psychiatric:         Mood and Affect: Mood normal.         Behavior: Behavior normal.         Significant Labs:   BMP:   Recent Labs   Lab 12/02/20  1350   *      K 4.2      CO2 20*   BUN 10   CREATININE 0.7   CALCIUM 8.5*     CBC:   Recent Labs   Lab 12/02/20  1350 12/03/20  1917 12/04/20  0505   WBC 5.77  --  9.46   HGB 3.7* 8.2* 9.0*   HCT 14.3* 25.1* 28.8*     --  312       Significant Imaging:

## 2020-12-04 NOTE — PROGRESS NOTES
[facility] Hospital Medicine  Progress Note    Patient Name: Aurora Ornelas  MRN: [id number]  Patient Class: IP- Inpatient   Admission Date: 12/2/2020  Length of Stay: 2 days  Attending Physician: Sander Godoy MD  Primary Care Provider: To Obtain Unable        Subjective:     Principal Problem:Symptomatic anemia        HPI:  Mrs. Ornelas is a 56 yo F who is deaf. She presents with 2 days of SOB and fatigue and associated CP.  History mainly from ED staff as well as me with written communication. Patient found to have a Hgb of 3.7. Patient has a history of Fe def. Anemia and evidently stopped taking her Fe.  She other wise is very healthy appearing and has no complaints. Small bump in her trop to .09.  She was going to go to floor but house supervisor stated with that low of a Hgb, she needs to go to ICU. Evidently heme negative in ED.      Overview/Hospital Course:  Mrs. Ornelas is a 56 yo F who is deaf. She presents with 2 days of SOB and fatigue and associated CP.  History mainly from ED staff as well as me with written communication. Patient found to have a Hgb of 3.7. Patient has a history of Fe def. Anemia and evidently stopped taking her Fe.  Patient was heme negative in the ED. Started on Fe infusion with 3 units of PRBc's.  Patient had small bump in trop in flat pattern likely from demand ischemia. Discussed with Cards. No intervention. Follow up with out patient stress test.  Heme/onc consulted and rec: GI consult.  Patient was started on Fe replacement.  Patient was sent to the floor on 12/4/20. Patient went for EGD and Colonoscopy on 12/4.     Interval History: No new issues.     Review of Systems   Constitutional: Negative for activity change, appetite change, chills, diaphoresis, fatigue, fever and unexpected weight change.   HENT: Negative for congestion and dental problem.    Eyes: Negative for discharge and itching.   Respiratory: Negative for apnea, cough, choking, chest  tightness, shortness of breath and stridor.    Cardiovascular: Negative for chest pain, palpitations and leg swelling.   Gastrointestinal: Negative for abdominal distention, abdominal pain, anal bleeding, nausea and vomiting.   Genitourinary: Negative for difficulty urinating.   Musculoskeletal: Negative for arthralgias, back pain and gait problem.   Skin: Negative for color change and pallor.   Psychiatric/Behavioral: Negative for agitation and behavioral problems.     Objective:     Vital Signs (Most Recent):  Temp: 97.7 °F (36.5 °C) (12/04/20 0730)  Pulse: 95 (12/04/20 0730)  Resp: 18 (12/04/20 0815)  BP: (!) 148/71 (12/04/20 0730)  SpO2: 99 % (12/04/20 0730) Vital Signs (24h Range):  Temp:  [97.7 °F (36.5 °C)-98.4 °F (36.9 °C)] 97.7 °F (36.5 °C)  Pulse:  [] 95  Resp:  [14-34] 18  SpO2:  [94 %-100 %] 99 %  BP: (121-175)/(59-87) 148/71     Weight: 71.7 kg (157 lb 15.4 oz)  Body mass index is 27.11 kg/m².    Intake/Output Summary (Last 24 hours) at 12/4/2020 0936  Last data filed at 12/3/2020 1830  Gross per 24 hour   Intake 1105 ml   Output --   Net 1105 ml      Physical Exam  Vitals signs and nursing note reviewed.   Constitutional:       General: She is not in acute distress.     Appearance: Normal appearance. She is obese. She is not ill-appearing, toxic-appearing or diaphoretic.   HENT:      Head: Normocephalic and atraumatic.   Cardiovascular:      Rate and Rhythm: Normal rate and regular rhythm.      Heart sounds: No murmur. No gallop.    Pulmonary:      Effort: No respiratory distress.      Breath sounds: No wheezing or rales.   Abdominal:      General: Abdomen is flat. Bowel sounds are normal. There is no distension.      Palpations: Abdomen is soft.      Tenderness: There is no abdominal tenderness.   Neurological:      Mental Status: She is alert and oriented to person, place, and time.   Psychiatric:         Mood and Affect: Mood normal.         Behavior: Behavior normal.         Significant Labs:    BMP:   Recent Labs   Lab 12/02/20  1350   *      K 4.2      CO2 20*   BUN 10   CREATININE 0.7   CALCIUM 8.5*     CBC:   Recent Labs   Lab 12/02/20  1350 12/03/20  1917 12/04/20  0505   WBC 5.77  --  9.46   HGB 3.7* 8.2* 9.0*   HCT 14.3* 25.1* 28.8*     --  312       Significant Imaging:      Assessment/Plan:      * Symptomatic anemia  Hgb is 3.7. Heme negative in the ED.  Likely from non-compliance from Iron replacement at home. 3 units of blood. Fe IV for 3 days. Patient very stable and will be transferred to the floor tomorrow.     Delay in getting blood due to anti-bodies.  Will transfuse- transfer to floor. Heme following.  GI consulted     Resolved issue. Hgb now > 9   EGD and colonoscopy today (12/4)       Elevated troponin  Likely demand ischemia from Hgb of 3.7.  Will trend out     No cardiac intervention. Out patient stress test       Obesity  Body mass index is 27.12 kg/m².  Weight loss as out patient       Shortness of breath  From #1.  ED mentioned possible L sided pneumonia. I don't agree. No coughing   No wbc count. Normal lung exam. Patient non- ill appearing. Monitor for now.     resolved      Chest pain  None currently.  Likely from severe anemia.   Will monitor.       Mild malnutrition  Will discuss with patient       Hyperglycemia  Will check an A1c      Hypertension  Resume home meds. Benign essential      Deaf  No acute issues       Severe persistent asthma with acute exacerbation  No acute issues.          VTE Risk Mitigation (From admission, onward)         Ordered     Place sequential compression device  Until discontinued      12/02/20 7682                Discharge Planning   CHARIS:      Code Status: Prior   Is the patient medically ready for discharge?:     Reason for patient still in hospital (select all that apply): Patient unstable  Discharge Plan A: Home with family            Critical care time spent on the evaluation and treatment of severe organ dysfunction,  review of pertinent labs and imaging studies, discussions with consulting providers and discussions with patient/family 25  Minutes.    EGD/colonoscopy today. To floor. Likely home tomorrow.         Sander Morales MD  Department of Hospital Medicine   [facility]

## 2020-12-04 NOTE — TRANSFER OF CARE
"Anesthesia Transfer of Care Note    Patient: Aurora Ornelas    Procedure(s) Performed: Procedure(s) (LRB):  EGD (ESOPHAGOGASTRODUODENOSCOPY) (N/A)  COLONOSCOPY (N/A)    Patient location: GI    Anesthesia Type: general    Transport from OR: Transported from OR on room air with adequate spontaneous ventilation    Post pain: adequate analgesia    Post assessment: no apparent anesthetic complications and tolerated procedure well    Post vital signs: stable    Level of consciousness: sedated    Nausea/Vomiting: no nausea/vomiting    Complications: none    Transfer of care protocol was followed      Last vitals:   Visit Vitals  /60 (BP Location: Left arm, Patient Position: Lying)   Pulse 99   Temp 36.8 °C (98.2 °F) (Axillary)   Resp 17   Ht 5' 4" (1.626 m)   Wt 71.7 kg (157 lb 15.4 oz)   LMP 07/07/2016   SpO2 100%   Breastfeeding No   BMI 27.11 kg/m²     "

## 2020-12-04 NOTE — CARE UPDATE
Transfer Summary:    Transfer Summary:     Mrs. Ornelas is a 56 yo F who is deaf. She presents with 2 days of SOB and fatigue and associated CP.  History mainly from ED staff as well as me with written communication. Patient found to have a Hgb of 3.7. Patient has a history of Fe def. Anemia and evidently stopped taking her Fe.  Patient was heme negative in the ED. Started on Fe infusion with 2 units of PRBc's.  Patient had small bump in trop in flat pattern likely from demand ischemia. Discussed with Cards. No intervention. Follow up with out patient stress test.  Heme/onc consulted and rec: GI consult.  Patient was started on Fe replacement.  Patient had an EGD and colonoscopy on 12/4.  Patient was transferred to the floor on 12/4.      Transfer to floor. EGD esophagitis and hiatal hernia. C-scope pending as of this writing. 3 days of IV Fe- then resume home Fe (was not taking). Home likely on 12/5.  Patient is deaf.      Revision History

## 2020-12-05 VITALS
HEART RATE: 91 BPM | SYSTOLIC BLOOD PRESSURE: 145 MMHG | OXYGEN SATURATION: 99 % | BODY MASS INDEX: 26.96 KG/M2 | HEIGHT: 64 IN | WEIGHT: 157.94 LBS | TEMPERATURE: 98 F | RESPIRATION RATE: 18 BRPM | DIASTOLIC BLOOD PRESSURE: 61 MMHG

## 2020-12-05 LAB
BASOPHILS # BLD AUTO: 0.03 K/UL (ref 0–0.2)
BASOPHILS NFR BLD: 0.5 % (ref 0–1.9)
DIFFERENTIAL METHOD: ABNORMAL
EOSINOPHIL # BLD AUTO: 0.2 K/UL (ref 0–0.5)
EOSINOPHIL NFR BLD: 3.3 % (ref 0–8)
ERYTHROCYTE [DISTWIDTH] IN BLOOD BY AUTOMATED COUNT: 20.9 % (ref 11.5–14.5)
HCT VFR BLD AUTO: 25.8 % (ref 37–48.5)
HGB BLD-MCNC: 8.4 G/DL (ref 12–16)
IMM GRANULOCYTES # BLD AUTO: 0.07 K/UL (ref 0–0.04)
IMM GRANULOCYTES NFR BLD AUTO: 1.2 % (ref 0–0.5)
LYMPHOCYTES # BLD AUTO: 0.9 K/UL (ref 1–4.8)
LYMPHOCYTES NFR BLD: 14.2 % (ref 18–48)
MCH RBC QN AUTO: 25.5 PG (ref 27–31)
MCHC RBC AUTO-ENTMCNC: 32.6 G/DL (ref 32–36)
MCV RBC AUTO: 78 FL (ref 82–98)
MONOCYTES # BLD AUTO: 0.7 K/UL (ref 0.3–1)
MONOCYTES NFR BLD: 12.4 % (ref 4–15)
NEUTROPHILS # BLD AUTO: 4.1 K/UL (ref 1.8–7.7)
NEUTROPHILS NFR BLD: 68.4 % (ref 38–73)
NRBC BLD-RTO: 2 /100 WBC
PLATELET # BLD AUTO: 298 K/UL (ref 150–350)
PMV BLD AUTO: 10.1 FL (ref 9.2–12.9)
RBC # BLD AUTO: 3.3 M/UL (ref 4–5.4)
WBC # BLD AUTO: 5.99 K/UL (ref 3.9–12.7)

## 2020-12-05 PROCEDURE — 25000003 PHARM REV CODE 250: Performed by: INTERNAL MEDICINE

## 2020-12-05 PROCEDURE — 36415 COLL VENOUS BLD VENIPUNCTURE: CPT

## 2020-12-05 PROCEDURE — 85025 COMPLETE CBC W/AUTO DIFF WBC: CPT

## 2020-12-05 RX ORDER — IPRATROPIUM BROMIDE 0.5 MG/2.5ML
500 SOLUTION RESPIRATORY (INHALATION) 4 TIMES DAILY PRN
Qty: 1 BOX | Refills: 0 | Status: ON HOLD | OUTPATIENT
Start: 2020-12-05 | End: 2022-11-08 | Stop reason: HOSPADM

## 2020-12-05 RX ORDER — PANTOPRAZOLE SODIUM 40 MG/1
40 TABLET, DELAYED RELEASE ORAL 2 TIMES DAILY
Qty: 60 TABLET | Refills: 1 | Status: SHIPPED | OUTPATIENT
Start: 2020-12-05 | End: 2021-01-25 | Stop reason: SDUPTHER

## 2020-12-05 RX ORDER — HYDRALAZINE HYDROCHLORIDE 25 MG/1
25 TABLET, FILM COATED ORAL EVERY 8 HOURS
Qty: 90 TABLET | Refills: 0 | Status: SHIPPED | OUTPATIENT
Start: 2020-12-05 | End: 2021-01-25 | Stop reason: SDUPTHER

## 2020-12-05 RX ORDER — AMLODIPINE BESYLATE 10 MG/1
10 TABLET ORAL DAILY
Qty: 30 TABLET | Refills: 1 | Status: SHIPPED | OUTPATIENT
Start: 2020-12-05 | End: 2021-02-08

## 2020-12-05 RX ORDER — ALBUTEROL SULFATE 90 UG/1
1-2 AEROSOL, METERED RESPIRATORY (INHALATION) EVERY 6 HOURS PRN
Qty: 6.7 G | Refills: 0 | Status: SHIPPED | OUTPATIENT
Start: 2020-12-05 | End: 2021-01-04

## 2020-12-05 RX ORDER — FERROUS SULFATE 325(65) MG
325 TABLET ORAL 2 TIMES DAILY
Qty: 60 TABLET | Refills: 0 | Status: SHIPPED | OUTPATIENT
Start: 2020-12-05 | End: 2021-01-04

## 2020-12-05 RX ORDER — ALBUTEROL SULFATE 2.5 MG/.5ML
2.5 SOLUTION RESPIRATORY (INHALATION) EVERY 4 HOURS PRN
Qty: 1 EACH | Refills: 0 | Status: ON HOLD | OUTPATIENT
Start: 2020-12-05 | End: 2022-11-08 | Stop reason: HOSPADM

## 2020-12-05 RX ADMIN — PANTOPRAZOLE SODIUM 40 MG: 40 TABLET, DELAYED RELEASE ORAL at 09:12

## 2020-12-05 RX ADMIN — HYDRALAZINE HYDROCHLORIDE 25 MG: 25 TABLET, FILM COATED ORAL at 05:12

## 2020-12-05 RX ADMIN — MUPIROCIN: 20 OINTMENT TOPICAL at 09:12

## 2020-12-05 RX ADMIN — AMLODIPINE BESYLATE 10 MG: 5 TABLET ORAL at 09:12

## 2020-12-05 NOTE — NURSING
Report received from Marjan ARANDA. Patient remains free from falls and injury. No distress noted. VSS. Questions encouraged and answered. Patient verbalized understanding. Bed locked and in low position; safety maintained. Call light in reach. White board updated, and explained. No complaint of pain, n/v, diarrhea, or SOB.  Will continue to monitor, will continue with plan of care.

## 2020-12-05 NOTE — PLAN OF CARE
12/05/20 1020   Final Note   Assessment Type Final Discharge Note   Anticipated Discharge Disposition Home   What phone number can be called within the next 1-3 days to see how you are doing after discharge?   (see chart)   Hospital Follow Up  Appt(s) scheduled? Yes  (daughter advised to schedule appointments on Monday. Reports understanding)   Discharge plans and expectations educations in teach back method with documentation complete? Yes   Right Care Referral Info   Post Acute Recommendation No Care   Post-Acute Status   Post-Acute Authorization Other   Other Status No Post-Acute Service Needs   Discharge Delays None known at this time     Patient scheduled to dc today, 12/5/20.  presented to patient's bedside. Patient's daughter at bedside and patient in shower.  provided daughter, Klarissa , with dc information. Daughter advised to schedule appointments on Monday. Caregiver reports understanding and denied having questions. No further needs identified.

## 2020-12-05 NOTE — PROGRESS NOTES
[facility] CASE MANAGEMENT                  WRITTEN DISCHARGE INFORMATION      APPOINTMENTS AND RESOURCES TO HELP YOU MANAGE YOUR CARE AT HOME BASED ON YOUR PREFERENCES:  (If an appointment is not scheduled for you when you leave the hospital, call your doctor to schedule a follow up visit within a week)  Follow-up Information     Kenzie Diaz MD. Schedule an appointment as soon as possible for a visit in 1 week.    Specialties: Hematology and Oncology, Hematology  Why: Please schedule an appointment following hospital discharge  Contact information:  [address]  [address]  [place] [id number] [phone]             Burak Burger MD. Schedule an appointment as soon as possible for a visit in 1 week.    Specialty: Cardiology  Why: Please schedule an appointment following hospital discharge  Contact information:  [address]  [address]  [place] [id number] [phone]             [facility] GASTROENTEROLOGY. Schedule an appointment as soon as possible for a visit in 1 week.    Specialty: Gastroenterology  Why: Please schedule an appointment with gastroenterology following hospital discharge  Contact information:  [address] [place] [address]  [phone]                 Healthy Living Instructions to HELP MANAGE YOUR CARE AT HOME:  Things You are responsible for:  1.    Getting your prescriptions filled   2.    Taking your medications as directed, DO NOT MISS ANY DOSES!  3.    Following the diet and exercise recommended by your doctor  4.    Going to your follow-up doctor appointment. This is important because it allows the doctor to monitor your progress and determine if any changes need to made to your treatment plan.  5. If you have any questions about MANAGING YOUR CARE AT HOME Call the Nurse Care Line for 24/7 Assistance [phone]     Thank you for choosing [facility] for your healthcare needs!

## 2020-12-05 NOTE — DISCHARGE SUMMARY
[facility] Hospital Medicine  Discharge Summary      Patient Name: Aurora Ornelas  MRN: [id number]  Admission Date: 12/2/2020  Hospital Length of Stay: 3 days  Discharge Date and Time:  12/05/2020 11:29 AM  Attending Physician: Cony Mathur MD   Discharging Provider: Cony Mathur MD  Primary Care Provider: To Obtain Unable      HPI:   Mrs. Ornelas is a 54 yo F who is deaf. She presents with 2 days of SOB and fatigue and associated CP.  History mainly from ED staff as well as me with written communication. Patient found to have a Hgb of 3.7. Patient has a history of Fe def. Anemia and evidently stopped taking her Fe.  She other wise is very healthy appearing and has no complaints. Small bump in her trop to .09.  She was going to go to floor but house supervisor stated with that low of a Hgb, she needs to go to ICU. Evidently heme negative in ED.      Procedure(s) (LRB):  EGD (ESOPHAGOGASTRODUODENOSCOPY) (N/A)  COLONOSCOPY (N/A)      Hospital Course:   Mrs. Ornelas is a 54 yo F who is deaf. She presents with 2 days of SOB and fatigue and associated CP.  History mainly from ED staff as well as me with written communication. Patient found to have a Hgb of 3.7. Patient has a history of Fe def. Anemia and evidently stopped taking her Fe.  Patient was heme negative in the ED. Started on Fe infusion with 3 units of PRBc's.  Patient had small bump in trop in flat pattern likely from demand ischemia. Was discussed with Cards. No intervention. Follow up with out patient stress test.  Heme/onc consulted and rec: GI consult.  Patient was started on Fe replacement.  Patient was sent to the floor on 12/4/20. Patient went for EGD and Colonoscopy on 12/4.show esophagitis,with no sign of bleeding,was started on PPI,she remains stable on floor,HH is much improved,she was asymptomatic at DC time,patient was discharged home with PPI and Iron supplement and follow up with PCP,hematology,cardiology and  GI as out patient.     Consults:   Consults (From admission, onward)        Status Ordering Provider     Inpatient consult to Gastroenterology  Once     Provider:  Felicia Bishop MD    Completed HORACE CUMMINGS     Inpatient consult to Hematology  Once     Provider:  Kenzie Diaz MD    Completed EMMANUEL MALONE     Inpatient consult to Social Work  Once     Provider:  (Not yet assigned)    Completed KODI RODRIGUEZ          No new Assessment & Plan notes have been filed under this hospital service since the last note was generated.  Service: Hospital Medicine    Final Active Diagnoses:    Diagnosis Date Noted POA    PRINCIPAL PROBLEM:  Symptomatic anemia [D64.9] 04/13/2018 Yes    Shortness of breath [R06.02] 12/02/2020 Yes    Obesity [E66.9] 12/02/2020 Yes    Chest pain [R07.9] 02/27/2019 Yes    Elevated troponin [R77.8] 04/14/2018 Yes    Deaf [H91.90] 04/13/2018 Yes    Hyperglycemia [R73.9] 04/13/2018 Yes    Hypertension [I10] 04/13/2018 Yes    Mild malnutrition [E44.1] 04/13/2018 Yes    Severe persistent asthma with acute exacerbation [J45.51]  Yes      Problems Resolved During this Admission:       Discharged Condition: stable    Disposition: Home or Self Care    Follow Up:  Follow-up Information     Kenzie Diaz MD. Schedule an appointment as soon as possible for a visit in 1 week.    Specialties: Hematology and Oncology, Hematology  Why: Please schedule an appointment following hospital discharge  Contact information:  [address]  [address]  [place] [id number] [phone]             Burak Burger MD. Schedule an appointment as soon as possible for a visit in 1 week.    Specialty: Cardiology  Why: Please schedule an appointment following hospital discharge  Contact information:  [address]  [address]  [place] [address]  [phone]             [facility] GASTROENTEROLOGY. Schedule an appointment as soon as possible for a visit in 1 week.    Specialty:  Gastroenterology  Why: Please schedule an appointment with gastroenterology following hospital discharge  Contact information:  [address] [place] [address]  [phone]               Patient Instructions:      Activity as tolerated       Significant Diagnostic Studies: Labs:   BMP: No results for input(s): GLU, NA, K, CL, CO2, BUN, CREATININE, CALCIUM, MG in the last 48 hours., CMP No results for input(s): NA, K, CL, CO2, GLU, BUN, CREATININE, CALCIUM, PROT, ALBUMIN, BILITOT, ALKPHOS, AST, ALT, ANIONGAP, ESTGFRAFRICA, EGFRNONAA in the last 48 hours. and CBC   Recent Labs   Lab 12/03/20  1917 12/04/20  0505 12/05/20  0516   WBC  --  9.46 5.99   HGB 8.2* 9.0* 8.4*   HCT 25.1* 28.8* 25.8*   PLT  --  312 298     Radiology: X-Ray: CXR: X-Ray Chest 1 View (CXR): No results found for this visit on 12/02/20. and X-Ray Chest PA and Lateral (CXR): No results found for this visit on 12/02/20.  Endoscopy: Colonoscopy  and Gastroscopy    Pending Diagnostic Studies:     None         Medications:  Reconciled Home Medications:      Medication List      START taking these medications    hydrALAZINE 25 MG tablet  Commonly known as: APRESOLINE  Take 1 tablet (25 mg total) by mouth every 8 (eight) hours.        CHANGE how you take these medications    ferrous sulfate 325 mg (65 mg iron) Tab tablet  Commonly known as: FEOSOL  Take 1 tablet (325 mg total) by mouth 2 (two) times daily.  What changed: when to take this        CONTINUE taking these medications    * albuterol 90 mcg/actuation inhaler  Commonly known as: PROVENTIL/VENTOLIN HFA  Inhale 1-2 puffs into the lungs every 6 (six) hours as needed for Wheezing or Shortness of Breath. Rescue     * albuterol sulfate 2.5 mg/0.5 mL Nebu  Take 2.5 mg by nebulization every 4 (four) hours as needed. Rescue     amLODIPine 10 MG tablet  Commonly known as: NORVASC  Take 1 tablet (10 mg total) by mouth once daily.     ipratropium 0.02 % nebulizer solution  Commonly known as:  ATROVENT  Take 2.5 mLs (500 mcg total) by nebulization 4 (four) times daily as needed for Wheezing. Rescue     pantoprazole 40 MG tablet  Commonly known as: PROTONIX  Take 1 tablet (40 mg total) by mouth 2 (two) times daily.         * This list has 2 medication(s) that are the same as other medications prescribed for you. Read the directions carefully, and ask your doctor or other care provider to review them with you.            STOP taking these medications    azithromycin 250 MG tablet  Commonly known as: Z-KATHY     cetirizine 10 MG tablet  Commonly known as: ZYRTEC     sulindac 150 MG tablet  Commonly known as: CLINORIL            Indwelling Lines/Drains at time of discharge:   Lines/Drains/Airways     None                 Time spent on the discharge of patient: over 30  minutes  Patient was seen and examined on the date of discharge and determined to be suitable for discharge.         Cony Mathur MD  Department of Hospital Medicine  [facility]

## 2020-12-06 NOTE — ANESTHESIA POSTPROCEDURE EVALUATION
Anesthesia Post Evaluation    Patient: Aurora Ornelas    Procedure(s) Performed: Procedure(s) (LRB):  EGD (ESOPHAGOGASTRODUODENOSCOPY) (N/A)  COLONOSCOPY (N/A)    Final Anesthesia Type: general    Patient location during evaluation: GI PACU  Patient participation: Yes- Able to Participate  Level of consciousness: awake and alert, oriented and awake  Post-procedure vital signs: reviewed and stable  Airway patency: patent    PONV status at discharge: No PONV  Anesthetic complications: no      Cardiovascular status: blood pressure returned to baseline  Respiratory status: unassisted, spontaneous ventilation and room air  Hydration status: euvolemic  Follow-up not needed.          Vitals Value Taken Time   /61 12/05/20 0754   Temp 36.7 °C (98.1 °F) 12/05/20 0754   Pulse 91 12/05/20 0754   Resp 18 12/05/20 0754   SpO2 99 % 12/05/20 0754         No case tracking events are documented in the log.      Pain/Edi Score: No data recorded

## 2020-12-10 ENCOUNTER — TELEPHONE (OUTPATIENT)
Dept: GASTROENTEROLOGY | Facility: CLINIC | Age: 55
End: 2020-12-10

## 2020-12-10 NOTE — TELEPHONE ENCOUNTER
----- Message from Felicia Bishop MD sent at 12/10/2020 10:53 AM CST -----  Regarding: RE: Patient is deaf, please call daughter to arrange capsule  Perfect!  Thanks!  ----- Message -----  From: Patricia Raymond MA  Sent: 12/10/2020  10:48 AM CST  To: Felicia Bishop MD  Subject: RE: Patient is deaf, please call daughter to#    Hi ,  I spoke with Klarissa, [phone], patients daughter.  Patient is scheduled for 12/22 for her VCE.   I will place the consent on your desk.   Thank You!    Gwendolyn  ----- Message -----  From: Felicia Bishop MD  Sent: 12/9/2020   9:48 AM CST  To: Patricia Raymond MA  Subject: RE: Patient is deaf, please call daughter to#    I can do a phone consent for her when I am at [facility] next week and give it to you  ----- Message -----  From: Patricia Raymond MA  Sent: 12/7/2020   1:15 PM CST  To: Felicia Bishop MD  Subject: RE: Patient is deaf, please call daughter to#    Do you want to do a phone consent or consent the day she comes in?  We are required to have a consent before we can do the test.   Gwendolyn  ----- Message -----  From: Felicia Bishop MD  Sent: 12/7/2020  12:30 PM CST  To: Patricia Raymond MA  Subject: RE: Patient is deaf, please call daughter to#    Hi Gwendolyn, thank you for your help.  No a consent was not signed for the capsule  ----- Message -----  From: Patricia Raymond MA  Sent: 12/7/2020  11:31 AM CST  To: Felicia Bishop MD  Subject: FW: Patient is deaf, please call daughter to#    ,  I was sent this message to help schedule this patient.  Was a consent signed? Gwendolyn  ----- Message -----  From: Lalita Morales MA  Sent: 12/4/2020   6:03 PM CST  To: Patricia Raymond MA  Subject: RE: Patient is deaf, please call daughter to#    Can you help?  ----- Message -----  From: Felicia Bishop MD  Sent: 12/4/2020   5:39 PM CST  To: Dario Duarte Staff  Subject: Patient is deaf, please call daughter to arr#    Hello,    When you call to  arrange the capsule please call Ms Ornelas daughter to schedule.  Ms Ornelas needs sign language interpretation to communicate.  Thanks!    Felicia

## 2020-12-11 ENCOUNTER — TELEPHONE (OUTPATIENT)
Dept: ENDOSCOPY | Facility: HOSPITAL | Age: 55
End: 2020-12-11

## 2020-12-11 NOTE — TELEPHONE ENCOUNTER
Contacted pt's daughter to schedule EGD. LVM with direct line at [facility] [phone]

## 2021-01-11 ENCOUNTER — OFFICE VISIT (OUTPATIENT)
Dept: HEMATOLOGY/ONCOLOGY | Facility: CLINIC | Age: 56
End: 2021-01-11
Payer: MEDICARE

## 2021-01-11 VITALS
OXYGEN SATURATION: 98 % | DIASTOLIC BLOOD PRESSURE: 86 MMHG | WEIGHT: 152.75 LBS | HEART RATE: 88 BPM | HEIGHT: 63 IN | SYSTOLIC BLOOD PRESSURE: 140 MMHG | TEMPERATURE: 98 F | BODY MASS INDEX: 27.07 KG/M2

## 2021-01-11 DIAGNOSIS — D64.9 ANEMIA, UNSPECIFIED TYPE: Primary | ICD-10-CM

## 2021-01-11 DIAGNOSIS — I10 ESSENTIAL HYPERTENSION: ICD-10-CM

## 2021-01-11 PROCEDURE — 3008F BODY MASS INDEX DOCD: CPT | Mod: CPTII,S$GLB,, | Performed by: INTERNAL MEDICINE

## 2021-01-11 PROCEDURE — 3079F PR MOST RECENT DIASTOLIC BLOOD PRESSURE 80-89 MM HG: ICD-10-PCS | Mod: CPTII,S$GLB,, | Performed by: INTERNAL MEDICINE

## 2021-01-11 PROCEDURE — 99999 PR PBB SHADOW E&M-EST. PATIENT-LVL V: ICD-10-PCS | Mod: PBBFAC,,, | Performed by: INTERNAL MEDICINE

## 2021-01-11 PROCEDURE — 99214 OFFICE O/P EST MOD 30 MIN: CPT | Mod: S$GLB,,, | Performed by: INTERNAL MEDICINE

## 2021-01-11 PROCEDURE — 3079F DIAST BP 80-89 MM HG: CPT | Mod: CPTII,S$GLB,, | Performed by: INTERNAL MEDICINE

## 2021-01-11 PROCEDURE — 99214 PR OFFICE/OUTPT VISIT, EST, LEVL IV, 30-39 MIN: ICD-10-PCS | Mod: S$GLB,,, | Performed by: INTERNAL MEDICINE

## 2021-01-11 PROCEDURE — 1126F AMNT PAIN NOTED NONE PRSNT: CPT | Mod: S$GLB,,, | Performed by: INTERNAL MEDICINE

## 2021-01-11 PROCEDURE — 3008F PR BODY MASS INDEX (BMI) DOCUMENTED: ICD-10-PCS | Mod: CPTII,S$GLB,, | Performed by: INTERNAL MEDICINE

## 2021-01-11 PROCEDURE — 99999 PR PBB SHADOW E&M-EST. PATIENT-LVL V: CPT | Mod: PBBFAC,,, | Performed by: INTERNAL MEDICINE

## 2021-01-11 PROCEDURE — 1126F PR PAIN SEVERITY QUANTIFIED, NO PAIN PRESENT: ICD-10-PCS | Mod: S$GLB,,, | Performed by: INTERNAL MEDICINE

## 2021-01-11 PROCEDURE — 3077F SYST BP >= 140 MM HG: CPT | Mod: CPTII,S$GLB,, | Performed by: INTERNAL MEDICINE

## 2021-01-11 PROCEDURE — 3077F PR MOST RECENT SYSTOLIC BLOOD PRESSURE >= 140 MM HG: ICD-10-PCS | Mod: CPTII,S$GLB,, | Performed by: INTERNAL MEDICINE

## 2021-01-11 RX ORDER — PREDNISONE 50 MG/1
50 TABLET ORAL DAILY
Status: ON HOLD | COMMUNITY
End: 2022-11-08 | Stop reason: HOSPADM

## 2021-01-11 RX ORDER — FERROUS SULFATE 325(65) MG
325 TABLET, DELAYED RELEASE (ENTERIC COATED) ORAL 2 TIMES DAILY
Status: ON HOLD | COMMUNITY
End: 2022-11-08 | Stop reason: HOSPADM

## 2021-01-11 RX ORDER — TRAMADOL HYDROCHLORIDE 50 MG/1
TABLET ORAL
COMMUNITY
Start: 2020-07-09 | End: 2022-10-18 | Stop reason: ALTCHOICE

## 2021-01-11 RX ORDER — IPRATROPIUM BROMIDE AND ALBUTEROL SULFATE 2.5; .5 MG/3ML; MG/3ML
3 SOLUTION RESPIRATORY (INHALATION)
Status: ON HOLD | COMMUNITY
Start: 2020-10-21 | End: 2022-11-08 | Stop reason: HOSPADM

## 2021-01-11 RX ORDER — IPRATROPIUM BROMIDE AND ALBUTEROL SULFATE 2.5; .5 MG/3ML; MG/3ML
SOLUTION RESPIRATORY (INHALATION)
COMMUNITY
Start: 2020-10-21

## 2021-01-11 RX ORDER — ERGOCALCIFEROL 1.25 MG/1
50000 CAPSULE ORAL
Status: ON HOLD | COMMUNITY
Start: 2020-10-21 | End: 2022-11-08 | Stop reason: HOSPADM

## 2021-01-25 ENCOUNTER — OFFICE VISIT (OUTPATIENT)
Dept: GASTROENTEROLOGY | Facility: CLINIC | Age: 56
End: 2021-01-25
Payer: MEDICARE

## 2021-01-25 VITALS
HEART RATE: 87 BPM | HEIGHT: 63 IN | WEIGHT: 152.31 LBS | SYSTOLIC BLOOD PRESSURE: 173 MMHG | DIASTOLIC BLOOD PRESSURE: 77 MMHG | BODY MASS INDEX: 26.99 KG/M2

## 2021-01-25 DIAGNOSIS — K20.90 ESOPHAGITIS: ICD-10-CM

## 2021-01-25 DIAGNOSIS — Z01.818 PRE-OP TESTING: ICD-10-CM

## 2021-01-25 DIAGNOSIS — I15.9 SECONDARY HYPERTENSION: Primary | ICD-10-CM

## 2021-01-25 PROCEDURE — 3078F PR MOST RECENT DIASTOLIC BLOOD PRESSURE < 80 MM HG: ICD-10-PCS | Mod: CPTII,S$GLB,, | Performed by: INTERNAL MEDICINE

## 2021-01-25 PROCEDURE — 99999 PR PBB SHADOW E&M-EST. PATIENT-LVL IV: CPT | Mod: PBBFAC,,, | Performed by: INTERNAL MEDICINE

## 2021-01-25 PROCEDURE — 3077F SYST BP >= 140 MM HG: CPT | Mod: CPTII,S$GLB,, | Performed by: INTERNAL MEDICINE

## 2021-01-25 PROCEDURE — 99999 PR PBB SHADOW E&M-EST. PATIENT-LVL IV: ICD-10-PCS | Mod: PBBFAC,,, | Performed by: INTERNAL MEDICINE

## 2021-01-25 PROCEDURE — 3008F PR BODY MASS INDEX (BMI) DOCUMENTED: ICD-10-PCS | Mod: CPTII,S$GLB,, | Performed by: INTERNAL MEDICINE

## 2021-01-25 PROCEDURE — 1126F PR PAIN SEVERITY QUANTIFIED, NO PAIN PRESENT: ICD-10-PCS | Mod: S$GLB,,, | Performed by: INTERNAL MEDICINE

## 2021-01-25 PROCEDURE — 3078F DIAST BP <80 MM HG: CPT | Mod: CPTII,S$GLB,, | Performed by: INTERNAL MEDICINE

## 2021-01-25 PROCEDURE — 3008F BODY MASS INDEX DOCD: CPT | Mod: CPTII,S$GLB,, | Performed by: INTERNAL MEDICINE

## 2021-01-25 PROCEDURE — 99214 OFFICE O/P EST MOD 30 MIN: CPT | Mod: S$GLB,,, | Performed by: INTERNAL MEDICINE

## 2021-01-25 PROCEDURE — 3077F PR MOST RECENT SYSTOLIC BLOOD PRESSURE >= 140 MM HG: ICD-10-PCS | Mod: CPTII,S$GLB,, | Performed by: INTERNAL MEDICINE

## 2021-01-25 PROCEDURE — 1126F AMNT PAIN NOTED NONE PRSNT: CPT | Mod: S$GLB,,, | Performed by: INTERNAL MEDICINE

## 2021-01-25 PROCEDURE — 99214 PR OFFICE/OUTPT VISIT, EST, LEVL IV, 30-39 MIN: ICD-10-PCS | Mod: S$GLB,,, | Performed by: INTERNAL MEDICINE

## 2021-01-25 RX ORDER — HYDRALAZINE HYDROCHLORIDE 25 MG/1
25 TABLET, FILM COATED ORAL EVERY 8 HOURS
Qty: 90 TABLET | Refills: 0 | Status: ON HOLD | OUTPATIENT
Start: 2021-01-25 | End: 2021-12-29 | Stop reason: HOSPADM

## 2021-01-25 RX ORDER — PANTOPRAZOLE SODIUM 40 MG/1
40 TABLET, DELAYED RELEASE ORAL 2 TIMES DAILY
Qty: 60 TABLET | Refills: 3 | Status: ON HOLD | OUTPATIENT
Start: 2021-01-25 | End: 2021-12-29 | Stop reason: HOSPADM

## 2021-02-04 ENCOUNTER — LAB VISIT (OUTPATIENT)
Dept: LAB | Facility: HOSPITAL | Age: 56
End: 2021-02-04
Attending: INTERNAL MEDICINE
Payer: MEDICARE

## 2021-02-04 DIAGNOSIS — D64.9 ANEMIA, UNSPECIFIED TYPE: ICD-10-CM

## 2021-02-04 LAB
ALBUMIN SERPL BCP-MCNC: 3.4 G/DL (ref 3.5–5.2)
ALP SERPL-CCNC: 59 U/L (ref 55–135)
ALT SERPL W/O P-5'-P-CCNC: 10 U/L (ref 10–44)
ANION GAP SERPL CALC-SCNC: 11 MMOL/L (ref 8–16)
AST SERPL-CCNC: 15 U/L (ref 10–40)
BASOPHILS # BLD AUTO: 0.01 K/UL (ref 0–0.2)
BASOPHILS NFR BLD: 0.2 % (ref 0–1.9)
BILIRUB SERPL-MCNC: 0.3 MG/DL (ref 0.1–1)
BUN SERPL-MCNC: 9 MG/DL (ref 6–20)
CALCIUM SERPL-MCNC: 8.3 MG/DL (ref 8.7–10.5)
CHLORIDE SERPL-SCNC: 104 MMOL/L (ref 95–110)
CO2 SERPL-SCNC: 23 MMOL/L (ref 23–29)
CREAT SERPL-MCNC: 0.7 MG/DL (ref 0.5–1.4)
DIFFERENTIAL METHOD: ABNORMAL
EOSINOPHIL # BLD AUTO: 0.1 K/UL (ref 0–0.5)
EOSINOPHIL NFR BLD: 2.9 % (ref 0–8)
ERYTHROCYTE [DISTWIDTH] IN BLOOD BY AUTOMATED COUNT: 19.3 % (ref 11.5–14.5)
EST. GFR  (AFRICAN AMERICAN): >60 ML/MIN/1.73 M^2
EST. GFR  (NON AFRICAN AMERICAN): >60 ML/MIN/1.73 M^2
FERRITIN SERPL-MCNC: 17 NG/ML (ref 20–300)
GLUCOSE SERPL-MCNC: 96 MG/DL (ref 70–110)
HCT VFR BLD AUTO: 39.5 % (ref 37–48.5)
HGB BLD-MCNC: 13.2 G/DL (ref 12–16)
IMM GRANULOCYTES # BLD AUTO: 0 K/UL (ref 0–0.04)
IMM GRANULOCYTES NFR BLD AUTO: 0 % (ref 0–0.5)
IRON SERPL-MCNC: 229 UG/DL (ref 30–160)
LYMPHOCYTES # BLD AUTO: 1.3 K/UL (ref 1–4.8)
LYMPHOCYTES NFR BLD: 30.5 % (ref 18–48)
MCH RBC QN AUTO: 29.9 PG (ref 27–31)
MCHC RBC AUTO-ENTMCNC: 33.4 G/DL (ref 32–36)
MCV RBC AUTO: 89 FL (ref 82–98)
MONOCYTES # BLD AUTO: 0.5 K/UL (ref 0.3–1)
MONOCYTES NFR BLD: 12.7 % (ref 4–15)
NEUTROPHILS # BLD AUTO: 2.2 K/UL (ref 1.8–7.7)
NEUTROPHILS NFR BLD: 53.7 % (ref 38–73)
NRBC BLD-RTO: 0 /100 WBC
PLATELET # BLD AUTO: 227 K/UL (ref 150–350)
PMV BLD AUTO: 8.3 FL (ref 9.2–12.9)
POTASSIUM SERPL-SCNC: 4.2 MMOL/L (ref 3.5–5.1)
PROT SERPL-MCNC: 7.2 G/DL (ref 6–8.4)
RBC # BLD AUTO: 4.42 M/UL (ref 4–5.4)
SATURATED IRON: 63 % (ref 20–50)
SODIUM SERPL-SCNC: 138 MMOL/L (ref 136–145)
TOTAL IRON BINDING CAPACITY: 361 UG/DL (ref 250–450)
TRANSFERRIN SERPL-MCNC: 244 MG/DL (ref 200–375)
WBC # BLD AUTO: 4.17 K/UL (ref 3.9–12.7)

## 2021-02-04 PROCEDURE — 82728 ASSAY OF FERRITIN: CPT

## 2021-02-04 PROCEDURE — 85025 COMPLETE CBC W/AUTO DIFF WBC: CPT

## 2021-02-04 PROCEDURE — 80053 COMPREHEN METABOLIC PANEL: CPT

## 2021-02-04 PROCEDURE — 83540 ASSAY OF IRON: CPT

## 2021-02-04 PROCEDURE — 36415 COLL VENOUS BLD VENIPUNCTURE: CPT

## 2021-02-08 ENCOUNTER — OFFICE VISIT (OUTPATIENT)
Dept: HEMATOLOGY/ONCOLOGY | Facility: CLINIC | Age: 56
End: 2021-02-08
Payer: MEDICARE

## 2021-02-08 ENCOUNTER — OFFICE VISIT (OUTPATIENT)
Dept: OBSTETRICS AND GYNECOLOGY | Facility: CLINIC | Age: 56
End: 2021-02-08
Payer: MEDICARE

## 2021-02-08 VITALS
BODY MASS INDEX: 27.19 KG/M2 | HEIGHT: 63 IN | SYSTOLIC BLOOD PRESSURE: 130 MMHG | DIASTOLIC BLOOD PRESSURE: 70 MMHG | WEIGHT: 153.44 LBS

## 2021-02-08 VITALS
TEMPERATURE: 98 F | BODY MASS INDEX: 26.17 KG/M2 | DIASTOLIC BLOOD PRESSURE: 81 MMHG | SYSTOLIC BLOOD PRESSURE: 172 MMHG | HEIGHT: 63 IN | OXYGEN SATURATION: 96 % | WEIGHT: 147.69 LBS | HEART RATE: 81 BPM

## 2021-02-08 DIAGNOSIS — Z01.419 WELL WOMAN EXAM WITH ROUTINE GYNECOLOGICAL EXAM: Primary | ICD-10-CM

## 2021-02-08 DIAGNOSIS — D64.9 ANEMIA, UNSPECIFIED TYPE: Primary | ICD-10-CM

## 2021-02-08 DIAGNOSIS — Z90.710 STATUS POST HYSTERECTOMY: ICD-10-CM

## 2021-02-08 DIAGNOSIS — I10 ESSENTIAL HYPERTENSION: ICD-10-CM

## 2021-02-08 PROCEDURE — 99214 OFFICE O/P EST MOD 30 MIN: CPT | Mod: S$GLB,,, | Performed by: INTERNAL MEDICINE

## 2021-02-08 PROCEDURE — 3074F PR MOST RECENT SYSTOLIC BLOOD PRESSURE < 130 MM HG: ICD-10-PCS | Mod: CPTII,S$GLB,, | Performed by: INTERNAL MEDICINE

## 2021-02-08 PROCEDURE — 99999 PR PBB SHADOW E&M-EST. PATIENT-LVL IV: ICD-10-PCS | Mod: PBBFAC,,, | Performed by: INTERNAL MEDICINE

## 2021-02-08 PROCEDURE — 3008F BODY MASS INDEX DOCD: CPT | Mod: CPTII,S$GLB,, | Performed by: INTERNAL MEDICINE

## 2021-02-08 PROCEDURE — 3008F PR BODY MASS INDEX (BMI) DOCUMENTED: ICD-10-PCS | Mod: CPTII,S$GLB,, | Performed by: OBSTETRICS & GYNECOLOGY

## 2021-02-08 PROCEDURE — 3008F BODY MASS INDEX DOCD: CPT | Mod: CPTII,S$GLB,, | Performed by: OBSTETRICS & GYNECOLOGY

## 2021-02-08 PROCEDURE — 99999 PR PBB SHADOW E&M-EST. PATIENT-LVL IV: CPT | Mod: PBBFAC,,, | Performed by: INTERNAL MEDICINE

## 2021-02-08 PROCEDURE — 3075F PR MOST RECENT SYSTOLIC BLOOD PRESS GE 130-139MM HG: ICD-10-PCS | Mod: CPTII,S$GLB,, | Performed by: OBSTETRICS & GYNECOLOGY

## 2021-02-08 PROCEDURE — 1126F AMNT PAIN NOTED NONE PRSNT: CPT | Mod: S$GLB,,, | Performed by: INTERNAL MEDICINE

## 2021-02-08 PROCEDURE — 99214 PR OFFICE/OUTPT VISIT, EST, LEVL IV, 30-39 MIN: ICD-10-PCS | Mod: S$GLB,,, | Performed by: INTERNAL MEDICINE

## 2021-02-08 PROCEDURE — 3079F DIAST BP 80-89 MM HG: CPT | Mod: CPTII,S$GLB,, | Performed by: INTERNAL MEDICINE

## 2021-02-08 PROCEDURE — 1126F PR PAIN SEVERITY QUANTIFIED, NO PAIN PRESENT: ICD-10-PCS | Mod: S$GLB,,, | Performed by: INTERNAL MEDICINE

## 2021-02-08 PROCEDURE — 3079F PR MOST RECENT DIASTOLIC BLOOD PRESSURE 80-89 MM HG: ICD-10-PCS | Mod: CPTII,S$GLB,, | Performed by: INTERNAL MEDICINE

## 2021-02-08 PROCEDURE — G0101 PR CA SCREEN;PELVIC/BREAST EXAM: ICD-10-PCS | Mod: S$GLB,,, | Performed by: OBSTETRICS & GYNECOLOGY

## 2021-02-08 PROCEDURE — 99999 PR PBB SHADOW E&M-EST. PATIENT-LVL III: ICD-10-PCS | Mod: PBBFAC,,, | Performed by: OBSTETRICS & GYNECOLOGY

## 2021-02-08 PROCEDURE — 3078F PR MOST RECENT DIASTOLIC BLOOD PRESSURE < 80 MM HG: ICD-10-PCS | Mod: CPTII,S$GLB,, | Performed by: OBSTETRICS & GYNECOLOGY

## 2021-02-08 PROCEDURE — 3008F PR BODY MASS INDEX (BMI) DOCUMENTED: ICD-10-PCS | Mod: CPTII,S$GLB,, | Performed by: INTERNAL MEDICINE

## 2021-02-08 PROCEDURE — 1126F PR PAIN SEVERITY QUANTIFIED, NO PAIN PRESENT: ICD-10-PCS | Mod: S$GLB,,, | Performed by: OBSTETRICS & GYNECOLOGY

## 2021-02-08 PROCEDURE — 3074F SYST BP LT 130 MM HG: CPT | Mod: CPTII,S$GLB,, | Performed by: INTERNAL MEDICINE

## 2021-02-08 PROCEDURE — 99999 PR PBB SHADOW E&M-EST. PATIENT-LVL III: CPT | Mod: PBBFAC,,, | Performed by: OBSTETRICS & GYNECOLOGY

## 2021-02-08 PROCEDURE — 3078F DIAST BP <80 MM HG: CPT | Mod: CPTII,S$GLB,, | Performed by: OBSTETRICS & GYNECOLOGY

## 2021-02-08 PROCEDURE — 1126F AMNT PAIN NOTED NONE PRSNT: CPT | Mod: S$GLB,,, | Performed by: OBSTETRICS & GYNECOLOGY

## 2021-02-08 PROCEDURE — 3075F SYST BP GE 130 - 139MM HG: CPT | Mod: CPTII,S$GLB,, | Performed by: OBSTETRICS & GYNECOLOGY

## 2021-02-08 PROCEDURE — G0101 CA SCREEN;PELVIC/BREAST EXAM: HCPCS | Mod: S$GLB,,, | Performed by: OBSTETRICS & GYNECOLOGY

## 2021-02-26 ENCOUNTER — LAB VISIT (OUTPATIENT)
Dept: FAMILY MEDICINE | Facility: CLINIC | Age: 56
End: 2021-02-26
Payer: MEDICARE

## 2021-02-26 DIAGNOSIS — Z01.818 PRE-OP TESTING: ICD-10-CM

## 2021-02-26 PROCEDURE — U0003 INFECTIOUS AGENT DETECTION BY NUCLEIC ACID (DNA OR RNA); SEVERE ACUTE RESPIRATORY SYNDROME CORONAVIRUS 2 (SARS-COV-2) (CORONAVIRUS DISEASE [COVID-19]), AMPLIFIED PROBE TECHNIQUE, MAKING USE OF HIGH THROUGHPUT TECHNOLOGIES AS DESCRIBED BY CMS-2020-01-R: HCPCS

## 2021-02-26 PROCEDURE — U0005 INFEC AGEN DETEC AMPLI PROBE: HCPCS

## 2021-02-27 LAB — SARS-COV-2 RNA RESP QL NAA+PROBE: NOT DETECTED

## 2021-02-28 ENCOUNTER — ANESTHESIA EVENT (OUTPATIENT)
Dept: ENDOSCOPY | Facility: HOSPITAL | Age: 56
End: 2021-02-28
Payer: MEDICARE

## 2021-02-28 RX ORDER — SODIUM CHLORIDE, SODIUM LACTATE, POTASSIUM CHLORIDE, CALCIUM CHLORIDE 600; 310; 30; 20 MG/100ML; MG/100ML; MG/100ML; MG/100ML
INJECTION, SOLUTION INTRAVENOUS CONTINUOUS
Status: CANCELLED | OUTPATIENT
Start: 2021-03-01

## 2021-03-01 ENCOUNTER — ANESTHESIA (OUTPATIENT)
Dept: ENDOSCOPY | Facility: HOSPITAL | Age: 56
End: 2021-03-01
Payer: MEDICARE

## 2021-03-10 DIAGNOSIS — I15.9 SECONDARY HYPERTENSION: ICD-10-CM

## 2021-03-10 DIAGNOSIS — K20.90 ESOPHAGITIS: ICD-10-CM

## 2021-03-10 RX ORDER — PANTOPRAZOLE SODIUM 40 MG/1
40 TABLET, DELAYED RELEASE ORAL 2 TIMES DAILY
Qty: 60 TABLET | Refills: 3 | OUTPATIENT
Start: 2021-03-10 | End: 2021-04-09

## 2021-03-10 RX ORDER — HYDRALAZINE HYDROCHLORIDE 25 MG/1
25 TABLET, FILM COATED ORAL EVERY 8 HOURS
Qty: 90 TABLET | Refills: 0 | OUTPATIENT
Start: 2021-03-10 | End: 2021-04-09

## 2021-03-10 RX ORDER — FERROUS SULFATE 325(65) MG
325 TABLET, DELAYED RELEASE (ENTERIC COATED) ORAL 2 TIMES DAILY
OUTPATIENT
Start: 2021-03-10

## 2021-03-11 ENCOUNTER — TELEPHONE (OUTPATIENT)
Dept: HEMATOLOGY/ONCOLOGY | Facility: CLINIC | Age: 56
End: 2021-03-11

## 2021-03-11 DIAGNOSIS — D64.9 ANEMIA, UNSPECIFIED TYPE: Primary | ICD-10-CM

## 2021-03-11 RX ORDER — FERROUS SULFATE 325(65) MG
325 TABLET, DELAYED RELEASE (ENTERIC COATED) ORAL 2 TIMES DAILY
Qty: 60 TABLET | Refills: 1 | OUTPATIENT
Start: 2021-03-11

## 2021-04-12 ENCOUNTER — IMMUNIZATION (OUTPATIENT)
Dept: PRIMARY CARE CLINIC | Facility: CLINIC | Age: 56
End: 2021-04-12
Payer: MEDICARE

## 2021-04-12 DIAGNOSIS — Z23 NEED FOR VACCINATION: Primary | ICD-10-CM

## 2021-04-12 PROCEDURE — 0031A PR IMMUNIZ ADMIN, SARS-COV-2 COVID-19 VACC, 5X10VP/0.5ML: CPT | Mod: CV19,S$GLB,, | Performed by: INTERNAL MEDICINE

## 2021-04-12 PROCEDURE — 91303 PR SARSCOV2 VAC AD26 .5ML IM: ICD-10-PCS | Mod: S$GLB,,, | Performed by: INTERNAL MEDICINE

## 2021-04-12 PROCEDURE — 91303 PR SARSCOV2 VAC AD26 .5ML IM: CPT | Mod: S$GLB,,, | Performed by: INTERNAL MEDICINE

## 2021-04-12 PROCEDURE — 0031A PR IMMUNIZ ADMIN, SARS-COV-2 COVID-19 VACC, 5X10VP/0.5ML: ICD-10-PCS | Mod: CV19,S$GLB,, | Performed by: INTERNAL MEDICINE

## 2021-05-04 ENCOUNTER — LAB VISIT (OUTPATIENT)
Dept: LAB | Facility: HOSPITAL | Age: 56
End: 2021-05-04
Attending: INTERNAL MEDICINE
Payer: MEDICARE

## 2021-05-04 DIAGNOSIS — D64.9 ANEMIA, UNSPECIFIED TYPE: ICD-10-CM

## 2021-05-04 LAB
BASOPHILS # BLD AUTO: 0.01 K/UL (ref 0–0.2)
BASOPHILS NFR BLD: 0.3 % (ref 0–1.9)
DIFFERENTIAL METHOD: ABNORMAL
EOSINOPHIL # BLD AUTO: 0.1 K/UL (ref 0–0.5)
EOSINOPHIL NFR BLD: 3.1 % (ref 0–8)
ERYTHROCYTE [DISTWIDTH] IN BLOOD BY AUTOMATED COUNT: 12.9 % (ref 11.5–14.5)
FERRITIN SERPL-MCNC: 31 NG/ML (ref 20–300)
HCT VFR BLD AUTO: 42.5 % (ref 37–48.5)
HGB BLD-MCNC: 14 G/DL (ref 12–16)
IMM GRANULOCYTES # BLD AUTO: 0 K/UL (ref 0–0.04)
IMM GRANULOCYTES NFR BLD AUTO: 0 % (ref 0–0.5)
IRON SERPL-MCNC: 132 UG/DL (ref 30–160)
LYMPHOCYTES # BLD AUTO: 1.4 K/UL (ref 1–4.8)
LYMPHOCYTES NFR BLD: 42.4 % (ref 18–48)
MCH RBC QN AUTO: 31.5 PG (ref 27–31)
MCHC RBC AUTO-ENTMCNC: 32.9 G/DL (ref 32–36)
MCV RBC AUTO: 96 FL (ref 82–98)
MONOCYTES # BLD AUTO: 0.4 K/UL (ref 0.3–1)
MONOCYTES NFR BLD: 11.8 % (ref 4–15)
NEUTROPHILS # BLD AUTO: 1.4 K/UL (ref 1.8–7.7)
NEUTROPHILS NFR BLD: 42.4 % (ref 38–73)
NRBC BLD-RTO: 0 /100 WBC
PLATELET # BLD AUTO: 230 K/UL (ref 150–450)
PMV BLD AUTO: 9.4 FL (ref 9.2–12.9)
RBC # BLD AUTO: 4.44 M/UL (ref 4–5.4)
RETICS/RBC NFR AUTO: 1.4 % (ref 0.5–2.5)
SATURATED IRON: 33 % (ref 20–50)
TOTAL IRON BINDING CAPACITY: 397 UG/DL (ref 250–450)
TRANSFERRIN SERPL-MCNC: 268 MG/DL (ref 200–375)
WBC # BLD AUTO: 3.21 K/UL (ref 3.9–12.7)

## 2021-05-04 PROCEDURE — 85025 COMPLETE CBC W/AUTO DIFF WBC: CPT | Performed by: INTERNAL MEDICINE

## 2021-05-04 PROCEDURE — 82728 ASSAY OF FERRITIN: CPT | Performed by: INTERNAL MEDICINE

## 2021-05-04 PROCEDURE — 83540 ASSAY OF IRON: CPT | Performed by: INTERNAL MEDICINE

## 2021-05-04 PROCEDURE — 36415 COLL VENOUS BLD VENIPUNCTURE: CPT | Performed by: INTERNAL MEDICINE

## 2021-05-04 PROCEDURE — 85045 AUTOMATED RETICULOCYTE COUNT: CPT | Performed by: INTERNAL MEDICINE

## 2021-05-06 ENCOUNTER — TELEPHONE (OUTPATIENT)
Dept: HEMATOLOGY/ONCOLOGY | Facility: CLINIC | Age: 56
End: 2021-05-06

## 2021-05-06 ENCOUNTER — OFFICE VISIT (OUTPATIENT)
Dept: HEMATOLOGY/ONCOLOGY | Facility: CLINIC | Age: 56
End: 2021-05-06
Payer: MEDICARE

## 2021-05-06 VITALS
HEIGHT: 63 IN | WEIGHT: 157.63 LBS | HEART RATE: 86 BPM | SYSTOLIC BLOOD PRESSURE: 177 MMHG | OXYGEN SATURATION: 96 % | DIASTOLIC BLOOD PRESSURE: 76 MMHG | TEMPERATURE: 98 F | BODY MASS INDEX: 27.93 KG/M2

## 2021-05-06 DIAGNOSIS — D50.9 IRON DEFICIENCY ANEMIA, UNSPECIFIED IRON DEFICIENCY ANEMIA TYPE: Primary | ICD-10-CM

## 2021-05-06 DIAGNOSIS — H53.8 BLURRY VISION, LEFT EYE: ICD-10-CM

## 2021-05-06 PROCEDURE — 99214 PR OFFICE/OUTPT VISIT, EST, LEVL IV, 30-39 MIN: ICD-10-PCS | Mod: S$GLB,,, | Performed by: INTERNAL MEDICINE

## 2021-05-06 PROCEDURE — 3008F PR BODY MASS INDEX (BMI) DOCUMENTED: ICD-10-PCS | Mod: CPTII,S$GLB,, | Performed by: INTERNAL MEDICINE

## 2021-05-06 PROCEDURE — 1126F PR PAIN SEVERITY QUANTIFIED, NO PAIN PRESENT: ICD-10-PCS | Mod: S$GLB,,, | Performed by: INTERNAL MEDICINE

## 2021-05-06 PROCEDURE — 99214 OFFICE O/P EST MOD 30 MIN: CPT | Mod: S$GLB,,, | Performed by: INTERNAL MEDICINE

## 2021-05-06 PROCEDURE — 99999 PR PBB SHADOW E&M-EST. PATIENT-LVL IV: ICD-10-PCS | Mod: PBBFAC,,, | Performed by: INTERNAL MEDICINE

## 2021-05-06 PROCEDURE — 3008F BODY MASS INDEX DOCD: CPT | Mod: CPTII,S$GLB,, | Performed by: INTERNAL MEDICINE

## 2021-05-06 PROCEDURE — 1126F AMNT PAIN NOTED NONE PRSNT: CPT | Mod: S$GLB,,, | Performed by: INTERNAL MEDICINE

## 2021-05-06 PROCEDURE — 99999 PR PBB SHADOW E&M-EST. PATIENT-LVL IV: CPT | Mod: PBBFAC,,, | Performed by: INTERNAL MEDICINE

## 2021-07-01 ENCOUNTER — HOSPITAL ENCOUNTER (OUTPATIENT)
Facility: HOSPITAL | Age: 56
Discharge: HOME | End: 2021-07-01
Attending: STUDENT IN AN ORGANIZED HEALTH CARE EDUCATION/TRAINING PROGRAM | Admitting: STUDENT IN AN ORGANIZED HEALTH CARE EDUCATION/TRAINING PROGRAM
Payer: MEDICARE

## 2021-07-01 VITALS
TEMPERATURE: 98 F | DIASTOLIC BLOOD PRESSURE: 70 MMHG | RESPIRATION RATE: 18 BRPM | OXYGEN SATURATION: 95 % | SYSTOLIC BLOOD PRESSURE: 167 MMHG | HEART RATE: 88 BPM

## 2021-07-01 DIAGNOSIS — K21.9 GASTROESOPHAGEAL REFLUX DISEASE, UNSPECIFIED WHETHER ESOPHAGITIS PRESENT: Primary | ICD-10-CM

## 2021-07-01 DIAGNOSIS — K20.90 ESOPHAGITIS: ICD-10-CM

## 2021-07-01 PROCEDURE — D9220A PRA ANESTHESIA: ICD-10-PCS | Mod: ANES,,, | Performed by: ANESTHESIOLOGY

## 2021-07-01 PROCEDURE — 43235 EGD DIAGNOSTIC BRUSH WASH: CPT | Mod: ,,, | Performed by: STUDENT IN AN ORGANIZED HEALTH CARE EDUCATION/TRAINING PROGRAM

## 2021-07-01 PROCEDURE — 43235 PR EGD, FLEX, DIAGNOSTIC: ICD-10-PCS | Mod: ,,, | Performed by: STUDENT IN AN ORGANIZED HEALTH CARE EDUCATION/TRAINING PROGRAM

## 2021-07-01 PROCEDURE — 25000003 PHARM REV CODE 250: Performed by: REGISTERED NURSE

## 2021-07-01 PROCEDURE — D9220A PRA ANESTHESIA: ICD-10-PCS | Mod: CRNA,,, | Performed by: REGISTERED NURSE

## 2021-07-01 PROCEDURE — D9220A PRA ANESTHESIA: Mod: CRNA,,, | Performed by: REGISTERED NURSE

## 2021-07-01 PROCEDURE — 25000003 PHARM REV CODE 250: Performed by: STUDENT IN AN ORGANIZED HEALTH CARE EDUCATION/TRAINING PROGRAM

## 2021-07-01 PROCEDURE — 63600175 PHARM REV CODE 636 W HCPCS: Performed by: REGISTERED NURSE

## 2021-07-01 PROCEDURE — 37000009 HC ANESTHESIA EA ADD 15 MINS: Performed by: STUDENT IN AN ORGANIZED HEALTH CARE EDUCATION/TRAINING PROGRAM

## 2021-07-01 PROCEDURE — D9220A PRA ANESTHESIA: Mod: ANES,,, | Performed by: ANESTHESIOLOGY

## 2021-07-01 PROCEDURE — 43235 EGD DIAGNOSTIC BRUSH WASH: CPT | Performed by: STUDENT IN AN ORGANIZED HEALTH CARE EDUCATION/TRAINING PROGRAM

## 2021-07-01 PROCEDURE — 37000008 HC ANESTHESIA 1ST 15 MINUTES: Performed by: STUDENT IN AN ORGANIZED HEALTH CARE EDUCATION/TRAINING PROGRAM

## 2021-07-01 RX ORDER — ACETAMINOPHEN 500 MG
1000 TABLET ORAL
Status: DISCONTINUED | OUTPATIENT
Start: 2021-07-02 | End: 2021-07-01 | Stop reason: HOSPADM

## 2021-07-01 RX ORDER — SUCRALFATE 1 G/1
1 TABLET ORAL
Qty: 120 TABLET | Refills: 1 | Status: ON HOLD | OUTPATIENT
Start: 2021-07-01 | End: 2022-11-08 | Stop reason: HOSPADM

## 2021-07-01 RX ORDER — PROPOFOL 10 MG/ML
INJECTION, EMULSION INTRAVENOUS
Status: DISCONTINUED | OUTPATIENT
Start: 2021-07-01 | End: 2021-07-01

## 2021-07-01 RX ORDER — LIDOCAINE HCL/PF 100 MG/5ML
SYRINGE (ML) INTRAVENOUS
Status: DISCONTINUED | OUTPATIENT
Start: 2021-07-01 | End: 2021-07-01

## 2021-07-01 RX ORDER — PROPOFOL 10 MG/ML
INJECTION, EMULSION INTRAVENOUS
Status: DISCONTINUED
Start: 2021-07-01 | End: 2021-07-01 | Stop reason: HOSPADM

## 2021-07-01 RX ORDER — LIDOCAINE HYDROCHLORIDE 20 MG/ML
INJECTION, SOLUTION EPIDURAL; INFILTRATION; INTRACAUDAL; PERINEURAL
Status: DISCONTINUED
Start: 2021-07-01 | End: 2021-07-01 | Stop reason: HOSPADM

## 2021-07-01 RX ORDER — LIDOCAINE HYDROCHLORIDE 10 MG/ML
1 INJECTION, SOLUTION EPIDURAL; INFILTRATION; INTRACAUDAL; PERINEURAL ONCE
Status: DISCONTINUED | OUTPATIENT
Start: 2021-07-02 | End: 2021-07-01 | Stop reason: HOSPADM

## 2021-07-01 RX ADMIN — LIDOCAINE HYDROCHLORIDE 100 MG: 20 INJECTION, SOLUTION INTRAVENOUS at 11:07

## 2021-07-01 RX ADMIN — PROPOFOL 40 MG: 10 INJECTION, EMULSION INTRAVENOUS at 11:07

## 2021-07-01 RX ADMIN — PROPOFOL 50 MG: 10 INJECTION, EMULSION INTRAVENOUS at 11:07

## 2021-07-01 RX ADMIN — SODIUM CHLORIDE: 0.9 INJECTION, SOLUTION INTRAVENOUS at 10:07

## 2021-07-01 RX ADMIN — SODIUM CHLORIDE 1000 ML: 0.9 INJECTION, SOLUTION INTRAVENOUS at 11:07

## 2021-09-22 ENCOUNTER — TELEPHONE (OUTPATIENT)
Dept: HEMATOLOGY/ONCOLOGY | Facility: CLINIC | Age: 56
End: 2021-09-22

## 2021-09-26 ENCOUNTER — HOSPITAL ENCOUNTER (EMERGENCY)
Facility: HOSPITAL | Age: 56
Discharge: HOME | End: 2021-09-27
Attending: EMERGENCY MEDICINE
Payer: COMMERCIAL

## 2021-09-26 VITALS
HEART RATE: 111 BPM | DIASTOLIC BLOOD PRESSURE: 78 MMHG | BODY MASS INDEX: 22.2 KG/M2 | WEIGHT: 130 LBS | TEMPERATURE: 98 F | OXYGEN SATURATION: 98 % | HEIGHT: 64 IN | SYSTOLIC BLOOD PRESSURE: 156 MMHG | RESPIRATION RATE: 15 BRPM

## 2021-09-26 DIAGNOSIS — S00.83XA FACIAL CONTUSION, INITIAL ENCOUNTER: Primary | ICD-10-CM

## 2021-09-26 DIAGNOSIS — M25.521 RIGHT ELBOW PAIN: ICD-10-CM
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
## 2021-09-26 DIAGNOSIS — S62.664A CLOSED NONDISPLACED FRACTURE OF DISTAL PHALANX OF RIGHT RING FINGER, INITIAL ENCOUNTER: ICD-10-CM

## 2021-09-26 DIAGNOSIS — S09.90XA CLOSED HEAD INJURY, INITIAL ENCOUNTER: ICD-10-CM

## 2021-09-26 LAB
ALBUMIN SERPL BCP-MCNC: 3.4 G/DL (ref 3.5–5.2)
ALP SERPL-CCNC: 72 U/L (ref 55–135)
ALT SERPL W/O P-5'-P-CCNC: 12 U/L (ref 10–44)
ANION GAP SERPL CALC-SCNC: 10 MMOL/L (ref 8–16)
AST SERPL-CCNC: 17 U/L (ref 10–40)
BASOPHILS # BLD AUTO: 0.03 K/UL (ref 0–0.2)
BASOPHILS NFR BLD: 0.4 % (ref 0–1.9)
BILIRUB SERPL-MCNC: 0.2 MG/DL (ref 0.1–1)
BUN SERPL-MCNC: 10 MG/DL (ref 6–20)
CALCIUM SERPL-MCNC: 9.2 MG/DL (ref 8.7–10.5)
CHLORIDE SERPL-SCNC: 104 MMOL/L (ref 95–110)
CO2 SERPL-SCNC: 24 MMOL/L (ref 23–29)
CREAT SERPL-MCNC: 0.7 MG/DL (ref 0.5–1.4)
DIFFERENTIAL METHOD: ABNORMAL
EOSINOPHIL # BLD AUTO: 0.3 K/UL (ref 0–0.5)
EOSINOPHIL NFR BLD: 3.5 % (ref 0–8)
ERYTHROCYTE [DISTWIDTH] IN BLOOD BY AUTOMATED COUNT: 13.2 % (ref 11.5–14.5)
EST. GFR  (AFRICAN AMERICAN): >60 ML/MIN/1.73 M^2
EST. GFR  (NON AFRICAN AMERICAN): >60 ML/MIN/1.73 M^2
GLUCOSE SERPL-MCNC: 90 MG/DL (ref 70–110)
HCT VFR BLD AUTO: 35.6 % (ref 37–48.5)
HGB BLD-MCNC: 12 G/DL (ref 12–16)
IMM GRANULOCYTES # BLD AUTO: 0.02 K/UL (ref 0–0.04)
IMM GRANULOCYTES NFR BLD AUTO: 0.3 % (ref 0–0.5)
LYMPHOCYTES # BLD AUTO: 1.6 K/UL (ref 1–4.8)
LYMPHOCYTES NFR BLD: 21.5 % (ref 18–48)
MCH RBC QN AUTO: 31.7 PG (ref 27–31)
MCHC RBC AUTO-ENTMCNC: 33.7 G/DL (ref 32–36)
MCV RBC AUTO: 94 FL (ref 82–98)
MONOCYTES # BLD AUTO: 0.7 K/UL (ref 0.3–1)
MONOCYTES NFR BLD: 9.1 % (ref 4–15)
NEUTROPHILS # BLD AUTO: 4.8 K/UL (ref 1.8–7.7)
NEUTROPHILS NFR BLD: 65.2 % (ref 38–73)
NRBC BLD-RTO: 0 /100 WBC
PLATELET # BLD AUTO: 280 K/UL (ref 150–450)
PMV BLD AUTO: 9.2 FL (ref 9.2–12.9)
POTASSIUM SERPL-SCNC: 4.1 MMOL/L (ref 3.5–5.1)
PROT SERPL-MCNC: 7.6 G/DL (ref 6–8.4)
RBC # BLD AUTO: 3.79 M/UL (ref 4–5.4)
SODIUM SERPL-SCNC: 138 MMOL/L (ref 136–145)
WBC # BLD AUTO: 7.39 K/UL (ref 3.9–12.7)

## 2021-09-26 PROCEDURE — 63600175 PHARM REV CODE 636 W HCPCS: Performed by: EMERGENCY MEDICINE

## 2021-09-26 PROCEDURE — 82962 GLUCOSE BLOOD TEST: CPT

## 2021-09-26 PROCEDURE — 85025 COMPLETE CBC W/AUTO DIFF WBC: CPT | Performed by: EMERGENCY MEDICINE

## 2021-09-26 PROCEDURE — 99284 EMERGENCY DEPT VISIT MOD MDM: CPT | Mod: 25

## 2021-09-26 PROCEDURE — 96374 THER/PROPH/DIAG INJ IV PUSH: CPT

## 2021-09-26 PROCEDURE — 80053 COMPREHEN METABOLIC PANEL: CPT | Performed by: EMERGENCY MEDICINE

## 2021-09-26 PROCEDURE — 25000003 PHARM REV CODE 250: Performed by: EMERGENCY MEDICINE

## 2021-09-26 RX ORDER — NAPROXEN 500 MG/1
500 TABLET ORAL 2 TIMES DAILY WITH MEALS
Qty: 14 TABLET | Refills: 0 | Status: SHIPPED | OUTPATIENT
Start: 2021-09-26 | End: 2021-09-26 | Stop reason: SDUPTHER

## 2021-09-26 RX ORDER — AMLODIPINE BESYLATE 5 MG/1
10 TABLET ORAL
Status: COMPLETED | OUTPATIENT
Start: 2021-09-26 | End: 2021-09-26

## 2021-09-26 RX ORDER — HYDRALAZINE HYDROCHLORIDE 20 MG/ML
20 INJECTION INTRAMUSCULAR; INTRAVENOUS
Status: COMPLETED | OUTPATIENT
Start: 2021-09-26 | End: 2021-09-26

## 2021-09-26 RX ORDER — HYDROCODONE BITARTRATE AND ACETAMINOPHEN 5; 325 MG/1; MG/1
1 TABLET ORAL EVERY 6 HOURS PRN
Qty: 12 TABLET | Refills: 0 | Status: SHIPPED | OUTPATIENT
Start: 2021-09-26 | End: 2021-09-29

## 2021-09-26 RX ORDER — HYDROCODONE BITARTRATE AND ACETAMINOPHEN 5; 325 MG/1; MG/1
1 TABLET ORAL
Status: COMPLETED | OUTPATIENT
Start: 2021-09-26 | End: 2021-09-26

## 2021-09-26 RX ORDER — NAPROXEN 500 MG/1
500 TABLET ORAL 2 TIMES DAILY WITH MEALS
Qty: 14 TABLET | Refills: 0 | Status: SHIPPED | OUTPATIENT
Start: 2021-09-26 | End: 2021-10-03

## 2021-09-26 RX ADMIN — HYDRALAZINE HYDROCHLORIDE 20 MG: 20 INJECTION, SOLUTION INTRAMUSCULAR; INTRAVENOUS at 08:09

## 2021-09-26 RX ADMIN — HYDROCODONE BITARTRATE AND ACETAMINOPHEN 1 TABLET: 5; 325 TABLET ORAL at 08:09

## 2021-09-26 RX ADMIN — AMLODIPINE BESYLATE 10 MG: 5 TABLET ORAL at 08:09

## 2021-09-27 LAB — POCT GLUCOSE: 96 MG/DL (ref 70–110)

## 2021-10-15 ENCOUNTER — TELEPHONE (OUTPATIENT)
Dept: HEMATOLOGY/ONCOLOGY | Facility: CLINIC | Age: 56
End: 2021-10-15

## 2021-10-29 ENCOUNTER — LAB VISIT (OUTPATIENT)
Dept: LAB | Facility: HOSPITAL | Age: 56
End: 2021-10-29
Attending: INTERNAL MEDICINE
Payer: MEDICARE

## 2021-10-29 DIAGNOSIS — D50.9 IRON DEFICIENCY ANEMIA, UNSPECIFIED IRON DEFICIENCY ANEMIA TYPE: ICD-10-CM

## 2021-10-29 LAB
BASOPHILS # BLD AUTO: 0.01 K/UL (ref 0–0.2)
BASOPHILS NFR BLD: 0.3 % (ref 0–1.9)
DIFFERENTIAL METHOD: ABNORMAL
EOSINOPHIL # BLD AUTO: 0.1 K/UL (ref 0–0.5)
EOSINOPHIL NFR BLD: 3.2 % (ref 0–8)
ERYTHROCYTE [DISTWIDTH] IN BLOOD BY AUTOMATED COUNT: 14.1 % (ref 11.5–14.5)
FERRITIN SERPL-MCNC: 5 NG/ML (ref 20–300)
HCT VFR BLD AUTO: 25.2 % (ref 37–48.5)
HGB BLD-MCNC: 7.8 G/DL (ref 12–16)
IMM GRANULOCYTES # BLD AUTO: 0.01 K/UL (ref 0–0.04)
IMM GRANULOCYTES NFR BLD AUTO: 0.3 % (ref 0–0.5)
IRON SERPL-MCNC: 14 UG/DL (ref 30–160)
LYMPHOCYTES # BLD AUTO: 1.3 K/UL (ref 1–4.8)
LYMPHOCYTES NFR BLD: 37.5 % (ref 18–48)
MCH RBC QN AUTO: 28.6 PG (ref 27–31)
MCHC RBC AUTO-ENTMCNC: 31 G/DL (ref 32–36)
MCV RBC AUTO: 92 FL (ref 82–98)
MONOCYTES # BLD AUTO: 0.4 K/UL (ref 0.3–1)
MONOCYTES NFR BLD: 12.5 % (ref 4–15)
NEUTROPHILS # BLD AUTO: 1.6 K/UL (ref 1.8–7.7)
NEUTROPHILS NFR BLD: 46.2 % (ref 38–73)
NRBC BLD-RTO: 0 /100 WBC
PLATELET # BLD AUTO: 288 K/UL (ref 150–450)
PMV BLD AUTO: 9.9 FL (ref 9.2–12.9)
RBC # BLD AUTO: 2.73 M/UL (ref 4–5.4)
SATURATED IRON: 3 % (ref 20–50)
TOTAL IRON BINDING CAPACITY: 482 UG/DL (ref 250–450)
TRANSFERRIN SERPL-MCNC: 326 MG/DL (ref 200–375)
WBC # BLD AUTO: 3.44 K/UL (ref 3.9–12.7)

## 2021-10-29 PROCEDURE — 85025 COMPLETE CBC W/AUTO DIFF WBC: CPT | Performed by: INTERNAL MEDICINE

## 2021-10-29 PROCEDURE — 84466 ASSAY OF TRANSFERRIN: CPT | Performed by: INTERNAL MEDICINE

## 2021-10-29 PROCEDURE — 82728 ASSAY OF FERRITIN: CPT | Performed by: INTERNAL MEDICINE

## 2021-10-29 PROCEDURE — 36415 COLL VENOUS BLD VENIPUNCTURE: CPT | Performed by: INTERNAL MEDICINE

## 2021-12-07 ENCOUNTER — OFFICE VISIT (OUTPATIENT)
Dept: HEMATOLOGY/ONCOLOGY | Facility: CLINIC | Age: 56
End: 2021-12-07
Payer: MEDICARE

## 2021-12-07 ENCOUNTER — TELEPHONE (OUTPATIENT)
Dept: GASTROENTEROLOGY | Facility: CLINIC | Age: 56
End: 2021-12-07
Payer: COMMERCIAL

## 2021-12-07 VITALS
WEIGHT: 163.81 LBS | HEIGHT: 68 IN | HEART RATE: 100 BPM | OXYGEN SATURATION: 100 % | BODY MASS INDEX: 24.83 KG/M2 | SYSTOLIC BLOOD PRESSURE: 142 MMHG | DIASTOLIC BLOOD PRESSURE: 71 MMHG

## 2021-12-07 DIAGNOSIS — H53.8 BLURRY VISION, LEFT EYE: ICD-10-CM

## 2021-12-07 DIAGNOSIS — D64.9 ANEMIA, UNSPECIFIED TYPE: Primary | ICD-10-CM

## 2021-12-07 PROCEDURE — 99214 OFFICE O/P EST MOD 30 MIN: CPT | Mod: S$GLB,,, | Performed by: INTERNAL MEDICINE

## 2021-12-07 PROCEDURE — 99999 PR PBB SHADOW E&M-EST. PATIENT-LVL IV: CPT | Mod: PBBFAC,,, | Performed by: INTERNAL MEDICINE

## 2021-12-07 PROCEDURE — 99999 PR PBB SHADOW E&M-EST. PATIENT-LVL IV: ICD-10-PCS | Mod: PBBFAC,,, | Performed by: INTERNAL MEDICINE

## 2021-12-07 PROCEDURE — 99214 PR OFFICE/OUTPT VISIT, EST, LEVL IV, 30-39 MIN: ICD-10-PCS | Mod: S$GLB,,, | Performed by: INTERNAL MEDICINE

## 2021-12-09 ENCOUNTER — TELEPHONE (OUTPATIENT)
Dept: HEMATOLOGY/ONCOLOGY | Facility: CLINIC | Age: 56
End: 2021-12-09
Payer: COMMERCIAL

## 2021-12-19 ENCOUNTER — HOSPITAL ENCOUNTER (INPATIENT)
Facility: HOSPITAL | Age: 56
LOS: 11 days | Discharge: NURSING, REHAB OR LONG-TERM CARE | DRG: 064 | End: 2021-12-30
Attending: EMERGENCY MEDICINE | Admitting: PSYCHIATRY & NEUROLOGY
Payer: MEDICARE

## 2021-12-19 DIAGNOSIS — I63.9 STROKE: ICD-10-CM

## 2021-12-19 DIAGNOSIS — I63.9 CEREBROVASCULAR ACCIDENT (CVA), UNSPECIFIED MECHANISM: ICD-10-CM

## 2021-12-19 DIAGNOSIS — I67.848 OTHER CEREBROVASCULAR VASOSPASM AND VASOCONSTRICTION: ICD-10-CM

## 2021-12-19 DIAGNOSIS — I63.412 EMBOLIC STROKE INVOLVING LEFT MIDDLE CEREBRAL ARTERY: Primary | ICD-10-CM

## 2021-12-19 PROBLEM — E11.9 DIABETES MELLITUS: Status: ACTIVE | Noted: 2021-12-19

## 2021-12-19 PROBLEM — E78.5 HYPERLIPIDEMIA: Status: ACTIVE | Noted: 2021-12-19

## 2021-12-19 PROBLEM — R00.0 SINUS TACHYCARDIA: Status: ACTIVE | Noted: 2021-12-19

## 2021-12-19 PROBLEM — G93.6 CYTOTOXIC CEREBRAL EDEMA: Status: ACTIVE | Noted: 2021-12-19

## 2021-12-19 LAB
ABO + RH BLD: NORMAL
ALBUMIN SERPL BCP-MCNC: 3.5 G/DL (ref 3.5–5.2)
ALP SERPL-CCNC: 59 U/L (ref 55–135)
ALT SERPL W/O P-5'-P-CCNC: 11 U/L (ref 10–44)
ANION GAP SERPL CALC-SCNC: 10 MMOL/L (ref 8–16)
ANION GAP SERPL CALC-SCNC: 11 MMOL/L (ref 8–16)
AST SERPL-CCNC: 41 U/L (ref 10–40)
BASOPHILS # BLD AUTO: 0.02 K/UL (ref 0–0.2)
BASOPHILS NFR BLD: 0.3 % (ref 0–1.9)
BILIRUB SERPL-MCNC: 0.3 MG/DL (ref 0.1–1)
BLD GP AB SCN CELLS X3 SERPL QL: NORMAL
BUN SERPL-MCNC: 13 MG/DL (ref 6–20)
BUN SERPL-MCNC: 13 MG/DL (ref 6–30)
CALCIUM SERPL-MCNC: 8.8 MG/DL (ref 8.7–10.5)
CHLORIDE SERPL-SCNC: 105 MMOL/L (ref 95–110)
CHLORIDE SERPL-SCNC: 107 MMOL/L (ref 95–110)
CHOLEST SERPL-MCNC: 214 MG/DL (ref 120–199)
CHOLEST/HDLC SERPL: 3.6 {RATIO} (ref 2–5)
CO2 SERPL-SCNC: 23 MMOL/L (ref 23–29)
CREAT SERPL-MCNC: 0.7 MG/DL (ref 0.5–1.4)
CREAT SERPL-MCNC: 0.8 MG/DL (ref 0.5–1.4)
CTP QC/QA: YES
DIFFERENTIAL METHOD: ABNORMAL
EOSINOPHIL # BLD AUTO: 0.1 K/UL (ref 0–0.5)
EOSINOPHIL NFR BLD: 2.1 % (ref 0–8)
ERYTHROCYTE [DISTWIDTH] IN BLOOD BY AUTOMATED COUNT: 18.9 % (ref 11.5–14.5)
EST. GFR  (AFRICAN AMERICAN): >60 ML/MIN/1.73 M^2
EST. GFR  (NON AFRICAN AMERICAN): >60 ML/MIN/1.73 M^2
ESTIMATED AVG GLUCOSE: 128 MG/DL (ref 68–131)
GLUCOSE SERPL-MCNC: 116 MG/DL (ref 70–110)
GLUCOSE SERPL-MCNC: 120 MG/DL (ref 70–110)
GLUCOSE SERPL-MCNC: 126 MG/DL (ref 70–110)
HBA1C MFR BLD: 6.1 % (ref 4–5.6)
HCT VFR BLD AUTO: 27.6 % (ref 37–48.5)
HCT VFR BLD CALC: 25 %PCV (ref 36–54)
HDLC SERPL-MCNC: 59 MG/DL (ref 40–75)
HDLC SERPL: 27.6 % (ref 20–50)
HGB BLD-MCNC: 7.8 G/DL (ref 12–16)
IMM GRANULOCYTES # BLD AUTO: 0.02 K/UL (ref 0–0.04)
IMM GRANULOCYTES NFR BLD AUTO: 0.3 % (ref 0–0.5)
INR PPP: 1 (ref 0.8–1.2)
LDLC SERPL CALC-MCNC: 127.2 MG/DL (ref 63–159)
LYMPHOCYTES # BLD AUTO: 1.6 K/UL (ref 1–4.8)
LYMPHOCYTES NFR BLD: 26.3 % (ref 18–48)
MAGNESIUM SERPL-MCNC: 1.8 MG/DL (ref 1.6–2.6)
MCH RBC QN AUTO: 21.4 PG (ref 27–31)
MCHC RBC AUTO-ENTMCNC: 28.3 G/DL (ref 32–36)
MCV RBC AUTO: 76 FL (ref 82–98)
MONOCYTES # BLD AUTO: 0.7 K/UL (ref 0.3–1)
MONOCYTES NFR BLD: 11.8 % (ref 4–15)
NEUTROPHILS # BLD AUTO: 3.6 K/UL (ref 1.8–7.7)
NEUTROPHILS NFR BLD: 59.2 % (ref 38–73)
NONHDLC SERPL-MCNC: 155 MG/DL
NRBC BLD-RTO: 0 /100 WBC
PHOSPHATE SERPL-MCNC: 3 MG/DL (ref 2.7–4.5)
PLATELET # BLD AUTO: 435 K/UL (ref 150–450)
PMV BLD AUTO: 10.2 FL (ref 9.2–12.9)
POC IONIZED CALCIUM: 1.23 MMOL/L (ref 1.06–1.42)
POC TCO2 (MEASURED): 29 MMOL/L (ref 23–29)
POCT GLUCOSE: 123 MG/DL (ref 70–110)
POTASSIUM BLD-SCNC: 3.9 MMOL/L (ref 3.5–5.1)
POTASSIUM SERPL-SCNC: 4.7 MMOL/L (ref 3.5–5.1)
PROT SERPL-MCNC: 8 G/DL (ref 6–8.4)
PROTHROMBIN TIME: 10.6 SEC (ref 9–12.5)
RBC # BLD AUTO: 3.64 M/UL (ref 4–5.4)
SAMPLE: ABNORMAL
SARS-COV-2 RDRP RESP QL NAA+PROBE: NEGATIVE
SODIUM BLD-SCNC: 140 MMOL/L (ref 136–145)
SODIUM SERPL-SCNC: 140 MMOL/L (ref 136–145)
TRIGL SERPL-MCNC: 139 MG/DL (ref 30–150)
TSH SERPL DL<=0.005 MIU/L-ACNC: 0.6 UIU/ML (ref 0.4–4)
WBC # BLD AUTO: 6.09 K/UL (ref 3.9–12.7)

## 2021-12-19 PROCEDURE — G0425 INPT/ED TELECONSULT30: HCPCS | Mod: 95,,, | Performed by: PSYCHIATRY & NEUROLOGY

## 2021-12-19 PROCEDURE — 93010 EKG 12-LEAD: ICD-10-PCS | Mod: ,,, | Performed by: INTERNAL MEDICINE

## 2021-12-19 PROCEDURE — G0425 PR INPT TELEHEALTH CONSULT 30M: ICD-10-PCS | Mod: 95,,, | Performed by: PSYCHIATRY & NEUROLOGY

## 2021-12-19 PROCEDURE — 99291 CRITICAL CARE FIRST HOUR: CPT | Mod: ,,, | Performed by: PSYCHIATRY & NEUROLOGY

## 2021-12-19 PROCEDURE — 99291 CRITICAL CARE FIRST HOUR: CPT | Mod: 25

## 2021-12-19 PROCEDURE — 25500020 PHARM REV CODE 255: Performed by: EMERGENCY MEDICINE

## 2021-12-19 PROCEDURE — 80053 COMPREHEN METABOLIC PANEL: CPT | Performed by: NURSE PRACTITIONER

## 2021-12-19 PROCEDURE — 99291 PR CRITICAL CARE, E/M 30-74 MINUTES: ICD-10-PCS | Mod: ,,, | Performed by: PHYSICIAN ASSISTANT

## 2021-12-19 PROCEDURE — 94640 AIRWAY INHALATION TREATMENT: CPT

## 2021-12-19 PROCEDURE — 20000000 HC ICU ROOM

## 2021-12-19 PROCEDURE — 82330 ASSAY OF CALCIUM: CPT

## 2021-12-19 PROCEDURE — 85025 COMPLETE CBC W/AUTO DIFF WBC: CPT | Performed by: NURSE PRACTITIONER

## 2021-12-19 PROCEDURE — 96360 HYDRATION IV INFUSION INIT: CPT

## 2021-12-19 PROCEDURE — A9585 GADOBUTROL INJECTION: HCPCS | Performed by: EMERGENCY MEDICINE

## 2021-12-19 PROCEDURE — 86850 RBC ANTIBODY SCREEN: CPT | Performed by: PHYSICIAN ASSISTANT

## 2021-12-19 PROCEDURE — 80061 LIPID PANEL: CPT | Performed by: NURSE PRACTITIONER

## 2021-12-19 PROCEDURE — 25000003 PHARM REV CODE 250: Performed by: EMERGENCY MEDICINE

## 2021-12-19 PROCEDURE — 85014 HEMATOCRIT: CPT

## 2021-12-19 PROCEDURE — 93005 ELECTROCARDIOGRAM TRACING: CPT

## 2021-12-19 PROCEDURE — U0002 COVID-19 LAB TEST NON-CDC: HCPCS | Performed by: EMERGENCY MEDICINE

## 2021-12-19 PROCEDURE — 99291 PR CRITICAL CARE, E/M 30-74 MINUTES: ICD-10-PCS | Mod: ,,, | Performed by: PSYCHIATRY & NEUROLOGY

## 2021-12-19 PROCEDURE — 82565 ASSAY OF CREATININE: CPT

## 2021-12-19 PROCEDURE — 84100 ASSAY OF PHOSPHORUS: CPT | Performed by: PHYSICIAN ASSISTANT

## 2021-12-19 PROCEDURE — 93010 ELECTROCARDIOGRAM REPORT: CPT | Mod: 76,,, | Performed by: INTERNAL MEDICINE

## 2021-12-19 PROCEDURE — 84295 ASSAY OF SERUM SODIUM: CPT

## 2021-12-19 PROCEDURE — 99900035 HC TECH TIME PER 15 MIN (STAT)

## 2021-12-19 PROCEDURE — 99291 CRITICAL CARE FIRST HOUR: CPT | Mod: ,,, | Performed by: PHYSICIAN ASSISTANT

## 2021-12-19 PROCEDURE — 25000242 PHARM REV CODE 250 ALT 637 W/ HCPCS: Performed by: PHYSICIAN ASSISTANT

## 2021-12-19 PROCEDURE — 84443 ASSAY THYROID STIM HORMONE: CPT | Performed by: NURSE PRACTITIONER

## 2021-12-19 PROCEDURE — 86900 BLOOD TYPING SEROLOGIC ABO: CPT | Performed by: PHYSICIAN ASSISTANT

## 2021-12-19 PROCEDURE — 85610 PROTHROMBIN TIME: CPT | Performed by: NURSE PRACTITIONER

## 2021-12-19 PROCEDURE — 83735 ASSAY OF MAGNESIUM: CPT | Performed by: PHYSICIAN ASSISTANT

## 2021-12-19 PROCEDURE — 82962 GLUCOSE BLOOD TEST: CPT

## 2021-12-19 PROCEDURE — 83036 HEMOGLOBIN GLYCOSYLATED A1C: CPT | Performed by: PHYSICIAN ASSISTANT

## 2021-12-19 PROCEDURE — 25000003 PHARM REV CODE 250: Performed by: PHYSICIAN ASSISTANT

## 2021-12-19 PROCEDURE — 84132 ASSAY OF SERUM POTASSIUM: CPT

## 2021-12-19 PROCEDURE — 93010 ELECTROCARDIOGRAM REPORT: CPT | Mod: ,,, | Performed by: INTERNAL MEDICINE

## 2021-12-19 PROCEDURE — 96361 HYDRATE IV INFUSION ADD-ON: CPT

## 2021-12-19 RX ORDER — ATORVASTATIN CALCIUM 20 MG/1
40 TABLET, FILM COATED ORAL DAILY
Status: DISCONTINUED | OUTPATIENT
Start: 2021-12-20 | End: 2021-12-30 | Stop reason: HOSPADM

## 2021-12-19 RX ORDER — ACETAMINOPHEN 325 MG/1
650 TABLET ORAL EVERY 6 HOURS PRN
Status: DISCONTINUED | OUTPATIENT
Start: 2021-12-19 | End: 2021-12-30 | Stop reason: HOSPADM

## 2021-12-19 RX ORDER — AMOXICILLIN 250 MG
1 CAPSULE ORAL 2 TIMES DAILY
Status: DISCONTINUED | OUTPATIENT
Start: 2021-12-19 | End: 2021-12-30 | Stop reason: HOSPADM

## 2021-12-19 RX ORDER — SODIUM CHLORIDE 0.9 % (FLUSH) 0.9 %
10 SYRINGE (ML) INJECTION
Status: DISCONTINUED | OUTPATIENT
Start: 2021-12-19 | End: 2021-12-30 | Stop reason: HOSPADM

## 2021-12-19 RX ORDER — LANOLIN ALCOHOL/MO/W.PET/CERES
1 CREAM (GRAM) TOPICAL 2 TIMES DAILY
Status: DISCONTINUED | OUTPATIENT
Start: 2021-12-19 | End: 2021-12-30 | Stop reason: HOSPADM

## 2021-12-19 RX ORDER — GLUCAGON 1 MG
1 KIT INJECTION
Status: DISCONTINUED | OUTPATIENT
Start: 2021-12-19 | End: 2021-12-30 | Stop reason: HOSPADM

## 2021-12-19 RX ORDER — CLOPIDOGREL BISULFATE 75 MG/1
75 TABLET ORAL DAILY
Status: DISCONTINUED | OUTPATIENT
Start: 2021-12-19 | End: 2021-12-30 | Stop reason: HOSPADM

## 2021-12-19 RX ORDER — GADOBUTROL 604.72 MG/ML
8 INJECTION INTRAVENOUS
Status: COMPLETED | OUTPATIENT
Start: 2021-12-19 | End: 2021-12-19

## 2021-12-19 RX ORDER — CLOPIDOGREL 300 MG/1
TABLET, FILM COATED ORAL
Status: DISPENSED
Start: 2021-12-19 | End: 2021-12-20

## 2021-12-19 RX ORDER — NAPROXEN SODIUM 220 MG/1
81 TABLET, FILM COATED ORAL DAILY
Status: DISCONTINUED | OUTPATIENT
Start: 2021-12-19 | End: 2021-12-30 | Stop reason: HOSPADM

## 2021-12-19 RX ORDER — IPRATROPIUM BROMIDE AND ALBUTEROL SULFATE 2.5; .5 MG/3ML; MG/3ML
3 SOLUTION RESPIRATORY (INHALATION)
Status: DISCONTINUED | OUTPATIENT
Start: 2021-12-19 | End: 2021-12-20

## 2021-12-19 RX ORDER — LABETALOL HCL 20 MG/4 ML
10 SYRINGE (ML) INTRAVENOUS EVERY 4 HOURS PRN
Status: DISCONTINUED | OUTPATIENT
Start: 2021-12-19 | End: 2021-12-30 | Stop reason: HOSPADM

## 2021-12-19 RX ORDER — NAPROXEN SODIUM 220 MG/1
324 TABLET, FILM COATED ORAL
Status: COMPLETED | OUTPATIENT
Start: 2021-12-19 | End: 2021-12-19

## 2021-12-19 RX ORDER — HYDROCODONE BITARTRATE AND ACETAMINOPHEN 5; 325 MG/1; MG/1
1 TABLET ORAL EVERY 6 HOURS PRN
Status: DISCONTINUED | OUTPATIENT
Start: 2021-12-19 | End: 2021-12-30 | Stop reason: HOSPADM

## 2021-12-19 RX ORDER — ONDANSETRON 2 MG/ML
4 INJECTION INTRAMUSCULAR; INTRAVENOUS EVERY 8 HOURS PRN
Status: DISCONTINUED | OUTPATIENT
Start: 2021-12-19 | End: 2021-12-30 | Stop reason: HOSPADM

## 2021-12-19 RX ORDER — INSULIN ASPART 100 [IU]/ML
0-5 INJECTION, SOLUTION INTRAVENOUS; SUBCUTANEOUS EVERY 6 HOURS PRN
Status: DISCONTINUED | OUTPATIENT
Start: 2021-12-19 | End: 2021-12-21

## 2021-12-19 RX ORDER — CLOPIDOGREL 300 MG/1
300 TABLET, FILM COATED ORAL
Status: COMPLETED | OUTPATIENT
Start: 2021-12-19 | End: 2021-12-19

## 2021-12-19 RX ORDER — ASPIRIN 325 MG
TABLET ORAL
Status: DISPENSED
Start: 2021-12-19 | End: 2021-12-20

## 2021-12-19 RX ORDER — SODIUM CHLORIDE 9 MG/ML
INJECTION, SOLUTION INTRAVENOUS CONTINUOUS
Status: DISCONTINUED | OUTPATIENT
Start: 2021-12-19 | End: 2021-12-20

## 2021-12-19 RX ADMIN — IOHEXOL 100 ML: 350 INJECTION, SOLUTION INTRAVENOUS at 02:12

## 2021-12-19 RX ADMIN — SODIUM CHLORIDE: 0.9 INJECTION, SOLUTION INTRAVENOUS at 09:12

## 2021-12-19 RX ADMIN — ASPIRIN 81 MG CHEWABLE TABLET 81 MG: 81 TABLET CHEWABLE at 09:12

## 2021-12-19 RX ADMIN — ASPIRIN 81 MG CHEWABLE TABLET 324 MG: 81 TABLET CHEWABLE at 02:12

## 2021-12-19 RX ADMIN — CLOPIDOGREL 75 MG: 75 TABLET, FILM COATED ORAL at 09:12

## 2021-12-19 RX ADMIN — SENNOSIDES AND DOCUSATE SODIUM 1 TABLET: 50; 8.6 TABLET ORAL at 09:12

## 2021-12-19 RX ADMIN — GADOBUTROL 8 ML: 604.72 INJECTION INTRAVENOUS at 05:12

## 2021-12-19 RX ADMIN — FERROUS SULFATE TAB 325 MG (65 MG ELEMENTAL FE) 1 EACH: 325 (65 FE) TAB at 09:12

## 2021-12-19 RX ADMIN — IPRATROPIUM BROMIDE AND ALBUTEROL SULFATE 3 ML: .5; 3 SOLUTION RESPIRATORY (INHALATION) at 08:12

## 2021-12-19 RX ADMIN — CLOPIDOGREL BISULFATE 300 MG: 300 TABLET, FILM COATED ORAL at 02:12

## 2021-12-19 RX ADMIN — SODIUM CHLORIDE 1000 ML: 0.9 INJECTION, SOLUTION INTRAVENOUS at 02:12

## 2021-12-19 RX ADMIN — SODIUM CHLORIDE 75 ML/HR: 0.9 INJECTION, SOLUTION INTRAVENOUS at 10:12

## 2021-12-19 NOTE — ED PROVIDER NOTES
Dictation #1  MRN:[id number]  CSN:[id number]  Encounter Date: 12/19/2021       History     Chief Complaint   Patient presents with    Transfer     Transfer from  for neuro surg     55 y/o f, h/o DM, HTN, deaf, transfer from [facility] for acute stroke evaluation.  Pt presented there today with R UE weakness and R facial droop.  Daughter reports that sx first noted on Friday afternoon, asymmetry to face.  Starting yesterday, pt was having difficulty walking/was off balance.  She presented to [facility] at 12 pm, > 24 hours after sx onset.    She had a CTA that showed Impression:  1. Complete occlusion of the left M1 segment.  2. Frothy secretions within the trachea and fluid distention of the esophageal lumen.  Findings raise concern for aspiration.  3. Hypodensity measuring 1.3 cm in the left thyroid lobe which can be further evaluated with nonemergent thyroid ultrasound    Sent here for possible intervention.    The history is provided by the patient. The history is limited by the condition of the patient.     Review of patient's allergies indicates:  No Known Allergies  Past Medical History:   Diagnosis Date    Anemia     Arthritis     Asthma     Deaf     Diabetes mellitus     Hyperlipidemia     Hypertension      Past Surgical History:   Procedure Laterality Date    COLONOSCOPY N/A 12/4/2020    Procedure: COLONOSCOPY;  Surgeon: Felicia Bishop MD;  Location: [facility];  Service: Endoscopy;  Laterality: N/A;    ESOPHAGOGASTRODUODENOSCOPY N/A 2/28/2019    Procedure: EGD (ESOPHAGOGASTRODUODENOSCOPY);  Surgeon: Yolanda Gary MD;  Location: [facility];  Service: Endoscopy;  Laterality: N/A;    ESOPHAGOGASTRODUODENOSCOPY N/A 12/4/2020    Procedure: EGD (ESOPHAGOGASTRODUODENOSCOPY);  Surgeon: Felicia Bishop MD;  Location: [facility];  Service: Endoscopy;  Laterality: N/A;    ESOPHAGOGASTRODUODENOSCOPY N/A 7/1/2021    Procedure: EGD (ESOPHAGOGASTRODUODENOSCOPY)-in March 2021;  Surgeon: Sharaht Kohler MD;   Location: [facility];  Service: Endoscopy;  Laterality: N/A;  hearing impaired, will need  - Maria Dolores Harris assigned-MS  fully vaccinated-see immunization record    HYSTERECTOMY       Family History   Problem Relation Age of Onset    Diabetes Mother     Hypertension Mother     Colon cancer Neg Hx     Esophageal cancer Neg Hx      Social History     Tobacco Use    Smoking status: Never Smoker    Smokeless tobacco: Never Used   Substance Use Topics    Alcohol use: No    Drug use: No     Review of Systems   Unable to perform ROS: Acuity of condition       Physical Exam     Initial Vitals [12/19/21 1231]   BP Pulse Resp Temp SpO2   (!) 186/80 (!) 132 18 98 °F (36.7 °C) 97 %      MAP       --         Physical Exam    Nursing note and vitals reviewed.  Constitutional: She appears well-developed and well-nourished. She is not diaphoretic. No distress.   HENT:   Mouth/Throat: Oropharynx is clear and moist.   Eyes: Conjunctivae are normal. Pupils are equal, round, and reactive to light.   Neck: Neck supple.   Cardiovascular: Normal rate and regular rhythm.   Pulmonary/Chest: No respiratory distress.   Abdominal: Abdomen is soft. She exhibits no distension. There is no abdominal tenderness. There is no rebound.   Musculoskeletal:         General: No edema.      Cervical back: Neck supple.     Neurological: She is alert.   Able to follow commands well  Daughter reports pt unable to sign as she normally would  R facial weakness and R UE/LE weakness   Skin: Skin is warm and dry. No pallor.         ED Course   Procedures  Labs Reviewed   CBC W/ AUTO DIFFERENTIAL - Abnormal; Notable for the following components:       Result Value    RBC 3.64 (*)     Hemoglobin 7.8 (*)     Hematocrit 27.6 (*)     MCV 76 (*)     MCH 21.4 (*)     MCHC 28.3 (*)     RDW 18.9 (*)     All other components within normal limits   COMPREHENSIVE METABOLIC PANEL - Abnormal; Notable for the following components:     Glucose 120 (*)     AST 41 (*)     All other components within normal limits   LIPID PANEL - Abnormal; Notable for the following components:    Cholesterol 214 (*)     All other components within normal limits   HEMOGLOBIN A1C - Abnormal; Notable for the following components:    Hemoglobin A1C 6.1 (*)     All other components within normal limits   POCT GLUCOSE, HAND-HELD DEVICE - Abnormal; Notable for the following components:    POC Glucose 126 (*)     All other components within normal limits   ISTAT PROCEDURE - Abnormal; Notable for the following components:    POC Glucose 116 (*)     POC Hematocrit 25 (*)     All other components within normal limits   PROTIME-INR   TSH   MAGNESIUM   PHOSPHORUS   SARS-COV-2 RDRP GENE    Narrative:     This test utilizes isothermal nucleic acid amplification   technology to detect the SARS-CoV-2 RdRp nucleic acid segment.   The analytical sensitivity (limit of detection) is 125 genome   equivalents/mL.   A POSITIVE result implies infection with the SARS-CoV-2 virus;   the patient is presumed to be contagious.     A NEGATIVE result means that SARS-CoV-2 nucleic acids are not   present above the limit of detection. A NEGATIVE result should be   treated as presumptive. It does not rule out the possibility of   COVID-19 and should not be the sole basis for treatment decisions.   If COVID-19 is strongly suspected based on clinical and exposure   history, re-testing using an alternate molecular assay should be   considered.   This test is only for use under the Food and Drug   Administration s Emergency Use Authorization (EUA).   Commercial kits are provided by [employer].   Performance characteristics of the EUA have been independently   verified by [facility] Department of   Pathology and Laboratory Medicine.   _________________________________________________________________   The authorized Fact Sheet for Healthcare Providers and the authorized Fact   Sheet for  Patients of the ID NOW COVID-19 are available on the FDA   website:     https://www.fda.gov/media/621649/download  https://www.fda.gov/media/447808/download         TYPE & SCREEN   ISTAT CHEM8   POCT PROTIME-INR   ISTAT CHEM8   POCT GLUCOSE MONITORING CONTINUOUS   POCT GLUCOSE MONITORING CONTINUOUS        ECG Results          EKG, 12 - Lead (Final result)  Result time 12/20/21 10:44:11    Final result by Interface, Lab In [facility] (12/20/21 10:44:11)                 Narrative:    Test Reason : I63.9,    Vent. Rate : 099 BPM     Atrial Rate : 099 BPM     P-R Int : 136 ms          QRS Dur : 084 ms      QT Int : 348 ms       P-R-T Axes : 057 050 061 degrees     QTc Int : 446 ms    Normal sinus rhythm  Normal ECG  When compared with ECG of 19-DEC-2021 17:24,  No significant change was found  Confirmed by Tristian CHILDS MD ([id number]) on 12/20/2021 10:44:02 AM    Referred By: [email or web address]   SELF           Confirmed By:Tristian CHILDS MD                             ECG 12 lead (Final result)  Result time 12/20/21 10:45:00    Final result by Interface, Lab In [facility] (12/20/21 10:45:00)                 Narrative:    Test Reason : I63.9,    Vent. Rate : 094 BPM     Atrial Rate : 094 BPM     P-R Int : 140 ms          QRS Dur : 084 ms      QT Int : 342 ms       P-R-T Axes : 057 050 058 degrees     QTc Int : 427 ms    Normal sinus rhythm  Normal ECG  When compared with ECG of 19-DEC-2021 13:36,  Nonspecific T wave abnormality no longer evident in Lateral leads  Confirmed by Tristian CHILDS MD ([id number]) on 12/20/2021 10:44:48 AM    Referred By: [email or web address]   SELF           Confirmed By:Tristian CHILDS MD                            Imaging Results          X-Ray Chest AP Single View (Final result)  Result time 12/19/21 20:58:37    Final result by Bernardo Marcano MD (12/19/21 20:58:37)                 Impression:      Airspace opacity in the left lower lobe.      Electronically signed by: Bernardo Marcano MD  Date:    12/19/2021  Time:    20:58             Narrative:     EXAMINATION:  XR CHEST 1 VIEW    CLINICAL HISTORY:  stroke, suspected aspiration;    TECHNIQUE:  Single frontal view of the chest was performed.    COMPARISON:  12/02/2020.    FINDINGS:  Monitoring EKG leads are present.  The trachea is unremarkable.  The cardiac silhouette is enlarged.  There is no evidence of free air beneath the hemidiaphragms.  There are no pleural effusions.  There is no evidence of a pneumothorax.  There is no evidence of pneumomediastinum.  There is an airspace opacity in the left lower lobe.  There are degenerative changes in the osseous structures.                               MRI Brain (Tumor with Perfusion) W W/O Contrast (XPD) (Final result)  Result time 12/19/21 18:31:57    Final result by Bernardo Marcano MD (12/19/21 18:31:57)                 Impression:      1. Acute to subacute infarctions in the left MCA distribution as above.  No evidence of hemorrhagic transformation.  2. MR perfusion findings consistent with increased mean transit time t in he left MCA territory with normal cerebral blood volume, suggestive of ischemic penumbra.  The critical information above was relayed directly by Sandro Bullock MD by telephone to Chloe Katz MD on 12/19/2021 at 18:27.    Electronically signed by resident: Sandro Bullock  Date:    12/19/2021  Time:    17:37    Electronically signed by: Bernardo Marcano MD  Date:    12/19/2021  Time:    18:31             Narrative:    EXAMINATION:  MRI BRAIN ISCHEMIC INTERVENTIONAL PROTOCOL INCL MRA W/O CONTRAST; MRI BRAIN (TUMOR WITH PERFUSION) W W/O CONTRAST (XPD)    CLINICAL HISTORY:  L MCA occlusion;; assessment of perfusion for possible thrombectomy;    TECHNIQUE:  Pre and postcontrast MR imaging of the brain with noncontrast MRA and MR perfusion was performed utilizing 10 mL Gadavist intravenous contrast.    MR perfusion was also performed.    COMPARISON:  CTA head neck 12/19/2021.    FINDINGS:  There is extensive T2/FLAIR signal hyperintensity involving the  left cerebral hemisphere including the insula, basal ganglia, as well as the left frontal and parietal lobes.  There is corresponding restricted diffusion on DWI/ADC.  No evidence of blooming is seen on GRE to suggest hemorrhagic transformation.  There is localized mass effect with sulcal effacement however no significant midline shift.    The remainder of the brain parenchyma demonstrates no evidence of prior cortical/territorial infarct with few scattered nonspecific white matter lesions that may reflect chronic small vessel ischemic change.  No enhancing lesion is appreciated.  MRI demonstrates findings consistent with occlusion of the left M1 segment of the MCA.  The remainder of the intracranial vasculature is within normal limits.    The postcontrast images demonstrates areas of patchy enhancement involving the infarcted territories.    MR perfusion demonstrates geographic increased mean transit time in the left MCA distribution.  No evidence of associated decreased cerebral blood volume or cerebral blood flow.    Ventricles are normal in size for age.  No hydrocephalus.                               MRI Brain Ischemic Inter Pro Incl MRA W/O Con (Final result)  Result time 12/19/21 18:31:57    Final result by Bernardo Marcano MD (12/19/21 18:31:57)                 Impression:      1. Acute to subacute infarctions in the left MCA distribution as above.  No evidence of hemorrhagic transformation.  2. MR perfusion findings consistent with increased mean transit time t in he left MCA territory with normal cerebral blood volume, suggestive of ischemic penumbra.  The critical information above was relayed directly by Sandro Bullock MD by telephone to Chloe Katz MD on 12/19/2021 at 18:27.    Electronically signed by resident: Sandro Bullock  Date:    12/19/2021  Time:    17:37    Electronically signed by: Bernardo Marcano MD  Date:    12/19/2021  Time:    18:31             Narrative:    EXAMINATION:  MRI BRAIN ISCHEMIC  INTERVENTIONAL PROTOCOL INCL MRA W/O CONTRAST; MRI BRAIN (TUMOR WITH PERFUSION) W W/O CONTRAST (XPD)    CLINICAL HISTORY:  L MCA occlusion;; assessment of perfusion for possible thrombectomy;    TECHNIQUE:  Pre and postcontrast MR imaging of the brain with noncontrast MRA and MR perfusion was performed utilizing 10 mL Gadavist intravenous contrast.    MR perfusion was also performed.    COMPARISON:  CTA head neck 12/19/2021.    FINDINGS:  There is extensive T2/FLAIR signal hyperintensity involving the left cerebral hemisphere including the insula, basal ganglia, as well as the left frontal and parietal lobes.  There is corresponding restricted diffusion on DWI/ADC.  No evidence of blooming is seen on GRE to suggest hemorrhagic transformation.  There is localized mass effect with sulcal effacement however no significant midline shift.    The remainder of the brain parenchyma demonstrates no evidence of prior cortical/territorial infarct with few scattered nonspecific white matter lesions that may reflect chronic small vessel ischemic change.  No enhancing lesion is appreciated.  MRI demonstrates findings consistent with occlusion of the left M1 segment of the MCA.  The remainder of the intracranial vasculature is within normal limits.    The postcontrast images demonstrates areas of patchy enhancement involving the infarcted territories.    MR perfusion demonstrates geographic increased mean transit time in the left MCA distribution.  No evidence of associated decreased cerebral blood volume or cerebral blood flow.    Ventricles are normal in size for age.  No hydrocephalus.                                CTA Head and Neck (xpd) (Final result)  Result time 12/19/21 14:15:38    Final result by Jorge Ruelas DO (12/19/21 14:15:38)                 Impression:      1. Complete occlusion of the left M1 segment.  2. Frothy secretions within the trachea and fluid distention of the esophageal lumen.  Findings raise  concern for aspiration.  3. Hypodensity measuring 1.3 cm in the left thyroid lobe which can be further evaluated with nonemergent thyroid ultrasound  This report was flagged in Epic as abnormal.    Dr. Ruelas discussed critical findings with Dr. Gagnon by telephone at 14:05 on 12/19/2021.      Electronically signed by: Jorge Ruelas  Date:    12/19/2021  Time:    14:15             Narrative:    EXAMINATION:  CTA HEAD AND NECK (XPD)    CLINICAL HISTORY:  Neuro deficit, acute, stroke suspected;    TECHNIQUE:  CT angiogram was performed from the level of the sherlyn to the top of the head following the IV administration of 100mL of Omnipaque 350.   Sagittal and coronal reconstructions and maximum intensity projection reconstructions were performed. Arterial stenosis percentages are based on NASCET measurement criteria. An immediate post-contrast CT head was also performed. RapidAI LVO was utilized to measure arterial blood flow in the brain.    COMPARISON:  Noncontrast CT head from earlier the same date.    FINDINGS:  CTA head:    There is venous contamination which somewhat limits evaluation.    Anterior circulation: There is complete occlusion of the left M1 segment at its origin.  There is a paucity of vessels in the left MCA territory.  Rapid AI evaluation demonstrates less than 45% blood vessel density in the left MCA territory (suspicious for LVO).  The right MCA is patent and unremarkable.  There is mild calcified atherosclerosis of the bilateral cavernous ICAs without evidence of high-grade stenosis.  The bilateral anterior arteries are within normal limits, without hemodynamically significant stenosis or occlusion.    Posterior circulation: Vertebrobasilar system is within normal limits without focal abnormality.  The bilateral posterior cerebral arteries are within normal limits, without hemodynamically significant stenosis or occlusion.    There is no intracranial aneurysm.    CTA neck:    There is a 4  vessel aortic arch, with the left vertebral artery takes its origin directly from the aorta.  There is mild calcified atherosclerosis at the origin of the left subclavian artery.    The common and internal carotid arteries are normal in course and caliber. No significant stenosis in either carotid bifurcation.    The vertebral origins are patent. The cervical vertebral arteries are normal in course and caliber.    There is a 1.3 cm hypodensity within the posterior aspect of the left thyroid lobe.  The visualized lung apices are clear.  There is frothy material within the trachea.  There is fluid within the esophageal lumen.    There is no acute fracture or subluxation of the cervical spine.                                CT Head Without Contrast (Final result)  Result time 12/19/21 13:22:51    Final result by Jorge Ruelas DO (12/19/21 13:22:51)                 Impression:      Hypoattenuation and loss of gray-white differentiation within the left insular cortex, frontal lobe, and basal ganglia compatible with an MCA territory acute infarction.    This report was flagged in Epic as abnormal.    Dr. Ruelas discussed critical findings with Dr. Gagnon by telephone at 13:20 on 12/19/2021.      Electronically signed by: Jorge Ruelas  Date:    12/19/2021  Time:    13:22             Narrative:    EXAMINATION:  CT HEAD WITHOUT CONTRAST    CLINICAL HISTORY:  Neuro deficit, acute, stroke suspected;    TECHNIQUE:  Low dose axial CT images obtained throughout the head without intravenous contrast. Sagittal and coronal reconstructions were performed.    COMPARISON:  CT head from 09/26/2021.    FINDINGS:  Ventricles and sulci are normal in size for age without evidence of hydrocephalus. No extra-axial blood or fluid collections.  There is hypoattenuation and loss of gray-white differentiation within the left insular cortex, the left frontal lobe, and the left basal ganglia, compatible with an MCA territory infarction.  No  definite hyperdense artery sign.  There is hypoattenuation within the periventricular in deep subcortical white matter compatible with chronic microvascular ischemic changes, similar to prior.  No parenchymal mass hemorrhage.    No calvarial fracture.  The scalp is unremarkable.  Bilateral paranasal sinuses and mastoid air cells are clear.                                 Medications   clopidogreL (PLAVIX) 300 mg tablet (has no administration in time range)   aspirin 325 MG tablet (has no administration in time range)   sodium chloride 0.9% flush 10 mL (has no administration in time range)   senna-docusate 8.6-50 mg per tablet 1 tablet (1 tablet Oral Not Given 12/20/21 2100)   ondansetron injection 4 mg (has no administration in time range)   labetalol 20 mg/4 mL (5 mg/mL) IV syring (10 mg Intravenous Given 12/20/21 1052)   atorvastatin tablet 40 mg (40 mg Oral Given 12/20/21 1338)   acetaminophen tablet 650 mg (has no administration in time range)   ferrous sulfate tablet 1 each (1 each Oral Given 12/20/21 2145)   HYDROcodone-acetaminophen 5-325 mg per tablet 1 tablet (has no administration in time range)   aspirin chewable tablet 81 mg (81 mg Oral Given 12/20/21 1330)   clopidogreL tablet 75 mg (75 mg Oral Given 12/20/21 1339)   dextrose 50% injection 12.5 g (has no administration in time range)   glucagon (human recombinant) injection 1 mg (has no administration in time range)   insulin aspart U-100 pen 0-5 Units (has no administration in time range)   albuterol-ipratropium 2.5 mg-0.5 mg/3 mL nebulizer solution 3 mL (3 mLs Nebulization Given 12/20/21 0253)   hydrALAZINE tablet 25 mg (25 mg Oral Given 12/20/21 2145)   heparin (porcine) injection 5,000 Units (5,000 Units Subcutaneous Given 12/20/21 2145)   iohexoL (OMNIPAQUE 350) injection 100 mL (100 mLs Intravenous Given 12/19/21 1401)   aspirin chewable tablet 324 mg (324 mg Oral Given 12/19/21 1415)   clopidogreL tablet 300 mg (300 mg Oral Given 12/19/21 1426)    sodium chloride 0.9% bolus 1,000 mL (0 mLs Intravenous Stopped 12/19/21 1657)   gadobutroL (GADAVIST) injection 8 mL (8 mLs Intravenous Given 12/19/21 1721)     Medical Decision Making:   History:   Old Medical Records: I decided to obtain old medical records.  Initial Assessment:   55 yo f, here with subacute L MCA stroke.  Sx onset Friday night, almost 48 hours ago    Neuro deficits as noted  Vascular neuro team at bedside on arrival    Additional imaging studies ordered, per their request, including MRI brain  Clinical Tests:   Lab Tests: Ordered and Reviewed  Radiological Study: Reviewed and Ordered  Medical Tests: Reviewed and Ordered  ED Management:  Admission to vascular neurology  Other:   I have discussed this case with another health care provider.       <> Summary of the Discussion: Vascular neuro resident             ED Course as of 12/20/21 2148   Sun Dec 19, 2021   1255 Dr. Gagnon at bedside [MT]      ED Course User Index  [MT] Aileen Diez NP             Clinical Impression:   Final diagnoses:  [I63.9] Stroke  [I63.9] Cerebrovascular accident (CVA), unspecified mechanism (Primary)          ED Disposition Condition    Admit               Avis Sparks MD  12/20/21 2148

## 2021-12-19 NOTE — HPI
Aurora Ornelas is a 56 y.o. female with PMHx of HTN, asthma, and hearing loss (deaf, communicates with American Sign Language) who presented to OSH with RSW and communication difficulty. CT Head with L frontal infarct. CTA with L M1 occlusion. Patient out of window for tPA. Patient transferred to [facility] for possible thrombectomy. Patient aphasic on arrival. Per daughter, they noticed a R facial droop on 12/17. She reported to the ED today due to worsening symptoms of RSW and communication difficulty.

## 2021-12-19 NOTE — ASSESSMENT & PLAN NOTE
Patient is a 56 y.o. female  with PMHx of HTN, HLD, asthma, and hearing loss (deaf, communicates with American Sign Language) who presented to OSH with RSW and communication difficulty. Per family, patient began having symptoms on 12/17 with a facial droop. CT Head with L frontal infarct. CTA with L M1 occlusion. Patient out of window for tPA. Patient transferred to [facility] for possible thrombectomy. On arrival, patient taken to MRI STAT for MRI acute ischemic protocol + MR perfusion scan. MRI reviewed with neuro IR, thrombectomy deferred due to high risk of reperfusion injury given symptoms beginning ~ 48 hours ago. However, recommendation was made for admission to neuro ICU for close monitoring with low threshold for IR intervention if neuro exam declined.  -Neuro exam stable. Echo pending.     Antithrombotics for secondary stroke prevention: Antiplatelets: Aspirin: 81 mg daily  Clopidogrel: 75 mg daily    Statins for secondary stroke prevention and hyperlipidemia, if present:   Statins: Atorvastatin- 40 mg daily    Aggressive risk factor modification: HTN     Rehab efforts: The patient has been evaluated by a stroke team provider and the therapy needs have been fully considered based off the presenting complaints and exam findings. The following therapy evaluations are needed: PT evaluate and treat, OT evaluate and treat, SLP evaluate and treat, PM&R evaluate for appropriate placement    Diagnostics ordered/pending: TTE to assess cardiac function/status     VTE prophylaxis: Heparin 5000 units SQ every 8 hours  Mechanical prophylaxis: Place SCDs    BP parameters: Infarct: No intervention, SBP <220

## 2021-12-19 NOTE — ED TRIAGE NOTES
Aurora Ornelas, a 56 y.o. female presents to the ED after being transferred from [facility] for neuro feliz consult. Pt was found to have right sided MCA on CT at [facility]. Possible IR case. Pt is deaf but presents with expressive aphasia. All hx provided per pt's daughter and ALS . Communication limited per pt condition. Symptoms started Friday.    Triage note:  Chief Complaint   Patient presents with    skin irritation     Patient come sin states left cheek skin irritation, and swelling to left cheek     Review of patient's allergies indicates:  No Known Allergies  Past Medical History:   Diagnosis Date    Anemia     Arthritis     Asthma     Deaf     Diabetes mellitus     Hyperlipidemia     Hypertension      LOC: The patient is awake, alert, and oriented to self and place only. Pt is calm and cooperative. Affect is appropriate.  Speech is appropriate and clear.     APPEARANCE: Patient resting comfortably in no acute distress.  Patient is clean and well groomed.    SKIN: The skin is warm and dry; color consistent with ethnicity.  Patient has normal skin turgor and moist mucus membranes.  Skin intact; no breakdown or bruising noted.     MUSCULOSKELETAL: Patient moving upper and lower extremities without difficulty; denies pain in the extremities or back.  Denies weakness. Stable gait.    RESPIRATORY: Airway is open and patent. Respirations spontaneous, even, easy, and non-labored.  Patient has a normal effort and rate.  No accessory muscle use noted. Denies cough and SOB.     CARDIAC:  Normal rhythm and rate noted.  No peripheral edema noted. No complaints of chest pain.      ABDOMEN: Soft and non tender to palpation.  No distention noted. Pt denies abdominal pain; denies nausea, vomiting, diarrhea, or constipation.    NEUROLOGIC: Eyes open spontaneously.  Behavior appropriate to situation.  Follows simple commands; facial expression symmetrical. Decreased sensation in right arm. Pt denies  headache; denies lightheadedness or dizziness; denies visual disturbances; denies loss of balance; right sided weakness present.

## 2021-12-19 NOTE — CONSULTS
[facility] Emergency Dept  Neurology  Consult Note    Patient Name: Aurora Ornelas  MRN: [id number]  Admission Date: 12/19/2021  Hospital Length of Stay: 0 days  Code Status: Prior   Attending Provider: Adiel Gagnon MD   Consulting Provider: Juan Casey MD  Primary Care Physician: Primary Doctor No  Principal Problem:<principal problem not specified>    Consults  Subjective:     Chief Complaint: Right sided weknaess     HPI: 57 y/o female with medical Hx as listed on PMHx section comes to ED for right sided weakness. This was noted around 7 am today. Initially pt states that her reason for being in [facility] was swelling of left cheek. Uncertain if pt woke up with symptoms. Language line is being used as pt is deaf. At moment of evaluation  states that pt is just repeating what he says and has a great deal understanding. Stroke CODE was called. See documentation from vascular neurology.    Past Medical History:   Diagnosis Date    Anemia     Arthritis     Asthma     Deaf     Diabetes mellitus     Hyperlipidemia     Hypertension        Past Surgical History:   Procedure Laterality Date    COLONOSCOPY N/A 12/4/2020    Procedure: COLONOSCOPY;  Surgeon: Felicia Bishop MD;  Location: [facility];  Service: Endoscopy;  Laterality: N/A;    ESOPHAGOGASTRODUODENOSCOPY N/A 2/28/2019    Procedure: EGD (ESOPHAGOGASTRODUODENOSCOPY);  Surgeon: Yolanda Gary MD;  Location: [facility];  Service: Endoscopy;  Laterality: N/A;    ESOPHAGOGASTRODUODENOSCOPY N/A 12/4/2020    Procedure: EGD (ESOPHAGOGASTRODUODENOSCOPY);  Surgeon: Felicia Bishop MD;  Location: [facility];  Service: Endoscopy;  Laterality: N/A;    ESOPHAGOGASTRODUODENOSCOPY N/A 7/1/2021    Procedure: EGD (ESOPHAGOGASTRODUODENOSCOPY)-in March 2021;  Surgeon: Sharath Kohler MD;  Location: [facility];  Service: Endoscopy;  Laterality: N/A;  hearing impaired, will need  - Maria Dolores Harris assigned-MS he  vaccinated-see immunization record    HYSTERECTOMY         Review of patient's allergies indicates:  No Known Allergies    Current Neurological Medications:     No current facility-administered medications on file prior to encounter.     Current Outpatient Medications on File Prior to Encounter   Medication Sig    albuterol sulfate 2.5 mg/0.5 mL Nebu Take 2.5 mg by nebulization every 4 (four) hours as needed. Rescue    albuterol-ipratropium (DUO-NEB) 2.5 mg-0.5 mg/3 mL nebulizer solution USE 1 AMPULE IN NEBULIZER EVERY 6 HOURS WHILE AWAKE    albuterol-ipratropium (DUO-NEB) 2.5 mg-0.5 mg/3 mL nebulizer solution Inhale 3 mLs into the lungs.    ergocalciferol (ERGOCALCIFEROL) 50,000 unit Cap Take 50,000 Units by mouth.    ferrous sulfate 325 (65 FE) MG EC tablet Take 325 mg by mouth 2 (two) times daily.    hydrALAZINE (APRESOLINE) 25 MG tablet Take 1 tablet (25 mg total) by mouth every 8 (eight) hours.    ipratropium (ATROVENT) 0.02 % nebulizer solution Take 2.5 mLs (500 mcg total) by nebulization 4 (four) times daily as needed for Wheezing. Rescue    pantoprazole (PROTONIX) 40 MG tablet Take 1 tablet (40 mg total) by mouth 2 (two) times daily.    predniSONE (DELTASONE) 50 MG Tab Take 50 mg by mouth once daily.    sucralfate (CARAFATE) 1 gram tablet Take 1 tablet (1 g total) by mouth 4 (four) times daily before meals and nightly.    traMADoL (ULTRAM) 50 mg tablet TAKE 1 TABLET BY MOUTH ONCE DAILY AS NEEDED FOR PAIN      Family History     Problem Relation (Age of Onset)    Diabetes Mother    Hypertension Mother        Tobacco Use    Smoking status: Never Smoker    Smokeless tobacco: Never Used   Substance and Sexual Activity    Alcohol use: No    Drug use: No    Sexual activity: Yes     Partners: Male     Birth control/protection: None     Review of Systems   HENT: Positive for trouble swallowing.    Cardiovascular: Negative for chest pain.   Neurological: Positive for weakness.    Limited ROS due to  aphasia    Objective:     Vital Signs (Most Recent):  Temp: 98 °F (36.7 °C) (12/19/21 1231)  Pulse: (!) 132 (12/19/21 1231)  Resp: 18 (12/19/21 1231)  BP: (!) 186/80 (12/19/21 1231)  SpO2: 97 % (12/19/21 1231) Vital Signs (24h Range):  Temp:  [98 °F (36.7 °C)] 98 °F (36.7 °C)  Pulse:  [132] 132  Resp:  [18] 18  SpO2:  [97 %] 97 %  BP: (186)/(80) 186/80     Weight: 72.6 kg (160 lb)  Body mass index is 25.82 kg/m².    Physical Exam  Constitutional:       General: She is not in acute distress.  HENT:      Right Ear: External ear normal.      Left Ear: External ear normal.   Eyes:      General:         Right eye: No discharge.         Left eye: No discharge.   Cardiovascular:      Rate and Rhythm: Normal rate.   Pulmonary:      Effort: Pulmonary effort is normal.   Abdominal:      Palpations: Abdomen is soft.   Musculoskeletal:         General: No tenderness.      Cervical back: Neck supple.   Skin:     General: Skin is warm.         NEUROLOGICAL EXAMINATION:     MENTAL STATUS   Level of consciousness: alert       Oriented to self  Follow commands but inconsistent at times     CRANIAL NERVES     CN II   Right visual field deficit: none  Left visual field deficit: none     CN III, IV, VI   Right pupil: Size: 2 mm. Shape: regular.   Left pupil: Size: 2 mm. Shape: regular.   Nystagmus: none   Ophthalmoparesis: none  Conjugate gaze: present    CN VII   Right facial weakness: central  Left facial weakness: none    CN XII   Tongue deviation: none    MOTOR EXAM        RUE pronator drift  No drift on LUE and LE's     NIHSS: 4    Significant Labs:   CBC:   Recent Labs   Lab 12/19/21  1304 12/19/21  1332   WBC 6.09  --    HGB 7.8*  --    HCT 27.6* 25*     --      CMP:   Recent Labs   Lab 12/19/21  1304   *      K 4.7      CO2 23   BUN 13   CREATININE 0.8   CALCIUM 8.8   PROT 8.0   ALBUMIN 3.5   BILITOT 0.3   ALKPHOS 59   AST 41*   ALT 11   ANIONGAP 10   EGFRNONAA >60     Urine Studies: No results for  input(s): COLORU, APPEARANCEUA, PHUR, SPECGRAV, PROTEINUA, GLUCUA, KETONESU, BILIRUBINUA, OCCULTUA, NITRITE, UROBILINOGEN, LEUKOCYTESUR, RBCUA, WBCUA, BACTERIA, SQUAMEPITHEL, HYALINECASTS in the last 48 hours.    Invalid input(s): DESHAWN    Significant Imaging: I have reviewed all pertinent imaging results/findings within the past 24 hours.     CTA head and neck  Impression:     1. Complete occlusion of the left M1 segment.  2. Frothy secretions within the trachea and fluid distention of the esophageal lumen.  Findings raise concern for aspiration.  3. Hypodensity measuring 1.3 cm in the left thyroid lobe which can be further evaluated with nonemergent thyroid ultrasound  This report was flagged in Epic as abnormal.     Dr. Ruelas discussed critical findings with Dr. Gagnon by telephone at 14:05 on 12/19/2021.        Electronically signed by: Jorge Ruelas  Date:                                            12/19/2021  Time:                                           14:15    Assessment and Plan:     55 y/o female consulted for stroke    1. Acute stroke: acute RUE and right facial weakness with what seems to be aphasia (using language line as pt has hearing impairment); concerning for left MCA stroke.   -CTA seems to show occlusion of M1. Pt is out of therapeutic window for thrombolytics. Recommend transfer to [facility] for potential thrombectomy. Likely will undergo CT or MR perfusion.   -Clopidogrel 300 mg X 1.  mg x 1.   -No a-fib on initial EKG.   -Permissive HTN. Will administer NaCl 500 cc bolus to increase her BP. Mainteanacece fluid at 125 cc/hr for now until pt can get to [facility]. Treat if SBP > 220.       VTE Risk Mitigation (From admission, onward)    None          Thank you for your consult. I will follow-up with patient. Please contact us if you have any additional questions.    Juan Casey MD  Neurology  [facility] - [place], [place] - Emergency Dept

## 2021-12-19 NOTE — ED PROVIDER NOTES
"Encounter Date: 12/19/2021    SCRIBE #1 NOTE: I, May Hutson, am scribing for, and in the presence of,  Aileen Diez NP. I have scribed the following portions of the note - Other sections scribed: HPI, ROS.       History     Chief Complaint   Patient presents with    Transfer     Transfer from  for neuro surg     Time seen by provider: 12:50 PM.   Aurora Ornelas is a 56 y.o. female, with a PMHx of HTN, DM, Anemia, deafness, who presents to the ED with right wrist weakness. She reports an onset of right wrist pain and weakness onset at 7 AM today when she woke up this morning. She states she "has to move my right hand with my other hand, its not working". She also reports left facial swelling and pain onset this morning. She further notes slight fever. No other exacerbating or alleviating factors. She denies compliance with daily medications. No prior episode of symptoms. No other exacerbating or alleviating factors. Denies chills, dental pain, ear pain, chest pain, shortness of breath, or other associated symptoms. States she was otherwise in her normal state of health prior to this morning.     Patient history limited due to deafness and ASL language barrier.     The history is provided by the patient. The history is limited by a language barrier. A  was used ( ID: #[id number]).     Review of patient's allergies indicates:  No Known Allergies  Past Medical History:   Diagnosis Date    Anemia     Arthritis     Asthma     Deaf     Diabetes mellitus     Hyperlipidemia     Hypertension      Past Surgical History:   Procedure Laterality Date    COLONOSCOPY N/A 12/4/2020    Procedure: COLONOSCOPY;  Surgeon: Felicia Bishop MD;  Location: [facility];  Service: Endoscopy;  Laterality: N/A;    ESOPHAGOGASTRODUODENOSCOPY N/A 2/28/2019    Procedure: EGD (ESOPHAGOGASTRODUODENOSCOPY);  Surgeon: Yolanda Gary MD;  Location: [facility];  Service: Endoscopy;  Laterality: N/A;    " ESOPHAGOGASTRODUODENOSCOPY N/A 12/4/2020    Procedure: EGD (ESOPHAGOGASTRODUODENOSCOPY);  Surgeon: Felicia Bishop MD;  Location: [facility];  Service: Endoscopy;  Laterality: N/A;    ESOPHAGOGASTRODUODENOSCOPY N/A 7/1/2021    Procedure: EGD (ESOPHAGOGASTRODUODENOSCOPY)-in March 2021;  Surgeon: Sharath Kohler MD;  Location: [facility] ENDO;  Service: Endoscopy;  Laterality: N/A;  hearing impaired, will need  - Maria Dolores Harris assigned-MS  fully vaccinated-see immunization record    HYSTERECTOMY       Family History   Problem Relation Age of Onset    Diabetes Mother     Hypertension Mother     Colon cancer Neg Hx     Esophageal cancer Neg Hx      Social History     Tobacco Use    Smoking status: Never Smoker    Smokeless tobacco: Never Used   Substance Use Topics    Alcohol use: No    Drug use: No     Review of Systems   Constitutional: Positive for fever (subjective). Negative for chills.   HENT: Positive for facial swelling (left, + pain). Negative for congestion, dental problem, rhinorrhea and sore throat.    Eyes: Negative for visual disturbance.   Respiratory: Negative for cough and shortness of breath.    Cardiovascular: Negative for chest pain.   Gastrointestinal: Negative for abdominal pain, diarrhea, nausea and vomiting.   Genitourinary: Negative for dysuria, frequency and hematuria.   Musculoskeletal: Negative for back pain.        Positive for right wrist pain.   Skin: Negative for rash.   Neurological: Negative for dizziness, weakness (other) and headaches.        Positive for right wrist weakness.        Physical Exam     Initial Vitals [12/19/21 1231]   BP Pulse Resp Temp SpO2   (!) 186/80 (!) 132 18 98 °F (36.7 °C) 97 %      MAP       --         Physical Exam    Constitutional: She appears well-developed and well-nourished. She is not diaphoretic. No distress.   HENT:   Head: Normocephalic and atraumatic.   Right Ear: External ear normal.   Left Ear: External ear  normal.   Nose: Nose normal.   Mouth/Throat: Oropharynx is clear and moist. No oropharyngeal exudate.   Eyes: Conjunctivae and EOM are normal. Pupils are equal, round, and reactive to light.   Neck: Neck supple.   Normal range of motion.  Cardiovascular: Normal rate, regular rhythm, normal heart sounds and intact distal pulses.   Pulmonary/Chest: Breath sounds normal. No respiratory distress.   Abdominal: Abdomen is soft.   Musculoskeletal:         General: Normal range of motion.      Cervical back: Normal range of motion and neck supple.     Neurological: She is alert and oriented to person, place, and time. A cranial nerve deficit is present.   Right arm weakness.   Skin: Skin is warm and dry.   Psychiatric: She has a normal mood and affect. Her behavior is normal.         ED Course   Critical Care    Date/Time: 12/19/2021 12:59 PM  Performed by: Adiel Gagnon MD  Authorized by: Adiel Gagnon MD   Direct patient critical care time: 15 minutes  Additional history critical care time: 5 minutes  Ordering / reviewing critical care time: 5 minutes  Documentation critical care time: 5 minutes  Consulting other physicians critical care time: 5 minutes  Consult with family critical care time: 5 minutes  Total critical care time (exclusive of procedural time) : 40 minutes  Critical care time was exclusive of separately billable procedures and treating other patients and teaching time.  Critical care was necessary to treat or prevent imminent or life-threatening deterioration of the following conditions: CNS failure or compromise and shock.  Critical care was time spent personally by me on the following activities: development of treatment plan with patient or surrogate, discussions with consultants, evaluation of patient's response to treatment, examination of patient, obtaining history from patient or surrogate, ordering and performing treatments and interventions, ordering and review of laboratory studies,  ordering and review of radiographic studies, re-evaluation of patient's condition and review of old charts.        Labs Reviewed   CBC W/ AUTO DIFFERENTIAL - Abnormal; Notable for the following components:       Result Value    RBC 3.64 (*)     Hemoglobin 7.8 (*)     Hematocrit 27.6 (*)     MCV 76 (*)     MCH 21.4 (*)     MCHC 28.3 (*)     RDW 18.9 (*)     All other components within normal limits   COMPREHENSIVE METABOLIC PANEL - Abnormal; Notable for the following components:    Glucose 120 (*)     AST 41 (*)     All other components within normal limits   LIPID PANEL - Abnormal; Notable for the following components:    Cholesterol 214 (*)     All other components within normal limits   POCT GLUCOSE, HAND-HELD DEVICE - Abnormal; Notable for the following components:    POC Glucose 126 (*)     All other components within normal limits   ISTAT PROCEDURE - Abnormal; Notable for the following components:    POC Glucose 116 (*)     POC Hematocrit 25 (*)     All other components within normal limits   PROTIME-INR   TSH   HEMOGLOBIN A1C   MAGNESIUM   PHOSPHORUS   SARS-COV-2 RDRP GENE    Narrative:     This test utilizes isothermal nucleic acid amplification   technology to detect the SARS-CoV-2 RdRp nucleic acid segment.   The analytical sensitivity (limit of detection) is 125 genome   equivalents/mL.   A POSITIVE result implies infection with the SARS-CoV-2 virus;   the patient is presumed to be contagious.     A NEGATIVE result means that SARS-CoV-2 nucleic acids are not   present above the limit of detection. A NEGATIVE result should be   treated as presumptive. It does not rule out the possibility of   COVID-19 and should not be the sole basis for treatment decisions.   If COVID-19 is strongly suspected based on clinical and exposure   history, re-testing using an alternate molecular assay should be   considered.   This test is only for use under the Food and Drug   Administration s Emergency Use Authorization (EUA).    Commercial kits are provided by [employer].   Performance characteristics of the EUA have been independently   verified by [facility] Department of   Pathology and Laboratory Medicine.   _________________________________________________________________   The authorized Fact Sheet for Healthcare Providers and the authorized Fact   Sheet for Patients of the ID NOW COVID-19 are available on the FDA   website:     https://www.fda.gov/media/276581/download  https://www.fda.gov/media/295120/download         TYPE & SCREEN   ISTAT CHEM8   POCT PROTIME-INR   ISTAT CHEM8   POCT GLUCOSE MONITORING CONTINUOUS     EKG Readings: (Independently Interpreted)       Imaging Results          MRI Brain (Tumor with Perfusion) W W/O Contrast (XPD) (Final result)  Result time 12/19/21 18:31:57    Final result by Bernardo Marcano MD (12/19/21 18:31:57)                 Impression:      1. Acute to subacute infarctions in the left MCA distribution as above.  No evidence of hemorrhagic transformation.  2. MR perfusion findings consistent with increased mean transit time t in he left MCA territory with normal cerebral blood volume, suggestive of ischemic penumbra.  The critical information above was relayed directly by Sandro Bullock MD by telephone to Chloe Katz MD on 12/19/2021 at 18:27.    Electronically signed by resident: Sandro Bullock  Date:    12/19/2021  Time:    17:37    Electronically signed by: Bernardo Marcano MD  Date:    12/19/2021  Time:    18:31             Narrative:    EXAMINATION:  MRI BRAIN ISCHEMIC INTERVENTIONAL PROTOCOL INCL MRA W/O CONTRAST; MRI BRAIN (TUMOR WITH PERFUSION) W W/O CONTRAST (XPD)    CLINICAL HISTORY:  L MCA occlusion;; assessment of perfusion for possible thrombectomy;    TECHNIQUE:  Pre and postcontrast MR imaging of the brain with noncontrast MRA and MR perfusion was performed utilizing 10 mL Gadavist intravenous contrast.    MR perfusion was also performed.    COMPARISON:  CTA head neck  12/19/2021.    FINDINGS:  There is extensive T2/FLAIR signal hyperintensity involving the left cerebral hemisphere including the insula, basal ganglia, as well as the left frontal and parietal lobes.  There is corresponding restricted diffusion on DWI/ADC.  No evidence of blooming is seen on GRE to suggest hemorrhagic transformation.  There is localized mass effect with sulcal effacement however no significant midline shift.    The remainder of the brain parenchyma demonstrates no evidence of prior cortical/territorial infarct with few scattered nonspecific white matter lesions that may reflect chronic small vessel ischemic change.  No enhancing lesion is appreciated.  MRI demonstrates findings consistent with occlusion of the left M1 segment of the MCA.  The remainder of the intracranial vasculature is within normal limits.    The postcontrast images demonstrates areas of patchy enhancement involving the infarcted territories.    MR perfusion demonstrates geographic increased mean transit time in the left MCA distribution.  No evidence of associated decreased cerebral blood volume or cerebral blood flow.    Ventricles are normal in size for age.  No hydrocephalus.                               MRI Brain Ischemic Inter Pro Incl MRA W/O Con (Final result)  Result time 12/19/21 18:31:57    Final result by Bernardo Marcano MD (12/19/21 18:31:57)                 Impression:      1. Acute to subacute infarctions in the left MCA distribution as above.  No evidence of hemorrhagic transformation.  2. MR perfusion findings consistent with increased mean transit time t in he left MCA territory with normal cerebral blood volume, suggestive of ischemic penumbra.  The critical information above was relayed directly by Sandro Bullock MD by telephone to Chloe Katz MD on 12/19/2021 at 18:27.    Electronically signed by resident: Sandro Bullock  Date:    12/19/2021  Time:    17:37    Electronically signed by: Bernardo Marcano  MD  Date:    12/19/2021  Time:    18:31             Narrative:    EXAMINATION:  MRI BRAIN ISCHEMIC INTERVENTIONAL PROTOCOL INCL MRA W/O CONTRAST; MRI BRAIN (TUMOR WITH PERFUSION) W W/O CONTRAST (XPD)    CLINICAL HISTORY:  L MCA occlusion;; assessment of perfusion for possible thrombectomy;    TECHNIQUE:  Pre and postcontrast MR imaging of the brain with noncontrast MRA and MR perfusion was performed utilizing 10 mL Gadavist intravenous contrast.    MR perfusion was also performed.    COMPARISON:  CTA head neck 12/19/2021.    FINDINGS:  There is extensive T2/FLAIR signal hyperintensity involving the left cerebral hemisphere including the insula, basal ganglia, as well as the left frontal and parietal lobes.  There is corresponding restricted diffusion on DWI/ADC.  No evidence of blooming is seen on GRE to suggest hemorrhagic transformation.  There is localized mass effect with sulcal effacement however no significant midline shift.    The remainder of the brain parenchyma demonstrates no evidence of prior cortical/territorial infarct with few scattered nonspecific white matter lesions that may reflect chronic small vessel ischemic change.  No enhancing lesion is appreciated.  MRI demonstrates findings consistent with occlusion of the left M1 segment of the MCA.  The remainder of the intracranial vasculature is within normal limits.    The postcontrast images demonstrates areas of patchy enhancement involving the infarcted territories.    MR perfusion demonstrates geographic increased mean transit time in the left MCA distribution.  No evidence of associated decreased cerebral blood volume or cerebral blood flow.    Ventricles are normal in size for age.  No hydrocephalus.                                CTA Head and Neck (xpd) (Final result)  Result time 12/19/21 14:15:38    Final result by Jorge Ruelas DO (12/19/21 14:15:38)                 Impression:      1. Complete occlusion of the left M1 segment.  2.  Frothy secretions within the trachea and fluid distention of the esophageal lumen.  Findings raise concern for aspiration.  3. Hypodensity measuring 1.3 cm in the left thyroid lobe which can be further evaluated with nonemergent thyroid ultrasound  This report was flagged in Epic as abnormal.    Dr. Ruelas discussed critical findings with Dr. Gagnon by telephone at 14:05 on 12/19/2021.      Electronically signed by: Jorge Ruelas  Date:    12/19/2021  Time:    14:15             Narrative:    EXAMINATION:  CTA HEAD AND NECK (XPD)    CLINICAL HISTORY:  Neuro deficit, acute, stroke suspected;    TECHNIQUE:  CT angiogram was performed from the level of the sherlyn to the top of the head following the IV administration of 100mL of Omnipaque 350.   Sagittal and coronal reconstructions and maximum intensity projection reconstructions were performed. Arterial stenosis percentages are based on NASCET measurement criteria. An immediate post-contrast CT head was also performed. RapidAI LVO was utilized to measure arterial blood flow in the brain.    COMPARISON:  Noncontrast CT head from earlier the same date.    FINDINGS:  CTA head:    There is venous contamination which somewhat limits evaluation.    Anterior circulation: There is complete occlusion of the left M1 segment at its origin.  There is a paucity of vessels in the left MCA territory.  Rapid AI evaluation demonstrates less than 45% blood vessel density in the left MCA territory (suspicious for LVO).  The right MCA is patent and unremarkable.  There is mild calcified atherosclerosis of the bilateral cavernous ICAs without evidence of high-grade stenosis.  The bilateral anterior arteries are within normal limits, without hemodynamically significant stenosis or occlusion.    Posterior circulation: Vertebrobasilar system is within normal limits without focal abnormality.  The bilateral posterior cerebral arteries are within normal limits, without hemodynamically  significant stenosis or occlusion.    There is no intracranial aneurysm.    CTA neck:    There is a 4 vessel aortic arch, with the left vertebral artery takes its origin directly from the aorta.  There is mild calcified atherosclerosis at the origin of the left subclavian artery.    The common and internal carotid arteries are normal in course and caliber. No significant stenosis in either carotid bifurcation.    The vertebral origins are patent. The cervical vertebral arteries are normal in course and caliber.    There is a 1.3 cm hypodensity within the posterior aspect of the left thyroid lobe.  The visualized lung apices are clear.  There is frothy material within the trachea.  There is fluid within the esophageal lumen.    There is no acute fracture or subluxation of the cervical spine.                                CT Head Without Contrast (Final result)  Result time 12/19/21 13:22:51    Final result by Jorge Ruelas DO (12/19/21 13:22:51)                 Impression:      Hypoattenuation and loss of gray-white differentiation within the left insular cortex, frontal lobe, and basal ganglia compatible with an MCA territory acute infarction.    This report was flagged in Epic as abnormal.    Dr. Ruelas discussed critical findings with Dr. Gagnon by telephone at 13:20 on 12/19/2021.      Electronically signed by: Jorge Ruelas  Date:    12/19/2021  Time:    13:22             Narrative:    EXAMINATION:  CT HEAD WITHOUT CONTRAST    CLINICAL HISTORY:  Neuro deficit, acute, stroke suspected;    TECHNIQUE:  Low dose axial CT images obtained throughout the head without intravenous contrast. Sagittal and coronal reconstructions were performed.    COMPARISON:  CT head from 09/26/2021.    FINDINGS:  Ventricles and sulci are normal in size for age without evidence of hydrocephalus. No extra-axial blood or fluid collections.  There is hypoattenuation and loss of gray-white differentiation within the left insular  cortex, the left frontal lobe, and the left basal ganglia, compatible with an MCA territory infarction.  No definite hyperdense artery sign.  There is hypoattenuation within the periventricular in deep subcortical white matter compatible with chronic microvascular ischemic changes, similar to prior.  No parenchymal mass hemorrhage.    No calvarial fracture.  The scalp is unremarkable.  Bilateral paranasal sinuses and mastoid air cells are clear.                                 Medications   0.9%  NaCl infusion (has no administration in time range)   clopidogreL (PLAVIX) 300 mg tablet (has no administration in time range)   aspirin 325 MG tablet (has no administration in time range)   sodium chloride 0.9% flush 10 mL (has no administration in time range)   senna-docusate 8.6-50 mg per tablet 1 tablet (has no administration in time range)   ondansetron injection 4 mg (has no administration in time range)   labetalol 20 mg/4 mL (5 mg/mL) IV syring (has no administration in time range)   atorvastatin tablet 40 mg (has no administration in time range)   acetaminophen tablet 650 mg (has no administration in time range)   iohexoL (OMNIPAQUE 350) injection 100 mL (100 mLs Intravenous Given 12/19/21 1401)   aspirin chewable tablet 324 mg (324 mg Oral Given 12/19/21 1415)   clopidogreL tablet 300 mg (300 mg Oral Given 12/19/21 1426)   sodium chloride 0.9% bolus 1,000 mL (0 mLs Intravenous Stopped 12/19/21 1657)   gadobutroL (GADAVIST) injection 8 mL (8 mLs Intravenous Given 12/19/21 1721)     Medical Decision Making:   History:   Old Medical Records: I decided to obtain old medical records.  Independently Interpreted Test(s):   I have ordered and independently interpreted EKG Reading(s) - see prior notes  Clinical Tests:   Lab Tests: Ordered and Reviewed  Radiological Study: Ordered and Reviewed  Medical Tests: Ordered and Reviewed  ED Management:  56-year-old female with hypertension noncompliant with medications presenting  to the ED with right arm weakness and left-sided facial swelling.  No facial swelling appreciated on exam.  Patient has facial droop and right arm weakness.  My attending physician Dr. Gagnon was called to the bedside.  Stroke code activated.  Dr. Gagnon assumed care of this patient.  TAMEKA Diez NP    56-year-old female with past medical history as noted above presenting with right arm weakness, left-sided facial numbness.  Code stroke activated with patient noting last known normal around 7:00 a.m. this morning.  Patient is out of window for tPA however on initial CT scan noted likely in the area of MCA.  Neurovascular agreeing with CTA head and neck, showing an M1 occlusion.  Neurology also at bedside, Dr. Casey, recommending aspirin/Plavix, IV fluids to augment her blood pressure as her symptoms have progressed to some aphasia and facial droop upon reassessment with her blood pressure decreased from earlier presentation time.  Will transfer patient to [facility] for Neurology and vascular Neurology evaluation with possible intervention.  Family members updated over the phone.   used throughout all interactions.          Scribe Attestation:   Scribe #1: I performed the above scribed service and the documentation accurately describes the services I performed. I attest to the accuracy of the note.        ED Course as of 12/19/21 1942   Sun Dec 19, 2021   1255 Dr. Gagnon at bedside [MT]      ED Course User Index  [MT] Aileen Diez NP             Clinical Impression:   Final diagnoses:  [I63.9] Stroke  [I63.9] Cerebrovascular accident (CVA), unspecified mechanism (Primary)          ED Disposition Condition    Admit        I, TAMEKA Diez, personally performed the services described in this documentation. All medical record entries made by the scribe were at my direction and in my presence. I have reviewed the chart and agree that the record reflects my personal performance and is accurate  and complete.          Adiel Gagnon MD  12/19/21 1942

## 2021-12-19 NOTE — ED NOTES
Bed: 02  Expected date: 12/19/21  Expected time: 2:59 PM  Means of arrival:   Comments:  STROKE TRANSFER

## 2021-12-19 NOTE — ED NOTES
"Pt brought into 38qtrack, code stroke activated by provider. Pt c/o of left extremity weakness and numbness, states right sided of of face "feels funny". On assessment, right upper and lower extremities strength and sensation decrease noted. Pt noted to have a left facial droop. iV access obtained to b/l ac, FS obtained Labs drawn and sent. Pt wheeled to CT scan and to room 6. Report given to MANOJ Llamas   "

## 2021-12-19 NOTE — ED NOTES
Patient arrived to ED with stroke like symptoms. Per the daughter, the family noticed left facial droop two days ago and were encouraging the patient to come visit the ED, patient was refusing. Patient is deaf, alerts to self, place, and time bu unable to remember the date. Patient passed the swallow screen with water but when given pills, it sounded like the patient began to aspirate per the healthcare staff. Last well known was 0700 this morning.

## 2021-12-19 NOTE — CONSULTS
[facility]  Vascular Neurology  Comprehensive Stroke Center  TeleVascular Neurology Acute Consultation Note      Consults    Consulting Provider: GAVIOTA REINA  Current Providers  No providers found    Patient Location:  [facility] EMERGENCY DEPARTMENT Emergency Department  Spoke hospital nurse at bedside with patient assisting consultant.     Patient information was obtained from patient and primary team.         Assessment/Plan:    55 y/o with HTN, HLD, DM, asthma, deafness, presents with new onset R arm and R face weakness noted around 7 am today.  NIHSS 3, CT head with subacute L frontal infarct. Location suggest embolism.  Admit, get CTA head and neck, MRI brain, TTE-bubble, lipid panel, hemoglobin A1c.  DAPT, atorvastatin.    Diagnoses: subacute L frontal infarct  No new Assessment & Plan notes have been filed under this hospital service since the last note was generated.  Service: Vascular Neurology      STROKE DOCUMENTATION     Acute Stroke Times:   Acute Stroke Times   Last Known Normal Date: 12/19/21  Last Known Normal Time: 0700  Symptom Onset Date: 12/19/21  Symptom Onset Time: 0700  Stroke Team Called Date: 12/19/21  Stroke Team Called Time: 1300  Stroke Team Arrival Date: 12/19/21  Stroke Team Arrival Time: 1310  CT Interpretation Time: 1310  Alteplase Recommended: No  CTA Interpretation Time:  (Pending)  Thrombectomy Recommended:  (Pending CTA)    NIH Scale:  Interval: baseline  1a. Level of Consciousness: 0-->Alert, keenly responsive  1b. LOC Questions: 0-->Answers both questions correctly  1c. LOC Commands: 0-->Performs both tasks correctly  2. Best Gaze: 0-->Normal  3. Visual: 0-->No visual loss  4. Facial Palsy: 2-->Partial paralysis (total or near-total paralysis of lower face)  5a. Motor Arm, Left: 0-->No drift, limb holds 90 (or 45) degrees for full 10 secs  5b. Motor Arm, Right: 1-->Drift, limb holds 90 (or 45) degrees, but drifts down before full 10  "secs, does not hit bed or other support  6a. Motor Leg, Left: 0-->No drift, leg holds 30 degree position for full 5 secs  6b. Motor Leg, Right: 0-->No drift, leg holds 30 degree position for full 5 secs  7. Limb Ataxia: 0-->Absent  8. Sensory: 0-->Normal, no sensory loss  9. Best Language: 0-->No aphasia, normal  10. Dysarthria: 0-->Normal  11. Extinction and Inattention (formerly Neglect): 0-->No abnormality  Total (NIH Stroke Scale): 3     Modified Alexandr    Darren Coma Scale:15   ABCD2 Score:    [id number] Score:   HAS -BLED Score:   ICH Score:   Hunt & Nguyen Classification:       Blood pressure (!) 186/80, pulse (!) 132, temperature 98 °F (36.7 °C), temperature source Oral, resp. rate 18, height 5' 6" (1.676 m), weight 72.6 kg (160 lb), last menstrual period 07/07/2016, SpO2 97 %.  Alteplase Eligible?: No  Alteplase Recommendation: Alteplase not recommended due to Outside of treatment window   Possible Interventional Revascularization Candidate? No; at this time symptoms not suggestive of large vessel occlusion    Disposition Recommendation: admit to inpatient    Subjective:     History of Present Illness: 57 y/o with HTN, HLD, DM, asthma, deafness, presents with new onset R arm and R face weakness noted around 7 am today. Never had similar symptoms before.  No improving.        No notes on file      Woke up with symptoms?: no    Recent bleeding noted: no  Does the patient take any Blood Thinners? no  Medications: Antiplatelets:  NONE      Past Medical History: hypertension, diabetes, hyperlipidemia and asthma, deafness    Past Surgical History: no major surgeries within the last 2 weeks    Family History: no relevant history    Social History: no smoking, no drinking, no drugs    Allergies: No Known Allergies     Review of Systems   Constitutional: Negative for chills, diaphoresis and fever.   HENT: Positive for hearing loss. Negative for tinnitus and trouble swallowing.         Deaf   Eyes: Negative for " "visual disturbance.   Respiratory: Negative for shortness of breath.    Cardiovascular: Negative for chest pain and palpitations.   Gastrointestinal: Negative for blood in stool and vomiting.   Endocrine: Negative for cold intolerance.   Genitourinary: Negative for hematuria.   Musculoskeletal: Negative for gait problem, neck pain and neck stiffness.   Skin: Negative for rash.   Allergic/Immunologic: Negative for immunocompromised state.   Neurological: Positive for facial asymmetry, speech difficulty and weakness. Negative for dizziness and numbness.   Hematological: Does not bruise/bleed easily.   Psychiatric/Behavioral: Negative for agitation, behavioral problems and confusion.     Objective:   Vitals: Blood pressure (!) 186/80, pulse (!) 132, temperature 98 °F (36.7 °C), temperature source Oral, resp. rate 18, height 5' 6" (1.676 m), weight 72.6 kg (160 lb), last menstrual period 07/07/2016, SpO2 97 %.     CT READ: Yes  Abnormal CT subacute L frontal infarct.     Physical Exam  Vitals and nursing note reviewed.   Constitutional:       General: She is not in acute distress.     Appearance: Normal appearance.   HENT:      Head: Normocephalic and atraumatic.      Nose: Nose normal.   Eyes:      Extraocular Movements: Extraocular movements intact.   Cardiovascular:      Rate and Rhythm: Normal rate and regular rhythm.   Pulmonary:      Effort: No respiratory distress.   Abdominal:      General: There is no distension.   Genitourinary:     Comments: na  Musculoskeletal:      Cervical back: Normal range of motion.      Right lower leg: No edema.      Left lower leg: No edema.   Skin:     Findings: No erythema or rash.   Neurological:      Mental Status: She is alert and oriented to person, place, and time.      Cranial Nerves: Cranial nerve deficit present.      Sensory: No sensory deficit.      Motor: Weakness present.      Comments: Patient is deaf but is able to follow commands via an . No gaze " preference. R face and R arm weakness.               Recommended the emergency room physician to have a brief discussion with the patient and/or family if available regarding the risks and benefits of treatment, and to briefly document the occurrence of that discussion in his clinical encounter note.     The attending portion of this evaluation, treatment, and documentation was performed per All Li MD via audiovisual.    Billing code:  (non-intervention mild to moderate stroke, TIA, some mimics)    · This patient has a critical neurological condition/illness, with some potential for high morbidity and mortality.  · There is a moderate probability for acute neurological change leading to clinical and possibly life-threatening deterioration requiring highest level of physician preparedness for urgent intervention.  · Care was coordinated with other physicians involved in the patient's care.  · Radiologic studies and laboratory data were reviewed and interpreted, and plan of care was re-assessed based on the results.  · Diagnosis, treatment options and prognosis may have been discussed with the patient and/or family members or caregiver.      In your opinion, this was a: Tier 2 N/A    Consult End Time:      All Li MD  Comprehensive Stroke Center  Vascular Neurology   [facility]

## 2021-12-19 NOTE — SUBJECTIVE & OBJECTIVE
Neurologic complaint: RSW     Interval history:   used during patient interaction. Patient able to communicate name and age. Aphasic, when trying to interpret images on stroke booklet, patient signing numbers instead of (hammse, cactus). Continues with RUE weakness. No RLE drift. Echo pending.     Past Medical History: hypertension, diabetes, hyperlipidemia and asthma, deafness  Past Surgical History: no major surgeries within the last 2 weeks    Social History: no smoking, no drinking, no drugs    Allergies: No Known Allergies     Review of Systems   Constitutional: Negative for fever.   HENT: Positive for hearing loss (deaf at baseline).              Eyes: Negative for visual disturbance.   Respiratory: Negative for cough and shortness of breath.    Cardiovascular: Negative for chest pain.   Gastrointestinal: Negative for nausea and vomiting.   Skin: Negative for rash.   Neurological: Positive for facial asymmetry and weakness. Negative for dizziness and numbness.     Objective:   .vitals  Vitals:    12/20/21 0715   BP: (!) 182/84   Pulse: 98   Resp: (!) 23   Temp: 98.3 °F (36.8 °C)         Physical Exam  Vitals and nursing note reviewed.   Constitutional:       General: She is not in acute distress.  HENT:      Head: Normocephalic and atraumatic.      Nose: Nose normal.   Eyes:      Extraocular Movements: Extraocular movements intact.      Conjunctiva/sclera: Conjunctivae normal.   Cardiovascular:      Rate and Rhythm: Normal rate.   Pulmonary:      Effort: Pulmonary effort is normal. No respiratory distress.   Abdominal:      General: There is no distension.   Musculoskeletal:      Cervical back: Normal range of motion.      Right lower leg: No edema.      Left lower leg: No edema.   Skin:     General: Skin is warm and dry.      Findings: No rash.   Neurological:      Mental Status: She is alert.      Motor: Weakness (RUE) present.       Neurological Exam  LOC: alert  Attention  Span: good  Language: expressive aphasia  Articulation: limited due to baseline deafness  Orientation: using , oriented to person, place, time  Visual Fields: full  EOM (CN III, IV, VI): intact  Facial Movement (CN VII): R facial droop  Motor: Arm left  5/5  Leg left  5/5  Arm right 3/5  Leg right 4/5  Sensation: intact to light touch throughout  Tone: normal       Recent Labs   Lab 12/20/21  0317   WBC 6.03   RBC 3.25*   HGB 7.1*   HCT 25.1*      MCV 77*   MCH 21.8*   MCHC 28.3*     Hemoglobin A1C   Date Value Ref Range Status   12/19/2021 6.1 (H) 4.0 - 5.6 % Final     Comment:     ADA Screening Guidelines:  5.7-6.4%  Consistent with prediabetes  >or=6.5%  Consistent with diabetes    High levels of fetal hemoglobin interfere with the HbA1C  assay. Heterozygous hemoglobin variants (HbS, HgC, etc)do  not significantly interfere with this assay.   However, presence of multiple variants may affect accuracy.       Recent Labs   Lab 12/19/21  1304   INR 1.0           Diagnostics  MRI Brain Perfusion/MRI ischemic protocol. Date: 12/19/21  1. Acute to subacute infarctions in the left MCA distribution as above.  No evidence of hemorrhagic transformation.  2. MR perfusion findings consistent with increased mean transit time t in he left MCA territory with normal cerebral blood volume, suggestive of ischemic penumbra.     CTA Head and Neck. Date: 12/19/21  1. Complete occlusion of the left M1 segment.  2. Frothy secretions within the trachea and fluid distention of the esophageal lumen.  Findings raise concern for aspiration.  3. Hypodensity measuring 1.3 cm in the left thyroid lobe which can be further evaluated with nonemergent thyroid ultrasound     CT Head. Date: 12/19/21  Hypoattenuation and loss of gray-white differentiation within the left insular cortex, frontal lobe, and basal ganglia compatible with an MCA territory acute infarction

## 2021-12-19 NOTE — ED NOTES
Patient was very unclear of why she was coming to ED interpretor system was unclear on type of sign she was using. Main complaint was swelling to left cheek.

## 2021-12-20 PROBLEM — E78.2 MIXED HYPERLIPIDEMIA: Status: ACTIVE | Noted: 2021-12-19

## 2021-12-20 LAB
ALBUMIN SERPL BCP-MCNC: 3.2 G/DL (ref 3.5–5.2)
ALP SERPL-CCNC: 58 U/L (ref 55–135)
ALT SERPL W/O P-5'-P-CCNC: 8 U/L (ref 10–44)
ANION GAP SERPL CALC-SCNC: 10 MMOL/L (ref 8–16)
ASCENDING AORTA: 2.85 CM
AST SERPL-CCNC: 18 U/L (ref 10–40)
AV INDEX (PROSTH): 0.62
AV MEAN GRADIENT: 5 MMHG
AV PEAK GRADIENT: 8 MMHG
AV VALVE AREA: 1.96 CM2
AV VELOCITY RATIO: 0.56
BASOPHILS # BLD AUTO: 0.02 K/UL (ref 0–0.2)
BASOPHILS NFR BLD: 0.3 % (ref 0–1.9)
BILIRUB SERPL-MCNC: 0.4 MG/DL (ref 0.1–1)
BSA FOR ECHO PROCEDURE: 1.84 M2
BUN SERPL-MCNC: 9 MG/DL (ref 6–20)
CALCIUM SERPL-MCNC: 8.5 MG/DL (ref 8.7–10.5)
CHLORIDE SERPL-SCNC: 104 MMOL/L (ref 95–110)
CO2 SERPL-SCNC: 24 MMOL/L (ref 23–29)
CREAT SERPL-MCNC: 0.7 MG/DL (ref 0.5–1.4)
CV ECHO LV RWT: 0.37 CM
DIFFERENTIAL METHOD: ABNORMAL
DOP CALC AO PEAK VEL: 1.38 M/S
DOP CALC AO VTI: 26.87 CM
DOP CALC LVOT AREA: 3.2 CM2
DOP CALC LVOT DIAMETER: 2.01 CM
DOP CALC LVOT PEAK VEL: 0.77 M/S
DOP CALC LVOT STROKE VOLUME: 52.58 CM3
DOP CALCLVOT PEAK VEL VTI: 16.58 CM
E WAVE DECELERATION TIME: 222.88 MSEC
E/A RATIO: 0.92
E/E' RATIO: 9.79 M/S
ECHO LV POSTERIOR WALL: 0.85 CM (ref 0.6–1.1)
EJECTION FRACTION: 65 %
EOSINOPHIL # BLD AUTO: 0.1 K/UL (ref 0–0.5)
EOSINOPHIL NFR BLD: 1.8 % (ref 0–8)
ERYTHROCYTE [DISTWIDTH] IN BLOOD BY AUTOMATED COUNT: 19 % (ref 11.5–14.5)
EST. GFR  (AFRICAN AMERICAN): >60 ML/MIN/1.73 M^2
EST. GFR  (NON AFRICAN AMERICAN): >60 ML/MIN/1.73 M^2
FRACTIONAL SHORTENING: 37 % (ref 28–44)
GLUCOSE SERPL-MCNC: 97 MG/DL (ref 70–110)
HCT VFR BLD AUTO: 25.1 % (ref 37–48.5)
HGB BLD-MCNC: 7.1 G/DL (ref 12–16)
IMM GRANULOCYTES # BLD AUTO: 0.01 K/UL (ref 0–0.04)
IMM GRANULOCYTES NFR BLD AUTO: 0.2 % (ref 0–0.5)
INTERVENTRICULAR SEPTUM: 0.93 CM (ref 0.6–1.1)
LA MAJOR: 5.57 CM
LA MINOR: 5.22 CM
LA WIDTH: 3.65 CM
LEFT ATRIUM SIZE: 3.45 CM
LEFT ATRIUM VOLUME INDEX: 31.7 ML/M2
LEFT ATRIUM VOLUME: 57.69 CM3
LEFT INTERNAL DIMENSION IN SYSTOLE: 2.92 CM (ref 2.1–4)
LEFT VENTRICLE DIASTOLIC VOLUME INDEX: 53.41 ML/M2
LEFT VENTRICLE DIASTOLIC VOLUME: 97.2 ML
LEFT VENTRICLE MASS INDEX: 75 G/M2
LEFT VENTRICLE SYSTOLIC VOLUME INDEX: 18 ML/M2
LEFT VENTRICLE SYSTOLIC VOLUME: 32.78 ML
LEFT VENTRICULAR INTERNAL DIMENSION IN DIASTOLE: 4.6 CM (ref 3.5–6)
LEFT VENTRICULAR MASS: 135.68 G
LV LATERAL E/E' RATIO: 9.3 M/S
LV SEPTAL E/E' RATIO: 10.33 M/S
LYMPHOCYTES # BLD AUTO: 1.7 K/UL (ref 1–4.8)
LYMPHOCYTES NFR BLD: 28.4 % (ref 18–48)
MAGNESIUM SERPL-MCNC: 1.7 MG/DL (ref 1.6–2.6)
MCH RBC QN AUTO: 21.8 PG (ref 27–31)
MCHC RBC AUTO-ENTMCNC: 28.3 G/DL (ref 32–36)
MCV RBC AUTO: 77 FL (ref 82–98)
MONOCYTES # BLD AUTO: 0.7 K/UL (ref 0.3–1)
MONOCYTES NFR BLD: 11.1 % (ref 4–15)
MV PEAK A VEL: 1.01 M/S
MV PEAK E VEL: 0.93 M/S
MV STENOSIS PRESSURE HALF TIME: 64.64 MS
MV VALVE AREA P 1/2 METHOD: 3.4 CM2
NEUTROPHILS # BLD AUTO: 3.5 K/UL (ref 1.8–7.7)
NEUTROPHILS NFR BLD: 58.2 % (ref 38–73)
NRBC BLD-RTO: 0 /100 WBC
PHOSPHATE SERPL-MCNC: 3.8 MG/DL (ref 2.7–4.5)
PISA TR MAX VEL: 2.55 M/S
PLATELET # BLD AUTO: 363 K/UL (ref 150–450)
PMV BLD AUTO: 10.1 FL (ref 9.2–12.9)
POCT GLUCOSE: 112 MG/DL (ref 70–110)
POCT GLUCOSE: 134 MG/DL (ref 70–110)
POTASSIUM SERPL-SCNC: 3.8 MMOL/L (ref 3.5–5.1)
PROT SERPL-MCNC: 6.6 G/DL (ref 6–8.4)
RA MAJOR: 3.91 CM
RA PRESSURE: 3 MMHG
RA WIDTH: 3.6 CM
RBC # BLD AUTO: 3.25 M/UL (ref 4–5.4)
RIGHT VENTRICULAR END-DIASTOLIC DIMENSION: 2.95 CM
SINUS: 2.42 CM
SODIUM SERPL-SCNC: 138 MMOL/L (ref 136–145)
STJ: 2.48 CM
TDI LATERAL: 0.1 M/S
TDI SEPTAL: 0.09 M/S
TDI: 0.1 M/S
TR MAX PG: 26 MMHG
TRICUSPID ANNULAR PLANE SYSTOLIC EXCURSION: 2.24 CM
TV REST PULMONARY ARTERY PRESSURE: 29 MMHG
WBC # BLD AUTO: 6.03 K/UL (ref 3.9–12.7)

## 2021-12-20 PROCEDURE — 97535 SELF CARE MNGMENT TRAINING: CPT

## 2021-12-20 PROCEDURE — 94640 AIRWAY INHALATION TREATMENT: CPT

## 2021-12-20 PROCEDURE — 25000003 PHARM REV CODE 250: Performed by: PHYSICIAN ASSISTANT

## 2021-12-20 PROCEDURE — 20000000 HC ICU ROOM

## 2021-12-20 PROCEDURE — 63600175 PHARM REV CODE 636 W HCPCS: Performed by: PHYSICIAN ASSISTANT

## 2021-12-20 PROCEDURE — 97162 PT EVAL MOD COMPLEX 30 MIN: CPT

## 2021-12-20 PROCEDURE — 80053 COMPREHEN METABOLIC PANEL: CPT | Performed by: PSYCHIATRY & NEUROLOGY

## 2021-12-20 PROCEDURE — 97165 OT EVAL LOW COMPLEX 30 MIN: CPT

## 2021-12-20 PROCEDURE — 84100 ASSAY OF PHOSPHORUS: CPT | Performed by: PSYCHIATRY & NEUROLOGY

## 2021-12-20 PROCEDURE — 97530 THERAPEUTIC ACTIVITIES: CPT

## 2021-12-20 PROCEDURE — 25000003 PHARM REV CODE 250: Performed by: EMERGENCY MEDICINE

## 2021-12-20 PROCEDURE — 99233 PR SUBSEQUENT HOSPITAL CARE,LEVL III: ICD-10-PCS | Mod: ,,, | Performed by: PSYCHIATRY & NEUROLOGY

## 2021-12-20 PROCEDURE — 85025 COMPLETE CBC W/AUTO DIFF WBC: CPT | Performed by: PSYCHIATRY & NEUROLOGY

## 2021-12-20 PROCEDURE — 92523 SPEECH SOUND LANG COMPREHEN: CPT

## 2021-12-20 PROCEDURE — 99233 PR SUBSEQUENT HOSPITAL CARE,LEVL III: ICD-10-PCS | Mod: ,,, | Performed by: PHYSICIAN ASSISTANT

## 2021-12-20 PROCEDURE — 99233 SBSQ HOSP IP/OBS HIGH 50: CPT | Mod: ,,, | Performed by: PHYSICIAN ASSISTANT

## 2021-12-20 PROCEDURE — 99233 SBSQ HOSP IP/OBS HIGH 50: CPT | Mod: ,,, | Performed by: PSYCHIATRY & NEUROLOGY

## 2021-12-20 PROCEDURE — 92610 EVALUATE SWALLOWING FUNCTION: CPT

## 2021-12-20 PROCEDURE — 97116 GAIT TRAINING THERAPY: CPT

## 2021-12-20 PROCEDURE — 94761 N-INVAS EAR/PLS OXIMETRY MLT: CPT

## 2021-12-20 PROCEDURE — 83735 ASSAY OF MAGNESIUM: CPT | Performed by: PSYCHIATRY & NEUROLOGY

## 2021-12-20 PROCEDURE — 25000242 PHARM REV CODE 250 ALT 637 W/ HCPCS: Performed by: PHYSICIAN ASSISTANT

## 2021-12-20 RX ORDER — HEPARIN SODIUM 5000 [USP'U]/ML
5000 INJECTION, SOLUTION INTRAVENOUS; SUBCUTANEOUS EVERY 8 HOURS
Status: DISCONTINUED | OUTPATIENT
Start: 2021-12-20 | End: 2021-12-30 | Stop reason: HOSPADM

## 2021-12-20 RX ORDER — HYDRALAZINE HYDROCHLORIDE 25 MG/1
25 TABLET, FILM COATED ORAL EVERY 8 HOURS
Status: DISCONTINUED | OUTPATIENT
Start: 2021-12-20 | End: 2021-12-30 | Stop reason: HOSPADM

## 2021-12-20 RX ORDER — IPRATROPIUM BROMIDE AND ALBUTEROL SULFATE 2.5; .5 MG/3ML; MG/3ML
3 SOLUTION RESPIRATORY (INHALATION) EVERY 4 HOURS PRN
Status: DISCONTINUED | OUTPATIENT
Start: 2021-12-20 | End: 2021-12-30 | Stop reason: HOSPADM

## 2021-12-20 RX ADMIN — HYDRALAZINE HYDROCHLORIDE 25 MG: 25 TABLET ORAL at 01:12

## 2021-12-20 RX ADMIN — CLOPIDOGREL 75 MG: 75 TABLET, FILM COATED ORAL at 01:12

## 2021-12-20 RX ADMIN — IPRATROPIUM BROMIDE AND ALBUTEROL SULFATE 3 ML: .5; 3 SOLUTION RESPIRATORY (INHALATION) at 10:12

## 2021-12-20 RX ADMIN — HYDRALAZINE HYDROCHLORIDE 25 MG: 25 TABLET ORAL at 09:12

## 2021-12-20 RX ADMIN — ASPIRIN 81 MG CHEWABLE TABLET 81 MG: 81 TABLET CHEWABLE at 01:12

## 2021-12-20 RX ADMIN — IPRATROPIUM BROMIDE AND ALBUTEROL SULFATE 3 ML: .5; 3 SOLUTION RESPIRATORY (INHALATION) at 02:12

## 2021-12-20 RX ADMIN — FERROUS SULFATE TAB 325 MG (65 MG ELEMENTAL FE) 1 EACH: 325 (65 FE) TAB at 09:12

## 2021-12-20 RX ADMIN — HEPARIN SODIUM 5000 UNITS: 5000 INJECTION INTRAVENOUS; SUBCUTANEOUS at 09:12

## 2021-12-20 RX ADMIN — HEPARIN SODIUM 5000 UNITS: 5000 INJECTION INTRAVENOUS; SUBCUTANEOUS at 04:12

## 2021-12-20 RX ADMIN — LABETALOL HYDROCHLORIDE 10 MG: 5 INJECTION, SOLUTION INTRAVENOUS at 10:12

## 2021-12-20 RX ADMIN — FERROUS SULFATE TAB 325 MG (65 MG ELEMENTAL FE) 1 EACH: 325 (65 FE) TAB at 01:12

## 2021-12-20 RX ADMIN — LABETALOL HYDROCHLORIDE 10 MG: 5 INJECTION, SOLUTION INTRAVENOUS at 07:12

## 2021-12-20 RX ADMIN — ATORVASTATIN CALCIUM 40 MG: 20 TABLET, FILM COATED ORAL at 01:12

## 2021-12-20 NOTE — PLAN OF CARE
Problem: Physical Therapy Goal  Goal: Physical Therapy Goal  Description: Goals to be met by:      Patient will increase functional independence with mobility by performin. Supine to sit with stand by assistance   2. Sit to supine with Stand-by Assistance  3. Sit to stand transfer with Contact Guard Assistance  4. Stand pivot bed to chair contact guard assist   5. Gait  x 15 feet with Minimal Assistance using LRAD.   6. Ascend/descend 6 stair with unilateral Handrails Minimal Assistance using LRAD.   7. Stand for 1 minutes with Contact Guard Assistance using No Assistive Device to prepare for functional activities in standing.     Outcome: Ongoing, Progressing   Evaluation completed, initiated plan of care.   Ruby Morin, PT  2021

## 2021-12-20 NOTE — PLAN OF CARE
[facility] - Neuro Critical Care  Initial Discharge Assessment       Primary Care Provider: Primary Doctor No    Admission Diagnosis: Stroke [I63.9]  Cerebrovascular accident (CVA), unspecified mechanism [I63.9]    Admission Date: 12/19/2021  Expected Discharge Date: 12/22/2021    Discharge Barriers Identified: None    Payor: HU[employer] MANAGED MEDICARE / Plan: HU[employer] MEDICARE HMO / Product Type: Capitation /     Extended Emergency Contact Information  Primary Emergency Contact: Klarissa Ornelas  Address: [address] [place], [place] [id number] [facility]  Home Phone: [phone]  Relation: Daughter  Preferred language: English   needed? No  Secondary Emergency Contact: BENITO ALATORRE  Mobile Phone: [phone]  Relation: Sister   needed? No    Discharge Plan A: Rehab  Discharge Plan B: Home with family      [id number] - [place] - [address]  [address] [place] [address]  Phone: [phone] Fax: [phone]    [id number] - [place] - [address] [facility] [address]  [address] [facility] [place] [address]  Phone: [phone] Fax: [phone]    [id number] [place] - [address] [facility] [place]  [address]   [facility] [address]  Phone: [phone] Fax: [phone]    [id number]  [place] - [address] [facility] [address]  [address]  [place] [address]  Phone: [phone] Fax: [phone]    [id number] - [place] - [address]  [address] [facility]  [place] [address]  Phone: [phone] Fax: [phone]      Transferred from:  Home    Past Medical History:   Diagnosis Date    Anemia     Arthritis     Asthma     Deaf     Diabetes mellitus     Hyperlipidemia     Hypertension          CM met with patient  in room for Discharge Planning Assessment.   Patient is able to answer  questions with remote , Jhonny, [id number].   Per patient, she lives alone in a single story house with 0 step(s) to enter.   Per patient, she was independent with ADLS and used a cane for ambulation.  Patient will have assistance from her daughter upon discharge.   Discharge Planning Booklet given to patient/family and discussed.  All questions addressed.  CM will follow for needs.      Initial Assessment (most recent)     Adult Discharge Assessment - 12/20/21 1631        Discharge Assessment    Assessment Type Discharge Planning Assessment     Confirmed/corrected address, phone number and insurance Yes     Confirmed Demographics Correct on Facesheet     Source of Information patient; utilized     Communicated CHARIS with patient/caregiver Date not available/Unable to determine     Reason For Admission Embolic Stroke     Lives With alone     Facility Arrived From: Home     Do you expect to return to your current living situation? Yes     Do you have help at home or someone to help you manage your care at home? Yes     Who are your caregiver(s) and their phone number(s)? Klarissa Ornelas (dtr) [phone]     Prior to hospitilization cognitive status: Alert/Oriented     Current cognitive status: Alert/Oriented     Walking or Climbing Stairs Difficulty ambulation difficulty, requires equipment;stair climbing difficulty, requires equipment;transferring difficulty, requires equipment     Mobility Management cane     Dressing/Bathing Difficulty none     Home Accessibility stairs to enter home     Number of Stairs, Main Entrance six     Home Layout Able to live on 1st floor     Equipment Currently Used at Home cane, straight     Readmission within 30 days? No     Patient currently being followed by outpatient case management? No     Do you currently have service(s) that help you manage your care at home? No     Do you take prescription medications? Yes     Do you have prescription coverage? Yes     Coverage  [employer]     Do you have any problems affording any of your prescribed medications? No     Is the patient taking medications as prescribed? yes     Who is going to help you get home at discharge? Klarissa Ornelas (dtr) [phone]     How do you get to doctors appointments? family or friend will provide;car, drives self     Are you on dialysis? No     Do you take coumadin? No     Discharge Plan A Rehab     Discharge Plan B Home with family     DME Needed Upon Discharge  none     Discharge Plan discussed with: Patient     Name(s) and Number(s) Klarissa Ornelas (dtr) [phone]     Discharge Barriers Identified None                    Nicole Ward RN, CCRN-K, [facility]  Neuro-Critical Care   X [phone]

## 2021-12-20 NOTE — SUBJECTIVE & OBJECTIVE
Past Medical History:   Diagnosis Date    Anemia     Arthritis     Asthma     Deaf     Diabetes mellitus     Hyperlipidemia     Hypertension      Past Surgical History:   Procedure Laterality Date    COLONOSCOPY N/A 12/4/2020    Procedure: COLONOSCOPY;  Surgeon: Felicia Bishop MD;  Location: [facility] ENDO;  Service: Endoscopy;  Laterality: N/A;    ESOPHAGOGASTRODUODENOSCOPY N/A 2/28/2019    Procedure: EGD (ESOPHAGOGASTRODUODENOSCOPY);  Surgeon: Yolanda Gary MD;  Location: [facility] ENDO;  Service: Endoscopy;  Laterality: N/A;    ESOPHAGOGASTRODUODENOSCOPY N/A 12/4/2020    Procedure: EGD (ESOPHAGOGASTRODUODENOSCOPY);  Surgeon: Felicia Bishop MD;  Location: [facility] ENDO;  Service: Endoscopy;  Laterality: N/A;    ESOPHAGOGASTRODUODENOSCOPY N/A 7/1/2021    Procedure: EGD (ESOPHAGOGASTRODUODENOSCOPY)-in March 2021;  Surgeon: Sharath Kohler MD;  Location: [facility] ENDO;  Service: Endoscopy;  Laterality: N/A;  hearing impaired, will need  - Maria Dolores Harris assigned-MS  fully vaccinated-see immunization record    HYSTERECTOMY        No current facility-administered medications on file prior to encounter.     Current Outpatient Medications on File Prior to Encounter   Medication Sig Dispense Refill    albuterol sulfate 2.5 mg/0.5 mL Nebu Take 2.5 mg by nebulization every 4 (four) hours as needed. Rescue 1 each 0    albuterol-ipratropium (DUO-NEB) 2.5 mg-0.5 mg/3 mL nebulizer solution USE 1 AMPULE IN NEBULIZER EVERY 6 HOURS WHILE AWAKE      albuterol-ipratropium (DUO-NEB) 2.5 mg-0.5 mg/3 mL nebulizer solution Inhale 3 mLs into the lungs.      ergocalciferol (ERGOCALCIFEROL) 50,000 unit Cap Take 50,000 Units by mouth.      ferrous sulfate 325 (65 FE) MG EC tablet Take 325 mg by mouth 2 (two) times daily.      hydrALAZINE (APRESOLINE) 25 MG tablet Take 1 tablet (25 mg total) by mouth every 8 (eight) hours. 90 tablet 0    ipratropium (ATROVENT) 0.02 % nebulizer solution Take 2.5 mLs  (500 mcg total) by nebulization 4 (four) times daily as needed for Wheezing. Rescue 1 Box 0    pantoprazole (PROTONIX) 40 MG tablet Take 1 tablet (40 mg total) by mouth 2 (two) times daily. 60 tablet 3    predniSONE (DELTASONE) 50 MG Tab Take 50 mg by mouth once daily.      sucralfate (CARAFATE) 1 gram tablet Take 1 tablet (1 g total) by mouth 4 (four) times daily before meals and nightly. 120 tablet 1    traMADoL (ULTRAM) 50 mg tablet TAKE 1 TABLET BY MOUTH ONCE DAILY AS NEEDED FOR PAIN        Allergies: Patient has no known allergies.    Family History   Problem Relation Age of Onset    Diabetes Mother     Hypertension Mother     Colon cancer Neg Hx     Esophageal cancer Neg Hx      Social History     Tobacco Use    Smoking status: Never Smoker    Smokeless tobacco: Never Used   Substance Use Topics    Alcohol use: No    Drug use: No     Review of Systems   Unable to perform ROS: Other   Constitutional: Negative for fever.   HENT: Negative for trouble swallowing.    Eyes: Negative for visual disturbance.   Respiratory: Negative for shortness of breath.    Cardiovascular: Negative for chest pain and palpitations.   Gastrointestinal: Negative for abdominal pain, nausea and vomiting.   Genitourinary: Negative for difficulty urinating.   Musculoskeletal: Negative for arthralgias and myalgias.   Skin: Negative for rash.   Neurological: Positive for speech difficulty (ASL production difficulty), weakness and numbness. Negative for dizziness, seizures and headaches.   Psychiatric/Behavioral: Negative for agitation.   ASL language barrier and patient expressive aphasia  Objective:     Vitals:    Temp: 98.3 °F (36.8 °C)  Pulse: 98  Rhythm: normal sinus rhythm  BP: (!) 182/84  MAP (mmHg): 121  Resp: (!) 23  SpO2: 98 %  O2 Device (Oxygen Therapy): room air    Temp  Min: 98.3 °F (36.8 °C)  Max: 98.7 °F (37.1 °C)  Pulse  Min: 88  Max: 120  BP  Min: 131/75  Max: 206/89  MAP (mmHg)  Min: 98  Max: 141  Resp  Min: 16   Max: 30  SpO2  Min: 96 %  Max: 100 %    12/19 0701 - 12/20 0700  In: 1503.8 [I.V.:503.8]  Out: 550 [Urine:550]           Physical Exam  Constitutional:       General: She is not in acute distress.     Appearance: Normal appearance. She is normal weight.   HENT:      Head: Normocephalic and atraumatic.      Right Ear: External ear normal.      Left Ear: External ear normal.      Nose: Nose normal. No rhinorrhea.      Mouth/Throat:      Mouth: Mucous membranes are moist.      Pharynx: Oropharynx is clear.   Eyes:      General:         Right eye: No discharge.         Left eye: No discharge.      Conjunctiva/sclera: Conjunctivae normal.   Cardiovascular:      Rate and Rhythm: Regular rhythm. Tachycardia present.      Pulses: Normal pulses.   Pulmonary:      Effort: Pulmonary effort is normal. No respiratory distress.      Breath sounds: No stridor. No wheezing.   Abdominal:      General: There is no distension.      Palpations: Abdomen is soft. There is no mass.      Tenderness: There is no abdominal tenderness. There is no guarding.   Musculoskeletal:         General: No swelling, deformity or signs of injury.      Cervical back: Neck supple. No rigidity.   Skin:     General: Skin is warm and dry.      Findings: No rash.      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      Comments: GCS [id number]  AAO to self and place  Follows commands  Mild to moderate expressive >receptive aphasia using ASL  PERRL  EOMI  Minor nasolabial flattening  MARMOLEJO spont AG with RUE weakness and R wrist drop  Sensation intact to light touch except for RUE diminished sensation  NIH4   Psychiatric:         Mood and Affect: Mood normal.       Unable to test language, memory, judgment, insight, fund of knowledge, gait due to level of consciousness.    Today I personally reviewed pertinent medications, lines/drains/airways, imaging, cardiology results, laboratory results, microbiology results,  notably:    CTH, CTA, MRI

## 2021-12-20 NOTE — HOSPITAL COURSE
12/20/2021 NAEO, neuro exam remains stable. SLP recommending modified barium, ordered for tomorrow. Stable to step down to floor  12/21/2021 NAEO, barium swallow this AM, passed for regular diet. Boarding for vascular neurology

## 2021-12-20 NOTE — CONSULTS
Inpatient consult to Physical Medicine Rehab  Consult performed by: Ananya Mcclain NP  Consult ordered by: Meredith Banks PA-C  Reason for consult: Assess rehab needs      Reviewed patient history and current admission.  Rehab team following.  Full consult to follow once closer to med stability.    KEVEN Daniel, FNP-C  Physical Medicine & Rehabilitation   12/20/2021

## 2021-12-20 NOTE — PROGRESS NOTES
"[facility] - Neuro Critical Care  Neurocritical Care  Progress Note    Admit Date: 12/19/2021  Service Date: 12/20/2021  Length of Stay: 1    Subjective:     Chief Complaint: Embolic stroke involving left middle cerebral artery    History of Present Illness: 55 yo F with PMH asthma, migraines, DM, HTN, deafness presents with L MCA CVA.  Pt presented to OSH today with RUE weakness and R facial droop.  Daughter reports that sx first noted on Friday afternoon 12/17 with facial asymmetry. Starting yesterday, pt was having difficulty walking/was off balance. She presented to [facility] at 12 pm, > 24 hours after sx onset.    Patient history is limited due to deafness/ASL language barrier as well as expressive aphasia. An [facility] video interpretor was used to assist in ROS/physical exam.  The patient herself reports an onset of right wrist pain and weakness at 7 AM today when she woke up this morning. She states she "had to move my right hand with my other hand, it's not working". She also reports left facial swelling and pain onset this morning with slight fever, calling it a migraine, that has since improved to zero pain. She states she was otherwise in her normal state of health prior to this morning.      At OSH, CTH with left insular cortex, frontal lobe, and basal ganglia acute infarct. She also had a CTA that showed Impression:  1. Complete occlusion of the left M1 segment.  2. Frothy secretions within the trachea and fluid distention of the esophageal lumen.  Findings raise concern for aspiration.  3. Hypodensity measuring 1.3 cm in the left thyroid lobe which can be further evaluated with nonemergent thyroid ultrasound     Patient out of window for tPA. [place] [facility] [place]. On arrival, patient taken to MRI STAT for MRI acute ischemic protocol + MR perfusion scan showing acute and subacute L MCA distribution infarct with ischemic penumbra. MRI reviewed with neuro IR, thrombectomy " deferred due to high risk of reperfusion injury given symptoms beginning about 48 hour ago per daughter. If patient's neuro exam declines, low threshold for IR intervention. She will be started on DAPT and admitted to NCC for monitoring and management of L MCA CVA without interventions.      Information obtained from chart, pt, and family bedside with help of [facility] video interpretor.      Hospital Course: 12/20/2021 NAEO, neuro exam remains stable. SLP recommending modified barium, ordered for tomorrow. Stable to step down to floor      Past Medical History:   Diagnosis Date    Anemia     Arthritis     Asthma     Deaf     Diabetes mellitus     Hyperlipidemia     Hypertension      Past Surgical History:   Procedure Laterality Date    COLONOSCOPY N/A 12/4/2020    Procedure: COLONOSCOPY;  Surgeon: Felicia Bishop MD;  Location: [facility];  Service: Endoscopy;  Laterality: N/A;    ESOPHAGOGASTRODUODENOSCOPY N/A 2/28/2019    Procedure: EGD (ESOPHAGOGASTRODUODENOSCOPY);  Surgeon: Yolanda Gary MD;  Location: [facility];  Service: Endoscopy;  Laterality: N/A;    ESOPHAGOGASTRODUODENOSCOPY N/A 12/4/2020    Procedure: EGD (ESOPHAGOGASTRODUODENOSCOPY);  Surgeon: Felicia Bishop MD;  Location: [facility];  Service: Endoscopy;  Laterality: N/A;    ESOPHAGOGASTRODUODENOSCOPY N/A 7/1/2021    Procedure: EGD (ESOPHAGOGASTRODUODENOSCOPY)-in March 2021;  Surgeon: Sharath Kohler MD;  Location: [facility];  Service: Endoscopy;  Laterality: N/A;  hearing impaired, will need  - Maria Dolores Harris assigned-MS  fully vaccinated-see immunization record    HYSTERECTOMY        No current facility-administered medications on file prior to encounter.     Current Outpatient Medications on File Prior to Encounter   Medication Sig Dispense Refill    albuterol sulfate 2.5 mg/0.5 mL Nebu Take 2.5 mg by nebulization every 4 (four) hours as needed. Rescue 1 each 0    albuterol-ipratropium (DUO-NEB) 2.5  mg-0.5 mg/3 mL nebulizer solution USE 1 AMPULE IN NEBULIZER EVERY 6 HOURS WHILE AWAKE      albuterol-ipratropium (DUO-NEB) 2.5 mg-0.5 mg/3 mL nebulizer solution Inhale 3 mLs into the lungs.      ergocalciferol (ERGOCALCIFEROL) 50,000 unit Cap Take 50,000 Units by mouth.      ferrous sulfate 325 (65 FE) MG EC tablet Take 325 mg by mouth 2 (two) times daily.      hydrALAZINE (APRESOLINE) 25 MG tablet Take 1 tablet (25 mg total) by mouth every 8 (eight) hours. 90 tablet 0    ipratropium (ATROVENT) 0.02 % nebulizer solution Take 2.5 mLs (500 mcg total) by nebulization 4 (four) times daily as needed for Wheezing. Rescue 1 Box 0    pantoprazole (PROTONIX) 40 MG tablet Take 1 tablet (40 mg total) by mouth 2 (two) times daily. 60 tablet 3    predniSONE (DELTASONE) 50 MG Tab Take 50 mg by mouth once daily.      sucralfate (CARAFATE) 1 gram tablet Take 1 tablet (1 g total) by mouth 4 (four) times daily before meals and nightly. 120 tablet 1    traMADoL (ULTRAM) 50 mg tablet TAKE 1 TABLET BY MOUTH ONCE DAILY AS NEEDED FOR PAIN        Allergies: Patient has no known allergies.    Family History   Problem Relation Age of Onset    Diabetes Mother     Hypertension Mother     Colon cancer Neg Hx     Esophageal cancer Neg Hx      Social History     Tobacco Use    Smoking status: Never Smoker    Smokeless tobacco: Never Used   Substance Use Topics    Alcohol use: No    Drug use: No     Review of Systems   Unable to perform ROS: Other   Constitutional: Negative for fever.   HENT: Negative for trouble swallowing.    Eyes: Negative for visual disturbance.   Respiratory: Negative for shortness of breath.    Cardiovascular: Negative for chest pain and palpitations.   Gastrointestinal: Negative for abdominal pain, nausea and vomiting.   Genitourinary: Negative for difficulty urinating.   Musculoskeletal: Negative for arthralgias and myalgias.   Skin: Negative for rash.   Neurological: Positive for speech difficulty (ASL  production difficulty), weakness and numbness. Negative for dizziness, seizures and headaches.   Psychiatric/Behavioral: Negative for agitation.   ASL language barrier and patient expressive aphasia  Objective:     Vitals:    Temp: 98.3 °F (36.8 °C)  Pulse: 98  Rhythm: normal sinus rhythm  BP: (!) 182/84  MAP (mmHg): 121  Resp: (!) 23  SpO2: 98 %  O2 Device (Oxygen Therapy): room air    Temp  Min: 98.3 °F (36.8 °C)  Max: 98.7 °F (37.1 °C)  Pulse  Min: 88  Max: 120  BP  Min: 131/75  Max: 206/89  MAP (mmHg)  Min: 98  Max: 141  Resp  Min: 16  Max: 30  SpO2  Min: 96 %  Max: 100 %    12/19 0701 - 12/20 0700  In: 1503.8 [I.V.:503.8]  Out: 550 [Urine:550]           Physical Exam  Constitutional:       General: She is not in acute distress.     Appearance: Normal appearance. She is normal weight.   HENT:      Head: Normocephalic and atraumatic.      Right Ear: External ear normal.      Left Ear: External ear normal.      Nose: Nose normal. No rhinorrhea.      Mouth/Throat:      Mouth: Mucous membranes are moist.      Pharynx: Oropharynx is clear.   Eyes:      General:         Right eye: No discharge.         Left eye: No discharge.      Conjunctiva/sclera: Conjunctivae normal.   Cardiovascular:      Rate and Rhythm: Regular rhythm. Tachycardia present.      Pulses: Normal pulses.   Pulmonary:      Effort: Pulmonary effort is normal. No respiratory distress.      Breath sounds: No stridor. No wheezing.   Abdominal:      General: There is no distension.      Palpations: Abdomen is soft. There is no mass.      Tenderness: There is no abdominal tenderness. There is no guarding.   Musculoskeletal:         General: No swelling, deformity or signs of injury.      Cervical back: Neck supple. No rigidity.   Skin:     General: Skin is warm and dry.      Findings: No rash.      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      Comments: GCS [id number]  AAO to self and place  Follows  commands  Mild to moderate expressive >receptive aphasia using ASL  PERRL  EOMI  Minor nasolabial flattening  MARMOLEJO spont AG with RUE weakness and R wrist drop  Sensation intact to light touch except for RUE diminished sensation  NIH4   Psychiatric:         Mood and Affect: Mood normal.       Unable to test language, memory, judgment, insight, fund of knowledge, gait due to level of consciousness.    Today I personally reviewed pertinent medications, lines/drains/airways, imaging, cardiology results, laboratory results, microbiology results, notably:    CTH, CTA, MRI    Assessment/Plan:     Neuro  * Embolic stroke involving left middle cerebral artery  Pt with L M1 complete occlusion at low threshold for thrombectomy intervention if neuro change, no tPA out of window    -VN following  - OSH CTH with left insular cortex, frontal lobe, and basal ganglia acute infarct.   - CTA: Complete occlusion of the left M1 segment. + MR perfusion scan showing acute and subacute L MCA distribution infarct with ischemic penumbra. MRI reviewed with neuro IR, thrombectomy deferred due to high risk of reperfusion injury  -SBP < 220  -DAPT   -daily statin  -Q1h neuro and VS checks  -PT/OT/SLP as appropriate      Cytotoxic cerebral edema  2/2 stroke  See stroke    ENT  Deaf  Use of ASL video interpretor required    Pulmonary  Severe persistent asthma with acute exacerbation  CXR  Duo nebs  satting 99% on RA     Cardiac/Vascular  Mixed hyperlipidemia  Lipid panel elev  statin    Essential hypertension  SBP <220  -permissive hypertension   -Labetalol prn    Endocrine  Diabetes mellitus  SSI + accu checks  A1C 6.1          The patient is being Prophylaxed for:  Venous Thromboembolism with: Chemical  Stress Ulcer with: Not Applicable   Ventilator Pneumonia with: not applicable    Activity Orders          Diet NPO Except for: Medication, Sips with Medication: NPO starting at 12/20 1328    Turn patient starting at 12/19 2000    Elevate HOB  starting at 12/19 1857        Full Code    LARA CarrascoC  Neurocritical Care  [facility] - Neuro Critical Care

## 2021-12-20 NOTE — PT/OT/SLP EVAL
Physical Therapy Evaluation  Co-evaluation with OT due to acuity of condition, level of skilled assist needed for assessment of safety with mobility.   Patient Name:  Aurora Ornelas   MRN:  [id number]    Recommendations:     Discharge Recommendations:  rehabilitation facility   Discharge Equipment Recommendations:  (TBD pending progress)   Barriers to discharge: Inaccessible home and below baseline, risk of falls    Assessment:     Aurora Ornelas is a 56 y.o. female admitted with a medical diagnosis of Embolic stroke involving left middle cerebral artery.  She presents with the following impairments/functional limitations:  weakness,impaired endurance,impaired self care skills,impaired functional mobilty,gait instability,decreased coordination,impaired balance,decreased upper extremity function,decreased lower extremity function,impaired coordination,decreased ROM,abnormal tone,impaired fine motor. The patient demonstrates R hemiplegia (UE>LE weakness), R inattention, no movement noted at R hand. Patient is inconsistent with command following, she is aphasic and deaf at baseline, ASL video  used during session. She performed sit to stand with minimum assistance, ambulated 10' with OLIVER HHA and minimum to moderate assistance for gait.  Based on the patient's progress with therapy, motivation to participate in treatment, and prior level of independence, they are an excellent candidate for inpatient rehabilitation and they would benefit from rehab to maximize their functional potential.      ASL video  ID [id number]    Rehab Prognosis: Good; patient would benefit from acute skilled PT services to address these deficits and reach maximum level of function.    Recent Surgery: * No surgery found *      Plan:     During this hospitalization, patient to be seen 4 x/week to address the identified rehab impairments via gait training,therapeutic activities,therapeutic exercises,neuromuscular re-education and  progress toward the following goals:    · Plan of Care Expires:  22    Subjective     Chief Complaint: denied pain, mild dizziness on standing that resolved  Patient/Family Comments/goals: motivated to mobilize with therapy  Pain/Comfort:  · Pain Rating 1: 0/10    Patients cultural, spiritual, Lutheran conflicts given the current situation: no    Living Environment:  Patient inconsistently reporting social hx, she stated she lives with her mother. Per CM conversation with patient: she lives alone in a [facility], [address]. Drives. R handed  Prior to admission, patients level of function was modified independent with use of SPC.  Equipment used at home: cane, straight.  DME owned (not currently used): none.  Upon discharge, patient will have assistance from unclear.    Objective:     Communicated with RN prior to session.  Patient found HOB elevated with bed alarm,blood pressure cuff,pulse ox (continuous),peripheral IV,PureWick,SCD,telemetry  upon PT entry to room.    General Precautions: Standard, deaf,fall,aspiration   Orthopedic Precautions:N/A   Braces: N/A  Respiratory Status: Room air    Exams:    Cognitive Exam  Patient is A&O xself, hospital; inaccurately reported  and current year and follows ~25% of one -step commands- limited due to aphasia and communication impairment (deaf), patient performed better with visual modeling and increased time to perform; good tracking to R, inattentive to R   Fine Motor Coordination   -       Impaired R UE>LE, no motor activity noted R hand     Postural Exam Patient presented with the following abnormalities:    -       Rounded shoulders  -       Forward head  -       Kyphosis  -       Posterior pelvic tilt   Sensation    -       Light touch intact OLIEVR LE per patient report, assessment limited due to communication impairment   Skin Integrity/Edema     -       Skin integrity: visibly intact  -       Edema: NA   R LE ROM WNL   R LE Strength WNL based on observation   L LE  ROM WNL PROM    L LE Strength  1/5 hip flexion, 2-/5 knee ext/flex, and 0/5 ankle DF/PF; limited due to communication impairment       Balance   Static Sitting contact guard assist    Dynamic Sitting contact guard assist to minimum assistance    Static Standing minimum assistance    Dynamic Standing       minimum assistance to moderate assistance          Functional Mobility:    Bed Mobility  Supine to Sit on the L side:  minimum assistance, inattentive to R LE  Sit to supine: minimum assistance    Transfers Sit to Stand:  minimum assistance    Gait  Gait Distance: 10 ft with OLIVER HHA  Assistance Level: minimum assistance, moderate assistance for turn to L side  Description: hemiparetic gait on R, decreased step length R, minimal R foot clearance, impaired sequencing, ataxic stepping, decreased foot clearance, scissoring of feet with turn          Therapeutic Activities and Exercises:   Patient educated on role of therapy, goals of session, benefits of out of bed mobility. Patient agreeable to mobilize with therapy.  Discussed PT plan of care during hospitalization. Patient educated that they need to call for assistance to mobilize out of bed. Whiteboard updated as appropriate. Patient educated on how their diagnosis impacts their mobility within PT scope of practice.     Gait training: facilitation for weight shift , manual and verbal cues for sequencing, visual cues to increase step length    Patient educated on PT schedule.  Encouraged patient to ambulate, sit up in chair 3x/day to prevent deconditioning during hospitalization. Patient verbalized understanding and agreement not to mobilize without RN assist. Patient in agreement with PT POC.      Patient safe to transfer with RN assist of 1 person, RN alerted. Patient not left up in chair following session due to impaired communication and confusion, fatigue at end of session.     AM-PAC 6 CLICK MOBILITY  Total Score:13     Patient left HOB elevated with all lines  intact, call button in reach, bed alarm on and RN notified.    GOALS:   Multidisciplinary Problems     Physical Therapy Goals        Problem: Physical Therapy Goal    Goal Priority Disciplines Outcome Goal Variances Interventions   Physical Therapy Goal     PT, PT/OT Ongoing, Progressing     Description: Goals to be met by:      Patient will increase functional independence with mobility by performin. Supine to sit with stand by assistance   2. Sit to supine with Stand-by Assistance  3. Sit to stand transfer with Contact Guard Assistance  4. Stand pivot bed to chair contact guard assist   5. Gait  x 15 feet with Minimal Assistance using LRAD.   6. Ascend/descend 6 stair with unilateral Handrails Minimal Assistance using LRAD.   7. Stand for 1 minutes with Contact Guard Assistance using No Assistive Device to prepare for functional activities in standing.                      History:     Past Medical History:   Diagnosis Date    Anemia     Arthritis     Asthma     Deaf     Diabetes mellitus     Hyperlipidemia     Hypertension        Past Surgical History:   Procedure Laterality Date    COLONOSCOPY N/A 2020    Procedure: COLONOSCOPY;  Surgeon: Felicia Bishop MD;  Location: [facility];  Service: Endoscopy;  Laterality: N/A;    ESOPHAGOGASTRODUODENOSCOPY N/A 2019    Procedure: EGD (ESOPHAGOGASTRODUODENOSCOPY);  Surgeon: Yolanda Gary MD;  Location: [facility];  Service: Endoscopy;  Laterality: N/A;    ESOPHAGOGASTRODUODENOSCOPY N/A 2020    Procedure: EGD (ESOPHAGOGASTRODUODENOSCOPY);  Surgeon: Felicia Bishop MD;  Location: [facility];  Service: Endoscopy;  Laterality: N/A;    ESOPHAGOGASTRODUODENOSCOPY N/A 2021    Procedure: EGD (ESOPHAGOGASTRODUODENOSCOPY)-in 2021;  Surgeon: Sharath Kohler MD;  Location: [facility];  Service: Endoscopy;  Laterality: N/A;  hearing impaired, will need  - Maria Dolores barker-MS  fully vaccinated-see  immunization record    HYSTERECTOMY         Time Tracking:     PT Received On: 12/20/21  PT Start Time: 0925     PT Stop Time: 0950  PT Total Time (min): 25 min     Billable Minutes: Evaluation 10 and Gait Training 12      12/20/2021

## 2021-12-20 NOTE — CONSULTS
[facility] - Emergency Dept  Vascular Neurology  Comprehensive Stroke Center  Consult Note    Consults  Assessment/Plan:     Patient is a 56 y.o. year old female with:    * Embolic stroke involving left middle cerebral artery  Aurora Ornelas is a 56 y.o. female  with PMHx of HTN, asthma, and hearing loss (deaf, communicates with American Sign Language) who presented to OSH with RSW and communication difficulty. Per family, patient began having symptoms on 12/17 with a facial droop. CT Head with L frontal infarct. CTA with L M1 occlusion. Patient out of window for tPA. Patient transferred to [facility] [place] for possible thrombectomy. On arrival, patient taken to MRI STAT for MRI acute ischemic protocol + MR perfusion scan. MRI reviewed with neuro IR, thrombectomy deferred due to high risk of reperfusion injury given symptoms beginning about 48 hour ago. However, recommending admission to neuro ICU for close monitoring. If patient's neuro exam declines, low threshold for IR intervention.    Antithrombotics for secondary stroke prevention: Antiplatelets: Aspirin: 81 mg daily  Clopidogrel: 75 mg daily    Statins for secondary stroke prevention and hyperlipidemia, if present:   Statins: Atorvastatin- 40 mg daily    Aggressive risk factor modification: HTN     Rehab efforts: The patient has been evaluated by a stroke team provider and the therapy needs have been fully considered based off the presenting complaints and exam findings. The following therapy evaluations are needed: PT evaluate and treat, OT evaluate and treat, SLP evaluate and treat, PM&R evaluate for appropriate placement    Diagnostics ordered/pending: HgbA1C to assess blood glucose levels, TTE to assess cardiac function/status     VTE prophylaxis: Heparin 5000 units SQ every 8 hours  Mechanical prophylaxis: Place SCDs    BP parameters: Infarct: No intervention, SBP <220        Cytotoxic cerebral edema  Area of cerebral edema identified when reviewing brain imaging in  the territory of the L middle cerebral artery. We will continue to monitor with q1h neuro checks while on floor or more frequently while in neuro critical care, as any change in the patients clinical exam may signify expansion of the insult and/or the area of the edema. Such changes may require acute interventions to prevent loss of function and/or death.              Hypertension  Stroke risk factor  SBP <220 in setting of acute stroke    Deaf  Communicates with American sign language        STROKE DOCUMENTATION     Acute Stroke Times   Last Known Normal Date: 12/17/21  Last Known Normal Time: 0700  Unknown Normal Time: Unknown Time  Symptom Onset Date: 12/17/21  Symptom Onset Time: 0700  Unknown Symptom Onset Time: Unknown Time  Stroke Team Called Date: 12/19/21  Stroke Team Called Time: 1633  Stroke Team Arrival Date: 12/19/21  Stroke Team Arrival Time: 1637  CT Interpretation Time: 1310  Alteplase Recommended: No  CTA Interpretation Time:  (Pending)  Thrombectomy Recommended: No  MRI Acute Stroke Protocol Interpretation Time: 1709    NIH Scale:  Interval: baseline  1a. Level of Consciousness: 0-->Alert, keenly responsive  1b. LOC Questions: 2-->Answers neither question correctly  1c. LOC Commands: 0-->Performs both tasks correctly  2. Best Gaze: 0-->Normal  3. Visual: 0-->No visual loss  4. Facial Palsy: 1-->Minor paralysis (flattened nasolabial fold, asymmetry on smiling)  5a. Motor Arm, Left: 0-->No drift, limb holds 90 (or 45) degrees for full 10 secs  5b. Motor Arm, Right: 1-->Drift, limb holds 90 (or 45) degrees, but drifts down before full 10 secs, does not hit bed or other support  6a. Motor Leg, Left: 0-->No drift, leg holds 30 degree position for full 5 secs  6b. Motor Leg, Right: 1-->Drift, leg falls by the end of the 5-sec period but does not hit bed  7. Limb Ataxia: 0-->Absent  8. Sensory: 0-->Normal, no sensory loss  9. Best Language: 2-->Severe aphasia, all communication is through fragmentary  expression, great need for inference, questioning, and guessing by the listener. Range of information that can be exchanged is limited, listener carries burden of. . . (see row details)  10. Dysarthria: (UN) Intubated or other physical barrier  11. Extinction and Inattention (formerly Neglect): 0-->No abnormality  Total (NIH Stroke Scale): 7    Modified [place] Score: 2  Darren Coma Scale:    ABCD2 Score:    [id number] Score:   HAS -BLED Score:   ICH Score:   Hunt & Nguyen Classification:       Thrombolysis Candidate? No, Out of window       Interventional Revascularization Candidate?   Is the patient eligible for mechanical endovascular reperfusion (MARY)?  No; Large core infarct on imaging       Hemorrhagic change of an Ischemic Stroke: Does this patient have an ischemic stroke with hemorrhagic changes? No     Subjective:     History of Present Illness:  Aurora Ornelas is a 56 y.o. female with PMHx of HTN, asthma, and hearing loss (deaf, communicates with American Sign Language) who presented to OSH with RSW and communication difficulty. CT Head with L frontal infarct. CTA with L M1 occlusion. Patient out of window for tPA. Patient transferred to [facility] [place] for possible thrombectomy. Patient aphasic on arrival. Per daughter, they noticed a R facial droop on 12/17. She reported to the ED today due to worsening symptoms of RSW and communication difficulty.            Woke up with symptoms?: no    Recent bleeding noted: no  Does the patient take any Blood Thinners? no  Medications: Antiplatelets:  NONE      Past Medical History: hypertension, diabetes, hyperlipidemia and asthma, deafness    Past Surgical History: no major surgeries within the last 2 weeks    Family History: no relevant history    Social History: no smoking, no drinking, no drugs    Allergies: No Known Allergies     Review of Systems   Constitutional: Negative for fever.   HENT: Positive for hearing loss.         Deaf   Eyes: Negative for visual disturbance.  "  Respiratory: Negative for cough and shortness of breath.    Cardiovascular: Negative for chest pain.   Gastrointestinal: Negative for vomiting.   Skin: Negative for rash.   Neurological: Positive for facial asymmetry, speech difficulty and weakness. Negative for dizziness and numbness.     Objective:   Vitals: Blood pressure (!) 186/80, pulse (!) 132, temperature 98 °F (36.7 °C), temperature source Oral, resp. rate 18, height 5' 6" (1.676 m), weight 72.6 kg (160 lb), last menstrual period 07/07/2016, SpO2 97 %.     CT READ: Yes  Abnormal CT subacute L frontal infarct.     Physical Exam  Vitals reviewed.   Constitutional:       General: She is not in acute distress.  HENT:      Head: Normocephalic and atraumatic.   Cardiovascular:      Rate and Rhythm: Tachycardia present.   Pulmonary:      Effort: Pulmonary effort is normal. No respiratory distress.   Genitourinary:     Comments: na  Musculoskeletal:      Right lower leg: No edema.      Left lower leg: No edema.   Skin:     General: Skin is warm and dry.   Neurological:      Mental Status: She is alert.           LOC: alert  Attention Span: good  Language: expressive aphasia  Articulation: limited due to baseline deafness  Orientation: unable to assess 2/2 aphasia  Visual Fields: full  EOM (CN III, IV, VI): intact  Facial Movement (CN VII): R facial droop  Motor: Arm left  5/5  Leg left  5/5  Arm right 4/5  Leg right 4/5  Sensation: intact to light touch bilaterally  Tone: normal    Diagnostics  MRI Brain Perfusion/MRI ischemic protocol. Date: 12/19/21  1. Acute to subacute infarctions in the left MCA distribution as above.  No evidence of hemorrhagic transformation.  2. MR perfusion findings consistent with increased mean transit time t in he left MCA territory with normal cerebral blood volume, suggestive of ischemic penumbra.    CTA Head and Neck. Date: 12/19/21  1. Complete occlusion of the left M1 segment.  2. Frothy secretions within the trachea and fluid " distention of the esophageal lumen.  Findings raise concern for aspiration.  3. Hypodensity measuring 1.3 cm in the left thyroid lobe which can be further evaluated with nonemergent thyroid ultrasound    CT Head. Date: 12/19/21  Hypoattenuation and loss of gray-white differentiation within the left insular cortex, frontal lobe, and basal ganglia compatible with an MCA territory acute infarction    Ananya Moore PA-C  [facility]  Department of Vascular Neurology   [facility] - Emergency Dept

## 2021-12-20 NOTE — ED NOTES
Family updated on plan of care. Patient repositioned in bed and made comfortable. No complaints at this time.

## 2021-12-20 NOTE — ED NOTES
Pt resting comfortably and independently repositioned in stretcher with bed locked in lowest position for safety. NAD noted at this time. Respirations even and unlabored and visible chest rise noted.  Patient offered bathroom assistance and denies need at this time. Pt instructed to call if assistance is needed. Pt on continuous cardiac, BP, and O2 monitoring. Call light within reach. Family at bedside. Updated on POC. No needs at this time. Will continue to monitor.

## 2021-12-20 NOTE — PLAN OF CARE
12/20/21 1630      Offer of free  was accepted or rejected? accepted   Interpreted items include ADLs;Other (See comment)  (CM Discharge Planning Assessment)    service used Video Remote   's ID # [id number]       Nicole aWrd RN, CCRN-K, [facility]  Neuro-Critical Care   [phone]

## 2021-12-20 NOTE — HPI
"57 yo F with PMH asthma, migraines, DM, HTN, deafness presents with L MCA CVA.  Pt presented to OSH today with RUE weakness and R facial droop.  Daughter reports that sx first noted on Friday afternoon 12/17 with facial asymmetry. Starting yesterday, pt was having difficulty walking/was off balance. She presented to [facility] at 12 pm, > 24 hours after sx onset.    Patient history is limited due to deafness/ASL language barrier as well as expressive aphasia. An [facility] video interpretor was used to assist in ROS/physical exam.  The patient herself reports an onset of right wrist pain and weakness at 7 AM today when she woke up this morning. She states she "had to move my right hand with my other hand, it's not working". She also reports left facial swelling and pain onset this morning with slight fever, calling it a migraine, that has since improved to zero pain. She states she was otherwise in her normal state of health prior to this morning.      At OSH, CTH with left insular cortex, frontal lobe, and basal ganglia acute infarct. She also had a CTA that showed Impression:  1. Complete occlusion of the left M1 segment.  2. Frothy secretions within the trachea and fluid distention of the esophageal lumen.  Findings raise concern for aspiration.  3. Hypodensity measuring 1.3 cm in the left thyroid lobe which can be further evaluated with nonemergent thyroid ultrasound     Patient out of window for tPA. [place] [facility] [place]. On arrival, patient taken to MRI STAT for MRI acute ischemic protocol + MR perfusion scan showing acute and subacute L MCA distribution infarct with ischemic penumbra. MRI reviewed with neuro IR, thrombectomy deferred due to high risk of reperfusion injury given symptoms beginning about 48 hour ago per daughter. If patient's neuro exam declines, low threshold for IR intervention. She will be started on DAPT and admitted to [facility] for monitoring and management of L MCA CVA " without interventions.      Information obtained from chart, pt, and family bedside with help of ASL video interpretor.    12/22/2021: NCC ws consulted today for concern of cerebral edema w/ MLS

## 2021-12-20 NOTE — PROGRESS NOTES
Patient arrived to [facility] from ED Bed 2 ----> 1596  Type of stroke/diagnosis:  L MCA    TPA start and end time N/A    Thrombectomy start and end time N/A    Current symptoms: R facial droop, weaker on R side, RUE drift, follows commands, decreased sensation to RUE    Skin assessment done: Yes  Wounds noted: generalized sores in various stages of healing     *If wounds noted, was Wound Care consulted? Yes    Islas Completed? Yes    Patient Belongings on Admit: bouffant with pink hearts    NCC notified: SUZANNE Helm

## 2021-12-20 NOTE — HOSPITAL COURSE
12/20:  used during patient interaction. Patient able to communicate name and age. Aphasic, when trying to interpret images on stroke booklet, patient signing numbers instead of (hammock, cactus). Continues with RUE weakness. No RLE drift. Echo pending. MBSS pending.   12/21:  used during patient interaction. Continues with RUE weakness. phasic, when trying to interpret images on stroke booklet, patient signing numbers instead of (hammock, cactus). Echo with EF 65%.   12/22:  used during patient interaction. Continued RUE and new RLE weakness, exam otherwise remained stable from previous. Repeat CTH obtained to evaluated further. Serial neuro exams performed.   12/23/2021 Patient had CT head done yesterday showing worsening edema and mild mid line shift. NCC consulted for possible need of HTS, but no indication and patient remained in VN floor. CT head repeated later in the day and this morning and stable. Patient is having worsening RU/RLE weakness and aphasia. She is having difficulty with understanding and expression. Na 139 from 135 today.   12/24 medically ready for discharge, patient without accepting rehab facility at this time, complaining of headache this morning and nurse instructed to give prn tylenol  12/25/2021 No acute events overnight, no changes in neuro exam. Still having some expressive aphasia and difficulty understanding. No new complaints today, pending IPR.   12/26/2021 NAEON, patient still having some aphasia and issues with fluency, but awake and alert, no changes on exam. Had a BM yesterday, pending IPR.   12/27/2021 NAEON; medically stable for DC; pending IPR  12/28/2021 Patient neurologically stable on assessment this AM; medically ready for DC pending IPR  12/29/2021 NAEON; anticipate discharge today with 30 day event monitor and vas neuro f/u in 4-6 weeks

## 2021-12-20 NOTE — H&P
"[facility] - Emergency Dept  Neurocritical Care  History & Physical    Admit Date: 12/19/2021  Service Date: 12/19/2021  Length of Stay: 0    Subjective:     Chief Complaint: Embolic stroke involving left middle cerebral artery    History of Present Illness: 57 yo F with PMH asthma, migraines, DM, HTN, deafness presents with L MCA CVA.  Pt presented to OSH today with RUE weakness and R facial droop.  Daughter reports that sx first noted on Friday afternoon 12/17 with facial asymmetry. Starting yesterday, pt was having difficulty walking/was off balance. She presented to [facility] at 12 pm, > 24 hours after sx onset.    Patient history is limited due to deafness/ASL language barrier as well as expressive aphasia. An [facility] video interpretor was used to assist in ROS/physical exam.  The patient herself reports an onset of right wrist pain and weakness at 7 AM today when she woke up this morning. She states she "had to move my right hand with my other hand, it's not working". She also reports left facial swelling and pain onset this morning with slight fever, calling it a migraine, that has since improved to zero pain. She states she was otherwise in her normal state of health prior to this morning.      At OSH, CTH with left insular cortex, frontal lobe, and basal ganglia acute infarct. She also had a CTA that showed Impression:  1. Complete occlusion of the left M1 segment.  2. Frothy secretions within the trachea and fluid distention of the esophageal lumen.  Findings raise concern for aspiration.  3. Hypodensity measuring 1.3 cm in the left thyroid lobe which can be further evaluated with nonemergent thyroid ultrasound     Patient out of window for tPA. Patient [place] [facility] [place]. On arrival, patient taken to MRI STAT for MRI acute ischemic protocol + MR perfusion scan showing acute and subacute L MCA distribution infarct with ischemic penumbra. MRI reviewed with neuro IR, thrombectomy " deferred due to high risk of reperfusion injury given symptoms beginning about 48 hour ago per daughter. If patient's neuro exam declines, low threshold for IR intervention. She will be started on DAPT and admitted to [facility] for monitoring and management of L MCA CVA without interventions.      Information obtained from chart, pt, and family bedside with help of [facility] video interpretor.      Past Medical History:   Diagnosis Date    Anemia     Arthritis     Asthma     Deaf     Diabetes mellitus     Hyperlipidemia     Hypertension      Past Surgical History:   Procedure Laterality Date    COLONOSCOPY N/A 12/4/2020    Procedure: COLONOSCOPY;  Surgeon: Felicia Bishop MD;  Location: [facility] ENDO;  Service: Endoscopy;  Laterality: N/A;    ESOPHAGOGASTRODUODENOSCOPY N/A 2/28/2019    Procedure: EGD (ESOPHAGOGASTRODUODENOSCOPY);  Surgeon: Yolanda Gary MD;  Location: [facility] ENDO;  Service: Endoscopy;  Laterality: N/A;    ESOPHAGOGASTRODUODENOSCOPY N/A 12/4/2020    Procedure: EGD (ESOPHAGOGASTRODUODENOSCOPY);  Surgeon: Felicia Bishop MD;  Location: [facility];  Service: Endoscopy;  Laterality: N/A;    ESOPHAGOGASTRODUODENOSCOPY N/A 7/1/2021    Procedure: EGD (ESOPHAGOGASTRODUODENOSCOPY)-in March 2021;  Surgeon: Sharath Kohler MD;  Location: [facility];  Service: Endoscopy;  Laterality: N/A;  hearing impaired, will need  - Maria Dolores Harris assigned-MS  fully vaccinated-see immunization record    HYSTERECTOMY        No current facility-administered medications on file prior to encounter.     Current Outpatient Medications on File Prior to Encounter   Medication Sig Dispense Refill    albuterol sulfate 2.5 mg/0.5 mL Nebu Take 2.5 mg by nebulization every 4 (four) hours as needed. Rescue 1 each 0    albuterol-ipratropium (DUO-NEB) 2.5 mg-0.5 mg/3 mL nebulizer solution USE 1 AMPULE IN NEBULIZER EVERY 6 HOURS WHILE AWAKE      albuterol-ipratropium (DUO-NEB) 2.5 mg-0.5 mg/3 mL nebulizer  solution Inhale 3 mLs into the lungs.      ergocalciferol (ERGOCALCIFEROL) 50,000 unit Cap Take 50,000 Units by mouth.      ferrous sulfate 325 (65 FE) MG EC tablet Take 325 mg by mouth 2 (two) times daily.      hydrALAZINE (APRESOLINE) 25 MG tablet Take 1 tablet (25 mg total) by mouth every 8 (eight) hours. 90 tablet 0    ipratropium (ATROVENT) 0.02 % nebulizer solution Take 2.5 mLs (500 mcg total) by nebulization 4 (four) times daily as needed for Wheezing. Rescue 1 Box 0    pantoprazole (PROTONIX) 40 MG tablet Take 1 tablet (40 mg total) by mouth 2 (two) times daily. 60 tablet 3    predniSONE (DELTASONE) 50 MG Tab Take 50 mg by mouth once daily.      sucralfate (CARAFATE) 1 gram tablet Take 1 tablet (1 g total) by mouth 4 (four) times daily before meals and nightly. 120 tablet 1    traMADoL (ULTRAM) 50 mg tablet TAKE 1 TABLET BY MOUTH ONCE DAILY AS NEEDED FOR PAIN        Allergies: Patient has no known allergies.    Family History   Problem Relation Age of Onset    Diabetes Mother     Hypertension Mother     Colon cancer Neg Hx     Esophageal cancer Neg Hx      Social History     Tobacco Use    Smoking status: Never Smoker    Smokeless tobacco: Never Used   Substance Use Topics    Alcohol use: No    Drug use: No     Review of Systems   Unable to perform ROS: Other   Constitutional: Negative for fever.   HENT: Negative for trouble swallowing.    Eyes: Negative for visual disturbance.   Respiratory: Positive for wheezing (hx asthma). Negative for shortness of breath.    Cardiovascular: Negative for chest pain and palpitations.   Gastrointestinal: Negative for abdominal pain, nausea and vomiting.   Genitourinary: Negative for difficulty urinating.   Musculoskeletal: Negative for arthralgias and myalgias.   Skin: Negative for rash.   Neurological: Positive for speech difficulty (ASL production difficulty), weakness and numbness. Negative for dizziness, seizures and headaches.   Psychiatric/Behavioral:  Negative for agitation.   ASL language barrier and patient expressive aphasia  Objective:     Vitals:    Temp: 98.6 °F (37 °C)  Pulse: 100  BP: 131/75  MAP (mmHg): 98  Resp: 18  SpO2: 99 %  O2 Device (Oxygen Therapy): room air    Temp  Min: 98 °F (36.7 °C)  Max: 98.6 °F (37 °C)  Pulse  Min: 92  Max: 132  BP  Min: 131/75  Max: 188/103  MAP (mmHg)  Min: 98  Max: 137  Resp  Min: 18  Max: 20  SpO2  Min: 97 %  Max: 100 %    No intake/output data recorded.           Physical Exam  Constitutional:       General: She is not in acute distress.  HENT:      Head: Normocephalic and atraumatic.      Right Ear: External ear normal.      Left Ear: External ear normal.      Nose: Nose normal. No rhinorrhea.      Mouth/Throat:      Mouth: Mucous membranes are moist.      Pharynx: Oropharynx is clear.   Eyes:      General:         Right eye: No discharge.         Left eye: No discharge.      Conjunctiva/sclera: Conjunctivae normal.   Cardiovascular:      Rate and Rhythm: Regular rhythm. Tachycardia present.      Pulses: Normal pulses.   Pulmonary:      Effort: Pulmonary effort is normal. No respiratory distress.      Breath sounds: No stridor. No wheezing.   Abdominal:      General: There is no distension.      Palpations: Abdomen is soft. There is no mass.      Tenderness: There is no abdominal tenderness. There is no guarding.   Musculoskeletal:         General: No swelling, deformity or signs of injury.      Cervical back: Neck supple. No rigidity.   Skin:     General: Skin is warm and dry.      Findings: No rash.      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      Comments: GCS [id number]  AAO to self and place  Follows commands  Mild to moderate expressive >receptive aphasia using ASL  L pupil slightly larger than R, but round and reactive bilat  EOMI  Minor nasolabial flattening  MARMOLEJO spont AG with RUE weakness and R wrist drop  Sensation intact to light touch except for RUE diminished  "sensation    NIHSS: 4  1a. Level of Consciousness: 0  1b. LOC Questions: 0  1c. LOC Commands: 0   2. Best Gaze: 0  3. Visual: 1  4. Facial Palsy: 0  5a. Motor Arm, Left: 0  5b. Motor Arm, Right: 1  6a. Motor Leg, Left: 0   6b. Motor Leg, Right: 0  7. Limb Ataxia: 0  8. Sensory: 1  9. Best Language: 1  10. Dysarthria: U (pt deaf, not able to test)  11. Extinction and Inattention (formerly Neglect): 0      When asking the patient questions, she begins by accurately answering, but then starts stating numbers instead of words soon after without completing the phrase. For example, when repeating back "down to earth," she stated "down 5, 6" but was able to repeat back other phrases "you know how" etc. Additionally, testing for visual fields she pointed where I had a finger up as she saw my hand, but stated incorrect numbers back to me. It's possible some of this was attributable to ASL language barrier using a video interpretor.   Psychiatric:         Mood and Affect: Mood normal.       Unable to test language, memory, judgment, insight, fund of knowledge, gait due to level of consciousness.    Today I personally reviewed pertinent medications, lines/drains/airways, imaging, cardiology results, laboratory results, microbiology results, notably:    CTH, CTA, MRI    Assessment/Plan:     Neuro  * Embolic stroke involving left middle cerebral artery  Pt with L M1 complete occlusion at low threshold for thrombectomy intervention if neuro change, no tPA out of window    -VN following  - OSH CTH with left insular cortex, frontal lobe, and basal ganglia acute infarct.   - CTA: Complete occlusion of the left M1 segment. Frothy secretions within the trachea and fluid distention of the esophageal lumen.  Findings raise concern for aspiration. Hypodensity measuring 1.3 cm in the left thyroid lobe which can be further evaluated with nonemergent thyroid ultrasound  - MRI acute ischemic protocol + MR perfusion scan showing acute and " subacute L MCA distribution infarct with ischemic penumbra. MRI reviewed with neuro IR, thrombectomy deferred due to high risk of reperfusion injury  -SBP < 220  -DAPT  -Q1h neuro and VS checks  -PT/OT/SLP as appropriate  -hold AC in acute setting  -family updated bedside     Cytotoxic cerebral edema  2/2 stroke  See stroke    ENT  Deaf  Use of ASL video interpretor required    Pulmonary  Severe persistent asthma with acute exacerbation  CXR  Duo nebs  satting 99% on RA and comfortable WOB on exam    Cardiac/Vascular  Hyperlipidemia  Lipid panel elev  statin    Sinus tachycardia  EKG with sinus tachycardia  TTE pending    Hypertension  SBP <220  Labetalol prn    Endocrine  Diabetes mellitus  SSI + accu checks  A1C 6.1          The patient is being Prophylaxed for:  Venous Thromboembolism with: Mechanical  Stress Ulcer with: Not Applicable   Ventilator Pneumonia with: not applicable    Activity Orders          Turn patient starting at 12/19 2000    Elevate HOB starting at 12/19 1857    Diet NPO: NPO starting at 12/19 1857        Full Code     Critical condition in that Patient has a condition that poses threat to life and bodily function: complete L M1 occlusion with acute/subacute L MCA distribution infarct and ischemic penumbra without intervention but low threshold for mechanical thrombectomy     35 minutes of Critical care time was spent personally by me on the following activities: development of treatment plan with patient or surrogate and bedside caregivers, discussions with consultants, evaluation of patient's response to treatment, examination of patient, ordering and performing treatments and interventions, ordering and review of laboratory studies, ordering and review of radiographic studies, pulse oximetry, antibiotic titration if applicable, vasopressor titration if applicable, re-evaluation of patient's condition. This critical care time did not overlap with that of any other provider or involve time  for any procedures. There is high probability for acute neurological change leading to clinical and possibly life-threatening deterioration requiring highest level of physician preparedness for urgent intervention.    Meredith Banks PA-C  Neurocritical Care  [facility] - Emergency Dept

## 2021-12-20 NOTE — PLAN OF CARE
[facility] Care Plan    POC reviewed with Aurora Ornelas at 0300. Pt verbalized understanding via . Questions and concerns addressed. No acute events overnight. Pt progressing toward goals. Will continue to monitor. See below and flowsheets for full assessment and VS info.       Neuro:  Darren Coma Scale  Best Eye Response: 4-->(E4) spontaneous  Best Motor Response: 6-->(M6) obeys commands  Best Verbal Response: 5-->(V5) oriented  Darren Coma Scale Score: 15  Assessment Qualifiers: patient not sedated/intubated        24hr Temp:  [98 °F (36.7 °C)-98.7 °F (37.1 °C)]     CV:   Rhythm: normal sinus rhythm  BP goals:   SBP < 220  MAP > 65    Resp:   O2 Device (Oxygen Therapy): room air       Plan:  Continuing to monitor    GI/:     Diet/Nutrition Received: NPO  Last Bowel Movement: 12/18/21  Voiding Characteristics: external catheter    Intake/Output Summary (Last 24 hours) at 12/20/2021 0648  Last data filed at 12/20/2021 0605  Gross per 24 hour   Intake 1503.76 ml   Output 550 ml   Net 953.76 ml          Labs/Accuchecks:  Recent Labs   Lab 12/20/21  0317   WBC 6.03   RBC 3.25*   HGB 7.1*   HCT 25.1*         Recent Labs   Lab 12/20/21  0317      K 3.8   CO2 24      BUN 9   CREATININE 0.7   ALKPHOS 58   ALT 8*   AST 18   BILITOT 0.4      Recent Labs   Lab 12/19/21  1304   INR 1.0    No results for input(s): CPK, CPKMB, TROPONINI, MB in the last 168 hours.    Electrolytes: N/A - electrolytes WDL  Accuchecks: Q6H    Gtts:   sodium chloride 0.9% 75 mL/hr at 12/20/21 0422       LDA/Wounds:  Lines/Drains/Airways       Peripheral Intravenous Line                   Peripheral IV - Single Lumen 12/19/21 1308 20 G Left Antecubital <1 day         Peripheral IV - Single Lumen 12/19/21 1308 20 G Right Antecubital <1 day                  Wounds: No  Wound care consulted: No

## 2021-12-20 NOTE — SUBJECTIVE & OBJECTIVE
Past Medical History:   Diagnosis Date    Anemia     Arthritis     Asthma     Deaf     Diabetes mellitus     Hyperlipidemia     Hypertension      Past Surgical History:   Procedure Laterality Date    COLONOSCOPY N/A 12/4/2020    Procedure: COLONOSCOPY;  Surgeon: Felicia Bishop MD;  Location: [facility] ENDO;  Service: Endoscopy;  Laterality: N/A;    ESOPHAGOGASTRODUODENOSCOPY N/A 2/28/2019    Procedure: EGD (ESOPHAGOGASTRODUODENOSCOPY);  Surgeon: Yolanda Gary MD;  Location: [facility] ENDO;  Service: Endoscopy;  Laterality: N/A;    ESOPHAGOGASTRODUODENOSCOPY N/A 12/4/2020    Procedure: EGD (ESOPHAGOGASTRODUODENOSCOPY);  Surgeon: Felicia Bishop MD;  Location: [facility] ENDO;  Service: Endoscopy;  Laterality: N/A;    ESOPHAGOGASTRODUODENOSCOPY N/A 7/1/2021    Procedure: EGD (ESOPHAGOGASTRODUODENOSCOPY)-in March 2021;  Surgeon: Sharath Kohler MD;  Location: [facility] ENDO;  Service: Endoscopy;  Laterality: N/A;  hearing impaired, will need  - Maria Dolores Harris assigned-MS  fully vaccinated-see immunization record    HYSTERECTOMY        No current facility-administered medications on file prior to encounter.     Current Outpatient Medications on File Prior to Encounter   Medication Sig Dispense Refill    albuterol sulfate 2.5 mg/0.5 mL Nebu Take 2.5 mg by nebulization every 4 (four) hours as needed. Rescue 1 each 0    albuterol-ipratropium (DUO-NEB) 2.5 mg-0.5 mg/3 mL nebulizer solution USE 1 AMPULE IN NEBULIZER EVERY 6 HOURS WHILE AWAKE      albuterol-ipratropium (DUO-NEB) 2.5 mg-0.5 mg/3 mL nebulizer solution Inhale 3 mLs into the lungs.      ergocalciferol (ERGOCALCIFEROL) 50,000 unit Cap Take 50,000 Units by mouth.      ferrous sulfate 325 (65 FE) MG EC tablet Take 325 mg by mouth 2 (two) times daily.      hydrALAZINE (APRESOLINE) 25 MG tablet Take 1 tablet (25 mg total) by mouth every 8 (eight) hours. 90 tablet 0    ipratropium (ATROVENT) 0.02 % nebulizer solution Take 2.5 mLs  (500 mcg total) by nebulization 4 (four) times daily as needed for Wheezing. Rescue 1 Box 0    pantoprazole (PROTONIX) 40 MG tablet Take 1 tablet (40 mg total) by mouth 2 (two) times daily. 60 tablet 3    predniSONE (DELTASONE) 50 MG Tab Take 50 mg by mouth once daily.      sucralfate (CARAFATE) 1 gram tablet Take 1 tablet (1 g total) by mouth 4 (four) times daily before meals and nightly. 120 tablet 1    traMADoL (ULTRAM) 50 mg tablet TAKE 1 TABLET BY MOUTH ONCE DAILY AS NEEDED FOR PAIN        Allergies: Patient has no known allergies.    Family History   Problem Relation Age of Onset    Diabetes Mother     Hypertension Mother     Colon cancer Neg Hx     Esophageal cancer Neg Hx      Social History     Tobacco Use    Smoking status: Never Smoker    Smokeless tobacco: Never Used   Substance Use Topics    Alcohol use: No    Drug use: No     Review of Systems   Unable to perform ROS: Other   Constitutional: Negative for fever.   HENT: Negative for trouble swallowing.    Eyes: Negative for visual disturbance.   Respiratory: Positive for wheezing (hx asthma). Negative for shortness of breath.    Cardiovascular: Negative for chest pain and palpitations.   Gastrointestinal: Negative for abdominal pain, nausea and vomiting.   Genitourinary: Negative for difficulty urinating.   Musculoskeletal: Negative for arthralgias and myalgias.   Skin: Negative for rash.   Neurological: Positive for speech difficulty (ASL production difficulty), weakness and numbness. Negative for dizziness, seizures and headaches.   Psychiatric/Behavioral: Negative for agitation.   ASL language barrier and patient expressive aphasia  Objective:     Vitals:    Temp: 98.6 °F (37 °C)  Pulse: 100  BP: 131/75  MAP (mmHg): 98  Resp: 18  SpO2: 99 %  O2 Device (Oxygen Therapy): room air    Temp  Min: 98 °F (36.7 °C)  Max: 98.6 °F (37 °C)  Pulse  Min: 92  Max: 132  BP  Min: 131/75  Max: 188/103  MAP (mmHg)  Min: 98  Max: 137  Resp  Min: 18  Max:  20  SpO2  Min: 97 %  Max: 100 %    No intake/output data recorded.           Physical Exam  Constitutional:       General: She is not in acute distress.  HENT:      Head: Normocephalic and atraumatic.      Right Ear: External ear normal.      Left Ear: External ear normal.      Nose: Nose normal. No rhinorrhea.      Mouth/Throat:      Mouth: Mucous membranes are moist.      Pharynx: Oropharynx is clear.   Eyes:      General:         Right eye: No discharge.         Left eye: No discharge.      Conjunctiva/sclera: Conjunctivae normal.   Cardiovascular:      Rate and Rhythm: Regular rhythm. Tachycardia present.      Pulses: Normal pulses.   Pulmonary:      Effort: Pulmonary effort is normal. No respiratory distress.      Breath sounds: No stridor. No wheezing.   Abdominal:      General: There is no distension.      Palpations: Abdomen is soft. There is no mass.      Tenderness: There is no abdominal tenderness. There is no guarding.   Musculoskeletal:         General: No swelling, deformity or signs of injury.      Cervical back: Neck supple. No rigidity.   Skin:     General: Skin is warm and dry.      Findings: No rash.      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      Comments: GCS E4V4M6  AAO to self and place  Follows commands  Mild to moderate expressive >receptive aphasia using ASL  L pupil slightly larger than R, but round and reactive bilat  EOMI  Minor nasolabial flattening  MARMOLEJO spont AG with RUE weakness and R wrist drop  Sensation intact to light touch except for RUE diminished sensation    NIHSS: 4  1a. Level of Consciousness: 0  1b. LOC Questions: 0  1c. LOC Commands: 0   2. Best Gaze: 0  3. Visual: 1  4. Facial Palsy: 0  5a. Motor Arm, Left: 0  5b. Motor Arm, Right: 1  6a. Motor Leg, Left: 0   6b. Motor Leg, Right: 0  7. Limb Ataxia: 0  8. Sensory: 1  9. Best Language: 1  10. Dysarthria: U (pt deaf, not able to test)  11. Extinction and Inattention  "(formerly Neglect): 0      When asking the patient questions, she begins by accurately answering, but then starts stating numbers instead of words soon after without completing the phrase. For example, when repeating back "down to earth," she stated "down 5, 6" but was able to repeat back other phrases "you know how" etc. Additionally, testing for visual fields she pointed where I had a finger up as she saw my hand, but stated incorrect numbers back to me. It's possible some of this was attributable to ASL language barrier using a video interpretor.   Psychiatric:         Mood and Affect: Mood normal.       Unable to test language, memory, judgment, insight, fund of knowledge, gait due to level of consciousness.    Today I personally reviewed pertinent medications, lines/drains/airways, imaging, cardiology results, laboratory results, microbiology results, notably:    CTH, CTA, MRI  "

## 2021-12-20 NOTE — PT/OT/SLP EVAL
Speech Language Pathology Evaluation  Cognitive/Bedside Swallow    Patient Name:  Aurora Ornelas   MRN:  [id number]  Admitting Diagnosis: Embolic stroke involving left middle cerebral artery    Recommendations:                  General Recommendations:  Dysphagia therapy, Speech/language therapy, Cognitive-linguistic therapy and pending progress possible Modified barium swallow study  Diet recommendations:  NPO, NPO   Aspiration Precautions: Strict aspiration precautions  General Precautions: Standard, aspiration,fall,NPO,deaf  Communication strategies:  go to room if call light pushed and  required     History:     Past Medical History:   Diagnosis Date    Anemia     Arthritis     Asthma     Deaf     Diabetes mellitus     Hyperlipidemia     Hypertension        Past Surgical History:   Procedure Laterality Date    COLONOSCOPY N/A 12/4/2020    Procedure: COLONOSCOPY;  Surgeon: Felicia Bishop MD;  Location: [facility];  Service: Endoscopy;  Laterality: N/A;    ESOPHAGOGASTRODUODENOSCOPY N/A 2/28/2019    Procedure: EGD (ESOPHAGOGASTRODUODENOSCOPY);  Surgeon: Yolanda Gary MD;  Location: [facility];  Service: Endoscopy;  Laterality: N/A;    ESOPHAGOGASTRODUODENOSCOPY N/A 12/4/2020    Procedure: EGD (ESOPHAGOGASTRODUODENOSCOPY);  Surgeon: Felicia Bishop MD;  Location: [facility];  Service: Endoscopy;  Laterality: N/A;    ESOPHAGOGASTRODUODENOSCOPY N/A 7/1/2021    Procedure: EGD (ESOPHAGOGASTRODUODENOSCOPY)-in March 2021;  Surgeon: Sharath Kohler MD;  Location: [facility];  Service: Endoscopy;  Laterality: N/A;  hearing impaired, will need  - Maria Dolores Harris assigned-MS  fully vaccinated-see immunization record    HYSTERECTOMY         Social/ occupational History: Limited 2/2 Patient expressive communication, Pt explained via  that she lived with her mother.     Prior Intubation HX:  None this admission     Modified Barium Swallow: none prior at this  "facility     Chest X-Rays: 12/19/21: Airspace opacity in the left lower lobe.    Prior diet: regular textures, thin per Pt. Pt is Diabetic.       Subjective     SLP reviewed Pt with RN pre/post session, RN reported PT passed Islas, cleared for tx   present via [employer] iPad t/o session   Pt presents calm  When asked about birthday, she explains via ALS  ""     Pain/Comfort:  · Pain Rating 1: 0/10    Respiratory Status: Room air    Objective:     Cognitive Status:    Attention Sustained attention deficit , mild. Pt with mildly decreased sustained attention to structured tasks greater than 30 seconds in length, which required occasional visual cues from SLP and  to redirect   Orientation: Pt oriented to name and location  CHRISTOPHER to assess problem solving, memory, organization and math skills 2/2 decreased comprehension, ST to continue to assess in session      Receptive Language:   Comprehension:      Questions: Simple yes/no 40% accuracy   Commands:  One step 60% accuracy   Picture identification: 25% accuracy  in Fo 8    Pragmatics:    Pt with appropriate affect    Expressive Language:  Verbal:    DNA 2/2 Patient is deaf and ASL is primary means of communication     Nonverbal:   ASL : Limited 2/2 reduced R UE mobility, Pt with moderate difficulty with attempts to finger spell which required moderate visual cues and repetion to clarify from clinician and ,  reported Pt with repetition/perseveration of some letters/words, Gestures limited 2/2 decreased comprehension  and Communication board: Ongoing assessment warranted 2/2 decreased comprehension, Patient with 50% accuracy for FO2 (Y/N), 25% accuracy with FO8       Motor Speech:  DNA 2/2 Patient is deaf and ASL is primary means of communication     Voice:   DNA 2/2 Patient is deaf and ASL is primary means of communication     Visual-Spatial:  TBA, Pt with Right inattention evident upon informal " assessment    Reading:   TBA     Written Expression:   TBA    Oral Musculature Evaluation  · Oral Musculature: right weakness,general weakness  · Dentition: present and adequate  · Secretion Management: adequate  · Mucosal Quality: adequate  · Mandibular Strength and Mobility: impaired  · Oral Labial Strength and Mobility: impaired retraction  · Lingual Strength and Mobility: impaired strength,impaired protrusion,other (see comments) (decreased command following impeded attempts to elicit lateral movement)  · Volitional Cough: unable to elicit on command, Pt with spontaneous  weak unproductive cough  · Volitional Swallow: elicited, delayed  · Voice Prior to PO Intake: not formally assessed 2/2 Patient deaf and ASL is primary means for communication, Pt with wet breath sounds informally observed, RN aware    Bedside Swallow Eval:   Consistencies Assessed:  · Thin liquids : ice chips x2, cup sips x2  · Honey thick liquids : tsp sips x3  · Puree : 1/2 tsp bite x1, full tsp bite x1     Oral Phase:   · Anterior loss (R)  · Decreased closure around utensil  · Slow oral transit time    Pharyngeal Phase:   · decreased hyolaryngeal excursion to palpation  · delayed swallow initation  · Delayed, wet breath sounds after swallow  · Delayed, weak cough after the swallow     Compensatory Strategies  · minimal assessment 2/2 decreased command following. SLP/ unable to elicit cough or throat clear on command     Treatment: Pt found awake and alert in bed on room air with call light in reach. ALS  present via [employer] iPad t/o session.  RN in and out of room t/o session. Pt with inconsistent timing of initiation of swallow with delayed, wet change in breath sounds and weak unproductive cough. Pt command following and decreased sustained attention impeded attempts for compensatory strategies upon initial assessment.Pt at times noted to hold and touch L cheek t/o swallow assessment.  Pt was educated on SLP Role,  aspiration precautions, possibility of further objective assessment of swallow fx and ongoing SLP POC via . Pt with minimal demonstration of understanding. No family present.  Whiteboard current. Pt remained in bed with RN at bedside upon SLP exit. MD team notified  Of findings/recs upon SLP exit.      Assessment:     Aurora Ornelas is a 56 y.o. female with an SLP diagnosis of Aphasia and Dysphagia.  She presents with s/s aspiration with low level trials. Risk of aspiration enhanced due to decreased command following and weak cough.  ST to continue to follow. Strict aspiration precautions advised.     Goals:   Multidisciplinary Problems     SLP Goals        Problem: SLP Goal    Goal Priority Disciplines Outcome   SLP Goal     SLP Ongoing, Progressing   Description: Speech Language Pathology Goals  Goals expected to be met by 12/27/21    1. Pt will participate in ongoing assessment of swallow function to determine safest, least restrictive means of nutrition/hydration  2. Educate Pt and family on aspiration precautions and SLP POC  3. Pt will answer personal yes/no questions with 80% accuracy or higher, MOD A  4. Pt will follow simple commands with 80% accuracy or higher, MOD A  5. Pt will orient x3+ via any modality 90% of the time, MOD A  6. Pt will participate in ongoing assessment of cognition and visiospatial skills to determine ongoing POC needs  7. Educate Pt and family on safety awareness and compensatory strategies for communication                     Plan:     · Patient to be seen:  4 x/week   · Plan of Care expires:  01/18/22  · Plan of Care reviewed with:  patient   · SLP Follow-Up:  Yes       Discharge recommendations:  Discharge Facility/Level of Care Needs: other (see comments) (per PT/OT recs)   Barriers to Discharge:  NPO    Time Tracking:     SLP Treatment Date:   12/20/21  Speech Start Time:  1022  Speech Stop Time:  1058     Speech Total Time (min):  36 min    Billable Minutes: Eval 15  , Eval Swallow and Oral Function 11 and Self Care/Home Management Training 10    12/20/2021

## 2021-12-20 NOTE — PT/OT/SLP EVAL
Occupational Therapy   Co-Evaluation/Treatment    Name: Aurora Ornelas  MRN: [id number]  Admitting Diagnosis:  Embolic stroke involving left middle cerebral artery  Recent Surgery: * No surgery found *      Recommendations:     Discharge Recommendations: rehabilitation facility  Discharge Equipment Recommendations: TBD  Barriers to discharge: increased assistance needed    Assessment:     Aurora Ornelas is a 56 y.o. female with a medical diagnosis of Embolic stroke involving left middle cerebral artery. She presents with performance deficits including weakness,impaired endurance,impaired self care skills,gait instability,impaired balance,impaired functional mobilty,decreased lower extremity function,decreased safety awareness,decreased upper extremity function,decreased coordination. Pt alert and cooperative throughout evaluation, inconsistently follows commands and limited by R-sided weakness. Pt would continue to benefit from OT to increase functional independence ands safety. Recommend rehab upon D/C to return to [facility] pending progress.    Rehab Prognosis: Good; patient would benefit from acute skilled OT services to address these deficits and reach maximum level of function.       Plan:     Patient to be seen 4 x/week to address the above listed problems via self-care/home management,therapeutic activities,therapeutic exercises,neuromuscular re-education  · Plan of Care Expires: 01/20/22  · Plan of Care Reviewed with: patient    Subjective     Chief Complaint: None stated  Patient/Family Comments/goals: None stated    Occupational Profile:  Living Environment: Pt reports she lives with her mother; per CM from pt report, she lives alone with ~6 CLARK-- will need to be verified.  Previous level of function: Per CM, pt reports (I) with ADLs and drives; uses cane for mobility as needed  Equipment Used at Home:  none  Assistance upon Discharge: Needs to be verified    Pain/Comfort:  · Pain Rating 1: 0/10  · Pain Rating  Post-Intervention 1: 0/10    Patients cultural, spiritual, Yarsanism conflicts given the current situation:      Objective:     Communicated with: RN prior to session. Patient found with HOB elevated with telemetry,pulse ox (continuous),blood pressure cuff,bed alarm,peripheral IV,PureWick,SCD upon OT entry to room, no family present.   used via ipad, ID #[id number]  Co-evaluation/treatment performed with PT due to patient's multiple deficits requiring two skilled therapists to appropriately and safely assess patient's strength and endurance while facilitating functional tasks in addition to accommodating for patient's activity tolerance.     General Precautions: Standard, fall,aspiration,deaf   Orthopedic Precautions:N/A   Braces: N/A    Occupational Performance:    Bed Mobility:    · Patient completed Supine to Sit with minimum assistance  · Patient completed Sit to Supine with minimum assistance    Functional Mobility/Transfers:  · Patient completed Sit <> Stand Transfer with minimum assistance with hand-held assist from EOB  · Functional Mobility: Few side steps and ~8 ft within room with minimum-moderate assistance with hand-held assist    Activities of Daily Living:  · Lower Body Dressing: moderate assistance to don socks    Cognitive/Visual Perceptual:  Cognitive/Psychosocial Skills:     -       Oriented to: Person and hospital; unable to correctly sign birthday or year   -       Follows Commands/attention: Follows one-step and two-step commands inconsistently  -       Communication: pt uses ASL; inconsistent signing per   -       Safety awareness/insight to disability: impaired   Visual/Perceptual: unable to formally assess    Physical Exam:  Balance:    -       good sitting, fair standing balance  Upper Extremity Range of Motion:     -       Right Upper Extremity: shld AROM ~90 deg, elbow flex/ext WFL although not on command; unable to demo active movement to hand  -       Left Upper  Extremity: WFL  Upper Extremity Strength:    -       Right Upper Extremity: shld 2/5; elbow flex/ext 3/5  -       Left Upper Extremity: WFL   Strength:    -       Right Upper Extremity: poor  -       Left Upper Extremity: WFL  Fine Motor Coordination: impaired R    AMPAC 6 Click ADL:  AMPAC Total Score: 15    Treatment & Education:  OT eval; educated on OT role and POC as well as R UE ROM and positioning and will require ongoing education/reinforcement  Education:    Patient left HOB elevated with all lines intact, call button in reach, bed alarm on and RN notified    GOALS:   Multidisciplinary Problems     Occupational Therapy Goals        Problem: Occupational Therapy Goal    Goal Priority Disciplines Outcome Interventions   Occupational Therapy Goal     OT, PT/OT Ongoing, Progressing    Description: Goals to be met by: 7 days (12/27/21)     Patient will increase functional independence with ADLs by performing:    UE Dressing with Minimal Assistance.  LE Dressing with Minimal Assistance.  Grooming while standing at sink with Minimal Assistance.  Toileting from toilet with Minimal Assistance for hygiene and clothing management.   Supine to sit with Contact Guard Assistance.  Toilet transfer to toilet with Minimal Assistance.  Increased functional strength to WFL for ADLs.                     History:     Past Medical History:   Diagnosis Date    Anemia     Arthritis     Asthma     Deaf     Diabetes mellitus     Hyperlipidemia     Hypertension        Past Surgical History:   Procedure Laterality Date    COLONOSCOPY N/A 12/4/2020    Procedure: COLONOSCOPY;  Surgeon: Felicia Bishop MD;  Location: [facility];  Service: Endoscopy;  Laterality: N/A;    ESOPHAGOGASTRODUODENOSCOPY N/A 2/28/2019    Procedure: EGD (ESOPHAGOGASTRODUODENOSCOPY);  Surgeon: Yolanda Gary MD;  Location: [facility];  Service: Endoscopy;  Laterality: N/A;    ESOPHAGOGASTRODUODENOSCOPY N/A 12/4/2020    Procedure: EGD  (ESOPHAGOGASTRODUODENOSCOPY);  Surgeon: Felicia Bishop MD;  Location: [facility];  Service: Endoscopy;  Laterality: N/A;    ESOPHAGOGASTRODUODENOSCOPY N/A 7/1/2021    Procedure: EGD (ESOPHAGOGASTRODUODENOSCOPY)-in March 2021;  Surgeon: Sharath Kohler MD;  Location: [facility];  Service: Endoscopy;  Laterality: N/A;  hearing impaired, will need  - Maria Dolores Harris assigned-MS  fully vaccinated-see immunization record    HYSTERECTOMY         Time Tracking:     OT Date of Treatment: 12/20/21  OT Start Time: 0926  OT Stop Time: 0949  OT Total Time (min): 23 min    Billable Minutes:Evaluation 15  Therapeutic Activity 8    12/20/2021

## 2021-12-20 NOTE — ASSESSMENT & PLAN NOTE
Pt with L M1 complete occlusion at low threshold for thrombectomy intervention if neuro change, no tPA out of window    -VN following  - OSH CTH with left insular cortex, frontal lobe, and basal ganglia acute infarct.   - CTA: Complete occlusion of the left M1 segment. Frothy secretions within the trachea and fluid distention of the esophageal lumen.  Findings raise concern for aspiration. Hypodensity measuring 1.3 cm in the left thyroid lobe which can be further evaluated with nonemergent thyroid ultrasound  - MRI acute ischemic protocol + MR perfusion scan showing acute and subacute L MCA distribution infarct with ischemic penumbra. MRI reviewed with neuro IR, thrombectomy deferred due to high risk of reperfusion injury  -SBP < 220  -DAPT  -Q1h neuro and VS checks  -PT/OT/SLP as appropriate  -hold AC in acute setting  -family updated bedside

## 2021-12-20 NOTE — PLAN OF CARE
Problem: SLP Goal  Goal: SLP Goal  Description: Speech Language Pathology Goals  Goals expected to be met by 12/27/21    1. Pt will participate in ongoing assessment of swallow function to determine safest, least restrictive means of nutrition/hydration  2. Educate Pt and family on aspiration precautions and SLP POC  3. Pt will answer personal yes/no questions with 80% accuracy or higher, MOD A  4. Pt will follow simple commands with 80% accuracy or higher, MOD A  5. Pt will orient x3+ via any modality 90% of the time, MOD A  6. Pt will participate in ongoing assessment of cognition and visiospatial skills to determine ongoing POC needs  7. Educate Pt and family on safety awareness and compensatory strategies for communication    Outcome: Ongoing, Progressing     SLP evaluation initiated. Patient with unreliable yes/no response.  SLP unable to r/o aspiration at the bedside.  REC: NPO and ST to continue to follow for ongoing assessment of swallow fx to determine safest, least restrictive means nutrition/hydration/medication and language therapy.  Findings./recs reviewed with Pt, RN and MD team.     12/20/2021

## 2021-12-20 NOTE — ASSESSMENT & PLAN NOTE
Area of cerebral edema identified when reviewing brain imaging in the territory of the L middle cerebral artery. We will continue to monitor with q1h neuro checks while on floor or more frequently while in neuro critical care, as any change in the patients clinical exam may signify expansion of the insult and/or the area of the edema. Such changes may require acute interventions to prevent loss of function and/or death.

## 2021-12-20 NOTE — ASSESSMENT & PLAN NOTE
Pt with L M1 complete occlusion at low threshold for thrombectomy intervention if neuro change, no tPA out of window    -VN following  - OSH CTH with left insular cortex, frontal lobe, and basal ganglia acute infarct.   - CTA: Complete occlusion of the left M1 segment. + MR perfusion scan showing acute and subacute L MCA distribution infarct with ischemic penumbra. MRI reviewed with neuro IR, thrombectomy deferred due to high risk of reperfusion injury  -SBP < 220  -DAPT   -daily statin  -Q1h neuro and VS checks  -PT/OT/SLP as appropriate

## 2021-12-20 NOTE — PROGRESS NOTES
[facility] Neuro Critical Care  Vascular Neurology  [facility]  Progress Note    Assessment/Plan:     * Embolic stroke involving left middle cerebral artery  Patient is a 56 y.o. female  with PMHx of HTN, HLD, asthma, and hearing loss (deaf, communicates with American Sign Language) who presented to OSH with RSW and communication difficulty. Per family, patient began having symptoms on 12/17 with a facial droop. CT Head with L frontal infarct. CTA with L M1 occlusion. Patient out of window for tPA. Patient transferred to [facility] for possible thrombectomy. On arrival, patient taken to MRI STAT for MRI acute ischemic protocol + MR perfusion scan. MRI reviewed with neuro IR, thrombectomy deferred due to high risk of reperfusion injury given symptoms beginning ~ 48 hours ago. However, recommendation was made for admission to neuro ICU for close monitoring with low threshold for IR intervention if neuro exam declined.  -Neuro exam stable. Stable for SD. Echo pending.     Antithrombotics for secondary stroke prevention: Antiplatelets: Aspirin: 81 mg daily  Clopidogrel: 75 mg daily    Statins for secondary stroke prevention and hyperlipidemia, if present:   Statins: Atorvastatin- 40 mg daily    Aggressive risk factor modification: HTN     Rehab efforts: The patient has been evaluated by a stroke team provider and the therapy needs have been fully considered based off the presenting complaints and exam findings. The following therapy evaluations are needed: PT evaluate and treat, OT evaluate and treat, SLP evaluate and treat, PM&R evaluate for appropriate placement    Diagnostics ordered/pending: TTE to assess cardiac function/status     VTE prophylaxis: Heparin 5000 units SQ every 8 hours  Mechanical prophylaxis: Place SCDs    BP parameters: Infarct: No intervention, SBP <220        Mixed hyperlipidemia  Stroke RF  .2  Atorvastatin 40    Diabetes mellitus  Stroke RF  A1c 6.1 on  12/19/21  SSI    Cytotoxic cerebral edema  Area of cerebral edema identified when reviewing brain imaging in the territory of the L middle cerebral artery. We will continue to monitor with q1h neuro checks while on floor or more frequently while in neuro critical care, as any change in the patients clinical exam may signify expansion of the insult and/or the area of the edema. Such changes may require acute interventions to prevent loss of function and/or death.              Essential hypertension  Stroke risk factor  SBP <220 in setting of acute stroke    Deaf  Communicates with American sign language         12/20:  used during patient interaction. Patient able to communicate name and age. Aphasic, when trying to interpret images on stroke booklet, patient signing numbers instead of (hammock, cactus). Continues with RUE weakness. No RLE drift. Echo pending.       STROKE DOCUMENTATION   Acute Stroke Times   Last Known Normal Date: 12/17/21  Last Known Normal Time: 0700  Unknown Normal Time: Unknown Time  Symptom Onset Date: 12/17/21  Symptom Onset Time: 0700  Unknown Symptom Onset Time: Unknown Time  Stroke Team Called Date: 12/19/21  Stroke Team Called Time: 1633  Stroke Team Arrival Date: 12/19/21  Stroke Team Arrival Time: 1637  CT Interpretation Time: 1310  Alteplase Recommended: No  CTA Interpretation Time:  (Pending)  Thrombectomy Recommended: No  MRI Acute Stroke Protocol Interpretation Time: 1709    NIH Scale:  1a. Level of Consciousness: 0-->Alert, keenly responsive  1b. LOC Questions: 0-->Answers both questions correctly  1c. LOC Commands: 0-->Performs both tasks correctly  2. Best Gaze: 0-->Normal  3. Visual: 0-->No visual loss  4. Facial Palsy: 1-->Minor paralysis (flattened nasolabial fold, asymmetry on smiling)  5a. Motor Arm, Left: 0-->No drift, limb holds 90 (or 45) degrees for full 10 secs  5b. Motor Arm, Right: 1-->Drift, limb holds 90 (or 45) degrees, but drifts down before full 10  secs, does not hit bed or other support  6a. Motor Leg, Left: 0-->No drift, leg holds 30 degree position for full 5 secs  6b. Motor Leg, Right: 0-->No drift, leg holds 30 degree position for full 5 secs  7. Limb Ataxia: 0-->Absent  8. Sensory: 0-->Normal, no sensory loss  9. Best Language: 2-->Severe aphasia, all communication is through fragmentary expression, great need for inference, questioning, and guessing by the listener. Range of information that can be exchanged is limited, listener carries burden of. . . (see row details)  10. Dysarthria: 0-->Normal  11. Extinction and Inattention (formerly Neglect): 0-->No abnormality  Total (NIH Stroke Scale): 4       Modified Alexandr Score: 2  Darren Coma Scale:    ABCD2 Score:    [id number] Score:   HAS -BLED Score:   ICH Score:   Hunt & Nguyen Classification:      Hemorrhagic change of an Ischemic Stroke: Does this patient have an ischemic stroke with hemorrhagic changes? No     Neurologic complaint: RSW     Interval history:   used during patient interaction. Patient able to communicate name and age. Aphasic, when trying to interpret images on stroke booklet, patient signing numbers instead of (hammock, cactus). Continues with RUE weakness. No RLE drift. Echo pending.     Past Medical History: hypertension, diabetes, hyperlipidemia and asthma, deafness  Past Surgical History: no major surgeries within the last 2 weeks    Social History: no smoking, no drinking, no drugs    Allergies: No Known Allergies     Review of Systems   Constitutional: Negative for fever.   HENT: Positive for hearing loss (deaf at baseline).              Eyes: Negative for visual disturbance.   Respiratory: Negative for cough and shortness of breath.    Cardiovascular: Negative for chest pain.   Gastrointestinal: Negative for nausea and vomiting.   Skin: Negative for rash.   Neurological: Positive for facial asymmetry and weakness. Negative for dizziness and numbness.     Objective:    .vitals  Vitals:    12/20/21 0715   BP: (!) 182/84   Pulse: 98   Resp: (!) 23   Temp: 98.3 °F (36.8 °C)         Physical Exam  Vitals and nursing note reviewed.   Constitutional:       General: She is not in acute distress.  HENT:      Head: Normocephalic and atraumatic.      Nose: Nose normal.   Eyes:      Extraocular Movements: Extraocular movements intact.      Conjunctiva/sclera: Conjunctivae normal.   Cardiovascular:      Rate and Rhythm: Normal rate.   Pulmonary:      Effort: Pulmonary effort is normal. No respiratory distress.   Abdominal:      General: There is no distension.   Musculoskeletal:      Cervical back: Normal range of motion.      Right lower leg: No edema.      Left lower leg: No edema.   Skin:     General: Skin is warm and dry.      Findings: No rash.   Neurological:      Mental Status: She is alert.      Motor: Weakness (RUE) present.       Neurological Exam  LOC: alert  Attention Span: good  Language: expressive aphasia  Articulation: limited due to baseline deafness  Orientation: using , oriented to person, place, time  Visual Fields: full  EOM (CN III, IV, VI): intact  Facial Movement (CN VII): R facial droop  Motor: Arm left  5/5  Leg left  5/5  Arm right 3/5  Leg right 4/5  Sensation: intact to light touch throughout  Tone: normal       Recent Labs   Lab 12/20/21  0317   WBC 6.03   RBC 3.25*   HGB 7.1*   HCT 25.1*      MCV 77*   MCH 21.8*   MCHC 28.3*     Hemoglobin A1C   Date Value Ref Range Status   12/19/2021 6.1 (H) 4.0 - 5.6 % Final     Comment:     ADA Screening Guidelines:  5.7-6.4%  Consistent with prediabetes  >or=6.5%  Consistent with diabetes    High levels of fetal hemoglobin interfere with the HbA1C  assay. Heterozygous hemoglobin variants (HbS, HgC, etc)do  not significantly interfere with this assay.   However, presence of multiple variants may affect accuracy.       Recent Labs   Lab 12/19/21  1304   INR 1.0           Diagnostics  MRI Brain  Perfusion/MRI ischemic protocol. Date: 12/19/21  1. Acute to subacute infarctions in the left MCA distribution as above.  No evidence of hemorrhagic transformation.  2. MR perfusion findings consistent with increased mean transit time t in he left MCA territory with normal cerebral blood volume, suggestive of ischemic penumbra.     CTA Head and Neck. Date: 12/19/21  1. Complete occlusion of the left M1 segment.  2. Frothy secretions within the trachea and fluid distention of the esophageal lumen.  Findings raise concern for aspiration.  3. Hypodensity measuring 1.3 cm in the left thyroid lobe which can be further evaluated with nonemergent thyroid ultrasound     CT Head. Date: 12/19/21  Hypoattenuation and loss of gray-white differentiation within the left insular cortex, frontal lobe, and basal ganglia compatible with an MCA territory acute infarction        Beatris Doan PA-C  [facility]  Department of Vascular Neurology   [facility]

## 2021-12-20 NOTE — PLAN OF CARE
12/20/21 1620   Post-Acute Status   Post-Acute Authorization Placement   Post-Acute Placement Status Referrals Sent  (rehab)     SW attempted to meet with Pt but she was sleeping. SW contacted Pt daughter via phone and discussed therapy recs for rehab/provided list verbally and at bedside. Addressed questions and reported need to send referrals to obtain accepting options. Pt daughter agreeable and will review the list for preferences asap. Currently her preference is [facility].    SAMIR sent referrals via [facility] to [facility] Hutchins, and [facility].    Melanie Tony, ZAIDA  Neurocritical Care   [facility]  [id number]

## 2021-12-21 LAB
ALBUMIN SERPL BCP-MCNC: 3.1 G/DL (ref 3.5–5.2)
ALP SERPL-CCNC: 53 U/L (ref 55–135)
ALT SERPL W/O P-5'-P-CCNC: 8 U/L (ref 10–44)
ANION GAP SERPL CALC-SCNC: 7 MMOL/L (ref 8–16)
AST SERPL-CCNC: 16 U/L (ref 10–40)
BASOPHILS # BLD AUTO: 0.02 K/UL (ref 0–0.2)
BASOPHILS NFR BLD: 0.3 % (ref 0–1.9)
BILIRUB SERPL-MCNC: 0.3 MG/DL (ref 0.1–1)
BUN SERPL-MCNC: 7 MG/DL (ref 6–20)
CALCIUM SERPL-MCNC: 8.6 MG/DL (ref 8.7–10.5)
CHLORIDE SERPL-SCNC: 101 MMOL/L (ref 95–110)
CO2 SERPL-SCNC: 27 MMOL/L (ref 23–29)
CREAT SERPL-MCNC: 0.6 MG/DL (ref 0.5–1.4)
DIFFERENTIAL METHOD: ABNORMAL
EOSINOPHIL # BLD AUTO: 0.1 K/UL (ref 0–0.5)
EOSINOPHIL NFR BLD: 1.3 % (ref 0–8)
ERYTHROCYTE [DISTWIDTH] IN BLOOD BY AUTOMATED COUNT: 19.1 % (ref 11.5–14.5)
EST. GFR  (AFRICAN AMERICAN): >60 ML/MIN/1.73 M^2
EST. GFR  (NON AFRICAN AMERICAN): >60 ML/MIN/1.73 M^2
GLUCOSE SERPL-MCNC: 106 MG/DL (ref 70–110)
HCT VFR BLD AUTO: 24.5 % (ref 37–48.5)
HGB BLD-MCNC: 7 G/DL (ref 12–16)
IMM GRANULOCYTES # BLD AUTO: 0 K/UL (ref 0–0.04)
IMM GRANULOCYTES NFR BLD AUTO: 0 % (ref 0–0.5)
LYMPHOCYTES # BLD AUTO: 1.7 K/UL (ref 1–4.8)
LYMPHOCYTES NFR BLD: 23.6 % (ref 18–48)
MAGNESIUM SERPL-MCNC: 1.7 MG/DL (ref 1.6–2.6)
MCH RBC QN AUTO: 21.5 PG (ref 27–31)
MCHC RBC AUTO-ENTMCNC: 28.6 G/DL (ref 32–36)
MCV RBC AUTO: 75 FL (ref 82–98)
MONOCYTES # BLD AUTO: 0.5 K/UL (ref 0.3–1)
MONOCYTES NFR BLD: 7.7 % (ref 4–15)
NEUTROPHILS # BLD AUTO: 4.7 K/UL (ref 1.8–7.7)
NEUTROPHILS NFR BLD: 67.1 % (ref 38–73)
NRBC BLD-RTO: 0 /100 WBC
PHOSPHATE SERPL-MCNC: 3.8 MG/DL (ref 2.7–4.5)
PLATELET # BLD AUTO: 322 K/UL (ref 150–450)
PMV BLD AUTO: 9.5 FL (ref 9.2–12.9)
POCT GLUCOSE: 104 MG/DL (ref 70–110)
POCT GLUCOSE: 122 MG/DL (ref 70–110)
POCT GLUCOSE: 125 MG/DL (ref 70–110)
POCT GLUCOSE: 128 MG/DL (ref 70–110)
POCT GLUCOSE: <20 MG/DL (ref 70–110)
POTASSIUM SERPL-SCNC: 3.6 MMOL/L (ref 3.5–5.1)
PROT SERPL-MCNC: 6.4 G/DL (ref 6–8.4)
RBC # BLD AUTO: 3.25 M/UL (ref 4–5.4)
SODIUM SERPL-SCNC: 135 MMOL/L (ref 136–145)
WBC # BLD AUTO: 7 K/UL (ref 3.9–12.7)

## 2021-12-21 PROCEDURE — 97535 SELF CARE MNGMENT TRAINING: CPT

## 2021-12-21 PROCEDURE — 99233 SBSQ HOSP IP/OBS HIGH 50: CPT | Mod: ,,, | Performed by: PHYSICIAN ASSISTANT

## 2021-12-21 PROCEDURE — 99233 PR SUBSEQUENT HOSPITAL CARE,LEVL III: ICD-10-PCS | Mod: ,,, | Performed by: PSYCHIATRY & NEUROLOGY

## 2021-12-21 PROCEDURE — 94761 N-INVAS EAR/PLS OXIMETRY MLT: CPT

## 2021-12-21 PROCEDURE — 25000003 PHARM REV CODE 250: Performed by: PHYSICIAN ASSISTANT

## 2021-12-21 PROCEDURE — 97110 THERAPEUTIC EXERCISES: CPT

## 2021-12-21 PROCEDURE — 25000003 PHARM REV CODE 250: Performed by: EMERGENCY MEDICINE

## 2021-12-21 PROCEDURE — 11000001 HC ACUTE MED/SURG PRIVATE ROOM

## 2021-12-21 PROCEDURE — 92611 MOTION FLUOROSCOPY/SWALLOW: CPT

## 2021-12-21 PROCEDURE — 63600175 PHARM REV CODE 636 W HCPCS: Performed by: PHYSICIAN ASSISTANT

## 2021-12-21 PROCEDURE — 83735 ASSAY OF MAGNESIUM: CPT | Performed by: INTERNAL MEDICINE

## 2021-12-21 PROCEDURE — 84100 ASSAY OF PHOSPHORUS: CPT | Performed by: INTERNAL MEDICINE

## 2021-12-21 PROCEDURE — 97530 THERAPEUTIC ACTIVITIES: CPT

## 2021-12-21 PROCEDURE — 80053 COMPREHEN METABOLIC PANEL: CPT | Performed by: INTERNAL MEDICINE

## 2021-12-21 PROCEDURE — 99233 SBSQ HOSP IP/OBS HIGH 50: CPT | Mod: ,,, | Performed by: PSYCHIATRY & NEUROLOGY

## 2021-12-21 PROCEDURE — 85025 COMPLETE CBC W/AUTO DIFF WBC: CPT | Performed by: INTERNAL MEDICINE

## 2021-12-21 PROCEDURE — 99233 PR SUBSEQUENT HOSPITAL CARE,LEVL III: ICD-10-PCS | Mod: ,,, | Performed by: PHYSICIAN ASSISTANT

## 2021-12-21 RX ORDER — POTASSIUM CHLORIDE 20 MEQ/1
40 TABLET, EXTENDED RELEASE ORAL ONCE
Status: CANCELLED | OUTPATIENT
Start: 2021-12-21 | End: 2021-12-21

## 2021-12-21 RX ORDER — INSULIN ASPART 100 [IU]/ML
1-10 INJECTION, SOLUTION INTRAVENOUS; SUBCUTANEOUS
Status: DISCONTINUED | OUTPATIENT
Start: 2021-12-21 | End: 2021-12-30 | Stop reason: HOSPADM

## 2021-12-21 RX ADMIN — HEPARIN SODIUM 5000 UNITS: 5000 INJECTION INTRAVENOUS; SUBCUTANEOUS at 06:12

## 2021-12-21 RX ADMIN — HEPARIN SODIUM 5000 UNITS: 5000 INJECTION INTRAVENOUS; SUBCUTANEOUS at 01:12

## 2021-12-21 RX ADMIN — HEPARIN SODIUM 5000 UNITS: 5000 INJECTION INTRAVENOUS; SUBCUTANEOUS at 09:12

## 2021-12-21 RX ADMIN — ASPIRIN 81 MG CHEWABLE TABLET 81 MG: 81 TABLET CHEWABLE at 11:12

## 2021-12-21 RX ADMIN — HYDRALAZINE HYDROCHLORIDE 25 MG: 25 TABLET ORAL at 09:12

## 2021-12-21 RX ADMIN — SENNOSIDES AND DOCUSATE SODIUM 1 TABLET: 50; 8.6 TABLET ORAL at 11:12

## 2021-12-21 RX ADMIN — ACETAMINOPHEN 650 MG: 325 TABLET ORAL at 09:12

## 2021-12-21 RX ADMIN — HYDRALAZINE HYDROCHLORIDE 25 MG: 25 TABLET ORAL at 01:12

## 2021-12-21 RX ADMIN — FERROUS SULFATE TAB 325 MG (65 MG ELEMENTAL FE) 1 EACH: 325 (65 FE) TAB at 09:12

## 2021-12-21 RX ADMIN — HYDROCODONE BITARTRATE AND ACETAMINOPHEN 1 TABLET: 5; 325 TABLET ORAL at 06:12

## 2021-12-21 RX ADMIN — FERROUS SULFATE TAB 325 MG (65 MG ELEMENTAL FE) 1 EACH: 325 (65 FE) TAB at 11:12

## 2021-12-21 RX ADMIN — SENNOSIDES AND DOCUSATE SODIUM 1 TABLET: 50; 8.6 TABLET ORAL at 09:12

## 2021-12-21 RX ADMIN — CLOPIDOGREL 75 MG: 75 TABLET, FILM COATED ORAL at 11:12

## 2021-12-21 RX ADMIN — HYDRALAZINE HYDROCHLORIDE 25 MG: 25 TABLET ORAL at 06:12

## 2021-12-21 RX ADMIN — ATORVASTATIN CALCIUM 40 MG: 20 TABLET, FILM COATED ORAL at 11:12

## 2021-12-21 NOTE — PT/OT/SLP PROGRESS
Physical Therapy      Patient Name:  Aurora Ornelas   MRN:  [id number]    Patient not seen today secondary to Unavailable (Comment). Pt off the floor x 2 attempts in AM. Writing therapist unable to return in PM for additional attempts this day. Will follow-up per POC as appropriate.

## 2021-12-21 NOTE — PROGRESS NOTES
[facility] - Neuro Critical Care  Vascular Neurology  Comprehensive Stroke Center  Progress Note    Assessment/Plan:     * Embolic stroke involving left middle cerebral artery  Patient is a 56 y.o. female  with PMHx of HTN, HLD, asthma, and hearing loss (deaf, communicates with American Sign Language) who presented to OSH with RSW and communication difficulty. Per family, patient began having symptoms on 12/17 with a facial droop. CT Head with L frontal infarct. CTA with L M1 occlusion. Patient out of window for tPA. [place] [facility] [place]. On arrival, patient taken to MRI STAT for MRI acute ischemic protocol + MR perfusion scan. MRI reviewed with neuro IR, thrombectomy deferred due to high risk of reperfusion injury given symptoms beginning ~ 48 hours ago. However, recommendation was made for admission to neuro ICU for close monitoring with low threshold for IR intervention if neuro exam declined. Echo with EF 65%.  -Neuro exam stable.     Antithrombotics for secondary stroke prevention: Antiplatelets: Aspirin: 81 mg daily  Clopidogrel: 75 mg daily    Statins for secondary stroke prevention and hyperlipidemia, if present:   Statins: Atorvastatin- 40 mg daily    Aggressive risk factor modification: HTN     Rehab efforts: The patient has been evaluated by a stroke team provider and the therapy needs have been fully considered based off the presenting complaints and exam findings. The following therapy evaluations are needed: PT evaluate and treat, OT evaluate and treat, SLP evaluate and treat, PM&R evaluate for appropriate placement therapy recs IPR    Diagnostics ordered/pending: None     VTE prophylaxis: Heparin 5000 units SQ every 8 hours  Mechanical prophylaxis: Place SCDs    BP parameters: Infarct: No intervention, SBP <220        Mixed hyperlipidemia  Stroke RF  .2  Atorvastatin 40    Diabetes mellitus  Stroke RF  A1c 6.1 on 12/19/21  SSI    Cytotoxic cerebral edema  Area of  cerebral edema identified when reviewing brain imaging in the territory of the L middle cerebral artery. We will continue to monitor with q1h neuro checks while on floor or more frequently while in neuro critical care, as any change in the patients clinical exam may signify expansion of the insult and/or the area of the edema. Such changes may require acute interventions to prevent loss of function and/or death.              Essential hypertension  Stroke risk factor  SBP <220 in setting of acute stroke    Deaf  Communicates with American sign language         12/20:  used during patient interaction. Patient able to communicate name and age. Aphasic, when trying to interpret images on stroke booklet, patient signing numbers instead of (hammock, cactus). Continues with RUE weakness. No RLE drift. Echo pending. MBSS pending.   12/21:  used during patient interaction. Continues with RUE weakness. phasic, when trying to interpret images on stroke booklet, patient signing numbers instead of (hammock, cactus). Echo with EF 65%.       STROKE DOCUMENTATION   Acute Stroke Times   Last Known Normal Date: 12/17/21  Last Known Normal Time: 0700  Unknown Normal Time: Unknown Time  Symptom Onset Date: 12/17/21  Symptom Onset Time: 0700  Unknown Symptom Onset Time: Unknown Time  Stroke Team Called Date: 12/19/21  Stroke Team Called Time: 1633  Stroke Team Arrival Date: 12/19/21  Stroke Team Arrival Time: 1637  CT Interpretation Time: 1310  Alteplase Recommended: No  CTA Interpretation Time:  (Pending)  Thrombectomy Recommended: No  MRI Acute Stroke Protocol Interpretation Time: 1709    NIH Scale:  1a. Level of Consciousness: 0-->Alert, keenly responsive  1b. LOC Questions: 0-->Answers both questions correctly  1c. LOC Commands: 0-->Performs both tasks correctly  2. Best Gaze: 0-->Normal  3. Visual: 0-->No visual loss  4. Facial Palsy: 1-->Minor paralysis (flattened nasolabial fold, asymmetry on smiling)  5a.  Motor Arm, Left: 0-->No drift, limb holds 90 (or 45) degrees for full 10 secs  5b. Motor Arm, Right: 1-->Drift, limb holds 90 (or 45) degrees, but drifts down before full 10 secs, does not hit bed or other support  6a. Motor Leg, Left: 0-->No drift, leg holds 30 degree position for full 5 secs  6b. Motor Leg, Right: 1-->Drift, leg falls by the end of the 5-sec period but does not hit bed  7. Limb Ataxia: 0-->Absent  8. Sensory: 0-->Normal, no sensory loss  9. Best Language: 2-->Severe aphasia, all communication is through fragmentary expression, great need for inference, questioning, and guessing by the listener. Range of information that can be exchanged is limited, listener carries burden of. . . (see row details)  10. Dysarthria: 0-->Normal  11. Extinction and Inattention (formerly Neglect): 0-->No abnormality  Total (NIH Stroke Scale): 5       Modified [place] Score: 2  Darren Coma Scale:    ABCD2 Score:    [id number] Score:   HAS -BLED Score:   ICH Score:   Hunt & Nguyen Classification:      Hemorrhagic change of an Ischemic Stroke: Does this patient have an ischemic stroke with hemorrhagic changes? No     Neurologic complaint: RSW     Interval history:   used during patient interaction. Continues with RUE weakness. phasic, when trying to interpret images on stroke booklet, patient signing numbers instead of (hammock, cactus). Echo with EF 65%.     Past Medical History: hypertension, diabetes, hyperlipidemia and asthma, deafness  Past Surgical History: no major surgeries within the last 2 weeks    Social History: no smoking, no drinking, no drugs    Allergies: No Known Allergies     Review of Systems   Constitutional: Negative for fever.   HENT: Positive for hearing loss (deaf at baseline).              Eyes: Negative for visual disturbance.   Respiratory: Negative for cough and shortness of breath.    Cardiovascular: Negative for chest pain.   Gastrointestinal: Negative for nausea and vomiting.    Skin: Negative for rash.   Neurological: Positive for facial asymmetry and weakness. Negative for dizziness and numbness.     Objective:   .vitals  Vitals:    12/21/21 0700   BP: (!) 143/67   Pulse: 99   Resp: (!) 27   Temp: 98.7 °F (37.1 °C)         Physical Exam  Vitals and nursing note reviewed.   Constitutional:       General: She is not in acute distress.  HENT:      Head: Normocephalic and atraumatic.      Nose: Nose normal.   Eyes:      Extraocular Movements: Extraocular movements intact.      Conjunctiva/sclera: Conjunctivae normal.   Cardiovascular:      Rate and Rhythm: Normal rate.   Pulmonary:      Effort: Pulmonary effort is normal. No respiratory distress.   Abdominal:      General: There is no distension.   Musculoskeletal:      Cervical back: Normal range of motion.      Right lower leg: No edema.      Left lower leg: No edema.   Skin:     General: Skin is warm and dry.      Findings: No rash.   Neurological:      Mental Status: She is alert.      Motor: Weakness (RUE) present.       Neurological Exam  LOC: alert  Attention Span: good  Language: expressive aphasia (when using ASL patient will sign numbers instead of name objects)  Articulation: limited due to baseline deafness  Orientation: using , oriented to person and place  Visual Fields: full  EOM (CN III, IV, VI): intact  Facial Movement (CN VII): R facial droop  Motor: Arm left  5/5  Leg left  5/5  Arm right 3/5  Leg right 4/5  Sensation: intact to light touch throughout  Tone: normal       Recent Labs   Lab 12/21/21  0042   WBC 7.00   RBC 3.25*   HGB 7.0*   HCT 24.5*      MCV 75*   MCH 21.5*   MCHC 28.6*     Hemoglobin A1C   Date Value Ref Range Status   12/19/2021 6.1 (H) 4.0 - 5.6 % Final     Comment:     ADA Screening Guidelines:  5.7-6.4%  Consistent with prediabetes  >or=6.5%  Consistent with diabetes    High levels of fetal hemoglobin interfere with the HbA1C  assay. Heterozygous hemoglobin variants (HbS, HgC,  etc)do  not significantly interfere with this assay.   However, presence of multiple variants may affect accuracy.       No results for input(s): PT, INR, APTT in the last 24 hours.        Diagnostics  MRI Brain Perfusion/MRI ischemic protocol. Date: 12/19/21  1. Acute to subacute infarctions in the left MCA distribution as above.  No evidence of hemorrhagic transformation.  2. MR perfusion findings consistent with increased mean transit time t in he left MCA territory with normal cerebral blood volume, suggestive of ischemic penumbra.     CTA Head and Neck. Date: 12/19/21  1. Complete occlusion of the left M1 segment.  2. Frothy secretions within the trachea and fluid distention of the esophageal lumen.  Findings raise concern for aspiration.  3. Hypodensity measuring 1.3 cm in the left thyroid lobe which can be further evaluated with nonemergent thyroid ultrasound     CT Head. Date: 12/19/21  Hypoattenuation and loss of gray-white differentiation within the left insular cortex, frontal lobe, and basal ganglia compatible with an MCA territory acute infarction      Cardiac Imaging  TTE 12/20/21  · The left ventricle is normal in size with normal systolic function. The estimated ejection fraction is 65%.  · Normal right ventricular size with normal right ventricular systolic function.  · Normal left ventricular diastolic function.  · The estimated PA systolic pressure is 29 mmHg.  · Normal central venous pressure (3 mmHg).          Beatris Doan PA-C  [facility]  Department of Vascular Neurology   [facility]

## 2021-12-21 NOTE — PT/OT/SLP PROGRESS
Occupational Therapy   Treatment    Patient Name:  Aurora Ornelas   MRN:  [id number]  Admit Date: 12/19/2021  Admitting Diagnosis:  Embolic stroke involving left middle cerebral artery   Length of Stay: 2 days  Recent Surgery: * No surgery found *           Recommendations:     Discharge Recommendations: rehabilitation facility  Discharge Equipment Recommendations:   (TBD)  Barriers to discharge:   (increased assistance needed)    Plan:     Patient to be seen 4 x/week to address the above listed problems via self-care/home management,therapeutic activities,therapeutic exercises,neuromuscular re-education  · Plan of Care Expires: 01/20/22  · Plan of Care Reviewed with: patient    Assessment:   Aurora Ornelas is a 56 y.o. female with a medical diagnosis of Embolic stroke involving left middle cerebral artery.  She presents with the following performance deficits affecting function: weakness,impaired endurance,impaired self care skills,impaired functional mobilty,gait instability,impaired balance,decreased upper extremity function,decreased lower extremity function,impaired sensation,decreased ROM,impaired coordination,impaired fine motor,decreased safety awareness,abnormal tone.  Pt would benefit from skilled OT services in order to maximize independence with ADLs and facilitate safe discharge. Pt's clinical condition meets full inpatient rehab criteria. Inpatient rehab will provide to total interdisciplinary treatment approach needed. Pt is at high risk of unplanned readmission due to fall risk. The lower level of care cannot provide total interdisciplinary approach needed. Because of patient's significant decline from PLOF, patient would benefit from Inpatient Rehab to maximize return to PLOF. Pt is an excellent candidate for inpatient rehabilitation, as he has a qualifying diagnosis, displays good activity tolerance, has experienced decline from PLOF, has good family support, and is motivated to participate in  "therapy.    Time spent performing bed mobility, EOB activity, self-care and transfer to and from chair    Relevant hx and current limitations:   Affected side: right (UE>LE)   Communication: non-verbal, sign-language w/ simple commands  o Pt demo'd ability to understand finger spelling and 1-2 word commands  o Pt w/ increased difficulty finger spelling and signing simple words (perseverates on finger spelling)   Neglect/Inattention: R inattention   EOB sitting: Min-CGA    Pt continues to require significant assist with functional mobility and safe completion of ADLs requiring Mod-Min A. Pt with noted improvement with self-care as compared to last session. Patient is making progress towards goals.    Rehab Prognosis: Good; patient would benefit from acute skilled OT services to address these deficits and reach maximum level of function.        Subjective   Communicated with: RN prior to session.  Patient found HOB elevated with bed alarm,blood pressure cuff,telemetry,pulse ox (continuous),peripheral IV,PureWick upon OT entry to room.    Patient: signed -I am feeling ok    Pain/Comfort:  · Pain Rating 1: 0/10    Objective:   Patient found with: bed alarm,blood pressure cuff,telemetry,pulse ox (continuous),peripheral IV,PureWick   General Precautions: Standard, deaf,fall,aspiration   Orthopedic Precautions:N/A   Braces: N/A   Oxygen Device: Room Air  Vitals: /64   Pulse 97   Temp 97.3 °F (36.3 °C) (Oral)   Resp (!) 21   Ht 5' 6" (1.676 m)   Wt 72.6 kg (160 lb)   LMP 07/07/2016   SpO2 98%   Breastfeeding No   BMI 25.82 kg/m²     Outcome Measures:  AMPAC 6 Click ADL: 16    Occupational Performance:  Bed Mobility:       Rolling Left:  moderate assistance   Scooting: minimum assistance   Supine to Sit: minimum assistance   Sit to Supine: minimum assistance    Functional Mobility/Transfers:     Patient completed Sit <> Stand Transfer with minimum assistance  with  hand-held assist    Patient " completed Bed <> Chair Transfer using Step Transfer technique with moderate assistance with hand-held assist  o Pt t/f to and from chair this date; req'd step by step instructions for stepping    Activities of Daily Living:     Grooming: minimum assistance sitting UIC to perform oral hygiene; pt able to use R hand to hold toothpaste and brush to complete oral care   Lower Body Dressing: moderate assistance to don B socks w/ HOB elevated and using modified 4 point technique\      [facility] ADL:  [facility] Total Score: 16    Therapeutic Exercise and/or Activity:  -AAROM performed RUE in all joints/planes of motion with stretches provided at end range, as indicated for recovery of effective participation in functional tasks, and to bolster proper circulation.  Sustained stretch provided for shoulder flexion and elbow extension at end range.   Assistance and facilitation provided for inferior angle of the scapula during shoulder flexion and abduction.     -RUE joint approximation provided to normalize tone.   o Focus on scapular mobilization this date    Treatment & Education:   Therapist provided facilitation and instruction of proper body mechanics, energy conservation, and fall prevention strategies during tasks listed above.   Pt re-educated on importance of OOB activities with staff member assistance and sitting OOB majority of the day.    Updated communication board with level of assist required (Mod A stand pivot) & educated RN/patient that pt is appropriate for transfers and mobility with RN/PCT.       Patient left HOB elevated with all lines intact, call button in reach, bed alarm on, RN notified and RN present    GOALS:   Multidisciplinary Problems     Occupational Therapy Goals        Problem: Occupational Therapy Goal    Goal Priority Disciplines Outcome Interventions   Occupational Therapy Goal     OT, PT/OT Ongoing, Progressing    Description: Goals to be met by: 7 days (12/27/21)     Patient will  increase functional independence with ADLs by performing:    UE Dressing with Minimal Assistance.  LE Dressing with Minimal Assistance.  Grooming while standing at sink with Minimal Assistance.  Toileting from toilet with Minimal Assistance for hygiene and clothing management.   Supine to sit with Contact Guard Assistance.  Toilet transfer to toilet with Minimal Assistance.  Increased functional strength to WFL for ADLs.                     Time Tracking:     OT Date of Treatment: 12/21/21  OT Start Time: 1427  OT Stop Time: 1453  OT Total Time (min): 26 min    Additional staff present: N/A    Billable Minutes:  Self Care/Home Management 12  Therapeutic Exercise 14      Sayra (Te), OTR/L  [phone] (Pager #)  12/21/2021

## 2021-12-21 NOTE — SUBJECTIVE & OBJECTIVE
Neurologic complaint: RSW     Interval history:   used during patient interaction. Continues with RUE weakness. phasic, when trying to interpret images on stroke booklet, patient signing numbers instead of (hammock, cactus). Echo with EF 65%.     Past Medical History: hypertension, diabetes, hyperlipidemia and asthma, deafness  Past Surgical History: no major surgeries within the last 2 weeks    Social History: no smoking, no drinking, no drugs    Allergies: No Known Allergies     Review of Systems   Constitutional: Negative for fever.   HENT: Positive for hearing loss (deaf at baseline).              Eyes: Negative for visual disturbance.   Respiratory: Negative for cough and shortness of breath.    Cardiovascular: Negative for chest pain.   Gastrointestinal: Negative for nausea and vomiting.   Skin: Negative for rash.   Neurological: Positive for facial asymmetry and weakness. Negative for dizziness and numbness.     Objective:   .vitals  Vitals:    12/21/21 0700   BP: (!) 143/67   Pulse: 99   Resp: (!) 27   Temp: 98.7 °F (37.1 °C)         Physical Exam  Vitals and nursing note reviewed.   Constitutional:       General: She is not in acute distress.  HENT:      Head: Normocephalic and atraumatic.      Nose: Nose normal.   Eyes:      Extraocular Movements: Extraocular movements intact.      Conjunctiva/sclera: Conjunctivae normal.   Cardiovascular:      Rate and Rhythm: Normal rate.   Pulmonary:      Effort: Pulmonary effort is normal. No respiratory distress.   Abdominal:      General: There is no distension.   Musculoskeletal:      Cervical back: Normal range of motion.      Right lower leg: No edema.      Left lower leg: No edema.   Skin:     General: Skin is warm and dry.      Findings: No rash.   Neurological:      Mental Status: She is alert.      Motor: Weakness (RUE) present.       Neurological Exam  LOC: alert  Attention Span: good  Language: expressive aphasia (when using ASL patient will sign  numbers instead of name objects)  Articulation: limited due to baseline deafness  Orientation: using , oriented to person and place  Visual Fields: full  EOM (CN III, IV, VI): intact  Facial Movement (CN VII): R facial droop  Motor: Arm left  5/5  Leg left  5/5  Arm right 3/5  Leg right 4/5  Sensation: intact to light touch throughout  Tone: normal       Recent Labs   Lab 12/21/21  0042   WBC 7.00   RBC 3.25*   HGB 7.0*   HCT 24.5*      MCV 75*   MCH 21.5*   MCHC 28.6*     Hemoglobin A1C   Date Value Ref Range Status   12/19/2021 6.1 (H) 4.0 - 5.6 % Final     Comment:     ADA Screening Guidelines:  5.7-6.4%  Consistent with prediabetes  >or=6.5%  Consistent with diabetes    High levels of fetal hemoglobin interfere with the HbA1C  assay. Heterozygous hemoglobin variants (HbS, HgC, etc)do  not significantly interfere with this assay.   However, presence of multiple variants may affect accuracy.       No results for input(s): PT, INR, APTT in the last 24 hours.        Diagnostics  MRI Brain Perfusion/MRI ischemic protocol. Date: 12/19/21  1. Acute to subacute infarctions in the left MCA distribution as above.  No evidence of hemorrhagic transformation.  2. MR perfusion findings consistent with increased mean transit time t in he left MCA territory with normal cerebral blood volume, suggestive of ischemic penumbra.     CTA Head and Neck. Date: 12/19/21  1. Complete occlusion of the left M1 segment.  2. Frothy secretions within the trachea and fluid distention of the esophageal lumen.  Findings raise concern for aspiration.  3. Hypodensity measuring 1.3 cm in the left thyroid lobe which can be further evaluated with nonemergent thyroid ultrasound     CT Head. Date: 12/19/21  Hypoattenuation and loss of gray-white differentiation within the left insular cortex, frontal lobe, and basal ganglia compatible with an MCA territory acute infarction      Cardiac Imaging  TTE 12/20/21  · The left ventricle is  normal in size with normal systolic function. The estimated ejection fraction is 65%.  · Normal right ventricular size with normal right ventricular systolic function.  · Normal left ventricular diastolic function.  · The estimated PA systolic pressure is 29 mmHg.  · Normal central venous pressure (3 mmHg).

## 2021-12-21 NOTE — PROGRESS NOTES
"[facility] - Neuro Critical Care  Neurocritical Care  Progress Note    Admit Date: 12/19/2021  Service Date: 12/21/2021  Length of Stay: 2    Subjective:     Chief Complaint: Embolic stroke involving left middle cerebral artery    History of Present Illness: 55 yo F with PMH asthma, migraines, DM, HTN, deafness presents with L MCA CVA.  Pt presented to OSH today with RUE weakness and R facial droop.  Daughter reports that sx first noted on Friday afternoon 12/17 with facial asymmetry. Starting yesterday, pt was having difficulty walking/was off balance. She presented to [facility] at 12 pm, > 24 hours after sx onset.    Patient history is limited due to deafness/ASL language barrier as well as expressive aphasia. An [facility] video interpretor was used to assist in ROS/physical exam.  The patient herself reports an onset of right wrist pain and weakness at 7 AM today when she woke up this morning. She states she "had to move my right hand with my other hand, it's not working". She also reports left facial swelling and pain onset this morning with slight fever, calling it a migraine, that has since improved to zero pain. She states she was otherwise in her normal state of health prior to this morning.      At OSH, CTH with left insular cortex, frontal lobe, and basal ganglia acute infarct. She also had a CTA that showed Impression:  1. Complete occlusion of the left M1 segment.  2. Frothy secretions within the trachea and fluid distention of the esophageal lumen.  Findings raise concern for aspiration.  3. Hypodensity measuring 1.3 cm in the left thyroid lobe which can be further evaluated with nonemergent thyroid ultrasound     Patient out of window for tPA. Patient transferred to [facility] for possible thrombectomy. On arrival, patient taken to MRI STAT for MRI acute ischemic protocol + MR perfusion scan showing acute and subacute L MCA distribution infarct with ischemic penumbra. MRI reviewed with neuro IR, thrombectomy " deferred due to high risk of reperfusion injury given symptoms beginning about 48 hour ago per daughter. If patient's neuro exam declines, low threshold for IR intervention. She will be started on DAPT and admitted to NCC for monitoring and management of L MCA CVA without interventions.      Information obtained from chart, pt, and family bedside with help of [facility] video interpretor.      Hospital Course: 12/20/2021 NAEO, neuro exam remains stable. SLP recommending modified barium, ordered for tomorrow. Stable to step down to floor  12/21/2021 NAEO, barium swallow this AM, passed for regular diet. Boarding for vascular neurology       Past Medical History:   Diagnosis Date    Anemia     Arthritis     Asthma     Deaf     Diabetes mellitus     Hyperlipidemia     Hypertension      Past Surgical History:   Procedure Laterality Date    COLONOSCOPY N/A 12/4/2020    Procedure: COLONOSCOPY;  Surgeon: Felicia Bishop MD;  Location: [facility];  Service: Endoscopy;  Laterality: N/A;    ESOPHAGOGASTRODUODENOSCOPY N/A 2/28/2019    Procedure: EGD (ESOPHAGOGASTRODUODENOSCOPY);  Surgeon: Yolanda Gary MD;  Location: [facility];  Service: Endoscopy;  Laterality: N/A;    ESOPHAGOGASTRODUODENOSCOPY N/A 12/4/2020    Procedure: EGD (ESOPHAGOGASTRODUODENOSCOPY);  Surgeon: Felicia Bishop MD;  Location: [facility];  Service: Endoscopy;  Laterality: N/A;    ESOPHAGOGASTRODUODENOSCOPY N/A 7/1/2021    Procedure: EGD (ESOPHAGOGASTRODUODENOSCOPY)-in March 2021;  Surgeon: Sharath Kohler MD;  Location: [facility];  Service: Endoscopy;  Laterality: N/A;  hearing impaired, will need  - Maria Dolores Harris assigned-MS  fully vaccinated-see immunization record    HYSTERECTOMY        No current facility-administered medications on file prior to encounter.     Current Outpatient Medications on File Prior to Encounter   Medication Sig Dispense Refill    albuterol sulfate 2.5 mg/0.5 mL Nebu Take 2.5 mg by  nebulization every 4 (four) hours as needed. Rescue 1 each 0    albuterol-ipratropium (DUO-NEB) 2.5 mg-0.5 mg/3 mL nebulizer solution USE 1 AMPULE IN NEBULIZER EVERY 6 HOURS WHILE AWAKE      albuterol-ipratropium (DUO-NEB) 2.5 mg-0.5 mg/3 mL nebulizer solution Inhale 3 mLs into the lungs.      ergocalciferol (ERGOCALCIFEROL) 50,000 unit Cap Take 50,000 Units by mouth.      ferrous sulfate 325 (65 FE) MG EC tablet Take 325 mg by mouth 2 (two) times daily.      hydrALAZINE (APRESOLINE) 25 MG tablet Take 1 tablet (25 mg total) by mouth every 8 (eight) hours. 90 tablet 0    ipratropium (ATROVENT) 0.02 % nebulizer solution Take 2.5 mLs (500 mcg total) by nebulization 4 (four) times daily as needed for Wheezing. Rescue 1 Box 0    pantoprazole (PROTONIX) 40 MG tablet Take 1 tablet (40 mg total) by mouth 2 (two) times daily. 60 tablet 3    predniSONE (DELTASONE) 50 MG Tab Take 50 mg by mouth once daily.      sucralfate (CARAFATE) 1 gram tablet Take 1 tablet (1 g total) by mouth 4 (four) times daily before meals and nightly. 120 tablet 1    traMADoL (ULTRAM) 50 mg tablet TAKE 1 TABLET BY MOUTH ONCE DAILY AS NEEDED FOR PAIN        Allergies: Patient has no known allergies.    Family History   Problem Relation Age of Onset    Diabetes Mother     Hypertension Mother     Colon cancer Neg Hx     Esophageal cancer Neg Hx      Social History     Tobacco Use    Smoking status: Never Smoker    Smokeless tobacco: Never Used   Substance Use Topics    Alcohol use: No    Drug use: No     Review of Systems   Unable to perform ROS: Other   Constitutional: Negative for fever.   HENT: Negative for trouble swallowing.    Eyes: Negative for visual disturbance.   Respiratory: Negative for shortness of breath.    Cardiovascular: Negative for chest pain and palpitations.   Gastrointestinal: Negative for abdominal pain, nausea and vomiting.   Genitourinary: Negative for difficulty urinating.   Musculoskeletal: Negative for  arthralgias and myalgias.   Skin: Negative for rash.   Neurological: Positive for speech difficulty (ASL production difficulty), weakness and numbness. Negative for dizziness, seizures and headaches.   Psychiatric/Behavioral: Negative for agitation.   ASL language barrier and patient expressive aphasia  Objective:     Vitals:    Temp: 98.4 °F (36.9 °C)  Pulse: 97  Rhythm: normal sinus rhythm  BP: 113/63  MAP (mmHg): 82  Resp: (!) 25  SpO2: 99 %  O2 Device (Oxygen Therapy): room air    Temp  Min: 98.4 °F (36.9 °C)  Max: 99.8 °F (37.7 °C)  Pulse  Min: 83  Max: 107  BP  Min: 111/57  Max: 196/95  MAP (mmHg)  Min: 81  Max: 137  Resp  Min: 17  Max: 27  SpO2  Min: 96 %  Max: 99 %    12/20 0701 - 12/21 0700  In: 977.6 [I.V.:977.6]  Out: 701 [Urine:700]   Unmeasured Output  Urine Occurrence: 1  Stool Occurrence: 1  Pad Count: 2       Physical Exam  Constitutional:       General: She is not in acute distress.     Appearance: Normal appearance. She is normal weight.   HENT:      Head: Normocephalic and atraumatic.      Right Ear: External ear normal.      Left Ear: External ear normal.      Nose: Nose normal. No rhinorrhea.      Mouth/Throat:      Mouth: Mucous membranes are moist.      Pharynx: Oropharynx is clear.   Eyes:      General:         Right eye: No discharge.         Left eye: No discharge.      Conjunctiva/sclera: Conjunctivae normal.   Cardiovascular:      Rate and Rhythm: Regular rhythm. Tachycardia present.      Pulses: Normal pulses.   Pulmonary:      Effort: Pulmonary effort is normal. No respiratory distress.      Breath sounds: No stridor. No wheezing.   Abdominal:      General: There is no distension.      Palpations: Abdomen is soft. There is no mass.      Tenderness: There is no abdominal tenderness. There is no guarding.   Musculoskeletal:         General: No swelling, deformity or signs of injury.      Cervical back: Neck supple. No rigidity.   Skin:     General: Skin is warm and dry.      Findings: No  rash.      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      Comments: GCS [id number]  AAO to self and place  Follows commands  Mild to moderate expressive >receptive aphasia using ASL  PERRL  EOMI  Minor nasolabial flattening  MARMOLEJO spont AG with RUE weakness and R wrist drop  Sensation intact to light touch except for RUE diminished sensation  NIH4   Psychiatric:         Mood and Affect: Mood normal.       Unable to test language, memory, judgment, insight, fund of knowledge, gait due to level of consciousness.    Today I personally reviewed pertinent medications, lines/drains/airways, imaging, cardiology results, laboratory results, microbiology results, notably:    CTH, CTA, MRI    Assessment/Plan:     Neuro  * Embolic stroke involving left middle cerebral artery  Pt with L M1 complete occlusion at low threshold for thrombectomy intervention if neuro change, no tPA out of window    -VN following  - OSH CTH with left insular cortex, frontal lobe, and basal ganglia acute infarct.   - CTA: Complete occlusion of the left M1 segment. + MR perfusion scan showing acute and subacute L MCA distribution infarct with ischemic penumbra. MRI reviewed with neuro IR, thrombectomy deferred due to high risk of reperfusion injury  -SBP < 220  -DAPT   -daily statin  -Q1h neuro and VS checks  -PT/OT/SLP as appropriate      Cytotoxic cerebral edema  2/2 stroke  See stroke    ENT  Deaf  Use of ASL video interpretor required    Pulmonary  Severe persistent asthma with acute exacerbation  CXR  Duo nebs  satting 99% on RA     Cardiac/Vascular  Mixed hyperlipidemia  Lipid panel elev  statin    Essential hypertension  SBP <220  -permissive hypertension   -Labetalol prn    Endocrine  Diabetes mellitus  SSI + accu checks  A1C 6.1          The patient is being Prophylaxed for:  Venous Thromboembolism with: Chemical  Stress Ulcer with: Not Applicable   Ventilator Pneumonia with: not applicable    Activity  Orders          Diet Adult Regular (IDDSI Level 7): Regular starting at 12/21 1058    Turn patient starting at 12/19 2000    Elevate HOB starting at 12/19 1124        Full Code    Shyanne Laguerre PA-C  Neurocritical Care  [facility] - Neuro Critical Care

## 2021-12-21 NOTE — SUBJECTIVE & OBJECTIVE
Past Medical History:   Diagnosis Date    Anemia     Arthritis     Asthma     Deaf     Diabetes mellitus     Hyperlipidemia     Hypertension      Past Surgical History:   Procedure Laterality Date    COLONOSCOPY N/A 12/4/2020    Procedure: COLONOSCOPY;  Surgeon: Felicia Bishop MD;  Location: [facility] ENDO;  Service: Endoscopy;  Laterality: N/A;    ESOPHAGOGASTRODUODENOSCOPY N/A 2/28/2019    Procedure: EGD (ESOPHAGOGASTRODUODENOSCOPY);  Surgeon: Yolanda Gary MD;  Location: [facility] ENDO;  Service: Endoscopy;  Laterality: N/A;    ESOPHAGOGASTRODUODENOSCOPY N/A 12/4/2020    Procedure: EGD (ESOPHAGOGASTRODUODENOSCOPY);  Surgeon: Felicia Bishop MD;  Location: [facility] ENDO;  Service: Endoscopy;  Laterality: N/A;    ESOPHAGOGASTRODUODENOSCOPY N/A 7/1/2021    Procedure: EGD (ESOPHAGOGASTRODUODENOSCOPY)-in March 2021;  Surgeon: Sharath Kohler MD;  Location: [facility] ENDO;  Service: Endoscopy;  Laterality: N/A;  hearing impaired, will need  - Maria Dolores Harris assigned-MS  fully vaccinated-see immunization record    HYSTERECTOMY        No current facility-administered medications on file prior to encounter.     Current Outpatient Medications on File Prior to Encounter   Medication Sig Dispense Refill    albuterol sulfate 2.5 mg/0.5 mL Nebu Take 2.5 mg by nebulization every 4 (four) hours as needed. Rescue 1 each 0    albuterol-ipratropium (DUO-NEB) 2.5 mg-0.5 mg/3 mL nebulizer solution USE 1 AMPULE IN NEBULIZER EVERY 6 HOURS WHILE AWAKE      albuterol-ipratropium (DUO-NEB) 2.5 mg-0.5 mg/3 mL nebulizer solution Inhale 3 mLs into the lungs.      ergocalciferol (ERGOCALCIFEROL) 50,000 unit Cap Take 50,000 Units by mouth.      ferrous sulfate 325 (65 FE) MG EC tablet Take 325 mg by mouth 2 (two) times daily.      hydrALAZINE (APRESOLINE) 25 MG tablet Take 1 tablet (25 mg total) by mouth every 8 (eight) hours. 90 tablet 0    ipratropium (ATROVENT) 0.02 % nebulizer solution Take 2.5 mLs  (500 mcg total) by nebulization 4 (four) times daily as needed for Wheezing. Rescue 1 Box 0    pantoprazole (PROTONIX) 40 MG tablet Take 1 tablet (40 mg total) by mouth 2 (two) times daily. 60 tablet 3    predniSONE (DELTASONE) 50 MG Tab Take 50 mg by mouth once daily.      sucralfate (CARAFATE) 1 gram tablet Take 1 tablet (1 g total) by mouth 4 (four) times daily before meals and nightly. 120 tablet 1    traMADoL (ULTRAM) 50 mg tablet TAKE 1 TABLET BY MOUTH ONCE DAILY AS NEEDED FOR PAIN        Allergies: Patient has no known allergies.    Family History   Problem Relation Age of Onset    Diabetes Mother     Hypertension Mother     Colon cancer Neg Hx     Esophageal cancer Neg Hx      Social History     Tobacco Use    Smoking status: Never Smoker    Smokeless tobacco: Never Used   Substance Use Topics    Alcohol use: No    Drug use: No     Review of Systems   Unable to perform ROS: Other   Constitutional: Negative for fever.   HENT: Negative for trouble swallowing.    Eyes: Negative for visual disturbance.   Respiratory: Negative for shortness of breath.    Cardiovascular: Negative for chest pain and palpitations.   Gastrointestinal: Negative for abdominal pain, nausea and vomiting.   Genitourinary: Negative for difficulty urinating.   Musculoskeletal: Negative for arthralgias and myalgias.   Skin: Negative for rash.   Neurological: Positive for speech difficulty (ASL production difficulty), weakness and numbness. Negative for dizziness, seizures and headaches.   Psychiatric/Behavioral: Negative for agitation.   ASL language barrier and patient expressive aphasia  Objective:     Vitals:    Temp: 98.4 °F (36.9 °C)  Pulse: 97  Rhythm: normal sinus rhythm  BP: 113/63  MAP (mmHg): 82  Resp: (!) 25  SpO2: 99 %  O2 Device (Oxygen Therapy): room air    Temp  Min: 98.4 °F (36.9 °C)  Max: 99.8 °F (37.7 °C)  Pulse  Min: 83  Max: 107  BP  Min: 111/57  Max: 196/95  MAP (mmHg)  Min: 81  Max: 137  Resp  Min: 17  Max:  27  SpO2  Min: 96 %  Max: 99 %    12/20 0701 - 12/21 0700  In: 977.6 [I.V.:977.6]  Out: 701 [Urine:700]   Unmeasured Output  Urine Occurrence: 1  Stool Occurrence: 1  Pad Count: 2       Physical Exam  Constitutional:       General: She is not in acute distress.     Appearance: Normal appearance. She is normal weight.   HENT:      Head: Normocephalic and atraumatic.      Right Ear: External ear normal.      Left Ear: External ear normal.      Nose: Nose normal. No rhinorrhea.      Mouth/Throat:      Mouth: Mucous membranes are moist.      Pharynx: Oropharynx is clear.   Eyes:      General:         Right eye: No discharge.         Left eye: No discharge.      Conjunctiva/sclera: Conjunctivae normal.   Cardiovascular:      Rate and Rhythm: Regular rhythm. Tachycardia present.      Pulses: Normal pulses.   Pulmonary:      Effort: Pulmonary effort is normal. No respiratory distress.      Breath sounds: No stridor. No wheezing.   Abdominal:      General: There is no distension.      Palpations: Abdomen is soft. There is no mass.      Tenderness: There is no abdominal tenderness. There is no guarding.   Musculoskeletal:         General: No swelling, deformity or signs of injury.      Cervical back: Neck supple. No rigidity.   Skin:     General: Skin is warm and dry.      Findings: No rash.      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      Comments: GCS E4V5M6  AAO to self and place  Follows commands  Mild to moderate expressive >receptive aphasia using ASL  PERRL  EOMI  Minor nasolabial flattening  MARMOLEJO spont AG with RUE weakness and R wrist drop  Sensation intact to light touch except for RUE diminished sensation  NIH4   Psychiatric:         Mood and Affect: Mood normal.       Unable to test language, memory, judgment, insight, fund of knowledge, gait due to level of consciousness.    Today I personally reviewed pertinent medications, lines/drains/airways, imaging,  cardiology results, laboratory results, microbiology results, notably:    CTH, CTA, MRI

## 2021-12-21 NOTE — ASSESSMENT & PLAN NOTE
Patient is a 56 y.o. female  with PMHx of HTN, HLD, asthma, and hearing loss (deaf, communicates with American Sign Language) who presented to OSH with RSW and communication difficulty. Per family, patient began having symptoms on 12/17 with a facial droop. CT Head with L frontal infarct. CTA with L M1 occlusion. Patient out of window for tPA. Patient transferred to [facility] for possible thrombectomy. On arrival, patient taken to MRI STAT for MRI acute ischemic protocol + MR perfusion scan. MRI reviewed with neuro IR, thrombectomy deferred due to high risk of reperfusion injury given symptoms beginning ~ 48 hours ago. However, recommendation was made for admission to neuro ICU for close monitoring with low threshold for IR intervention if neuro exam declined. Echo with EF 65%.  -Neuro exam stable.     Antithrombotics for secondary stroke prevention: Antiplatelets: Aspirin: 81 mg daily  Clopidogrel: 75 mg daily    Statins for secondary stroke prevention and hyperlipidemia, if present:   Statins: Atorvastatin- 40 mg daily    Aggressive risk factor modification: HTN     Rehab efforts: The patient has been evaluated by a stroke team provider and the therapy needs have been fully considered based off the presenting complaints and exam findings. The following therapy evaluations are needed: PT evaluate and treat, OT evaluate and treat, SLP evaluate and treat, PM&R evaluate for appropriate placement therapy recs IPR    Diagnostics ordered/pending: None     VTE prophylaxis: Heparin 5000 units SQ every 8 hours  Mechanical prophylaxis: Place SCDs    BP parameters: Infarct: No intervention, SBP <220

## 2021-12-21 NOTE — PLAN OF CARE
[facility] Care Plan    POC reviewed with Aurora Ornelas at 0300. Pt verbalized understanding. Questions and concerns addressed. No acute events overnight. Pt progressing toward goals. Will continue to monitor. See below and flowsheets for full assessment and VS info.       Neuro:  Mont[place] Coma Scale  Best Eye Response: 4-->(E4) spontaneous  Best Motor Response: 6-->(M6) obeys commands  Best Verbal Response: 5-->(V5) oriented  Montgomery Coma Scale Score: 15  Assessment Qualifiers: patient not sedated/intubated        24hr Temp:  [98.2 °F (36.8 °C)-99.8 °F (37.7 °C)]     CV:   Rhythm: normal sinus rhythm  BP goals:   SBP < 220  MAP > 65    Resp:   O2 Device (Oxygen Therapy): room air       Plan:  Continuing to monitor    GI/:     Diet/Nutrition Received: NPO  Last Bowel Movement: 12/20/21  Voiding Characteristics: external catheter    Intake/Output Summary (Last 24 hours) at 12/21/2021 0554  Last data filed at 12/20/2021 2132  Gross per 24 hour   Intake 977.64 ml   Output 1251 ml   Net -273.36 ml     Unmeasured Output  Urine Occurrence: 1  Stool Occurrence: 1  Pad Count: 1    Labs/Accuchecks:  Recent Labs   Lab 12/21/21  0042   WBC 7.00   RBC 3.25*   HGB 7.0*   HCT 24.5*         Recent Labs   Lab 12/21/21  0042   *   K 3.6   CO2 27      BUN 7   CREATININE 0.6   ALKPHOS 53*   ALT 8*   AST 16   BILITOT 0.3      Recent Labs   Lab 12/19/21  1304   INR 1.0    No results for input(s): CPK, CPKMB, TROPONINI, MB in the last 168 hours.    Electrolytes: N/A - electrolytes WDL  Accuchecks: Q6H    Gtts:       LDA/Wounds:  Lines/Drains/Airways       Peripheral Intravenous Line                   Peripheral IV - Single Lumen 12/19/21 1308 20 G Left Antecubital 1 day         Peripheral IV - Single Lumen 12/19/21 1308 20 G Right Antecubital 1 day                  Wounds: No  Wound care consulted: No

## 2021-12-21 NOTE — PLAN OF CARE
Problem: SLP Goal  Goal: SLP Goal  Description: Speech Language Pathology Goals  Goals expected to be met by 12/27/21    1. Pt will participate in ongoing assessment of swallow function to determine safest, least restrictive means of nutrition/hydration  2. Educate Pt and family on aspiration precautions and SLP POC  3. Pt will answer personal yes/no questions with 80% accuracy or higher, MOD A  4. Pt will follow simple commands with 80% accuracy or higher, MOD A  5. Pt will orient x3+ via any modality 90% of the time, MOD A  6. Pt will participate in ongoing assessment of cognition and visiospatial skills to determine ongoing POC needs  7. Educate Pt and family on safety awareness and compensatory strategies for communication    12/21/2021 1530 by Jojo Burger CCC-SLP  Outcome: Ongoing, Progressing     MBSS completed. Recommend a regular diet with thin liquids and standard aspiration precautions including HOB elevated during PO intake. SLP to continue to follow to target language deficits.

## 2021-12-21 NOTE — PROCEDURES
Modified Barium Swallow    Patient Name:  Aurora Ornelas   MRN:  [id number]     utilized throughout session: Rajesh- #[id number]    Recommendations:     Recommendations:                General Recommendations:  Speech/language therapy  Diet recommendations:  Regular, Thin   Aspiration Precautions: HOB to 90 degrees   General Precautions: Standard, fall,deaf  Communication strategies:  go to room if call light pushed and utilize  as pt uses ASL as primary language     Referral     Reason for Referral  Patient was referred for a Modified Barium Swallow Study to assess the efficiency of his/her swallow function, rule out aspiration and make recommendations regarding safe dietary consistencies, effective compensatory strategies, and safe eating environment.     Diagnosis: Embolic stroke involving left middle cerebral artery     History:     Past Medical History:   Diagnosis Date    Anemia     Arthritis     Asthma     Deaf     Diabetes mellitus     Hyperlipidemia     Hypertension        Objective:     Current Respiratory Status: 12/21/21    Alert: yes    Cooperative: yes    Follows Directions: yes    Visualization  · Patient was seen in the lateral view    Oral Peripheral Examination  · Oral Musculature: right weakness  · Dentition: present and adequate  · Secretion Management: adequate  · Mucosal Quality: adequate  · Mandibular Strength and Mobility: impaired  · Oral Labial Strength and Mobility: impaired retraction  · Lingual Strength and Mobility: impaired strength,impaired protrusion,other (see comments) (decreased command following impeded attempts to elicit lateral movement)  · Volitional Cough: unable to elicit on command, Pt with spontaneous  weak unproductive cough  · Volitional Swallow: elicited, delayed  · Voice Prior to PO Intake: clear vocla quality upon minimal phonaiton; pt utilizes ASL as primary means of communication    Consistencies Assessed  Thin liquids: self-fed by the single and  consecutive open cup and straw sips  Pudding: self-fed by the tsp of pudding  · Solid: self-fed adelita cracker    Oral Preparation/Oral Phase  Oral phase of swallow was characterized by functional lip closure, tongue control during bolus hold, bolus preparation/mastication, and bolus transport/lingual motion across all consistencies trialed.       Pharyngeal Phase   Pharyngeal phase of swallow was characterized by functional soft palate elevation, base of tongue retraction, epiglottic inversion, laryngeal elevation, anterior hyoid excursion, laryngeal vestibular closure, and pharyngeal stripping wave across all consistencies. No penetration or aspiration observed during this study. Delayed triggering of the swallow to the pyriform sinuses with thin liquids and solids thought to be normal variations of swallow function and did have negative impacts upon PO intake.      Cervical Esophageal Phase  Esophageal phase unable to be fully viewed 2/2 limitations of fluoroscopy equipment; however, adequate bolus acceptance into UES without stasis or retrograde movement observed at the level of the cervical vertebrae.    Assessment:     Impressions  Pt exhibited functional oral and pharyngeal phases of the swallow for support of a regular diet with thin liquids at this time. Swallow safety and efficacy is preserved.    Prognosis: Good    Barriers:  · None    Plan  Continue speech services for language deficits    Education  Results were discussed with patient who demonstrated understanding via ASL.    Goals:   Multidisciplinary Problems     SLP Goals        Problem: SLP Goal    Goal Priority Disciplines Outcome   SLP Goal     SLP Ongoing, Progressing   Description: Speech Language Pathology Goals  Goals expected to be met by 12/27/21    1. Pt will participate in ongoing assessment of swallow function to determine safest, least restrictive means of nutrition/hydration  2. Educate Pt and family on aspiration precautions and SLP  POC  3. Pt will answer personal yes/no questions with 80% accuracy or higher, MOD A  4. Pt will follow simple commands with 80% accuracy or higher, MOD A  5. Pt will orient x3+ via any modality 90% of the time, MOD A  6. Pt will participate in ongoing assessment of cognition and visiospatial skills to determine ongoing POC needs  7. Educate Pt and family on safety awareness and compensatory strategies for communication                     Plan:   · Patient to be seen:  Therapy Frequency: 4 x/week   · Plan of Care expires:  01/18/22  · Plan of Care reviewed with:  patient        Discharge recommendations:  rehabilitation facility   Barriers to Discharge:  Level of Skilled Assistance Needed     Time Tracking:   SLP Treatment Date:   12/21/21  Speech Start Time:  1006  Speech Stop Time:  1023     Speech Total Time (min):  17 min      12/21/2021

## 2021-12-22 LAB — POCT GLUCOSE: 123 MG/DL (ref 70–110)

## 2021-12-22 PROCEDURE — 97530 THERAPEUTIC ACTIVITIES: CPT

## 2021-12-22 PROCEDURE — 97535 SELF CARE MNGMENT TRAINING: CPT

## 2021-12-22 PROCEDURE — 99222 PR INITIAL HOSPITAL CARE,LEVL II: ICD-10-PCS | Mod: ,,, | Performed by: NURSE PRACTITIONER

## 2021-12-22 PROCEDURE — 99222 1ST HOSP IP/OBS MODERATE 55: CPT | Mod: ,,, | Performed by: NURSE PRACTITIONER

## 2021-12-22 PROCEDURE — 99900035 HC TECH TIME PER 15 MIN (STAT)

## 2021-12-22 PROCEDURE — 63600175 PHARM REV CODE 636 W HCPCS: Performed by: PHYSICIAN ASSISTANT

## 2021-12-22 PROCEDURE — 25000003 PHARM REV CODE 250: Performed by: EMERGENCY MEDICINE

## 2021-12-22 PROCEDURE — 25000003 PHARM REV CODE 250: Performed by: PHYSICIAN ASSISTANT

## 2021-12-22 PROCEDURE — 94761 N-INVAS EAR/PLS OXIMETRY MLT: CPT

## 2021-12-22 PROCEDURE — 97116 GAIT TRAINING THERAPY: CPT

## 2021-12-22 PROCEDURE — 11000001 HC ACUTE MED/SURG PRIVATE ROOM

## 2021-12-22 RX ORDER — SODIUM CHLORIDE 9 MG/ML
INJECTION, SOLUTION INTRAVENOUS CONTINUOUS
Status: CANCELLED | OUTPATIENT
Start: 2021-12-22

## 2021-12-22 RX ADMIN — HEPARIN SODIUM 5000 UNITS: 5000 INJECTION INTRAVENOUS; SUBCUTANEOUS at 03:12

## 2021-12-22 RX ADMIN — FERROUS SULFATE TAB 325 MG (65 MG ELEMENTAL FE) 1 EACH: 325 (65 FE) TAB at 09:12

## 2021-12-22 RX ADMIN — ASPIRIN 81 MG CHEWABLE TABLET 81 MG: 81 TABLET CHEWABLE at 10:12

## 2021-12-22 RX ADMIN — HYDRALAZINE HYDROCHLORIDE 25 MG: 25 TABLET ORAL at 03:12

## 2021-12-22 RX ADMIN — HEPARIN SODIUM 5000 UNITS: 5000 INJECTION INTRAVENOUS; SUBCUTANEOUS at 05:12

## 2021-12-22 RX ADMIN — HYDRALAZINE HYDROCHLORIDE 25 MG: 25 TABLET ORAL at 05:12

## 2021-12-22 RX ADMIN — SENNOSIDES AND DOCUSATE SODIUM 1 TABLET: 50; 8.6 TABLET ORAL at 09:12

## 2021-12-22 RX ADMIN — CLOPIDOGREL 75 MG: 75 TABLET, FILM COATED ORAL at 10:12

## 2021-12-22 RX ADMIN — HYDROCODONE BITARTRATE AND ACETAMINOPHEN 1 TABLET: 5; 325 TABLET ORAL at 09:12

## 2021-12-22 RX ADMIN — ACETAMINOPHEN 650 MG: 325 TABLET ORAL at 03:12

## 2021-12-22 RX ADMIN — ATORVASTATIN CALCIUM 40 MG: 20 TABLET, FILM COATED ORAL at 10:12

## 2021-12-22 RX ADMIN — HYDRALAZINE HYDROCHLORIDE 25 MG: 25 TABLET ORAL at 09:12

## 2021-12-22 RX ADMIN — HEPARIN SODIUM 5000 UNITS: 5000 INJECTION INTRAVENOUS; SUBCUTANEOUS at 09:12

## 2021-12-22 RX ADMIN — SENNOSIDES AND DOCUSATE SODIUM 1 TABLET: 50; 8.6 TABLET ORAL at 10:12

## 2021-12-22 RX ADMIN — FERROUS SULFATE TAB 325 MG (65 MG ELEMENTAL FE) 1 EACH: 325 (65 FE) TAB at 10:12

## 2021-12-22 NOTE — CONSULTS
"[facility] ([facility])  Neurocritical Care  Consult Note    Admit Date: 12/19/2021  Service Date: 12/22/2021  Length of Stay: 3    Consults  Subjective:     Chief Complaint: Embolic stroke involving left middle cerebral artery    History of Present Illness: 57 yo F with PMH asthma, migraines, DM, HTN, deafness presents with L MCA CVA.  Pt presented to OSH today with RUE weakness and R facial droop.  Daughter reports that sx first noted on Friday afternoon 12/17 with facial asymmetry. Starting yesterday, pt was having difficulty walking/was off balance. She presented to [facility] at 12 pm, > 24 hours after sx onset.    Patient history is limited due to deafness/ASL language barrier as well as expressive aphasia. An [employer] video interpretor was used to assist in ROS/physical exam.  The patient herself reports an onset of right wrist pain and weakness at 7 AM today when she woke up this morning. She states she "had to move my right hand with my other hand, it's not working". She also reports left facial swelling and pain onset this morning with slight fever, calling it a migraine, that has since improved to zero pain. She states she was otherwise in her normal state of health prior to this morning.      At OSH, CTH with left insular cortex, frontal lobe, and basal ganglia acute infarct. She also had a CTA that showed Impression:  1. Complete occlusion of the left M1 segment.  2. Frothy secretions within the trachea and fluid distention of the esophageal lumen.  Findings raise concern for aspiration.  3. Hypodensity measuring 1.3 cm in the left thyroid lobe which can be further evaluated with nonemergent thyroid ultrasound     Patient out of window for tPA. Patient transferred to [facility] for possible thrombectomy. On arrival, patient taken to MRI STAT for MRI acute ischemic protocol + MR perfusion scan showing acute and subacute L MCA distribution infarct with ischemic penumbra. MRI reviewed with neuro IR, " thrombectomy deferred due to high risk of reperfusion injury given symptoms beginning about 48 hour ago per daughter. If patient's neuro exam declines, low threshold for IR intervention. She will be started on DAPT and admitted to [facility] for monitoring and management of L MCA CVA without interventions.      Information obtained from chart, pt, and family bedside with help of [facility] video interpretor.    12/22/2021: N[facility] ws consulted today for concern of cerebral edema w/ MLS      Past Medical History:   Diagnosis Date    Anemia     Arthritis     Asthma     Deaf     Diabetes mellitus     Hyperlipidemia     Hypertension      Past Surgical History:   Procedure Laterality Date    COLONOSCOPY N/A 12/4/2020    Procedure: COLONOSCOPY;  Surgeon: Felicia Bishop MD;  Location: [facility];  Service: Endoscopy;  Laterality: N/A;    ESOPHAGOGASTRODUODENOSCOPY N/A 2/28/2019    Procedure: EGD (ESOPHAGOGASTRODUODENOSCOPY);  Surgeon: Yolanda Gary MD;  Location: [facility];  Service: Endoscopy;  Laterality: N/A;    ESOPHAGOGASTRODUODENOSCOPY N/A 12/4/2020    Procedure: EGD (ESOPHAGOGASTRODUODENOSCOPY);  Surgeon: Felicia Bishop MD;  Location: [facility];  Service: Endoscopy;  Laterality: N/A;    ESOPHAGOGASTRODUODENOSCOPY N/A 7/1/2021    Procedure: EGD (ESOPHAGOGASTRODUODENOSCOPY)-in March 2021;  Surgeon: Sharath Kohler MD;  Location: [facility];  Service: Endoscopy;  Laterality: N/A;  hearing impaired, will need  - Maria Dolores Harris assigned-MS  fully vaccinated-see immunization record    HYSTERECTOMY        No current facility-administered medications on file prior to encounter.     Current Outpatient Medications on File Prior to Encounter   Medication Sig Dispense Refill    albuterol sulfate 2.5 mg/0.5 mL Nebu Take 2.5 mg by nebulization every 4 (four) hours as needed. Rescue 1 each 0    albuterol-ipratropium (DUO-NEB) 2.5 mg-0.5 mg/3 mL nebulizer solution USE 1 AMPULE IN NEBULIZER EVERY 6  HOURS WHILE AWAKE      albuterol-ipratropium (DUO-NEB) 2.5 mg-0.5 mg/3 mL nebulizer solution Inhale 3 mLs into the lungs.      ergocalciferol (ERGOCALCIFEROL) 50,000 unit Cap Take 50,000 Units by mouth.      ferrous sulfate 325 (65 FE) MG EC tablet Take 325 mg by mouth 2 (two) times daily.      hydrALAZINE (APRESOLINE) 25 MG tablet Take 1 tablet (25 mg total) by mouth every 8 (eight) hours. 90 tablet 0    ipratropium (ATROVENT) 0.02 % nebulizer solution Take 2.5 mLs (500 mcg total) by nebulization 4 (four) times daily as needed for Wheezing. Rescue 1 Box 0    pantoprazole (PROTONIX) 40 MG tablet Take 1 tablet (40 mg total) by mouth 2 (two) times daily. 60 tablet 3    predniSONE (DELTASONE) 50 MG Tab Take 50 mg by mouth once daily.      sucralfate (CARAFATE) 1 gram tablet Take 1 tablet (1 g total) by mouth 4 (four) times daily before meals and nightly. 120 tablet 1    traMADoL (ULTRAM) 50 mg tablet TAKE 1 TABLET BY MOUTH ONCE DAILY AS NEEDED FOR PAIN        Allergies: Patient has no known allergies.    Family History   Problem Relation Age of Onset    Diabetes Mother     Hypertension Mother     Colon cancer Neg Hx     Esophageal cancer Neg Hx      Social History     Tobacco Use    Smoking status: Never Smoker    Smokeless tobacco: Never Used   Substance Use Topics    Alcohol use: No    Drug use: No     Review of Systems- UNABLE TO OBTAIN, SIGN LANGUAGE,  AND SISTER AT BEDSIDE HELPING WITH INTERPRETATION. PATIENT DENIES PAIN.   Objective:     Vitals:    Temp: 98.2 °F (36.8 °C)  Pulse: 98  Rhythm: sinus tachycardia  BP: 135/65  MAP (mmHg): 94  Resp: 15  SpO2: 99 %  O2 Device (Oxygen Therapy): room air    Temp  Min: 97.6 °F (36.4 °C)  Max: 98.8 °F (37.1 °C)  Pulse  Min: 79  Max: 109  BP  Min: 133/70  Max: 151/75  MAP (mmHg)  Min: 88  Max: 104  Resp  Min: 15  Max: 20  SpO2  Min: 95 %  Max: 100 %    No intake/output data recorded.   Unmeasured Output  Urine Occurrence: 1  Stool Occurrence:  1  Pad Count: 2       Physical Exam  GA: Alert, comfortable, no acute distress.   HEENT: No scleral icterus or JVD.   Pulmonary: Clear to auscultation A/L.   Cardiac: RRR S1 & S2 w/o rubs/murmurs/gallops.   Abdominal: Bowel sounds present x 4. No appreciable hepatosplenomegaly.  Skin: No jaundice, rashes, or visible lesions.  Neuro:  --GCS: E4 V5M6  --Mental Status: awake, oriented X4, uses sign languar  --CN II-XII grossly intact.   --Pupils 3mm, PERRL.   --Corneal reflex, gag, cough intact.  --MARMOLEJO spont    Today I personally reviewed pertinent medications, lines/drains/airways, imaging,    Assessment/Plan:     Neuro  * Embolic stroke involving left middle cerebral artery  Pt with L M1 complete occlusion at low threshold for thrombectomy intervention if neuro change, no tPA out of window    -VN following  - OSH CTH with left insular cortex, frontal lobe, and basal ganglia acute infarct.   - CTA: Complete occlusion of the left M1 segment. + MR perfusion scan showing acute and subacute L MCA distribution infarct with ischemic penumbra. MRI reviewed with neuro IR, thrombectomy deferred due to high risk of reperfusion injury  -SBP < 220  -DAPT   -daily statin  -Q1h neuro and VS checks  -PT/OT/SLP as appropriate  12/22/2021:   CT head reviewed with attending MD  -- no need for neuro ICU at this time  -- Please call us back at #[phone] for any worsening symptoms  -- we appreciate your consult    Case discussed with attending Dr. Cook.            The patient is being Prophylaxed for:      Activity Orders          Diet Adult Regular (IDDSI Level 7): Regular starting at 12/21 1058    Turn patient starting at 12/19 2000    Elevate HOB starting at 12/19 1857        Full Code    Harper Hodgson NP  Neurocritical Care  [facility]

## 2021-12-22 NOTE — HPI
Aurora Lofton is a 56-year-old female with PMHx of HTN, HLD, and hearing loss (sign language). Patient transferred to [facility] on 12/19. CT Head with L frontal infarct. CTA with L M1 occlusion. Patient out of window for tPA. Thrombectomy deferred.     Functional History: Patient lives in *** with alone in a story home.  Prior to admission, Sonya. DME: .

## 2021-12-22 NOTE — SUBJECTIVE & OBJECTIVE
Neurologic Chief Complaint: RSW    Subjective:     Interval History: Patient is seen for follow-up neurological assessment and treatment recommendations: SULMA PT reccommend IPR    HPI, Past Medical, Family, and Social History remains the same as documented in the initial encounter.     Review of Systems   Constitutional: Negative for fever.   HENT: Positive for hearing loss (deaf at baseline).              Eyes: Negative for visual disturbance.   Respiratory: Negative for cough and shortness of breath.    Cardiovascular: Negative for chest pain.   Gastrointestinal: Negative for nausea and vomiting.   Skin: Negative for rash.   Neurological: Positive for facial asymmetry and weakness. Negative for dizziness and numbness.     Scheduled Meds:   aspirin  81 mg Oral Daily    atorvastatin  40 mg Oral Daily    clopidogreL  75 mg Oral Daily    ferrous sulfate  1 tablet Oral BID    heparin (porcine)  5,000 Units Subcutaneous Q8H    hydrALAZINE  25 mg Oral Q8H    senna-docusate 8.6-50 mg  1 tablet Oral BID     Continuous Infusions:  PRN Meds:acetaminophen, albuterol-ipratropium, barium sulfate, barium sulfate, dextrose 50%, glucagon (human recombinant), HYDROcodone-acetaminophen, insulin aspart U-100, labetalol, ondansetron, sodium chloride 0.9%    Objective:     Vital Signs (Most Recent):  Temp: 98.3 °F (36.8 °C) (12/22/21 0417)  Pulse: 83 (12/22/21 0700)  Resp: 18 (12/22/21 0417)  BP: 134/64 (12/22/21 0417)  SpO2: 100 % (12/22/21 0417)  BP Location: Left arm    Vital Signs Range (Last 24H):  Temp:  [97.3 °F (36.3 °C)-98.8 °F (37.1 °C)]   Pulse:  []   Resp:  [17-27]   BP: (111-151)/(57-77)   SpO2:  [95 %-100 %]   BP Location: Left arm    Physical Exam  Constitutional:       General: She is not in acute distress.     Appearance: Normal appearance. She is normal weight.   HENT:      Head: Normocephalic and atraumatic.      Right Ear: External ear normal.      Left Ear: External ear normal.      Nose: Nose  normal. No rhinorrhea.      Mouth/Throat:      Mouth: Mucous membranes are moist.      Pharynx: Oropharynx is clear.   Eyes:      General:         Right eye: No discharge.         Left eye: No discharge.      Extraocular Movements: Extraocular movements intact.      Conjunctiva/sclera: Conjunctivae normal.      Pupils: Pupils are equal, round, and reactive to light.   Cardiovascular:      Rate and Rhythm: Normal rate and regular rhythm.      Pulses: Normal pulses.   Pulmonary:      Effort: Pulmonary effort is normal. No respiratory distress.      Breath sounds: No stridor. No wheezing.   Abdominal:      General: There is no distension.      Palpations: Abdomen is soft. There is no mass.      Tenderness: There is no abdominal tenderness. There is no guarding.   Musculoskeletal:         General: No swelling, deformity or signs of injury.      Cervical back: Neck supple. No rigidity.   Skin:     General: Skin is warm and dry.      Findings: No rash.      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      Comments: AAO to self and place  Follows commands  Mild to moderate expressive >receptive aphasia using ASL  Minor nasolabial flattening  MARMOLEJO spont AG with RUE weakness and R wrist drop     Psychiatric:         Mood and Affect: Mood normal.       Neurological Exam:   LOC: alert  Attention Span: good  Language: expressive aphasia (when using ASL patient will sign numbers instead of name objects)  Articulation: limited due to baseline deafness  Orientation: using , oriented to person and place  Visual Fields: full  EOM (CN III, IV, VI): intact  Facial Movement (CN VII): R facial droop  Motor: Arm left  5/5  Leg left  5/5  Arm right 2/5 limited movement  Leg right 2/5 limited movement  Sensation: intact to light touch throughout  Tone: normal    Laboratory:  BMP:   Recent Labs   Lab 12/21/21  0042   *   K 3.6      CO2 27   BUN 7   CREATININE 0.6   CALCIUM  8.6*     CBC:   Recent Labs   Lab 12/21/21  0042   WBC 7.00   RBC 3.25*   HGB 7.0*   HCT 24.5*      MCV 75*   MCH 21.5*   MCHC 28.6*     Lipid Panel:   Recent Labs   Lab 12/19/21  1304   CHOL 214*   LDLCALC 127.2   HDL 59   TRIG 139     Coagulation:   Recent Labs   Lab 12/19/21  1304   INR 1.0     Hgb A1C:   Recent Labs   Lab 12/19/21  1924   HGBA1C 6.1*     TSH:   Recent Labs   Lab 12/19/21  1304   TSH 0.601       Diagnostic Results   MRI Brain Perfusion/MRI ischemic protocol. Date: 12/19/21  1. Acute to subacute infarctions in the left MCA distribution as above.  No evidence of hemorrhagic transformation.  2. MR perfusion findings consistent with increased mean transit time t in he left MCA territory with normal cerebral blood volume, suggestive of ischemic penumbra.     CTA Head and Neck. Date: 12/19/21  1. Complete occlusion of the left M1 segment.  2. Frothy secretions within the trachea and fluid distention of the esophageal lumen.  Findings raise concern for aspiration.  3. Hypodensity measuring 1.3 cm in the left thyroid lobe which can be further evaluated with nonemergent thyroid ultrasound     CT Head. Date: 12/19/21  Hypoattenuation and loss of gray-white differentiation within the left insular cortex, frontal lobe, and basal ganglia compatible with an MCA territory acute infarction        Cardiac Imaging  TTE 12/20/21  · The left ventricle is normal in size with normal systolic function. The estimated ejection fraction is 65%.  · Normal right ventricular size with normal right ventricular systolic function.  · Normal left ventricular diastolic function.  · The estimated PA systolic pressure is 29 mmHg.  · Normal central venous pressure (3 mmHg).

## 2021-12-22 NOTE — CONSULTS
[facility] - Neurosurgery ([facility])  Physical Medicine & Rehab  Consult Note    Patient Name: Aurora Ornelas  MRN: [id number]  Admission Date: 12/19/2021  Hospital Length of Stay: 3 days  Attending Physician: Anastacio De Guzman MD     Inpatient consult to Physical Medicine & Rehabilitation  Consult performed by: Ananya Mcclain NP  Consult requested by:  Anastacio De Guzman MD    Collaborating Physician: Stefanie Rosario MD  Reason for Consult:  Assess rehabilitation needs     Consults  Subjective:     Principal Problem: Embolic stroke involving left middle cerebral artery    HPI: Auroar Lofton is a 56-year-old female with PMHx of HTN, HLD, and hearing loss (sign language). Patient transferred to [facility] on 12/19. CT Head with L frontal infarct. CTA with L M1 occlusion. Patient out of window for tPA. Thrombectomy deferred.     Functional History: Patient lives alone in a story home.  Prior to admission, Sonya. DME: SPC.       Hospital Course: 12/21/21: Participated with OT. Bed mob min- modA. Sit to stand Eric.     Past Medical History:   Diagnosis Date    Anemia     Arthritis     Asthma     Deaf     Diabetes mellitus     Hyperlipidemia     Hypertension      Past Surgical History:   Procedure Laterality Date    COLONOSCOPY N/A 12/4/2020    Procedure: COLONOSCOPY;  Surgeon: Felicia Bishop MD;  Location: [facility];  Service: Endoscopy;  Laterality: N/A;    ESOPHAGOGASTRODUODENOSCOPY N/A 2/28/2019    Procedure: EGD (ESOPHAGOGASTRODUODENOSCOPY);  Surgeon: Yolanda Gary MD;  Location: [facility];  Service: Endoscopy;  Laterality: N/A;    ESOPHAGOGASTRODUODENOSCOPY N/A 12/4/2020    Procedure: EGD (ESOPHAGOGASTRODUODENOSCOPY);  Surgeon: Felicia Bishop MD;  Location: [facility];  Service: Endoscopy;  Laterality: N/A;    ESOPHAGOGASTRODUODENOSCOPY N/A 7/1/2021    Procedure: EGD (ESOPHAGOGASTRODUODENOSCOPY)-in March 2021;  Surgeon: Sharath Kohler MD;  Location: [facility];  Service: Endoscopy;  Laterality: N/A;  hearing  impaired, will need  - Maria Dolores Harris assigned-MS  fully vaccinated-see immunization record    HYSTERECTOMY       Review of patient's allergies indicates:  No Known Allergies    Scheduled Medications:    aspirin  81 mg Oral Daily    atorvastatin  40 mg Oral Daily    clopidogreL  75 mg Oral Daily    ferrous sulfate  1 tablet Oral BID    heparin (porcine)  5,000 Units Subcutaneous Q8H    hydrALAZINE  25 mg Oral Q8H    senna-docusate 8.6-50 mg  1 tablet Oral BID       PRN Medications: acetaminophen, albuterol-ipratropium, barium sulfate, barium sulfate, dextrose 50%, glucagon (human recombinant), HYDROcodone-acetaminophen, insulin aspart U-100, labetalol, ondansetron, sodium chloride 0.9%    Family History     Problem Relation (Age of Onset)    Diabetes Mother    Hypertension Mother        Tobacco Use    Smoking status: Never Smoker    Smokeless tobacco: Never Used   Substance and Sexual Activity    Alcohol use: No    Drug use: No    Sexual activity: Yes     Partners: Male     Birth control/protection: None     Review of Systems   Constitutional: Positive for activity change. Negative for fatigue and fever.   HENT: Negative for sore throat and trouble swallowing.    Eyes: Negative for visual disturbance.   Respiratory: Negative for cough and shortness of breath.    Cardiovascular: Negative for chest pain and leg swelling.   Gastrointestinal: Negative for abdominal distention and abdominal pain.   Genitourinary: Negative for difficulty urinating.   Musculoskeletal: Positive for gait problem. Negative for back pain.   Skin: Negative for color change.   Neurological: Positive for weakness. Negative for dizziness, light-headedness and headaches.   Psychiatric/Behavioral: Negative for agitation and confusion.     Objective:     Vital Signs (Most Recent):  Temp: 98.2 °F (36.8 °C) (12/22/21 0746)  Pulse: 92 (12/22/21 1024)  Resp: 15 (12/22/21 0746)  BP: 137/66 (12/22/21 0746)  SpO2:  99 % (12/22/21 1049)    Vital Signs (24h Range):  Temp:  [97.3 °F (36.3 °C)-98.8 °F (37.1 °C)] 98.2 °F (36.8 °C)  Pulse:  [] 92  Resp:  [15-27] 15  SpO2:  [95 %-100 %] 99 %  BP: (116-151)/(62-75) 137/66     Body mass index is 25.82 kg/m².    Physical Exam  Vitals and nursing note reviewed.   Constitutional:       Appearance: Normal appearance. She is well-developed and well-nourished.   HENT:      Head: Normocephalic.   Eyes:      Extraocular Movements: EOM normal.      Pupils: Pupils are equal, round, and reactive to light.   Cardiovascular:      Rate and Rhythm: Normal rate and regular rhythm.   Abdominal:      General: Bowel sounds are normal.      Palpations: Abdomen is soft.   Musculoskeletal:      Cervical back: Normal range of motion and neck supple.      Comments: R sided weakness   Skin:     General: Skin is warm and dry.   Neurological:      Mental Status: She is alert.      Comments: Cog impairment  Following commands   Psychiatric:         Mood and Affect: Mood and affect and mood normal.         Behavior: Behavior normal.       Diagnostic Results: Labs: Reviewed  ECG: Reviewed  CT: Reviewed    Assessment/Plan:     * Embolic stroke involving left middle cerebral artery  - Related to prolonged/acute hospital course.     Recommendations  -  Encourage mobility, OOB in chair at least 3 hours per day, and early ambulation as appropriate  -  PT/OT evaluate and treat  -  Pain management  -  Monitor for and prevent skin breakdown and pressure ulcers  · Early mobility, repositioning/weight shifting every 20-30 minutes when sitting, turn patient every 2 hours, proper mattress/overlay and chair cushioning, pressure relief/heel protector boots  -  DVT prophylaxis    -  Reviewed discharge options (IP rehab, SNF, HH therapy, and OP therapy)    Essential hypertension  - Stroke risk factor    PM&R Recommendation:     At this time, the PM&R team has reviewed this patient's ongoing medical case including inpatient  diagnosis, medical history, clinical examination, labs, vitals, current social and functional history to provide the post-acute recommendation as follows:     RECOMMENDATIONS: inpatient rehabilitation due to good motivation/participation with therapies and good potential for recovery.    Thank you for your consult.     Ananya Mcclain NP  Department of Physical Medicine & Rehab  [facility] Neurosurgery [facility])

## 2021-12-22 NOTE — CONSULTS
Inpatient consult to Physical Medicine Rehab  Consult performed by: Shirlene Iyer NP  Consult ordered by: Yaya Bingham MD      Consult received.  Reviewed patient history and current admission.  PM&R following.  Shirlene Iyer NP  Physical Medicine & Rehabilitation   12/22/2021

## 2021-12-22 NOTE — PT/OT/SLP PROGRESS
Physical Therapy Treatment    Patient Name:  Aurora Ornelas   MRN:  [id number]    Recommendations:     Discharge Recommendations:  rehabilitation facility   Discharge Equipment Recommendations:  (TBD pending progress)   Barriers to discharge: Inaccessible home, Decreased caregiver support and patient below functional baseline    Assessment:     Aurora Ornelas is a 56 y.o. female admitted with a medical diagnosis of Embolic stroke involving left middle cerebral artery.  She presents with the following impairments/functional limitations:  weakness,impaired endurance,impaired balance,impaired self care skills,impaired functional mobilty,decreased coordination,gait instability,decreased upper extremity function,decreased lower extremity function,abnormal tone,impaired fine motor,impaired coordination,decreased ROM. The patient presents with worsening R arm and leg weakness today, consistent with imaging indicating evolving L MCA. She required increased assist for sit to stand and weight shifting in standing. Unable to weight bear through R LE due to buckling. Performed 4 steps to L with OLIVER HHA and moderate assistance. PTA she was modified independent with mobility.  Based on the patient's progress with therapy, motivation to participate in treatment, and prior level of independence, they are an excellent candidate for inpatient rehabilitation and they would benefit from rehab to maximize their functional potential.      [email or web address]. video  attempted 2x due to poor connection,  ID [id number]    Rehab Prognosis: Good; patient would benefit from acute skilled PT services to address these deficits and reach maximum level of function.    Recent Surgery: * No surgery found *      Plan:     During this hospitalization, patient to be seen 4 x/week to address the identified rehab impairments via gait training,therapeutic activities,therapeutic exercises,neuromuscular re-education and progress toward the following  "goals:    · Plan of Care Expires:  01/19/21    Subjective     Chief Complaint: "Can I get cleaned up?"  Patient/Family Comments/goals: motivated to progress with therapy  Pain/Comfort:  · Pain Rating 1: 0/10      Objective:     Communicated with RN prior to session.  Patient found HOB elevated with bed alarm,blood pressure cuff,telemetry,peripheral IV,PureWick upon PT entry to room.     General Precautions: Standard, deaf,fall   Orthopedic Precautions:N/A   Braces: N/A  Respiratory Status: Room air     Functional Mobility:    Bed Mobility  Supine to Sit on the L side:  minimum assistance, assist to progress R LE   Sit to supine: minimum assistance assist with R LE   Transfers Sit to Stand:  moderate assistance, R foot and knee blocked; 3 reps from edge of bed   Gait  Gait Distance: 4 steps to L with OLIVER HHA  Assistance Level: moderate assistance   Description: minimal weight bearing through R LE, R knee buckled in stance, adductor strategy to progress R LE medially, impaired weight shift            AM-PAC 6 CLICK MOBILITY  Turning over in bed (including adjusting bedclothes, sheets and blankets)?: 3  Sitting down on and standing up from a chair with arms (e.g., wheelchair, bedside commode, etc.): 2  Moving from lying on back to sitting on the side of the bed?: 2  Moving to and from a bed to a chair (including a wheelchair)?: 2  Need to walk in hospital room?: 2  Climbing 3-5 steps with a railing?: 1  Basic Mobility Total Score: 12       Therapeutic Activities and Exercises:   Patient educated on role of therapy, goals of session, benefits of out of bed mobility. Patient agreeable to mobilize with therapy.  Discussed PT plan of care during hospitalization. Patient educated that they need to call for assistance to mobilize out of bed. Whiteboard updated as appropriate. Patient educated on how their diagnosis impacts their mobility within PT scope of practice.     Seated R UE reaching, trace biceps and deltoid " activation noted with attempts to initiate reaching, no active wrist or hand strength noted. LAQ 10 reps with facilitation for quad activation, moderate assistance for full ROM.     Standing balance, minimum assistance to moderate assistance , for 1 minutes with NBQC on L, 1 min with .OLIVER HHA  -Demonstrates trunk/hip/knee flexion in standing with L lateral lean  -Manual/verbal cues and facilitation for hip extension, trunk extension, cues to look up  -Manual/verbal cues for quad and glute engagement  -Blocking R knee, buckling with increased weight bearing  -Dynamic weight shift in standing with OLIVER HHA, moderate assistance     Gait training: facilitation for weight shift, cues for sequencing, moderate assistance to progress R LE medially, manual facilitation of R quad in stance and blocking R LE to prevent buckling. Assisted patient with bed bath.     Patient left HOB elevated with all lines intact, call button in reach and bed alarm on..    GOALS:   Multidisciplinary Problems     Physical Therapy Goals        Problem: Physical Therapy Goal    Goal Priority Disciplines Outcome Goal Variances Interventions   Physical Therapy Goal     PT, PT/OT Ongoing, Progressing     Description: Goals to be met by:      Patient will increase functional independence with mobility by performin. Supine to sit with stand by assistance   2. Sit to supine with Stand-by Assistance  3. Sit to stand transfer with Contact Guard Assistance  4. Stand pivot bed to chair contact guard assist   5. Gait  x 15 feet with Minimal Assistance using LRAD.   6. Ascend/descend 6 stair with unilateral Handrails Minimal Assistance using LRAD.   7. Stand for 1 minutes with Contact Guard Assistance using No Assistive Device to prepare for functional activities in standing.                      Time Tracking:     PT Received On: 21  PT Start Time: 1240     PT Stop Time: 1315  PT Total Time (min): 35 min     Billable Minutes: Gait Training 12  and Therapeutic Activity 13    Treatment Type: Treatment  PT/PTA: PT     PTA Visit Number: 0     12/22/2021

## 2021-12-22 NOTE — ASSESSMENT & PLAN NOTE
Pt with L M1 complete occlusion at low threshold for thrombectomy intervention if neuro change, no tPA out of window    -VN following  - OSH CTH with left insular cortex, frontal lobe, and basal ganglia acute infarct.   - CTA: Complete occlusion of the left M1 segment. + MR perfusion scan showing acute and subacute L MCA distribution infarct with ischemic penumbra. MRI reviewed with neuro IR, thrombectomy deferred due to high risk of reperfusion injury  -SBP < 220  -DAPT   -daily statin  -Q1h neuro and VS checks  -PT/OT/SLP as appropriate  12/22/2021:   CT head reviewed with attending MD  -- no need for neuro ICU at this time  -- Please call us back at #[phone] for any worsening symptoms  -- we appreciate your consult    Case discussed with attending Dr. Cook.

## 2021-12-22 NOTE — PT/OT/SLP PROGRESS
Speech Language Pathology Treatment    Patient Name:  Aurora Ornelas   MRN:  [id number]  Admitting Diagnosis: Embolic stroke involving left middle cerebral artery    Recommendations:                 General Recommendations:  Speech/language therapy  Diet recommendations:  Regular, Thin   Aspiration Precautions: HOB to 90 degrees   General Precautions: Standard, fall,deaf  Communication strategies:  go to room if call light pushed and utilize  as pt uses ASL as primary language     Subjective     Pt found resting in bed with daughter at bedside upon SLP entry into room.     Patient goals: none stated     Pain/Comfort:  · Pain Rating 1:  (none stated)  · Pain Rating Post-Intervention 1:  (none stated)    Respiratory Status: Room air    Objective:     Has the patient been evaluated by SLP for swallowing?   Yes  Keep patient NPO? No   Current Respiratory Status:        Pt seen for ongoing language therapy. Pt's daughter at bedside reported frustration due to not being updated about her mother's condition. SLP educated pt on results of recent SLP assessment, intact swallow function, and least restrictive PO diet. Daughter stated pt more somnolent this date, not showing much interest in activities and not signing with family much this date. She was also concerned about pt signing incorrectly and being unable to effectively communicate with family and staff. SLP provided education regarding aphasia, receptive and expressive language components, results of recent speech and language eval, and SLP POC. Pt's daughter verbalized understanding but would continue to benefit from ongoing education regarding pt's deficits.  contacted but connection lost x3 so daughter stated she would interpret for pt. Transport arrived to pt's room to escort to CT scan. Neurology team arrived to pt's bedside to discuss daughter's concerns over change in demeanor. Session was subsequently terminated.     Assessment:     Aurora ONTIVEROS  Ceci is a 56 y.o. female with an SLP diagnosis of Aphasia.  She presents with ongoing need for speech services at current and next level of care.    Goals:   Multidisciplinary Problems     SLP Goals        Problem: SLP Goal    Goal Priority Disciplines Outcome   SLP Goal     SLP Ongoing, Progressing   Description: Speech Language Pathology Goals  Goals expected to be met by 12/27/21    1. Pt will participate in ongoing assessment of swallow function to determine safest, least restrictive means of nutrition/hydration  2. Educate Pt and family on aspiration precautions and SLP POC  3. Pt will answer personal yes/no questions with 80% accuracy or higher, MOD A  4. Pt will follow simple commands with 80% accuracy or higher, MOD A  5. Pt will orient x3+ via any modality 90% of the time, MOD A  6. Pt will participate in ongoing assessment of cognition and visiospatial skills to determine ongoing POC needs  7. Educate Pt and family on safety awareness and compensatory strategies for communication                     Plan:     · Patient to be seen:  4 x/week   · Plan of Care expires:  01/18/22  · Plan of Care reviewed with:  patient   · SLP Follow-Up:  Yes       Discharge recommendations:  rehabilitation facility   Barriers to Discharge:  Level of Skilled Assistance Needed     Time Tracking:     SLP Treatment Date:   12/22/21  Speech Start Time:  1113  Speech Stop Time:  1127     Speech Total Time (min):  14 min    Billable Minutes: Self Care/Home Management Training 14       12/22/2021

## 2021-12-22 NOTE — PLAN OF CARE
Problem: Adjustment to Illness (Stroke, Ischemic/Transient Ischemic Attack)  Goal: Optimal Coping  Outcome: Ongoing, Progressing     Problem: Adult Inpatient Plan of Care  Goal: Plan of Care Review  Outcome: Ongoing, Progressing  Goal: Patient-Specific Goal (Individualized)  Outcome: Ongoing, Progressing  Goal: Absence of Hospital-Acquired Illness or Injury  Outcome: Ongoing, Progressing  Goal: Optimal Comfort and Wellbeing  Outcome: Ongoing, Progressing  Goal: Readiness for Transition of Care  Outcome: Ongoing, Progressing

## 2021-12-22 NOTE — NURSING
Received patient via wheelchair.  Pt transferred X1 assist.  Pt able to provide a little help with pivoting. Unsteady.  Vitals obtained.  Stable.  Able to communicate via dry erase board.  Aphasic, nods to yes or no questions.  Able to answer appropriately by pointing.     Pt able to answer to pain questions and point to pain  Area.  Able to swallow PO PRN pain med whole. NADN. Call light in hand. Bed alarm on and audible, bed in low  Position. Purick in place and connected to continuous  Wall suction.   [facility]

## 2021-12-22 NOTE — SUBJECTIVE & OBJECTIVE
Past Medical History:   Diagnosis Date    Anemia     Arthritis     Asthma     Deaf     Diabetes mellitus     Hyperlipidemia     Hypertension      Past Surgical History:   Procedure Laterality Date    COLONOSCOPY N/A 12/4/2020    Procedure: COLONOSCOPY;  Surgeon: Felicia Bishop MD;  Location: [facility] ENDO;  Service: Endoscopy;  Laterality: N/A;    ESOPHAGOGASTRODUODENOSCOPY N/A 2/28/2019    Procedure: EGD (ESOPHAGOGASTRODUODENOSCOPY);  Surgeon: Yolanda Gary MD;  Location: [facility] ENDO;  Service: Endoscopy;  Laterality: N/A;    ESOPHAGOGASTRODUODENOSCOPY N/A 12/4/2020    Procedure: EGD (ESOPHAGOGASTRODUODENOSCOPY);  Surgeon: Felicia Bishop MD;  Location: [facility] ENDO;  Service: Endoscopy;  Laterality: N/A;    ESOPHAGOGASTRODUODENOSCOPY N/A 7/1/2021    Procedure: EGD (ESOPHAGOGASTRODUODENOSCOPY)-in March 2021;  Surgeon: Sharath Kohler MD;  Location: [facility] ENDO;  Service: Endoscopy;  Laterality: N/A;  hearing impaired, will need  - Maria Dolores Harris assigned-MS  fully vaccinated-see immunization record    HYSTERECTOMY        No current facility-administered medications on file prior to encounter.     Current Outpatient Medications on File Prior to Encounter   Medication Sig Dispense Refill    albuterol sulfate 2.5 mg/0.5 mL Nebu Take 2.5 mg by nebulization every 4 (four) hours as needed. Rescue 1 each 0    albuterol-ipratropium (DUO-NEB) 2.5 mg-0.5 mg/3 mL nebulizer solution USE 1 AMPULE IN NEBULIZER EVERY 6 HOURS WHILE AWAKE      albuterol-ipratropium (DUO-NEB) 2.5 mg-0.5 mg/3 mL nebulizer solution Inhale 3 mLs into the lungs.      ergocalciferol (ERGOCALCIFEROL) 50,000 unit Cap Take 50,000 Units by mouth.      ferrous sulfate 325 (65 FE) MG EC tablet Take 325 mg by mouth 2 (two) times daily.      hydrALAZINE (APRESOLINE) 25 MG tablet Take 1 tablet (25 mg total) by mouth every 8 (eight) hours. 90 tablet 0    ipratropium (ATROVENT) 0.02 % nebulizer solution Take 2.5 mLs  (500 mcg total) by nebulization 4 (four) times daily as needed for Wheezing. Rescue 1 Box 0    pantoprazole (PROTONIX) 40 MG tablet Take 1 tablet (40 mg total) by mouth 2 (two) times daily. 60 tablet 3    predniSONE (DELTASONE) 50 MG Tab Take 50 mg by mouth once daily.      sucralfate (CARAFATE) 1 gram tablet Take 1 tablet (1 g total) by mouth 4 (four) times daily before meals and nightly. 120 tablet 1    traMADoL (ULTRAM) 50 mg tablet TAKE 1 TABLET BY MOUTH ONCE DAILY AS NEEDED FOR PAIN        Allergies: Patient has no known allergies.    Family History   Problem Relation Age of Onset    Diabetes Mother     Hypertension Mother     Colon cancer Neg Hx     Esophageal cancer Neg Hx      Social History     Tobacco Use    Smoking status: Never Smoker    Smokeless tobacco: Never Used   Substance Use Topics    Alcohol use: No    Drug use: No     Review of Systems- UNABLE TO OBTAIN, SIGN LANGUAGE,  AND SISTER AT BEDSIDE HELPING WITH INTERPRETATION. PATIENT DENIES PAIN.   Objective:     Vitals:    Temp: 98.2 °F (36.8 °C)  Pulse: 98  Rhythm: sinus tachycardia  BP: 135/65  MAP (mmHg): 94  Resp: 15  SpO2: 99 %  O2 Device (Oxygen Therapy): room air    Temp  Min: 97.6 °F (36.4 °C)  Max: 98.8 °F (37.1 °C)  Pulse  Min: 79  Max: 109  BP  Min: 133/70  Max: 151/75  MAP (mmHg)  Min: 88  Max: 104  Resp  Min: 15  Max: 20  SpO2  Min: 95 %  Max: 100 %    No intake/output data recorded.   Unmeasured Output  Urine Occurrence: 1  Stool Occurrence: 1  Pad Count: 2       Physical Exam  GA: Alert, comfortable, no acute distress.   HEENT: No scleral icterus or JVD.   Pulmonary: Clear to auscultation A/L.   Cardiac: RRR S1 & S2 w/o rubs/murmurs/gallops.   Abdominal: Bowel sounds present x 4. No appreciable hepatosplenomegaly.  Skin: No jaundice, rashes, or visible lesions.  Neuro:  --GCS: E4 V5M6  --Mental Status: awake, oriented X4, uses sign languar  --CN II-XII grossly intact.   --Pupils 3mm, PERRL.   --Corneal reflex,  gag, cough intact.  --MARMOLEJO spont    Today I personally reviewed pertinent medications, lines/drains/airways, imaging,

## 2021-12-22 NOTE — PLAN OF CARE
CM spoke with pt and daughter regarding facility of preference, they would [facility] to be closer to home. Clinical updates and preference sent to facility. Daughter request medical update from team, this was sent to team.

## 2021-12-22 NOTE — PROGRESS NOTES
[facility] - Neurosurgery ([facility])  Vascular Neurology  Comprehensive Stroke Center  Progress Note    Assessment/Plan:     * Embolic stroke involving left middle cerebral artery  Patient is a 56 y.o. female  with PMHx of HTN, HLD, asthma, and hearing loss (deaf, communicates with American Sign Language) who presented to OSH with RSW and communication difficulty. Per family, patient began having symptoms on 12/17 with a facial droop. CT Head with L frontal infarct. CTA with L M1 occlusion. Patient out of window for tPA. Patient transferred to [place] for possible thrombectomy. On arrival, patient taken to MRI STAT for MRI acute ischemic protocol + MR perfusion scan. MRI reviewed with neuro IR, thrombectomy deferred due to high risk of reperfusion injury given symptoms beginning ~ 48 hours ago. However, recommendation was made for admission to neuro ICU for close monitoring with low threshold for IR intervention if neuro exam declined. Echo with EF 65%.  -Neuro exam stable.     Antithrombotics for secondary stroke prevention: Antiplatelets: Aspirin: 81 mg daily  Clopidogrel: 75 mg daily    Statins for secondary stroke prevention and hyperlipidemia, if present:   Statins: Atorvastatin- 40 mg daily    Aggressive risk factor modification: HTN     Rehab efforts: The patient has been evaluated by a stroke team provider and the therapy needs have been fully considered based off the presenting complaints and exam findings. The following therapy evaluations are needed: PT evaluate and treat, OT evaluate and treat, SLP evaluate and treat, PM&R evaluate for appropriate placement therapy recs IPR    Diagnostics ordered/pending: None     VTE prophylaxis: Heparin 5000 units SQ every 8 hours  Mechanical prophylaxis: Place SCDs    BP parameters: Infarct: No intervention, SBP <220        Mixed hyperlipidemia  Stroke RF  .2  Atorvastatin 40    Diabetes mellitus  Stroke RF  A1c 6.1 on 12/19/21  SSI    Cytotoxic cerebral edema  Area  of cerebral edema identified when reviewing brain imaging in the territory of the L middle cerebral artery. We will continue to monitor with q1h neuro checks while on floor or more frequently while in neuro critical care, as any change in the patients clinical exam may signify expansion of the insult and/or the area of the edema. Such changes may require acute interventions to prevent loss of function and/or death.    Essential hypertension  Stroke risk factor  SBP <220 in setting of acute stroke    Deaf  Communicates with American sign language       12/20:  used during patient interaction. Patient able to communicate name and age. Aphasic, when trying to interpret images on stroke booklet, patient signing numbers instead of (hammock, cactus). Continues with RUE weakness. No RLE drift. Echo pending. MBSS pending.   12/21:  used during patient interaction. Continues with RUE weakness. phasic, when trying to interpret images on stroke booklet, patient signing numbers instead of (hammock, cactus). Echo with EF 65%.   12/22:  used during patient interaction. Continued RUE and new RLE weakness, exam otherwise remained stable from previous. Repeat CTH obtained to evaluated further. Serial neuro exams performed.         STROKE DOCUMENTATION   Acute Stroke Times   Last Known Normal Date: 12/17/21  Last Known Normal Time: 0700  Unknown Normal Time: Unknown Time  Symptom Onset Date: 12/17/21  Symptom Onset Time: 0700  Unknown Symptom Onset Time: Unknown Time  Stroke Team Called Date: 12/19/21  Stroke Team Called Time: 1633  Stroke Team Arrival Date: 12/19/21  Stroke Team Arrival Time: 1637  CT Interpretation Time: 1310  Alteplase Recommended: No  CTA Interpretation Time:  (Pending)  Thrombectomy Recommended: No  MRI Acute Stroke Protocol Interpretation Time: 1709    NIH Scale:  1a. Level of Consciousness: 0-->Alert, keenly responsive  1b. LOC Questions: 0-->Answers both questions  correctly  1c. LOC Commands: 0-->Performs both tasks correctly  2. Best Gaze: 0-->Normal  3. Visual: 0-->No visual loss  4. Facial Palsy: 1-->Minor paralysis (flattened nasolabial fold, asymmetry on smiling)  5a. Motor Arm, Left: 0-->No drift, limb holds 90 (or 45) degrees for full 10 secs  5b. Motor Arm, Right: 3-->No effort against gravity  6a. Motor Leg, Left: 0-->No drift, leg holds 30 degree position for full 5 secs  6b. Motor Leg, Right: 3-->No effort against gravity  8. Sensory: 0-->Normal, no sensory loss  9. Best Language: 2-->Severe aphasia, all communication is through fragmentary expression, great need for inference, questioning, and guessing by the listener. Range of information that can be exchanged is limited, listener carries burden of. . . (see row details)  10. Dysarthria: 0-->Normal  11. Extinction and Inattention (formerly Neglect): 0-->No abnormality  Total (NIH Stroke Scale): 9      Modified Alexandr Score: 2      Hemorrhagic change of an Ischemic Stroke: Does this patient have an ischemic stroke with hemorrhagic changes? No     Neurologic Chief Complaint: RSW    Subjective:     Interval History: Patient is seen for follow-up neurological assessment and treatment recommendations: FABIANON. Patient with increased weakness (RUE & RLE), CTH repeated, pending. Continue serial exams to ensure no other acute changes. Continue risk factor modification.  PT reccommend IPR    HPI, Past Medical, Family, and Social History remains the same as documented in the initial encounter.     Review of Systems   Constitutional: Negative for fever.   HENT: Positive for hearing loss (deaf at baseline).              Eyes: Negative for visual disturbance.   Respiratory: Negative for cough and shortness of breath.    Cardiovascular: Negative for chest pain.   Gastrointestinal: Negative for nausea and vomiting.   Skin: Negative for rash.   Neurological: Positive for facial asymmetry and weakness. Negative for dizziness and  numbness.     Scheduled Meds:   aspirin  81 mg Oral Daily    atorvastatin  40 mg Oral Daily    clopidogreL  75 mg Oral Daily    ferrous sulfate  1 tablet Oral BID    heparin (porcine)  5,000 Units Subcutaneous Q8H    hydrALAZINE  25 mg Oral Q8H    senna-docusate 8.6-50 mg  1 tablet Oral BID     Continuous Infusions:  PRN Meds:acetaminophen, albuterol-ipratropium, barium sulfate, barium sulfate, dextrose 50%, glucagon (human recombinant), HYDROcodone-acetaminophen, insulin aspart U-100, labetalol, ondansetron, sodium chloride 0.9%    Objective:     Vital Signs (Most Recent):  Temp: 98.3 °F (36.8 °C) (12/22/21 0417)  Pulse: 83 (12/22/21 0700)  Resp: 18 (12/22/21 0417)  BP: 134/64 (12/22/21 0417)  SpO2: 100 % (12/22/21 0417)  BP Location: Left arm    Vital Signs Range (Last 24H):  Temp:  [97.3 °F (36.3 °C)-98.8 °F (37.1 °C)]   Pulse:  []   Resp:  [17-27]   BP: (111-151)/(57-77)   SpO2:  [95 %-100 %]   BP Location: Left arm    Physical Exam  Constitutional:       General: She is not in acute distress.     Appearance: Normal appearance. She is normal weight.   HENT:      Head: Normocephalic and atraumatic.      Right Ear: External ear normal.      Left Ear: External ear normal.      Nose: Nose normal. No rhinorrhea.      Mouth/Throat:      Mouth: Mucous membranes are moist.      Pharynx: Oropharynx is clear.   Eyes:      General:         Right eye: No discharge.         Left eye: No discharge.      Extraocular Movements: Extraocular movements intact.      Conjunctiva/sclera: Conjunctivae normal.      Pupils: Pupils are equal, round, and reactive to light.   Cardiovascular:      Rate and Rhythm: Normal rate and regular rhythm.      Pulses: Normal pulses.   Pulmonary:      Effort: Pulmonary effort is normal. No respiratory distress.      Breath sounds: No stridor. No wheezing.   Abdominal:      General: There is no distension.      Palpations: Abdomen is soft. There is no mass.      Tenderness: There is no  abdominal tenderness. There is no guarding.   Musculoskeletal:         General: No swelling, deformity or signs of injury.      Cervical back: Neck supple. No rigidity.   Skin:     General: Skin is warm and dry.      Findings: No rash.      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      Comments: AAO to self and place  Follows commands  Mild to moderate expressive >receptive aphasia using ASL  Minor nasolabial flattening  MARMOLEJO spont AG with RUE weakness and R wrist drop     Psychiatric:         Mood and Affect: Mood normal.       Neurological Exam:   LOC: alert  Attention Span: good  Language: expressive aphasia (when using ASL patient will sign numbers instead of name objects)  Articulation: limited due to baseline deafness  Orientation: using , oriented to person and place  Visual Fields: full  EOM (CN III, IV, VI): intact  Facial Movement (CN VII): R facial droop  Motor: Arm left  5/5  Leg left  5/5  Arm right 2/5 limited movement  Leg right 2/5 limited movement  Sensation: intact to light touch throughout  Tone: normal    Laboratory:  BMP:   Recent Labs   Lab 12/21/21  0042   *   K 3.6      CO2 27   BUN 7   CREATININE 0.6   CALCIUM 8.6*     CBC:   Recent Labs   Lab 12/21/21  0042   WBC 7.00   RBC 3.25*   HGB 7.0*   HCT 24.5*      MCV 75*   MCH 21.5*   MCHC 28.6*     Lipid Panel:   Recent Labs   Lab 12/19/21  1304   CHOL 214*   LDLCALC 127.2   HDL 59   TRIG 139     Coagulation:   Recent Labs   Lab 12/19/21  1304   INR 1.0     Hgb A1C:   Recent Labs   Lab 12/19/21  1924   HGBA1C 6.1*     TSH:   Recent Labs   Lab 12/19/21  1304   TSH 0.601       Diagnostic Results   MRI Brain Perfusion/MRI ischemic protocol. Date: 12/19/21  1. Acute to subacute infarctions in the left MCA distribution as above.  No evidence of hemorrhagic transformation.  2. MR perfusion findings consistent with increased mean transit time t in he left MCA territory with  normal cerebral blood volume, suggestive of ischemic penumbra.     CTA Head and Neck. Date: 12/19/21  1. Complete occlusion of the left M1 segment.  2. Frothy secretions within the trachea and fluid distention of the esophageal lumen.  Findings raise concern for aspiration.  3. Hypodensity measuring 1.3 cm in the left thyroid lobe which can be further evaluated with nonemergent thyroid ultrasound     CT Head. Date: 12/19/21  Hypoattenuation and loss of gray-white differentiation within the left insular cortex, frontal lobe, and basal ganglia compatible with an MCA territory acute infarction        Cardiac Imaging  TTE 12/20/21  · The left ventricle is normal in size with normal systolic function. The estimated ejection fraction is 65%.  · Normal right ventricular size with normal right ventricular systolic function.  · Normal left ventricular diastolic function.  · The estimated PA systolic pressure is 29 mmHg.  · Normal central venous pressure (3 mmHg).         Katie Alcala MD  Comprehensive Stroke Center  Department of Vascular Neurology   [facility]

## 2021-12-22 NOTE — SUBJECTIVE & OBJECTIVE
Past Medical History:   Diagnosis Date    Anemia     Arthritis     Asthma     Deaf     Diabetes mellitus     Hyperlipidemia     Hypertension      Past Surgical History:   Procedure Laterality Date    COLONOSCOPY N/A 12/4/2020    Procedure: COLONOSCOPY;  Surgeon: Felicia Bishop MD;  Location: [facility] ENDO;  Service: Endoscopy;  Laterality: N/A;    ESOPHAGOGASTRODUODENOSCOPY N/A 2/28/2019    Procedure: EGD (ESOPHAGOGASTRODUODENOSCOPY);  Surgeon: Yolanda Gary MD;  Location: [facility] ENDO;  Service: Endoscopy;  Laterality: N/A;    ESOPHAGOGASTRODUODENOSCOPY N/A 12/4/2020    Procedure: EGD (ESOPHAGOGASTRODUODENOSCOPY);  Surgeon: Felicia Bishop MD;  Location: [facility] ENDO;  Service: Endoscopy;  Laterality: N/A;    ESOPHAGOGASTRODUODENOSCOPY N/A 7/1/2021    Procedure: EGD (ESOPHAGOGASTRODUODENOSCOPY)-in March 2021;  Surgeon: Sharath Kohler MD;  Location: [facility];  Service: Endoscopy;  Laterality: N/A;  hearing impaired, will need  - Maria Dolores Harris assigned-MS  fully vaccinated-see immunization record    HYSTERECTOMY       Review of patient's allergies indicates:  No Known Allergies    Scheduled Medications:    aspirin  81 mg Oral Daily    atorvastatin  40 mg Oral Daily    clopidogreL  75 mg Oral Daily    ferrous sulfate  1 tablet Oral BID    heparin (porcine)  5,000 Units Subcutaneous Q8H    hydrALAZINE  25 mg Oral Q8H    senna-docusate 8.6-50 mg  1 tablet Oral BID       PRN Medications: acetaminophen, albuterol-ipratropium, barium sulfate, barium sulfate, dextrose 50%, glucagon (human recombinant), HYDROcodone-acetaminophen, insulin aspart U-100, labetalol, ondansetron, sodium chloride 0.9%    Family History     Problem Relation (Age of Onset)    Diabetes Mother    Hypertension Mother        Tobacco Use    Smoking status: Never Smoker    Smokeless tobacco: Never Used   Substance and Sexual Activity    Alcohol use: No    Drug use: No    Sexual activity: Yes      Partners: Male     Birth control/protection: None     Review of Systems   Constitutional: Positive for activity change. Negative for fatigue and fever.   HENT: Negative for sore throat and trouble swallowing.    Eyes: Negative for visual disturbance.   Respiratory: Negative for cough and shortness of breath.    Cardiovascular: Negative for chest pain and leg swelling.   Gastrointestinal: Negative for abdominal distention and abdominal pain.   Genitourinary: Negative for difficulty urinating.   Musculoskeletal: Positive for gait problem. Negative for back pain.   Skin: Negative for color change.   Neurological: Positive for weakness. Negative for dizziness, light-headedness and headaches.   Psychiatric/Behavioral: Negative for agitation and confusion.     Objective:     Vital Signs (Most Recent):  Temp: 98.2 °F (36.8 °C) (12/22/21 0746)  Pulse: 92 (12/22/21 1024)  Resp: 15 (12/22/21 0746)  BP: 137/66 (12/22/21 0746)  SpO2: 99 % (12/22/21 1049)    Vital Signs (24h Range):  Temp:  [97.3 °F (36.3 °C)-98.8 °F (37.1 °C)] 98.2 °F (36.8 °C)  Pulse:  [] 92  Resp:  [15-27] 15  SpO2:  [95 %-100 %] 99 %  BP: (116-151)/(62-75) 137/66     Body mass index is 25.82 kg/m².    Physical Exam  Vitals and nursing note reviewed.   Constitutional:       Appearance: Normal appearance. She is well-developed and well-nourished.   HENT:      Head: Normocephalic.   Eyes:      Extraocular Movements: EOM normal.      Pupils: Pupils are equal, round, and reactive to light.   Cardiovascular:      Rate and Rhythm: Normal rate and regular rhythm.   Abdominal:      General: Bowel sounds are normal.      Palpations: Abdomen is soft.   Musculoskeletal:      Cervical back: Normal range of motion and neck supple.      Comments: R sided weakness   Skin:     General: Skin is warm and dry.   Neurological:      Mental Status: She is alert.      Comments: Cog impairment  Following commands   Psychiatric:         Mood and Affect: Mood and affect and mood  normal.         Behavior: Behavior normal.       NEUROLOGICAL EXAMINATION:     CRANIAL NERVES     CN III, IV, VI   Pupils are equal, round, and reactive to light.  Extraocular motions are normal.       Diagnostic Results: Labs: Reviewed  ECG: Reviewed  CT: Reviewed

## 2021-12-23 LAB
ALBUMIN SERPL BCP-MCNC: 3.1 G/DL (ref 3.5–5.2)
ALP SERPL-CCNC: 58 U/L (ref 55–135)
ALT SERPL W/O P-5'-P-CCNC: 8 U/L (ref 10–44)
ANION GAP SERPL CALC-SCNC: 10 MMOL/L (ref 8–16)
AST SERPL-CCNC: 18 U/L (ref 10–40)
BILIRUB SERPL-MCNC: 0.3 MG/DL (ref 0.1–1)
BUN SERPL-MCNC: 10 MG/DL (ref 6–20)
CALCIUM SERPL-MCNC: 8.8 MG/DL (ref 8.7–10.5)
CHLORIDE SERPL-SCNC: 106 MMOL/L (ref 95–110)
CO2 SERPL-SCNC: 23 MMOL/L (ref 23–29)
CREAT SERPL-MCNC: 0.7 MG/DL (ref 0.5–1.4)
EST. GFR  (AFRICAN AMERICAN): >60 ML/MIN/1.73 M^2
EST. GFR  (NON AFRICAN AMERICAN): >60 ML/MIN/1.73 M^2
GLUCOSE SERPL-MCNC: 146 MG/DL (ref 70–110)
POCT GLUCOSE: 118 MG/DL (ref 70–110)
POTASSIUM SERPL-SCNC: 3.5 MMOL/L (ref 3.5–5.1)
PROT SERPL-MCNC: 6.8 G/DL (ref 6–8.4)
SODIUM SERPL-SCNC: 139 MMOL/L (ref 136–145)

## 2021-12-23 PROCEDURE — 25000003 PHARM REV CODE 250: Performed by: PHYSICIAN ASSISTANT

## 2021-12-23 PROCEDURE — 25000003 PHARM REV CODE 250: Performed by: EMERGENCY MEDICINE

## 2021-12-23 PROCEDURE — 80053 COMPREHEN METABOLIC PANEL: CPT | Performed by: STUDENT IN AN ORGANIZED HEALTH CARE EDUCATION/TRAINING PROGRAM

## 2021-12-23 PROCEDURE — 63600175 PHARM REV CODE 636 W HCPCS: Performed by: PHYSICIAN ASSISTANT

## 2021-12-23 PROCEDURE — 11000001 HC ACUTE MED/SURG PRIVATE ROOM

## 2021-12-23 PROCEDURE — 36415 COLL VENOUS BLD VENIPUNCTURE: CPT | Performed by: STUDENT IN AN ORGANIZED HEALTH CARE EDUCATION/TRAINING PROGRAM

## 2021-12-23 PROCEDURE — 99233 SBSQ HOSP IP/OBS HIGH 50: CPT | Mod: GC,,, | Performed by: PSYCHIATRY & NEUROLOGY

## 2021-12-23 PROCEDURE — 97530 THERAPEUTIC ACTIVITIES: CPT

## 2021-12-23 PROCEDURE — 94761 N-INVAS EAR/PLS OXIMETRY MLT: CPT

## 2021-12-23 PROCEDURE — 97535 SELF CARE MNGMENT TRAINING: CPT

## 2021-12-23 PROCEDURE — 99900035 HC TECH TIME PER 15 MIN (STAT)

## 2021-12-23 PROCEDURE — 97530 THERAPEUTIC ACTIVITIES: CPT | Mod: CQ

## 2021-12-23 PROCEDURE — 92507 TX SP LANG VOICE COMM INDIV: CPT

## 2021-12-23 PROCEDURE — 99233 PR SUBSEQUENT HOSPITAL CARE,LEVL III: ICD-10-PCS | Mod: GC,,, | Performed by: PSYCHIATRY & NEUROLOGY

## 2021-12-23 RX ADMIN — HYDRALAZINE HYDROCHLORIDE 25 MG: 25 TABLET ORAL at 06:12

## 2021-12-23 RX ADMIN — ASPIRIN 81 MG CHEWABLE TABLET 81 MG: 81 TABLET CHEWABLE at 08:12

## 2021-12-23 RX ADMIN — HEPARIN SODIUM 5000 UNITS: 5000 INJECTION INTRAVENOUS; SUBCUTANEOUS at 09:12

## 2021-12-23 RX ADMIN — SENNOSIDES AND DOCUSATE SODIUM 1 TABLET: 50; 8.6 TABLET ORAL at 08:12

## 2021-12-23 RX ADMIN — ATORVASTATIN CALCIUM 40 MG: 20 TABLET, FILM COATED ORAL at 08:12

## 2021-12-23 RX ADMIN — HYDRALAZINE HYDROCHLORIDE 25 MG: 25 TABLET ORAL at 09:12

## 2021-12-23 RX ADMIN — HYDRALAZINE HYDROCHLORIDE 25 MG: 25 TABLET ORAL at 03:12

## 2021-12-23 RX ADMIN — HYDROCODONE BITARTRATE AND ACETAMINOPHEN 1 TABLET: 5; 325 TABLET ORAL at 08:12

## 2021-12-23 RX ADMIN — HEPARIN SODIUM 5000 UNITS: 5000 INJECTION INTRAVENOUS; SUBCUTANEOUS at 06:12

## 2021-12-23 RX ADMIN — FERROUS SULFATE TAB 325 MG (65 MG ELEMENTAL FE) 1 EACH: 325 (65 FE) TAB at 08:12

## 2021-12-23 RX ADMIN — CLOPIDOGREL 75 MG: 75 TABLET, FILM COATED ORAL at 08:12

## 2021-12-23 RX ADMIN — HEPARIN SODIUM 5000 UNITS: 5000 INJECTION INTRAVENOUS; SUBCUTANEOUS at 03:12

## 2021-12-23 RX ADMIN — ONDANSETRON 4 MG: 2 INJECTION INTRAMUSCULAR; INTRAVENOUS at 10:12

## 2021-12-23 NOTE — PLAN OF CARE
Problem: Adjustment to Illness (Stroke, Ischemic/Transient Ischemic Attack)  Goal: Optimal Coping  Outcome: Ongoing, Progressing     Problem: Communication Impairment (Stroke, Ischemic/Transient Ischemic Attack)  Goal: Improved Communication Skills  Outcome: Ongoing, Progressing   POC reviewed with pt. Pt's questions and concerns were addressed. Medications reviewed in completion with pt. No distress noted. VS WDL. Pt call light, bed in lowest and locked position, and bed alarm on. Pt had a series of imaging completed during the night shift starting at the beginning of the shift and again at the end (morning)-see nursing notes. Pt's personal belongings at the bedside.

## 2021-12-23 NOTE — SUBJECTIVE & OBJECTIVE
Neurologic Chief Complaint: RSW, aphasia     Subjective:     Interval History: Patient is seen for follow-up neurological assessment and treatment recommendations: Patient had neurological change yesterday with worsening RSW and aphasia, CT head repeated with worsening edema and mild MLS. NCC consulted and no indication for HTS and patient remained in VN floor with closer monitoring. CT head repeated this morning and stable. Patient is still having significant weakness in RUE and expressive aphasia. Will continue to monitor, D4 of  L MCA infarct currently at peak window for swelling.     HPI, Past Medical, Family, and Social History remains the same as documented in the initial encounter.     Review of Systems   Unable to perform ROS: Patient nonverbal   Constitutional: Negative for fever.   Respiratory: Negative for cough.    Gastrointestinal: Negative for blood in stool, diarrhea and vomiting.   Neurological: Positive for facial asymmetry, speech difficulty and weakness.     Scheduled Meds:   aspirin  81 mg Oral Daily    atorvastatin  40 mg Oral Daily    clopidogreL  75 mg Oral Daily    ferrous sulfate  1 tablet Oral BID    heparin (porcine)  5,000 Units Subcutaneous Q8H    hydrALAZINE  25 mg Oral Q8H    senna-docusate 8.6-50 mg  1 tablet Oral BID     Continuous Infusions:  PRN Meds:acetaminophen, albuterol-ipratropium, barium sulfate, barium sulfate, dextrose 50%, glucagon (human recombinant), HYDROcodone-acetaminophen, insulin aspart U-100, labetalol, ondansetron, sodium chloride 0.9%    Objective:     Vital Signs (Most Recent):  Temp: 97 °F (36.1 °C) (12/23/21 1604)  Pulse: 95 (12/23/21 1604)  Resp: 16 (12/23/21 1604)  BP: 139/63 (12/23/21 1604)  SpO2: (!) 92 % (12/23/21 1604)  BP Location: Left arm    Vital Signs Range (Last 24H):  Temp:  [96.6 °F (35.9 °C)-97.6 °F (36.4 °C)]   Pulse:  []   Resp:  [16-20]   BP: (124-154)/(63-78)   SpO2:  [92 %-97 %]   BP Location: Left arm    Physical  Exam  Constitutional:       Appearance: Normal appearance.   HENT:      Head: Normocephalic and atraumatic.      Nose: Nose normal.      Mouth/Throat:      Mouth: Mucous membranes are moist.   Eyes:      Extraocular Movements: Extraocular movements intact.      Pupils: Pupils are equal, round, and reactive to light.   Cardiovascular:      Rate and Rhythm: Normal rate and regular rhythm.   Pulmonary:      Breath sounds: Normal breath sounds.   Abdominal:      General: Bowel sounds are normal. There is no distension.      Palpations: Abdomen is soft.      Tenderness: There is no abdominal tenderness.   Musculoskeletal:         General: No swelling. Normal range of motion.      Cervical back: Normal range of motion.   Skin:     General: Skin is warm.      Capillary Refill: Capillary refill takes less than 2 seconds.   Neurological:      Mental Status: She is alert.         Neurological Exam:   LOC: alert  Language: Expressive aphasia, Receptive aphasia,  used   Orientation: Person, Place, Time , Not oriented to place, Not oriented to time  Facial Movement (CN VII): Lower facial weakness on the Right  Motor: Arm left  Normal 5/5  Leg left  Normal 5/5  Arm right  Plegia 0/5  Leg right Plegia 0/5  Sensation: Intact to light touch, temperature and vibration    Laboratory:  CMP:   Recent Labs   Lab 12/23/21  0953   CALCIUM 8.8   ALBUMIN 3.1*   PROT 6.8      K 3.5   CO2 23      BUN 10   CREATININE 0.7   ALKPHOS 58   ALT 8*   AST 18   BILITOT 0.3     BMP:   Recent Labs   Lab 12/23/21  0953      K 3.5      CO2 23   BUN 10   CREATININE 0.7   CALCIUM 8.8       Diagnostic Results     Brain Imaging     Ct head 12/23/21  Evolving large recent left MCA distribution infarct, without evidence of infarct extension, hemorrhagic conversion, or significant worsening of associated mass effect.     Otherwise stable exam as above.  No new major vascular distribution infarct or hemorrhage elsewhere.  Ct  head 12/22/21       Moderate to large evolving left MCA territory infarction corresponding to abnormality seen on MRI.  There is trace intermediate to hyperdensity in the precentral gyrus which may represent petechial hemorrhage versus artifact from parenchyma edema sparing.  Clinical correlation and follow-up advised     Mass effect with approximately 2 mm of rightward midline shift without evidence for hydrocephalus.        MRI Brain Perfusion/MRI ischemic protocol. Date: 12/19/21  1. Acute to subacute infarctions in the left MCA distribution as above.  No evidence of hemorrhagic transformation.  2. MR perfusion findings consistent with increased mean transit time t in he left MCA territory with normal cerebral blood volume, suggestive of ischemic penumbra.     CTA Head and Neck. Date: 12/19/21  1. Complete occlusion of the left M1 segment.  2. Frothy secretions within the trachea and fluid distention of the esophageal lumen.  Findings raise concern for aspiration.  3. Hypodensity measuring 1.3 cm in the left thyroid lobe which can be further evaluated with nonemergent thyroid ultrasound     CT Head. Date: 12/19/21  Hypoattenuation and loss of gray-white differentiation within the left insular cortex, frontal lobe, and basal ganglia compatible with an MCA territory acute infarction        Cardiac Imaging  TTE 12/20/21  · The left ventricle is normal in size with normal systolic function. The estimated ejection fraction is 65%.  · Normal right ventricular size with normal right ventricular systolic function.  · Normal left ventricular diastolic function.  · The estimated PA systolic pressure is 29 mmHg.  · Normal central venous pressure (3 mmHg).

## 2021-12-23 NOTE — PT/OT/SLP PROGRESS
Physical Therapy Treatment    Patient Name:  Aurora Ornelas   MRN:  [id number]    Recommendations:     Discharge Recommendations:  rehabilitation facility   Discharge Equipment Recommendations:  (TBD)   Barriers to discharge: Inaccessible home and Decreased caregiver support    Assessment:     Aurora Ornelas is a 56 y.o. female admitted with a medical diagnosis of Embolic stroke involving left middle cerebral artery.  She presents with the following impairments/functional limitations:  weakness,impaired endurance,impaired self care skills,impaired functional mobilty,gait instability,impaired balance,decreased upper extremity function,decreased lower extremity function,decreased safety awareness,abnormal tone,impaired fine motor,impaired coordination,decreased coordination. Pt tolerates session poorly with pt unable to consistently interact with  and c/o nausea further increasing pts inattentiveness. Pt returned to supine with further mobility deferred d/t inability to safely communicate with pt. Pt will continue to benefit from therapy services to address impairments listed above.     Rehab Prognosis: Good; patient would benefit from acute skilled PT services to address these deficits and reach maximum level of function.    Recent Surgery: * No surgery found *      Plan:     During this hospitalization, patient to be seen 4 x/week to address the identified rehab impairments via gait training,therapeutic activities,therapeutic exercises,neuromuscular re-education and progress toward the following goals:    · Plan of Care Expires:  01/19/22    Subjective     Chief Complaint: nausea  Patient/Family Comments/goals: agreeable to attempt to sit up and engage in therapy initially  Pain/Comfort:  · Pain Rating 1: 0/10  · Pain Rating Post-Intervention 1: 0/10      Objective:     Communicated with RN prior to session.  Patient found HOB elevated with bed alarm,telemetry,peripheral IV,PureWick upon PTA entry to room.     via video conference: Rajesh ID# [id number]    General Precautions: Standard, fall,deaf   Orthopedic Precautions:N/A   Braces: N/A  Respiratory Status: Room air     Functional Mobility:  · Bed Mobility:     · Rolling Left:  minimum assistance  · Supine to Sit: minimum assistance  · Sit to Supine: moderate assistance  · Transfers:  deferred  · Gait: deferred      AM-PAC 6 CLICK MOBILITY  Turning over in bed (including adjusting bedclothes, sheets and blankets)?: 3  Sitting down on and standing up from a chair with arms (e.g., wheelchair, bedside commode, etc.): 2  Moving from lying on back to sitting on the side of the bed?: 3  Moving to and from a bed to a chair (including a wheelchair)?: 2  Need to walk in hospital room?: 2  Climbing 3-5 steps with a railing?: 1  Basic Mobility Total Score: 13       Therapeutic Activities and Exercises:   Pt assisted with functional mobility as noted above.   Session limited d/t inability to effectively translate therapeutic instructions via  d/t pt with poor engagement with .     Patient left HOB elevated with all lines intact, call button in reach, bed alarm on and RN notified..    GOALS:   Multidisciplinary Problems     Physical Therapy Goals        Problem: Physical Therapy Goal    Goal Priority Disciplines Outcome Goal Variances Interventions   Physical Therapy Goal     PT, PT/OT Ongoing, Progressing     Description: Goals to be met by:      Patient will increase functional independence with mobility by performin. Supine to sit with stand by assistance   2. Sit to supine with Stand-by Assistance  3. Sit to stand transfer with Contact Guard Assistance  4. Stand pivot bed to chair contact guard assist   5. Gait  x 15 feet with Minimal Assistance using LRAD.   6. Ascend/descend 6 stair with unilateral Handrails Minimal Assistance using LRAD.   7. Stand for 1 minutes with Contact Guard Assistance using No Assistive Device to prepare  for functional activities in standing.                      Time Tracking:     PT Received On: 12/23/21  PT Start Time: 0906     PT Stop Time: 0921  PT Total Time (min): 15 min     Billable Minutes: Therapeutic Activity 15    Treatment Type: Treatment  PT/PTA: PTA     PTA Visit Number: 1     12/23/2021

## 2021-12-23 NOTE — ASSESSMENT & PLAN NOTE
Patient is a 56 y.o. female  with PMHx of HTN, HLD, asthma, and hearing loss (deaf, communicates with American Sign Language) who presented to OSH with RSW and communication difficulty. Per family, patient began having symptoms on 12/17 with a facial droop. CT Head with L frontal infarct. CTA with L M1 occlusion. Patient out of window for tPA. Patient transferred to C for possible thrombectomy. On arrival, patient taken to MRI STAT for MRI acute ischemic protocol + MR perfusion scan. MRI reviewed with neuro IR, thrombectomy deferred due to high risk of reperfusion injury given symptoms beginning ~ 48 hours ago. However, recommendation was made for admission to neuro ICU for close monitoring with low threshold for IR intervention if neuro exam declined. Echo with EF 65%.  Patient had neuro change 12/22/21 with repeat CT head with worsening edema and mild MLS. CT head this morning stable. NCC evaluated patient and no need of HTS. Patient is now having worsening RSW and receptive/expressive aphasia. Currently at peak window for swelling     Antithrombotics for secondary stroke prevention: Antiplatelets: Aspirin: 81 mg daily  Clopidogrel: 75 mg daily    Statins for secondary stroke prevention and hyperlipidemia, if present:   Statins: Atorvastatin- 40 mg daily    Aggressive risk factor modification: HTN     Rehab efforts: The patient has been evaluated by a stroke team provider and the therapy needs have been fully considered based off the presenting complaints and exam findings. The following therapy evaluations are needed: PT evaluate and treat, OT evaluate and treat, SLP evaluate and treat, PM&R evaluate for appropriate placement therapy recs IPR    Diagnostics ordered/pending: None , CT HEAD STAT for any neurological change    VTE prophylaxis: Heparin 5000 units SQ every 8 hours  Mechanical prophylaxis: Place SCDs    BP parameters: Infarct: No intervention, SBP <220

## 2021-12-23 NOTE — PT/OT/SLP PROGRESS
"Speech Language Pathology Treatment    Patient Name:  Aurora Ornelas   MRN:  [id number]  Admitting Diagnosis: Embolic stroke involving left middle cerebral artery     utilized throughout session    Recommendations:                 General Recommendations:  Speech/language therapy  Diet recommendations:  Regular, Thin   Aspiration Precautions: HOB to 90 degrees   General Precautions: Standard, fall,deaf  Communication strategies:  go to room if call light pushed and utilize  as pt uses ASL as primary language     Subjective     Pt found sleeping in bed upon SLP entry into room. Pt alert throughout session.     Patient goals: none stated     Pain/Comfort:  Pain Rating 1: 0/10  Pain Rating Post-Intervention 1: 0/10    Respiratory Status: Room air    Objective:     Has the patient been evaluated by SLP for swallowing?   Yes  Keep patient NPO? No   Current Respiratory Status:        Note: ASL contains 5 elements for linguistic construction including handshape, orientation (such as wrist shaking or movement), location (locaiotn of dominant and non-dominant hand), movement (direciotn, shape, and size of movement), and non-manual expression (including verbalizations, facial expression, and mouth shapes). These will be commented on throughout documentation to reflect pt's progress    Chart review completed prior to session. CT head without contrast completed on 12/22 at 1207 revealed "moderate to large evolving left MCA territory infarction corresponding to abnormality seen on MRI.  There is trace intermediate to hyperdensity in the precentral gyrus which may represent petechial hemorrhage versus artifact from parenchyma edema sparing." However, additional CT head without contrast studies completed on 12/23 at 0530 noted "evolving large recent left MCA distribution infarct, without evidence of infarct extension, hemorrhagic conversion, or significant worsening of associated mass effect."    Upon SLP " arrival into room, pt sleeping and difficult to awaken. Attempts to identifying item function were quickly abandoned as pt demonstrated significant difficulty with x2 trials. Given a physical item in FO1, pt identified item name x2 given MAX verbal and visual cueing.  noted pt exhibiting deficits in multiple aspects of ASL including weakened hand shape and decreased sign placement when not finger spelling. This was not present upon assessment on 12/21, just 2 days prior. Pt unable to interact with  and clinician simultaneously without significant burden which was not noted upon last session. Additionally, pt required persistent repetition of task directions (typically 3-6 times per ). Pt perseverating on incorrect spelling of item during naming tasks. Task complexity decreased to automatic speech tasks and when asked to recite the alphabet, pt unable to sequence letters S-Z correctly despite ongoing MAX cueing and omission of letters X and V noted. Pt independent when counting from 1-10 but displayed weak hand shape and poor ordering of numbers 11-20.    SLP provide ongoing education to pt regarding aphasia and ongoing SLP POC. Pt continued to perseverate on finger spelling during education and was unable to exhibit full understanding of teachings.     Assessment:     Aurora Ornelas is a 56 y.o. female with an SLP diagnosis of Aphasia. Pt with decline in functional language use this date, exhibiting poor completion of automatic speech tasks including counting and reciting alphabet despite multiple attempts at modeling tasks. Additionally, decreased ability to attend to  noted. She presents with ongoing need for speech services at current and next level of care.    Goals:   Multidisciplinary Problems     SLP Goals        Problem: SLP Goal    Goal Priority Disciplines Outcome   SLP Goal     SLP Ongoing, Progressing   Description: Speech Language Pathology Goals  Goals  expected to be met by 12/27/21    1. Pt will participate in ongoing assessment of swallow function to determine safest, least restrictive means of nutrition/hydration  2. Educate Pt and family on aspiration precautions and SLP POC  3. Pt will answer personal yes/no questions with 80% accuracy or higher, MOD A  4. Pt will follow simple commands with 80% accuracy or higher, MOD A  5. Pt will orient x3+ via any modality 90% of the time, MOD A  6. Pt will participate in ongoing assessment of cognition and visiospatial skills to determine ongoing POC needs  7. Educate Pt and family on safety awareness and compensatory strategies for communication                     Plan:     · Patient to be seen:  4 x/week   · Plan of Care expires:  01/18/22  · Plan of Care reviewed with:  patient   · SLP Follow-Up:  Yes       Discharge recommendations:  rehabilitation facility   Barriers to Discharge:  Level of Skilled Assistance Needed     Time Tracking:     SLP Treatment Date:   12/23/21  Speech Start Time:  1120  Speech Stop Time:  1154     Speech Total Time (min):  34 min    Billable Minutes: Speech Therapy Individual 25 Self Care/Home Management Training 9      12/23/2021

## 2021-12-23 NOTE — PLAN OF CARE
[facility] (H[facility])  Discharge Reassessment    Primary Care Provider: Primary Doctor No    Expected Discharge Date: 12/28/2021     Not medically ready, no accepting facility.    Reassessment (most recent)     Discharge Reassessment - 12/23/21 1337        Discharge Reassessment    Assessment Type Discharge Planning Reassessment     Did the patient's condition or plan change since previous assessment? No     Discharge Plan discussed with: Patient;Adult children     Name(s) and Number(s) Klarissa Ornelas (dtr) [phone]     Communicated CHARIS with patient/caregiver Date not available/Unable to determine     Discharge Plan A Rehab     Discharge Plan B Skilled Nursing Facility     DME Needed Upon Discharge  none     Discharge Barriers Identified None     Why the patient remains in the hospital Requires continued medical care        Post-Acute Status    Post-Acute Authorization Placement     Post-Acute Placement Status Pending post-acute provider review/more information requested     Discharge Delays None known at this time

## 2021-12-23 NOTE — PROGRESS NOTES
Arvind Beck - Neurosurgery ([facility])  Vascular Neurology  Comprehensive Stroke Center  Progress Note    Assessment/Plan:     * Embolic stroke involving left middle cerebral artery  Patient is a 56 y.o. female  with PMHx of HTN, HLD, asthma, and hearing loss (deaf, communicates with American Sign Language) who presented to OSH with RSW and communication difficulty. Per family, patient began having symptoms on 12/17 with a facial droop. CT Head with L frontal infarct. CTA with L M1 occlusion. Patient out of window for tPA. Patient [place] [facility] [place]. On arrival, patient taken to MRI STAT for MRI acute ischemic protocol + MR perfusion scan. MRI reviewed with neuro IR, thrombectomy deferred due to high risk of reperfusion injury given symptoms beginning ~ 48 hours ago. However, recommendation was made for admission to neuro ICU for close monitoring with low threshold for IR intervention if neuro exam declined. Echo with EF 65%.  Patient had neuro change 12/22/21 with repeat CT head with worsening edema and mild MLS. CT head this morning stable. NCC evaluated patient and no need of HTS. Patient is now having worsening RSW and receptive/expressive aphasia. Currently at peak window for swelling     Antithrombotics for secondary stroke prevention: Antiplatelets: Aspirin: 81 mg daily  Clopidogrel: 75 mg daily    Statins for secondary stroke prevention and hyperlipidemia, if present:   Statins: Atorvastatin- 40 mg daily    Aggressive risk factor modification: HTN     Rehab efforts: The patient has been evaluated by a stroke team provider and the therapy needs have been fully considered based off the presenting complaints and exam findings. The following therapy evaluations are needed: PT evaluate and treat, OT evaluate and treat, SLP evaluate and treat, PM&R evaluate for appropriate placement therapy recs IPR    Diagnostics ordered/pending: None , CT HEAD STAT for any neurological change    VTE  prophylaxis: Heparin 5000 units SQ every 8 hours  Mechanical prophylaxis: Place SCDs    BP parameters: Infarct: No intervention, SBP <220        Mixed hyperlipidemia  Stroke RF  .2  Atorvastatin 40    Diabetes mellitus  Stroke RF  A1c 6.1 on 12/19/21  SSI    Cytotoxic cerebral edema  Area of cerebral edema identified when reviewing brain imaging in the territory of the L middle cerebral artery. We will continue to monitor with q1h neuro checks while on floor or more frequently while in neuro critical care, as any change in the patients clinical exam may signify expansion of the insult and/or the area of the edema. Such changes may require acute interventions to prevent loss of function and/or death.    Essential hypertension  Stroke risk factor  SBP <220 in setting of acute stroke  -- Resumed hydralazine 25mg TID     Deaf  Communicates with American sign language         12/20:  used during patient interaction. Patient able to communicate name and age. Aphasic, when trying to interpret images on stroke booklet, patient signing numbers instead of (hammock, cactus). Continues with RUE weakness. No RLE drift. Echo pending. MBSS pending.   12/21:  used during patient interaction. Continues with RUE weakness. phasic, when trying to interpret images on stroke booklet, patient signing numbers instead of (hammock, cactus). Echo with EF 65%.   12/22:  used during patient interaction. Continued RUE and new RLE weakness, exam otherwise remained stable from previous. Repeat CTH obtained to evaluated further. Serial neuro exams performed.   12/23/2021 Patient had CT head done yesterday showing worsening edema and mild mid line shift. NCC consulted for possible need of HTS, but no indication and patient remained in VN floor. CT head repeated later in the day and this morning and stable. Patient is having worsening RU/RLE weakness and aphasia. She is having difficulty with understanding and  expression. Na 139 from 135 today.         STROKE DOCUMENTATION   Acute Stroke Times   Last Known Normal Date: 12/17/21  Last Known Normal Time: 0700  Unknown Normal Time: Unknown Time  Symptom Onset Date: 12/17/21  Symptom Onset Time: 0700  Unknown Symptom Onset Time: Unknown Time  Stroke Team Called Date: 12/19/21  Stroke Team Called Time: 1633  Stroke Team Arrival Date: 12/19/21  Stroke Team Arrival Time: 1637  CT Interpretation Time: 1310  Alteplase Recommended: No  CTA Interpretation Time:  (Pending)  Thrombectomy Recommended: No  MRI Acute Stroke Protocol Interpretation Time: 1709    NIH Scale:  1a. Level of Consciousness: 0-->Alert, keenly responsive  1b. LOC Questions: 2-->Answers neither question correctly  1c. LOC Commands: 1-->Performs one task correctly  2. Best Gaze: 0-->Normal  3. Visual: 0-->No visual loss  4. Facial Palsy: 1-->Minor paralysis (flattened nasolabial fold, asymmetry on smiling)  5a. Motor Arm, Left: 0-->No drift, limb holds 90 (or 45) degrees for full 10 secs  5b. Motor Arm, Right: 4-->No movement  6a. Motor Leg, Left: 0-->No drift, leg holds 30 degree position for full 5 secs  6b. Motor Leg, Right: 3-->No effort against gravity, leg falls to bed immediately  7. Limb Ataxia: 2-->Present in two limbs  8. Sensory: 0-->Normal, no sensory loss  9. Best Language: 2-->Severe aphasia, all communication is through fragmentary expression, great need for inference, questioning, and guessing by the listener. Range of information that can be exchanged is limited, listener carries burden of. . . (see row details)  10. Dysarthria: 0-->Normal  11. Extinction and Inattention (formerly Neglect): 0-->No abnormality  Total (NIH Stroke Scale): 15       Modified Hunterdon Score: 2  Darren Coma Scale:    ABCD2 Score:    [id number] Score:   HAS -BLED Score:   ICH Score:   Hunt & Nguyen Classification:      Hemorrhagic change of an Ischemic Stroke: Does this patient have an ischemic stroke with hemorrhagic  changes? No     Neurologic Chief Complaint: RSW, aphasia     Subjective:     Interval History: Patient is seen for follow-up neurological assessment and treatment recommendations: Patient had neurological change yesterday with worsening RSW and aphasia, CT head repeated with worsening edema and mild MLS. NCC consulted and no indication for HTS and patient remained in VN floor with closer monitoring. CT head repeated this morning and stable. Patient is still having significant weakness in RUE and expressive aphasia. Will continue to monitor, D4 of  L MCA infarct currently at peak window for swelling.     HPI, Past Medical, Family, and Social History remains the same as documented in the initial encounter.     Review of Systems   Unable to perform ROS: Patient nonverbal   Constitutional: Negative for fever.   Respiratory: Negative for cough.    Gastrointestinal: Negative for blood in stool, diarrhea and vomiting.   Neurological: Positive for facial asymmetry, speech difficulty and weakness.     Scheduled Meds:   aspirin  81 mg Oral Daily    atorvastatin  40 mg Oral Daily    clopidogreL  75 mg Oral Daily    ferrous sulfate  1 tablet Oral BID    heparin (porcine)  5,000 Units Subcutaneous Q8H    hydrALAZINE  25 mg Oral Q8H    senna-docusate 8.6-50 mg  1 tablet Oral BID     Continuous Infusions:  PRN Meds:acetaminophen, albuterol-ipratropium, barium sulfate, barium sulfate, dextrose 50%, glucagon (human recombinant), HYDROcodone-acetaminophen, insulin aspart U-100, labetalol, ondansetron, sodium chloride 0.9%    Objective:     Vital Signs (Most Recent):  Temp: 97 °F (36.1 °C) (12/23/21 1604)  Pulse: 95 (12/23/21 1604)  Resp: 16 (12/23/21 1604)  BP: 139/63 (12/23/21 1604)  SpO2: (!) 92 % (12/23/21 1604)  BP Location: Left arm    Vital Signs Range (Last 24H):  Temp:  [96.6 °F (35.9 °C)-97.6 °F (36.4 °C)]   Pulse:  []   Resp:  [16-20]   BP: (124-154)/(63-78)   SpO2:  [92 %-97 %]   BP Location: Left  arm    Physical Exam  Constitutional:       Appearance: Normal appearance.   HENT:      Head: Normocephalic and atraumatic.      Nose: Nose normal.      Mouth/Throat:      Mouth: Mucous membranes are moist.   Eyes:      Extraocular Movements: Extraocular movements intact.      Pupils: Pupils are equal, round, and reactive to light.   Cardiovascular:      Rate and Rhythm: Normal rate and regular rhythm.   Pulmonary:      Breath sounds: Normal breath sounds.   Abdominal:      General: Bowel sounds are normal. There is no distension.      Palpations: Abdomen is soft.      Tenderness: There is no abdominal tenderness.   Musculoskeletal:         General: No swelling. Normal range of motion.      Cervical back: Normal range of motion.   Skin:     General: Skin is warm.      Capillary Refill: Capillary refill takes less than 2 seconds.   Neurological:      Mental Status: She is alert.         Neurological Exam:   LOC: alert  Language: Expressive aphasia, Receptive aphasia,  used   Orientation: Person, Place, Time , Not oriented to place, Not oriented to time  Facial Movement (CN VII): Lower facial weakness on the Right  Motor: Arm left  Normal 5/5  Leg left  Normal 5/5  Arm right  Plegia 0/5  Leg right Plegia 0/5  Sensation: Intact to light touch, temperature and vibration    Laboratory:  CMP:   Recent Labs   Lab 12/23/21  0953   CALCIUM 8.8   ALBUMIN 3.1*   PROT 6.8      K 3.5   CO2 23      BUN 10   CREATININE 0.7   ALKPHOS 58   ALT 8*   AST 18   BILITOT 0.3     BMP:   Recent Labs   Lab 12/23/21  0953      K 3.5      CO2 23   BUN 10   CREATININE 0.7   CALCIUM 8.8       Diagnostic Results     Brain Imaging     Ct head 12/23/21  Evolving large recent left MCA distribution infarct, without evidence of infarct extension, hemorrhagic conversion, or significant worsening of associated mass effect.     Otherwise stable exam as above.  No new major vascular distribution infarct or hemorrhage  elsewhere.  Ct head 12/22/21       Moderate to large evolving left MCA territory infarction corresponding to abnormality seen on MRI.  There is trace intermediate to hyperdensity in the precentral gyrus which may represent petechial hemorrhage versus artifact from parenchyma edema sparing.  Clinical correlation and follow-up advised     Mass effect with approximately 2 mm of rightward midline shift without evidence for hydrocephalus.        MRI Brain Perfusion/MRI ischemic protocol. Date: 12/19/21  1. Acute to subacute infarctions in the left MCA distribution as above.  No evidence of hemorrhagic transformation.  2. MR perfusion findings consistent with increased mean transit time t in he left MCA territory with normal cerebral blood volume, suggestive of ischemic penumbra.     CTA Head and Neck. Date: 12/19/21  1. Complete occlusion of the left M1 segment.  2. Frothy secretions within the trachea and fluid distention of the esophageal lumen.  Findings raise concern for aspiration.  3. Hypodensity measuring 1.3 cm in the left thyroid lobe which can be further evaluated with nonemergent thyroid ultrasound     CT Head. Date: 12/19/21  Hypoattenuation and loss of gray-white differentiation within the left insular cortex, frontal lobe, and basal ganglia compatible with an MCA territory acute infarction        Cardiac Imaging  TTE 12/20/21  · The left ventricle is normal in size with normal systolic function. The estimated ejection fraction is 65%.  · Normal right ventricular size with normal right ventricular systolic function.  · Normal left ventricular diastolic function.  · The estimated PA systolic pressure is 29 mmHg.  · Normal central venous pressure (3 mmHg).        Ann Matias MD  Comprehensive Stroke Center  Department of Vascular Neurology   [facility])

## 2021-12-23 NOTE — PT/OT/SLP PROGRESS
Occupational Therapy   Treatment    Name: Aurora Ornelas  MRN: [id number]  Admitting Diagnosis: Embolic stroke involving left middle cerebral artery       Recommendations:     Discharge Recommendations: rehabilitation facility  Discharge Equipment Recommendations: TBD pending progress  Barriers to discharge: increased assistance needed    Assessment:     Aurora Ornelas is a 56 y.o. female with a medical diagnosis of Embolic stroke involving left middle cerebral artery. She presents with performance deficits including weakness,impaired endurance,impaired self care skills,impaired functional mobilty,impaired balance,decreased lower extremity function,decreased upper extremity function,decreased coordination,decreased ROM,abnormal tone. Pt demo worsening R-sided weakness compared to initial evaluation and was unable to actively move R UE or take steps this date (MD team aware). Pt would continue to benefit from OT to increase functional independence and safety. Recommend rehab upon D/C to return to [facility].    Rehab Prognosis: Good; patient would benefit from acute skilled OT services to address these deficits and reach maximum level of function.       Plan:     Patient to be seen 4 x/week to address the above listed problems via self-care/home management,therapeutic activities,neuromuscular re-education,therapeutic exercises  · Plan of Care Expires: 01/20/22  · Plan of Care Reviewed with: patient    Subjective     Pain/Comfort:  · Pain Rating 1: 0/10  · Pain Rating Post-Intervention 1: 0/10    Objective:     Communicated with: RN prior to session. Patient found with HOB elevated with bed alarm,telemetry,peripheral IV,PureWick upon OT entry to room, no family present.  Ipad with  used throughout session-- difficulty due to poor connection and multiple freezes/pauses.    General Precautions: Standard, fall,deaf   Orthopedic Precautions:N/A   Braces: N/A  Respiratory Status: Room air     Occupational  Performance:     Bed Mobility:    · Patient completed Scooting toward EOB while seated with moderate assistance  · Patient completed Supine to Sit with moderate assistance  · Patient completed Sit to Supine with minimum assistance for R LE management    Functional Mobility/Transfers:  · Patient completed Sit <> Stand Transfer with moderate assistance from EOB 3 trials with R knee blocked and cues for hand placement; pt maintained static standing briefly all trials and then returned self to sitting EOB  · Functional Mobility: Attempted side step/shuffle along EOB but pt was unable to weightshift to advance LEs    Activities of Daily Living:  · Grooming: stand by assistance while sitting EOB to wash face with L UE      AMPA 6 Click ADL: 12    Treatment & Education:  Pt sat EOB ~15 min with close SBA; completed ROM to R UE and pt unable to demo active movement-- completed PROM all planes x ~10 reps with cues to visually attend to R side; completed seated grooming task and standing trials as described above-- unable to take steps this date; educated on UE self-ROM and positioning and to call for assistance    Patient left HOB elevated with all lines intact, call button in reach and bed alarm onEducation:      GOALS:   Multidisciplinary Problems     Occupational Therapy Goals        Problem: Occupational Therapy Goal    Goal Priority Disciplines Outcome Interventions   Occupational Therapy Goal     OT, PT/OT Ongoing, Progressing    Description: Goals to be met by: 7 days (12/27/21)     Patient will increase functional independence with ADLs by performing:    UE Dressing with Minimal Assistance.  LE Dressing with Minimal Assistance.  Grooming while standing at sink with Minimal Assistance.  Toileting from toilet with Minimal Assistance for hygiene and clothing management.   Supine to sit with Contact Guard Assistance.  Toilet transfer to toilet with Minimal Assistance.  Increased functional strength to WFL for ADLs.                      Time Tracking:     OT Date of Treatment: 12/23/21  OT Start Time: 1439  OT Stop Time: 1504  OT Total Time (min): 25 min    Billable Minutes:Therapeutic Activity 25 minutes    OT/AYLA: OT          12/23/2021

## 2021-12-24 LAB
ALBUMIN SERPL BCP-MCNC: 2.8 G/DL (ref 3.5–5.2)
ALP SERPL-CCNC: 54 U/L (ref 55–135)
ALT SERPL W/O P-5'-P-CCNC: 10 U/L (ref 10–44)
ANION GAP SERPL CALC-SCNC: 9 MMOL/L (ref 8–16)
AST SERPL-CCNC: 30 U/L (ref 10–40)
BASOPHILS # BLD AUTO: 0.02 K/UL (ref 0–0.2)
BASOPHILS NFR BLD: 0.3 % (ref 0–1.9)
BILIRUB SERPL-MCNC: 0.3 MG/DL (ref 0.1–1)
BUN SERPL-MCNC: 8 MG/DL (ref 6–20)
CALCIUM SERPL-MCNC: 8.9 MG/DL (ref 8.7–10.5)
CHLORIDE SERPL-SCNC: 105 MMOL/L (ref 95–110)
CO2 SERPL-SCNC: 24 MMOL/L (ref 23–29)
CREAT SERPL-MCNC: 0.7 MG/DL (ref 0.5–1.4)
DIFFERENTIAL METHOD: ABNORMAL
EOSINOPHIL # BLD AUTO: 0.1 K/UL (ref 0–0.5)
EOSINOPHIL NFR BLD: 1.9 % (ref 0–8)
ERYTHROCYTE [DISTWIDTH] IN BLOOD BY AUTOMATED COUNT: 21.7 % (ref 11.5–14.5)
EST. GFR  (AFRICAN AMERICAN): >60 ML/MIN/1.73 M^2
EST. GFR  (NON AFRICAN AMERICAN): >60 ML/MIN/1.73 M^2
GLUCOSE SERPL-MCNC: 100 MG/DL (ref 70–110)
HCT VFR BLD AUTO: 28.2 % (ref 37–48.5)
HGB BLD-MCNC: 8.1 G/DL (ref 12–16)
IMM GRANULOCYTES # BLD AUTO: 0.02 K/UL (ref 0–0.04)
IMM GRANULOCYTES NFR BLD AUTO: 0.3 % (ref 0–0.5)
LYMPHOCYTES # BLD AUTO: 1.5 K/UL (ref 1–4.8)
LYMPHOCYTES NFR BLD: 21.7 % (ref 18–48)
MCH RBC QN AUTO: 22.6 PG (ref 27–31)
MCHC RBC AUTO-ENTMCNC: 28.7 G/DL (ref 32–36)
MCV RBC AUTO: 79 FL (ref 82–98)
MONOCYTES # BLD AUTO: 0.7 K/UL (ref 0.3–1)
MONOCYTES NFR BLD: 10.9 % (ref 4–15)
NEUTROPHILS # BLD AUTO: 4.4 K/UL (ref 1.8–7.7)
NEUTROPHILS NFR BLD: 64.9 % (ref 38–73)
NRBC BLD-RTO: 0 /100 WBC
PLATELET # BLD AUTO: 370 K/UL (ref 150–450)
PMV BLD AUTO: 10.1 FL (ref 9.2–12.9)
POCT GLUCOSE: 102 MG/DL (ref 70–110)
POCT GLUCOSE: 124 MG/DL (ref 70–110)
POCT GLUCOSE: 125 MG/DL (ref 70–110)
POTASSIUM SERPL-SCNC: 3.7 MMOL/L (ref 3.5–5.1)
PROT SERPL-MCNC: 6.4 G/DL (ref 6–8.4)
RBC # BLD AUTO: 3.58 M/UL (ref 4–5.4)
SODIUM SERPL-SCNC: 138 MMOL/L (ref 136–145)
WBC # BLD AUTO: 6.78 K/UL (ref 3.9–12.7)

## 2021-12-24 PROCEDURE — 11000001 HC ACUTE MED/SURG PRIVATE ROOM

## 2021-12-24 PROCEDURE — 80053 COMPREHEN METABOLIC PANEL: CPT | Performed by: STUDENT IN AN ORGANIZED HEALTH CARE EDUCATION/TRAINING PROGRAM

## 2021-12-24 PROCEDURE — 99233 PR SUBSEQUENT HOSPITAL CARE,LEVL III: ICD-10-PCS | Mod: ,,, | Performed by: PSYCHIATRY & NEUROLOGY

## 2021-12-24 PROCEDURE — 85025 COMPLETE CBC W/AUTO DIFF WBC: CPT | Performed by: STUDENT IN AN ORGANIZED HEALTH CARE EDUCATION/TRAINING PROGRAM

## 2021-12-24 PROCEDURE — 99900035 HC TECH TIME PER 15 MIN (STAT)

## 2021-12-24 PROCEDURE — 94761 N-INVAS EAR/PLS OXIMETRY MLT: CPT

## 2021-12-24 PROCEDURE — 36415 COLL VENOUS BLD VENIPUNCTURE: CPT | Performed by: STUDENT IN AN ORGANIZED HEALTH CARE EDUCATION/TRAINING PROGRAM

## 2021-12-24 PROCEDURE — 99233 SBSQ HOSP IP/OBS HIGH 50: CPT | Mod: ,,, | Performed by: PSYCHIATRY & NEUROLOGY

## 2021-12-24 PROCEDURE — 25000003 PHARM REV CODE 250: Performed by: PHYSICIAN ASSISTANT

## 2021-12-24 PROCEDURE — 63600175 PHARM REV CODE 636 W HCPCS: Performed by: PHYSICIAN ASSISTANT

## 2021-12-24 PROCEDURE — 25000003 PHARM REV CODE 250: Performed by: EMERGENCY MEDICINE

## 2021-12-24 RX ADMIN — FERROUS SULFATE TAB 325 MG (65 MG ELEMENTAL FE) 1 EACH: 325 (65 FE) TAB at 08:12

## 2021-12-24 RX ADMIN — HYDRALAZINE HYDROCHLORIDE 25 MG: 25 TABLET ORAL at 03:12

## 2021-12-24 RX ADMIN — HYDRALAZINE HYDROCHLORIDE 25 MG: 25 TABLET ORAL at 06:12

## 2021-12-24 RX ADMIN — HEPARIN SODIUM 5000 UNITS: 5000 INJECTION INTRAVENOUS; SUBCUTANEOUS at 03:12

## 2021-12-24 RX ADMIN — CLOPIDOGREL 75 MG: 75 TABLET, FILM COATED ORAL at 08:12

## 2021-12-24 RX ADMIN — ACETAMINOPHEN 650 MG: 325 TABLET ORAL at 12:12

## 2021-12-24 RX ADMIN — HEPARIN SODIUM 5000 UNITS: 5000 INJECTION INTRAVENOUS; SUBCUTANEOUS at 06:12

## 2021-12-24 RX ADMIN — ASPIRIN 81 MG CHEWABLE TABLET 81 MG: 81 TABLET CHEWABLE at 08:12

## 2021-12-24 RX ADMIN — SENNOSIDES AND DOCUSATE SODIUM 1 TABLET: 50; 8.6 TABLET ORAL at 08:12

## 2021-12-24 RX ADMIN — ATORVASTATIN CALCIUM 40 MG: 20 TABLET, FILM COATED ORAL at 08:12

## 2021-12-24 NOTE — PROGRESS NOTES
Arvind Beck - Neurosurgery ([facility])  Vascular Neurology  Comprehensive Stroke Center  Progress Note    Assessment/Plan:     * Embolic stroke involving left middle cerebral artery  Patient is a 56 y.o. female  with PMHx of HTN, HLD, asthma, and hearing loss (deaf, communicates with American Sign Language) who presented to OSH with RSW and communication difficulty. Per family, patient began having symptoms on 12/17 with a facial droop. CT Head with L frontal infarct. CTA with L M1 occlusion. Patient out of window for tPA. [place] [facility] [place] On arrival, patient taken to MRI STAT for MRI acute ischemic protocol + MR perfusion scan. MRI reviewed with neuro IR, thrombectomy deferred due to high risk of reperfusion injury given symptoms beginning ~ 48 hours ago. However, recommendation was made for admission to neuro ICU for close monitoring with low threshold for IR intervention if neuro exam declined. Echo with EF 65%.  Patient had neuro change 12/22/21 with repeat CT head with worsening edema and mild MLS. CT head has been stable. NCC evaluated patient and no need of HTS. Patient now 6-7 days post infarct, there there is less concern for progression of edema.    Antithrombotics for secondary stroke prevention: Antiplatelets: Aspirin: 81 mg daily  Clopidogrel: 75 mg daily    Statins for secondary stroke prevention and hyperlipidemia, if present: Statins: Atorvastatin- 40 mg daily    Aggressive risk factor modification: HTN     Rehab efforts: The patient has been evaluated by a stroke team provider and the therapy needs have been fully considered based off the presenting complaints and exam findings. The following therapy evaluations are needed: PT evaluate and treat, OT evaluate and treat, SLP evaluate and treat, PM&R evaluate for appropriate placement therapy recs IPR    Diagnostics ordered/pending: None , CT HEAD STAT for any neurological change    VTE prophylaxis: Heparin 5000 units  SQ every 8 hours  Mechanical prophylaxis: Place SCDs    BP parameters: Infarct: SBP <180        Mixed hyperlipidemia  Stroke RF  .2  Atorvastatin 40    Diabetes mellitus  Stroke RF  A1c 6.1 on 12/19/21  SSI    Cytotoxic cerebral edema  Area of cerebral edema identified when reviewing brain imaging in the territory of the L middle cerebral artery. We will continue to monitor with q1h neuro checks while on floor or more frequently while in neuro critical care, as any change in the patients clinical exam may signify expansion of the insult and/or the area of the edema. Such changes may require acute interventions to prevent loss of function and/or death.    Essential hypertension  Stroke risk factor  SBP <180  Continue hydralazine 25mg TID     Deaf  Communicates with American sign language    Severe persistent asthma with acute exacerbation  duonebs prn         12/20:  used during patient interaction. Patient able to communicate name and age. Aphasic, when trying to interpret images on stroke booklet, patient signing numbers instead of (hammock, cactus). Continues with RUE weakness. No RLE drift. Echo pending. MBSS pending.   12/21:  used during patient interaction. Continues with RUE weakness. phasic, when trying to interpret images on stroke booklet, patient signing numbers instead of (hammock, cactus). Echo with EF 65%.   12/22:  used during patient interaction. Continued RUE and new RLE weakness, exam otherwise remained stable from previous. Repeat CTH obtained to evaluated further. Serial neuro exams performed.   12/23/2021 Patient had CT head done yesterday showing worsening edema and mild mid line shift. NCC consulted for possible need of HTS, but no indication and patient remained in VN floor. CT head repeated later in the day and this morning and stable. Patient is having worsening RU/RLE weakness and aphasia. She is having difficulty with understanding and expression.  Na 139 from 135 today.   12/24 medically ready for discharge, patient without accepting rehab facility at this time, complaining of headache this morning and nurse instructed to give prn tylenol      STROKE DOCUMENTATION   Acute Stroke Times   Last Known Normal Date: 12/17/21  Last Known Normal Time: 0700  Unknown Normal Time: Unknown Time  Symptom Onset Date: 12/17/21  Symptom Onset Time: 0700  Unknown Symptom Onset Time: Unknown Time  Stroke Team Called Date: 12/19/21  Stroke Team Called Time: 1633  Stroke Team Arrival Date: 12/19/21  Stroke Team Arrival Time: 1637  CT Interpretation Time: 1310  Alteplase Recommended: No  CTA Interpretation Time:  (Pending)  Thrombectomy Recommended: No  MRI Acute Stroke Protocol Interpretation Time: 1709    NIH Scale:  1a. Level of Consciousness: 0-->Alert, keenly responsive  1b. LOC Questions: 2-->Answers neither question correctly  1c. LOC Commands: 0-->Performs both tasks correctly  2. Best Gaze: 0-->Normal  3. Visual: 0-->No visual loss  4. Facial Palsy: 1-->Minor paralysis (flattened nasolabial fold, asymmetry on smiling)  5a. Motor Arm, Left: 0-->No drift, limb holds 90 (or 45) degrees for full 10 secs  5b. Motor Arm, Right: 4-->No movement  6a. Motor Leg, Left: 0-->No drift, leg holds 30 degree position for full 5 secs  6b. Motor Leg, Right: 4-->No movement  7. Limb Ataxia: 0-->Absent  8. Sensory: 0-->Normal, no sensory loss  9. Best Language: 2-->Severe aphasia, all communication is through fragmentary expression, great need for inference, questioning, and guessing by the listener. Range of information that can be exchanged is limited, listener carries burden of. . . (see row details)  10. Dysarthria: 0-->Normal  11. Extinction and Inattention (formerly Neglect): 0-->No abnormality  Total (NIH Stroke Scale): 13       Modified Denver Score: 2  Darren Coma Scale:    ABCD2 Score:    [id number] Score:   HAS -BLED Score:   ICH Score:   Hunt & Nguyen Classification:       Hemorrhagic change of an Ischemic Stroke: Does this patient have an ischemic stroke with hemorrhagic changes? Yes, Grading Scale: HI Type 1 (HI-1) = small petechiae along the margins of the infarct. Is this a symptomatic change?  No - Hemorrhage is not clinically significant     Neurologic Chief Complaint: RSW, aphasia     Subjective:     Interval History: Patient is seen for follow-up neurological assessment and treatment recommendations: medically ready for discharge, patient without accepting rehab facility at this time, complaining of headache this morning and nurse instructed to give prn tylenol    HPI, Past Medical, Family, and Social History remains the same as documented in the initial encounter.     Review of Systems   Constitutional: Negative for fever.   Respiratory: Negative for cough.    Gastrointestinal: Positive for nausea (intermittent). Negative for blood in stool, diarrhea and vomiting.   Neurological: Positive for facial asymmetry, speech difficulty, weakness and headaches. Negative for numbness.     Scheduled Meds:   aspirin  81 mg Oral Daily    atorvastatin  40 mg Oral Daily    clopidogreL  75 mg Oral Daily    ferrous sulfate  1 tablet Oral BID    heparin (porcine)  5,000 Units Subcutaneous Q8H    hydrALAZINE  25 mg Oral Q8H    senna-docusate 8.6-50 mg  1 tablet Oral BID     Continuous Infusions:  PRN Meds:acetaminophen, albuterol-ipratropium, barium sulfate, barium sulfate, dextrose 50%, glucagon (human recombinant), HYDROcodone-acetaminophen, insulin aspart U-100, labetalol, ondansetron, sodium chloride 0.9%    Objective:     Vital Signs (Most Recent):  Temp: 99.4 °F (37.4 °C) (12/24/21 0735)  Pulse: 90 (12/24/21 0735)  Resp: 17 (12/24/21 0735)  BP: 138/62 (12/24/21 0735)  SpO2: (!) 94 % (12/24/21 0735)  BP Location: Left arm    Vital Signs Range (Last 24H):  Temp:  [96.4 °F (35.8 °C)-99.4 °F (37.4 °C)]   Pulse:  []   Resp:  [16-19]   BP: (126-160)/(59-74)   SpO2:  [92 %-97 %]    BP Location: Left arm    Physical Exam  Vitals reviewed.   Constitutional:       General: She is not in acute distress.  HENT:      Head: Normocephalic and atraumatic.   Cardiovascular:      Rate and Rhythm: Normal rate.   Pulmonary:      Effort: Pulmonary effort is normal. No respiratory distress.   Skin:     General: Skin is warm and dry.   Neurological:      Mental Status: She is alert.         Neurological Exam:   LOC: alert  Attention Span: Good   Language: Expressive aphasia  Orientation: Untestable due to severe aphasia   Visual Fields: Full  EOM (CN III, IV, VI): Full/intact  Facial Movement (CN VII): Lower facial weakness on the Right  Motor: Arm left  Normal 5/5  Leg left  Normal 5/5  Arm right  Plegia 0/5  Leg right Plegia 0/5  Sensation: Intact to light touch, temperature and vibration  Tone: Flaccid  RUE    Laboratory:  CMP:   Recent Labs   Lab 12/24/21  0450   CALCIUM 8.9   ALBUMIN 2.8*   PROT 6.4      K 3.7   CO2 24      BUN 8   CREATININE 0.7   ALKPHOS 54*   ALT 10   AST 30   BILITOT 0.3     BMP:   Recent Labs   Lab 12/24/21  0450      K 3.7      CO2 24   BUN 8   CREATININE 0.7   CALCIUM 8.9       Diagnostic Results     Brain Imaging     Ct head 12/23/21  Evolving large recent left MCA distribution infarct, without evidence of infarct extension, hemorrhagic conversion, or significant worsening of associated mass effect.     Otherwise stable exam as above.  No new major vascular distribution infarct or hemorrhage elsewhere.  Ct head 12/22/21       Moderate to large evolving left MCA territory infarction corresponding to abnormality seen on MRI.  There is trace intermediate to hyperdensity in the precentral gyrus which may represent petechial hemorrhage versus artifact from parenchyma edema sparing.  Clinical correlation and follow-up advised     Mass effect with approximately 2 mm of rightward midline shift without evidence for hydrocephalus.      MRI Brain Perfusion/MRI  ischemic protocol. Date: 12/19/21  1. Acute to subacute infarctions in the left MCA distribution as above.  No evidence of hemorrhagic transformation.  2. MR perfusion findings consistent with increased mean transit time t in he left MCA territory with normal cerebral blood volume, suggestive of ischemic penumbra.     CTA Head and Neck. Date: 12/19/21  1. Complete occlusion of the left M1 segment.  2. Frothy secretions within the trachea and fluid distention of the esophageal lumen.  Findings raise concern for aspiration.  3. Hypodensity measuring 1.3 cm in the left thyroid lobe which can be further evaluated with nonemergent thyroid ultrasound     CT Head. Date: 12/19/21  Hypoattenuation and loss of gray-white differentiation within the left insular cortex, frontal lobe, and basal ganglia compatible with an MCA territory acute infarction        Cardiac Imaging  TTE 12/20/21  · The left ventricle is normal in size with normal systolic function. The estimated ejection fraction is 65%.  · Normal right ventricular size with normal right ventricular systolic function.  · Normal left ventricular diastolic function.  · The estimated PA systolic pressure is 29 mmHg.  · Normal central venous pressure (3 mmHg).        Ananya Moore PA-C  Comprehensive Stroke Center  Department of Vascular Neurology   [facility])

## 2021-12-24 NOTE — SUBJECTIVE & OBJECTIVE
Neurologic Chief Complaint: RSW, aphasia     Subjective:     Interval History: Patient is seen for follow-up neurological assessment and treatment recommendations: medically ready for discharge, patient without accepting rehab facility at this time, complaining of headache this morning and nurse instructed to give prn tylenol    HPI, Past Medical, Family, and Social History remains the same as documented in the initial encounter.     Review of Systems   Constitutional: Negative for fever.   Respiratory: Negative for cough.    Gastrointestinal: Positive for nausea (intermittent). Negative for blood in stool, diarrhea and vomiting.   Neurological: Positive for facial asymmetry, speech difficulty, weakness and headaches. Negative for numbness.     Scheduled Meds:   aspirin  81 mg Oral Daily    atorvastatin  40 mg Oral Daily    clopidogreL  75 mg Oral Daily    ferrous sulfate  1 tablet Oral BID    heparin (porcine)  5,000 Units Subcutaneous Q8H    hydrALAZINE  25 mg Oral Q8H    senna-docusate 8.6-50 mg  1 tablet Oral BID     Continuous Infusions:  PRN Meds:acetaminophen, albuterol-ipratropium, barium sulfate, barium sulfate, dextrose 50%, glucagon (human recombinant), HYDROcodone-acetaminophen, insulin aspart U-100, labetalol, ondansetron, sodium chloride 0.9%    Objective:     Vital Signs (Most Recent):  Temp: 99.4 °F (37.4 °C) (12/24/21 0735)  Pulse: 90 (12/24/21 0735)  Resp: 17 (12/24/21 0735)  BP: 138/62 (12/24/21 0735)  SpO2: (!) 94 % (12/24/21 0735)  BP Location: Left arm    Vital Signs Range (Last 24H):  Temp:  [96.4 °F (35.8 °C)-99.4 °F (37.4 °C)]   Pulse:  []   Resp:  [16-19]   BP: (126-160)/(59-74)   SpO2:  [92 %-97 %]   BP Location: Left arm    Physical Exam  Vitals reviewed.   Constitutional:       General: She is not in acute distress.  HENT:      Head: Normocephalic and atraumatic.   Cardiovascular:      Rate and Rhythm: Normal rate.   Pulmonary:      Effort: Pulmonary effort is normal. No  respiratory distress.   Skin:     General: Skin is warm and dry.   Neurological:      Mental Status: She is alert.         Neurological Exam:   LOC: alert  Attention Span: Good   Language: Expressive aphasia  Orientation: Untestable due to severe aphasia   Visual Fields: Full  EOM (CN III, IV, VI): Full/intact  Facial Movement (CN VII): Lower facial weakness on the Right  Motor: Arm left  Normal 5/5  Leg left  Normal 5/5  Arm right  Plegia 0/5  Leg right Plegia 0/5  Sensation: Intact to light touch, temperature and vibration  Tone: Flaccid  RUE    Laboratory:  CMP:   Recent Labs   Lab 12/24/21  0450   CALCIUM 8.9   ALBUMIN 2.8*   PROT 6.4      K 3.7   CO2 24      BUN 8   CREATININE 0.7   ALKPHOS 54*   ALT 10   AST 30   BILITOT 0.3     BMP:   Recent Labs   Lab 12/24/21  0450      K 3.7      CO2 24   BUN 8   CREATININE 0.7   CALCIUM 8.9       Diagnostic Results     Brain Imaging     Ct head 12/23/21  Evolving large recent left MCA distribution infarct, without evidence of infarct extension, hemorrhagic conversion, or significant worsening of associated mass effect.     Otherwise stable exam as above.  No new major vascular distribution infarct or hemorrhage elsewhere.  Ct head 12/22/21       Moderate to large evolving left MCA territory infarction corresponding to abnormality seen on MRI.  There is trace intermediate to hyperdensity in the precentral gyrus which may represent petechial hemorrhage versus artifact from parenchyma edema sparing.  Clinical correlation and follow-up advised     Mass effect with approximately 2 mm of rightward midline shift without evidence for hydrocephalus.      MRI Brain Perfusion/MRI ischemic protocol. Date: 12/19/21  1. Acute to subacute infarctions in the left MCA distribution as above.  No evidence of hemorrhagic transformation.  2. MR perfusion findings consistent with increased mean transit time t in he left MCA territory with normal cerebral blood volume,  suggestive of ischemic penumbra.     CTA Head and Neck. Date: 12/19/21  1. Complete occlusion of the left M1 segment.  2. Frothy secretions within the trachea and fluid distention of the esophageal lumen.  Findings raise concern for aspiration.  3. Hypodensity measuring 1.3 cm in the left thyroid lobe which can be further evaluated with nonemergent thyroid ultrasound     CT Head. Date: 12/19/21  Hypoattenuation and loss of gray-white differentiation within the left insular cortex, frontal lobe, and basal ganglia compatible with an MCA territory acute infarction        Cardiac Imaging  TTE 12/20/21  · The left ventricle is normal in size with normal systolic function. The estimated ejection fraction is 65%.  · Normal right ventricular size with normal right ventricular systolic function.  · Normal left ventricular diastolic function.  · The estimated PA systolic pressure is 29 mmHg.  · Normal central venous pressure (3 mmHg).

## 2021-12-24 NOTE — ASSESSMENT & PLAN NOTE
Patient is a 56 y.o. female  with PMHx of HTN, HLD, asthma, and hearing loss (deaf, communicates with American Sign Language) who presented to OSH with RSW and communication difficulty. Per family, patient began having symptoms on 12/17 with a facial droop. CT Head with L frontal infarct. CTA with L M1 occlusion. Patient out of window for tPA. Patient transferred to [facility] for possible thrombectomy. On arrival, patient taken to MRI STAT for MRI acute ischemic protocol + MR perfusion scan. MRI reviewed with neuro IR, thrombectomy deferred due to high risk of reperfusion injury given symptoms beginning ~ 48 hours ago. However, recommendation was made for admission to neuro ICU for close monitoring with low threshold for IR intervention if neuro exam declined. Echo with EF 65%.  Patient had neuro change 12/22/21 with repeat CT head with worsening edema and mild MLS. CT head has been stable. NCC evaluated patient and no need of HTS. Patient now 6-7 days post infarct, there there is less concern for progression of edema.    Antithrombotics for secondary stroke prevention: Antiplatelets: Aspirin: 81 mg daily  Clopidogrel: 75 mg daily    Statins for secondary stroke prevention and hyperlipidemia, if present: Statins: Atorvastatin- 40 mg daily    Aggressive risk factor modification: HTN     Rehab efforts: The patient has been evaluated by a stroke team provider and the therapy needs have been fully considered based off the presenting complaints and exam findings. The following therapy evaluations are needed: PT evaluate and treat, OT evaluate and treat, SLP evaluate and treat, PM&R evaluate for appropriate placement therapy recs IPR    Diagnostics ordered/pending: None , CT HEAD STAT for any neurological change    VTE prophylaxis: Heparin 5000 units SQ every 8 hours  Mechanical prophylaxis: Place SCDs    BP parameters: Infarct: SBP <180

## 2021-12-24 NOTE — PLAN OF CARE
Problem: Adjustment to Illness (Stroke, Ischemic/Transient Ischemic Attack)  Goal: Optimal Coping  Outcome: Ongoing, Progressing     Problem: Communication Impairment (Stroke, Ischemic/Transient Ischemic Attack)  Goal: Improved Communication Skills  Outcome: Ongoing, Progressing   POC reviewed with pt. Pt's questions and concerns were addressed. Medications reviewed in completion with pt. No distress noted. VS WDL. Pt call light, bed in lowest and locked position, and bed alarm on. For the sake of safety and caution; the pt will now have meds crushed and placed in pudding-this is a result of the imaging and stroke results of the pt's status and progress.  Pt's personal belongings at the bedside.

## 2021-12-25 LAB — POCT GLUCOSE: 176 MG/DL (ref 70–110)

## 2021-12-25 PROCEDURE — 25000003 PHARM REV CODE 250: Performed by: EMERGENCY MEDICINE

## 2021-12-25 PROCEDURE — 99233 SBSQ HOSP IP/OBS HIGH 50: CPT | Mod: ,,, | Performed by: PSYCHIATRY & NEUROLOGY

## 2021-12-25 PROCEDURE — 25000003 PHARM REV CODE 250: Performed by: STUDENT IN AN ORGANIZED HEALTH CARE EDUCATION/TRAINING PROGRAM

## 2021-12-25 PROCEDURE — 63600175 PHARM REV CODE 636 W HCPCS: Performed by: PHYSICIAN ASSISTANT

## 2021-12-25 PROCEDURE — 99233 PR SUBSEQUENT HOSPITAL CARE,LEVL III: ICD-10-PCS | Mod: ,,, | Performed by: PSYCHIATRY & NEUROLOGY

## 2021-12-25 PROCEDURE — 11000001 HC ACUTE MED/SURG PRIVATE ROOM

## 2021-12-25 PROCEDURE — 25000003 PHARM REV CODE 250: Performed by: PHYSICIAN ASSISTANT

## 2021-12-25 RX ORDER — POLYETHYLENE GLYCOL 3350 17 G/17G
17 POWDER, FOR SOLUTION ORAL DAILY
Status: DISCONTINUED | OUTPATIENT
Start: 2021-12-25 | End: 2021-12-26

## 2021-12-25 RX ADMIN — FERROUS SULFATE TAB 325 MG (65 MG ELEMENTAL FE) 1 EACH: 325 (65 FE) TAB at 09:12

## 2021-12-25 RX ADMIN — POLYETHYLENE GLYCOL 3350 17 G: 17 POWDER, FOR SOLUTION ORAL at 11:12

## 2021-12-25 RX ADMIN — HYDRALAZINE HYDROCHLORIDE 25 MG: 25 TABLET ORAL at 10:12

## 2021-12-25 RX ADMIN — HYDRALAZINE HYDROCHLORIDE 25 MG: 25 TABLET ORAL at 12:12

## 2021-12-25 RX ADMIN — HEPARIN SODIUM 5000 UNITS: 5000 INJECTION INTRAVENOUS; SUBCUTANEOUS at 06:12

## 2021-12-25 RX ADMIN — HEPARIN SODIUM 5000 UNITS: 5000 INJECTION INTRAVENOUS; SUBCUTANEOUS at 10:12

## 2021-12-25 RX ADMIN — HEPARIN SODIUM 5000 UNITS: 5000 INJECTION INTRAVENOUS; SUBCUTANEOUS at 03:12

## 2021-12-25 RX ADMIN — SENNOSIDES AND DOCUSATE SODIUM 1 TABLET: 50; 8.6 TABLET ORAL at 12:12

## 2021-12-25 RX ADMIN — SENNOSIDES AND DOCUSATE SODIUM 1 TABLET: 50; 8.6 TABLET ORAL at 09:12

## 2021-12-25 RX ADMIN — HYDRALAZINE HYDROCHLORIDE 25 MG: 25 TABLET ORAL at 03:12

## 2021-12-25 RX ADMIN — HYDRALAZINE HYDROCHLORIDE 25 MG: 25 TABLET ORAL at 06:12

## 2021-12-25 RX ADMIN — FERROUS SULFATE TAB 325 MG (65 MG ELEMENTAL FE) 1 EACH: 325 (65 FE) TAB at 12:12

## 2021-12-25 RX ADMIN — FERROUS SULFATE TAB 325 MG (65 MG ELEMENTAL FE) 1 EACH: 325 (65 FE) TAB at 10:12

## 2021-12-25 RX ADMIN — CLOPIDOGREL 75 MG: 75 TABLET, FILM COATED ORAL at 09:12

## 2021-12-25 RX ADMIN — ASPIRIN 81 MG CHEWABLE TABLET 81 MG: 81 TABLET CHEWABLE at 09:12

## 2021-12-25 RX ADMIN — SENNOSIDES AND DOCUSATE SODIUM 1 TABLET: 50; 8.6 TABLET ORAL at 10:12

## 2021-12-25 RX ADMIN — HEPARIN SODIUM 5000 UNITS: 5000 INJECTION INTRAVENOUS; SUBCUTANEOUS at 12:12

## 2021-12-25 RX ADMIN — ATORVASTATIN CALCIUM 40 MG: 20 TABLET, FILM COATED ORAL at 09:12

## 2021-12-25 NOTE — SUBJECTIVE & OBJECTIVE
Neurologic Chief Complaint: Right sided weakness, aphasia    Subjective:     Interval History: Patient is seen for follow-up neurological assessment and treatment recommendations: no acute events overnight. Neuro exam is stable, patient is awake and alert, able to feed herself. Still having some expressive aphasia, oriented to self, not to place and time but with repetition able to say she is in the hospital for stroke. No movement at her right side.     HPI, Past Medical, Family, and Social History remains the same as documented in the initial encounter.     Review of Systems   Constitutional: Negative for appetite change and fever.   HENT: Negative for trouble swallowing.    Respiratory: Positive for cough.    Gastrointestinal: Negative for diarrhea and vomiting.   Musculoskeletal: Positive for gait problem.   Skin: Negative for rash.   Neurological: Positive for facial asymmetry, speech difficulty and weakness.     Scheduled Meds:   aspirin  81 mg Oral Daily    atorvastatin  40 mg Oral Daily    clopidogreL  75 mg Oral Daily    ferrous sulfate  1 tablet Oral BID    heparin (porcine)  5,000 Units Subcutaneous Q8H    hydrALAZINE  25 mg Oral Q8H    senna-docusate 8.6-50 mg  1 tablet Oral BID     Continuous Infusions:  PRN Meds:acetaminophen, albuterol-ipratropium, barium sulfate, barium sulfate, dextrose 50%, glucagon (human recombinant), HYDROcodone-acetaminophen, insulin aspart U-100, labetalol, ondansetron, sodium chloride 0.9%    Objective:     Vital Signs (Most Recent):  Temp: 99.4 °F (37.4 °C) (12/25/21 0836)  Pulse: 108 (12/25/21 0836)  Resp: 17 (12/25/21 0836)  BP: 124/65 (12/25/21 0836)  SpO2: (!) 94 % (12/25/21 0836)  BP Location: Left arm    Vital Signs Range (Last 24H):  Temp:  [97.3 °F (36.3 °C)-99.4 °F (37.4 °C)]   Pulse:  []   Resp:  [17-18]   BP: (124-135)/(60-83)   SpO2:  [94 %-98 %]   BP Location: Left arm    Physical Exam  Constitutional:       Appearance: Normal appearance.   HENT:       Head: Normocephalic and atraumatic.      Nose: Nose normal.      Mouth/Throat:      Mouth: Mucous membranes are moist.   Eyes:      Extraocular Movements: Extraocular movements intact.      Pupils: Pupils are equal, round, and reactive to light.   Cardiovascular:      Rate and Rhythm: Normal rate and regular rhythm.   Pulmonary:      Effort: No respiratory distress.   Abdominal:      General: Bowel sounds are normal. There is no distension.      Palpations: Abdomen is soft.      Tenderness: There is no abdominal tenderness.   Musculoskeletal:         General: No swelling. Normal range of motion.      Cervical back: Normal range of motion.   Skin:     General: Skin is warm.      Capillary Refill: Capillary refill takes less than 2 seconds.   Neurological:      Mental Status: She is alert. She is disoriented.      Cranial Nerves: Cranial nerve deficit present.      Motor: Weakness present.         Neurological Exam:   LOC: alert  Attention Span: Good   Language: Expressive aphasia, Receptive aphasia  Orientation: Not oriented to place, Not oriented to time  EOM (CN III, IV, VI): Full/intact  Facial Movement (CN VII): Lower facial weakness on the Right  Motor: Arm left  Normal 5/5  Leg left  Normal 5/5  Arm right  Plegia 0/5  Leg right Plegia 0/5    Laboratory:  CMP: No results for input(s): GLUCOSE, CALCIUM, ALBUMIN, PROT, NA, K, CO2, CL, BUN, CREATININE, ALKPHOS, ALT, AST, BILITOT in the last 24 hours.  BMP:   Recent Labs   Lab 12/24/21  0450      K 3.7      CO2 24   BUN 8   CREATININE 0.7   CALCIUM 8.9       Diagnostic Results     Brain Imaging   Ct head 12/23/21  Evolving large recent left MCA distribution infarct, without evidence of infarct extension, hemorrhagic conversion, or significant worsening of associated mass effect.     Otherwise stable exam as above.  No new major vascular distribution infarct or hemorrhage elsewhere.  Ct head 12/22/21        Moderate to large evolving left MCA territory  infarction corresponding to abnormality seen on MRI.  There is trace intermediate to hyperdensity in the precentral gyrus which may represent petechial hemorrhage versus artifact from parenchyma edema sparing.  Clinical correlation and follow-up advised     Mass effect with approximately 2 mm of rightward midline shift without evidence for hydrocephalus.        MRI Brain Perfusion/MRI ischemic protocol. Date: 12/19/21  1. Acute to subacute infarctions in the left MCA distribution as above.  No evidence of hemorrhagic transformation.  2. MR perfusion findings consistent with increased mean transit time t in he left MCA territory with normal cerebral blood volume, suggestive of ischemic penumbra.     CTA Head and Neck. Date: 12/19/21  1. Complete occlusion of the left M1 segment.  2. Frothy secretions within the trachea and fluid distention of the esophageal lumen.  Findings raise concern for aspiration.  3. Hypodensity measuring 1.3 cm in the left thyroid lobe which can be further evaluated with nonemergent thyroid ultrasound     CT Head. Date: 12/19/21  Hypoattenuation and loss of gray-white differentiation within the left insular cortex, frontal lobe, and basal ganglia compatible with an MCA territory acute infarction        Cardiac Imaging  TTE 12/20/21  · The left ventricle is normal in size with normal systolic function. The estimated ejection fraction is 65%.  · Normal right ventricular size with normal right ventricular systolic function.  · Normal left ventricular diastolic function.  · The estimated PA systolic pressure is 29 mmHg.  · Normal central venous pressure (3 mmHg).

## 2021-12-25 NOTE — ASSESSMENT & PLAN NOTE
Patient is a 56 y.o. female  with PMHx of HTN, HLD, asthma, and hearing loss (deaf, communicates with American Sign Language) who presented to OSH with RSW and communication difficulty. Per family, patient began having symptoms on 12/17 with a facial droop. CT Head with L frontal infarct. CTA with L M1 occlusion. Patient out of window for tPA. Patient transferred to [facility] for possible thrombectomy. On arrival, patient taken to MRI STAT for MRI acute ischemic protocol + MR perfusion scan. MRI reviewed with neuro IR, thrombectomy deferred due to high risk of reperfusion injury given symptoms beginning ~ 48 hours ago. However, recommendation was made for admission to neuro ICU for close monitoring with low threshold for IR intervention if neuro exam declined. Echo with EF 65%.  Patient had neuro change 12/22/21 with repeat CT head with worsening edema and mild MLS. CT head has been stable. NCC evaluated patient and no need of HTS. Patient now 6-7 days post infarct, there there is less concern for progression of edema.    Antithrombotics for secondary stroke prevention: Antiplatelets: Aspirin: 81 mg daily  Clopidogrel: 75 mg daily x 30 days    Statins for secondary stroke prevention and hyperlipidemia, if present: Statins: Atorvastatin- 40 mg daily    Aggressive risk factor modification: HTN     Rehab efforts: The patient has been evaluated by a stroke team provider and the therapy needs have been fully considered based off the presenting complaints and exam findings. The following therapy evaluations are needed: PT evaluate and treat, OT evaluate and treat, SLP evaluate and treat, PM&R evaluate for appropriate placement therapy recs IPR    Diagnostics ordered/pending: None , CT HEAD STAT for any neurological change    VTE prophylaxis: Heparin 5000 units SQ every 8 hours  Mechanical prophylaxis: Place SCDs    BP parameters: Infarct: SBP <180

## 2021-12-25 NOTE — ASSESSMENT & PLAN NOTE
Stroke RF  A1c 6.1 on 12/19/21, new diagnosis of diabetes, not on any medications at home.   -180 while IP, patient on diabetic diet.     -- Nutrition consulted for diabetes education   -- OP diabetes education and will need PCP for starting medication

## 2021-12-25 NOTE — PLAN OF CARE
VSS. BG monitored.   for ASL during the shift. Pt educated on fall risk and remained free from falls/trauma/injury. Denies chest pain, SOB, palpitations, dizziness, pain, or discomfort. Plan of care reviewed with pt, all questions answered. Bed locked in lowest position, call bell within reach, no acute distress noted, will continue to monitor.

## 2021-12-25 NOTE — PLAN OF CARE
Problem: Adjustment to Illness (Stroke, Ischemic/Transient Ischemic Attack)  Goal: Optimal Coping  Outcome: Ongoing, Progressing     Problem: Adult Inpatient Plan of Care  Goal: Absence of Hospital-Acquired Illness or Injury  Outcome: Ongoing, Progressing   Patient aa0x4, vss, right sided weakness follows command, aphasic to will   Cont to monitor

## 2021-12-25 NOTE — PROGRESS NOTES
[facility] - Neurosurgery ([facility])  Vascular Neurology  Comprehensive Stroke Center  Progress Note    Assessment/Plan:     * Embolic stroke involving left middle cerebral artery  Patient is a 56 y.o. female  with PMHx of HTN, HLD, asthma, and hearing loss (deaf, communicates with American Sign Language) who presented to OSH with RSW and communication difficulty. Per family, patient began having symptoms on 12/17 with a facial droop. CT Head with L frontal infarct. CTA with L M1 occlusion. Patient out of window for tPA. Patient transferred to [facility] for possible thrombectomy. On arrival, patient taken to MRI STAT for MRI acute ischemic protocol + MR perfusion scan. MRI reviewed with neuro IR, thrombectomy deferred due to high risk of reperfusion injury given symptoms beginning ~ 48 hours ago. However, recommendation was made for admission to neuro ICU for close monitoring with low threshold for IR intervention if neuro exam declined. Echo with EF 65%.  Patient had neuro change 12/22/21 with repeat CT head with worsening edema and mild MLS. CT head has been stable. NCC evaluated patient and no need of HTS. Patient now 6-7 days post infarct, there there is less concern for progression of edema.    Antithrombotics for secondary stroke prevention: Antiplatelets: Aspirin: 81 mg daily  Clopidogrel: 75 mg daily x 30 days    Statins for secondary stroke prevention and hyperlipidemia, if present: Statins: Atorvastatin- 40 mg daily    Aggressive risk factor modification: HTN     Rehab efforts: The patient has been evaluated by a stroke team provider and the therapy needs have been fully considered based off the presenting complaints and exam findings. The following therapy evaluations are needed: PT evaluate and treat, OT evaluate and treat, SLP evaluate and treat, PM&R evaluate for appropriate placement therapy recs IPR    Diagnostics ordered/pending: None , CT HEAD STAT for any neurological change    VTE prophylaxis: Heparin  5000 units SQ every 8 hours  Mechanical prophylaxis: Place SCDs    BP parameters: Infarct: SBP <180        Mixed hyperlipidemia  Stroke RF  .2  Atorvastatin 40mg QD    Diabetes mellitus  Stroke RF  A1c 6.1 on 12/19/21, new diagnosis of diabetes, not on any medications at home.   -180 while IP, patient on diabetic diet.     -- Nutrition consulted for diabetes education   -- OP diabetes education and will need PCP for starting medication     Cytotoxic cerebral edema  Area of cerebral edema identified when reviewing brain imaging in the territory of the L middle cerebral artery. We will continue to monitor with q1h neuro checks while on floor or more frequently while in neuro critical care, as any change in the patients clinical exam may signify expansion of the insult and/or the area of the edema. Such changes may require acute interventions to prevent loss of function and/or death.    Essential hypertension  Stroke risk factor  SBP <180  Continue hydralazine 25mg TID     Deaf  Communicates with American sign language    Severe persistent asthma with acute exacerbation  duonebs prn  -- CPT BID for atelectasis ppx          12/20:  used during patient interaction. Patient able to communicate name and age. Aphasic, when trying to interpret images on stroke booklet, patient signing numbers instead of (hammock, cactus). Continues with RUE weakness. No RLE drift. Echo pending. MBSS pending.   12/21:  used during patient interaction. Continues with RUE weakness. phasic, when trying to interpret images on stroke booklet, patient signing numbers instead of (hammock, cactus). Echo with EF 65%.   12/22:  used during patient interaction. Continued RUE and new RLE weakness, exam otherwise remained stable from previous. Repeat CTH obtained to evaluated further. Serial neuro exams performed.   12/23/2021 Patient had CT head done yesterday showing worsening edema and mild mid line shift.  NCC consulted for possible need of HTS, but no indication and patient remained in VN floor. CT head repeated later in the day and this morning and stable. Patient is having worsening RU/RLE weakness and aphasia. She is having difficulty with understanding and expression. Na 139 from 135 today.   12/24 medically ready for discharge, patient without accepting rehab facility at this time, complaining of headache this morning and nurse instructed to give prn tylenol  12/25/2021 No acute events overnight, no changes in neuro exam. Still having some expressive aphasia and difficulty understanding. No new complaints today, pending IPR.       STROKE DOCUMENTATION   Acute Stroke Times   Last Known Normal Date: 12/17/21  Last Known Normal Time: 0700  Unknown Normal Time: Unknown Time  Symptom Onset Date: 12/17/21  Symptom Onset Time: 0700  Unknown Symptom Onset Time: Unknown Time  Stroke Team Called Date: 12/19/21  Stroke Team Called Time: 1633  Stroke Team Arrival Date: 12/19/21  Stroke Team Arrival Time: 1637  CT Interpretation Time: 1310  Alteplase Recommended: No  CTA Interpretation Time:  (Pending)  Thrombectomy Recommended: No  MRI Acute Stroke Protocol Interpretation Time: 1709    NIH Scale:  1a. Level of Consciousness: 0-->Alert, keenly responsive  1b. LOC Questions: 1-->Answers one question correctly  1c. LOC Commands: 0-->Performs both tasks correctly  2. Best Gaze: 1-->Partial gaze palsy, gaze is abnormal in one or both eyes, but forced deviation or total gaze paresis is not present  3. Visual: 0-->No visual loss  4. Facial Palsy: 1-->Minor paralysis (flattened nasolabial fold, asymmetry on smiling)  5a. Motor Arm, Left: 0-->No drift, limb holds 90 (or 45) degrees for full 10 secs  5b. Motor Arm, Right: 4-->No movement  6a. Motor Leg, Left: 0-->No drift, leg holds 30 degree position for full 5 secs  6b. Motor Leg, Right: 4-->No movement  7. Limb Ataxia: 2-->Present in two limbs  8. Sensory: 0-->Normal, no sensory  loss  9. Best Language: 2-->Severe aphasia, all communication is through fragmentary expression, great need for inference, questioning, and guessing by the listener. Range of information that can be exchanged is limited, listener carries burden of. . . (see row details)  10. Dysarthria: 0-->Normal  11. Extinction and Inattention (formerly Neglect): 0-->No abnormality  Total (NIH Stroke Scale): 15       Modified Pasco Score: 2  Darren Coma Scale:    ABCD2 Score:    [id number] Score:   HAS -BLED Score:   ICH Score:   Hunt & Nguyen Classification:      Hemorrhagic change of an Ischemic Stroke: Does this patient have an ischemic stroke with hemorrhagic changes? No     Neurologic Chief Complaint: Right sided weakness, aphasia    Subjective:     Interval History: Patient is seen for follow-up neurological assessment and treatment recommendations: no acute events overnight. Neuro exam is stable, patient is awake and alert, able to feed herself. Still having some expressive aphasia, oriented to self, not to place and time but with repetition able to say she is in the hospital for stroke. No movement at her right side.     HPI, Past Medical, Family, and Social History remains the same as documented in the initial encounter.     Review of Systems   Constitutional: Negative for appetite change and fever.   HENT: Negative for trouble swallowing.    Respiratory: Positive for cough.    Gastrointestinal: Negative for diarrhea and vomiting.   Musculoskeletal: Positive for gait problem.   Skin: Negative for rash.   Neurological: Positive for facial asymmetry, speech difficulty and weakness.     Scheduled Meds:   aspirin  81 mg Oral Daily    atorvastatin  40 mg Oral Daily    clopidogreL  75 mg Oral Daily    ferrous sulfate  1 tablet Oral BID    heparin (porcine)  5,000 Units Subcutaneous Q8H    hydrALAZINE  25 mg Oral Q8H    senna-docusate 8.6-50 mg  1 tablet Oral BID     Continuous Infusions:  PRN Meds:acetaminophen,  albuterol-ipratropium, barium sulfate, barium sulfate, dextrose 50%, glucagon (human recombinant), HYDROcodone-acetaminophen, insulin aspart U-100, labetalol, ondansetron, sodium chloride 0.9%    Objective:     Vital Signs (Most Recent):  Temp: 99.4 °F (37.4 °C) (12/25/21 0836)  Pulse: 108 (12/25/21 0836)  Resp: 17 (12/25/21 0836)  BP: 124/65 (12/25/21 0836)  SpO2: (!) 94 % (12/25/21 0836)  BP Location: Left arm    Vital Signs Range (Last 24H):  Temp:  [97.3 °F (36.3 °C)-99.4 °F (37.4 °C)]   Pulse:  []   Resp:  [17-18]   BP: (124-135)/(60-83)   SpO2:  [94 %-98 %]   BP Location: Left arm    Physical Exam  Constitutional:       Appearance: Normal appearance.   HENT:      Head: Normocephalic and atraumatic.      Nose: Nose normal.      Mouth/Throat:      Mouth: Mucous membranes are moist.   Eyes:      Extraocular Movements: Extraocular movements intact.      Pupils: Pupils are equal, round, and reactive to light.   Cardiovascular:      Rate and Rhythm: Normal rate and regular rhythm.   Pulmonary:      Effort: No respiratory distress.   Abdominal:      General: Bowel sounds are normal. There is no distension.      Palpations: Abdomen is soft.      Tenderness: There is no abdominal tenderness.   Musculoskeletal:         General: No swelling. Normal range of motion.      Cervical back: Normal range of motion.   Skin:     General: Skin is warm.      Capillary Refill: Capillary refill takes less than 2 seconds.   Neurological:      Mental Status: She is alert. She is disoriented.      Cranial Nerves: Cranial nerve deficit present.      Motor: Weakness present.         Neurological Exam:   LOC: alert  Attention Span: Good   Language: Expressive aphasia, Receptive aphasia  Orientation: Not oriented to place, Not oriented to time  EOM (CN III, IV, VI): Full/intact  Facial Movement (CN VII): Lower facial weakness on the Right  Motor: Arm left  Normal 5/5  Leg left  Normal 5/5  Arm right  Plegia 0/5  Leg right Plegia  0/5    Laboratory:  CMP: No results for input(s): GLUCOSE, CALCIUM, ALBUMIN, PROT, NA, K, CO2, CL, BUN, CREATININE, ALKPHOS, ALT, AST, BILITOT in the last 24 hours.  BMP:   Recent Labs   Lab 12/24/21  0450      K 3.7      CO2 24   BUN 8   CREATININE 0.7   CALCIUM 8.9       Diagnostic Results     Brain Imaging   Ct head 12/23/21  Evolving large recent left MCA distribution infarct, without evidence of infarct extension, hemorrhagic conversion, or significant worsening of associated mass effect.     Otherwise stable exam as above.  No new major vascular distribution infarct or hemorrhage elsewhere.  Ct head 12/22/21        Moderate to large evolving left MCA territory infarction corresponding to abnormality seen on MRI.  There is trace intermediate to hyperdensity in the precentral gyrus which may represent petechial hemorrhage versus artifact from parenchyma edema sparing.  Clinical correlation and follow-up advised     Mass effect with approximately 2 mm of rightward midline shift without evidence for hydrocephalus.        MRI Brain Perfusion/MRI ischemic protocol. Date: 12/19/21  1. Acute to subacute infarctions in the left MCA distribution as above.  No evidence of hemorrhagic transformation.  2. MR perfusion findings consistent with increased mean transit time t in he left MCA territory with normal cerebral blood volume, suggestive of ischemic penumbra.     CTA Head and Neck. Date: 12/19/21  1. Complete occlusion of the left M1 segment.  2. Frothy secretions within the trachea and fluid distention of the esophageal lumen.  Findings raise concern for aspiration.  3. Hypodensity measuring 1.3 cm in the left thyroid lobe which can be further evaluated with nonemergent thyroid ultrasound     CT Head. Date: 12/19/21  Hypoattenuation and loss of gray-white differentiation within the left insular cortex, frontal lobe, and basal ganglia compatible with an MCA territory acute infarction        Cardiac  Imaging  TTE 12/20/21  · The left ventricle is normal in size with normal systolic function. The estimated ejection fraction is 65%.  · Normal right ventricular size with normal right ventricular systolic function.  · Normal left ventricular diastolic function.  · The estimated PA systolic pressure is 29 mmHg.  · Normal central venous pressure (3 mmHg).         Ann Matias MD  Comprehensive Stroke Center  Department of Vascular Neurology   [facility])

## 2021-12-25 NOTE — PLAN OF CARE
Pt Alert and oriented to self she is x1 assist with right sided weakness. Pt receiving scheduled meds. Remained afebrile throughout shift with no falls or injuries. Pt with nonskid footwear on and bed locked and in lowest position with bed rails up x3. Call light is within reach. Will continue to monitor.

## 2021-12-26 LAB — POCT GLUCOSE: 116 MG/DL (ref 70–110)

## 2021-12-26 PROCEDURE — 94761 N-INVAS EAR/PLS OXIMETRY MLT: CPT

## 2021-12-26 PROCEDURE — 25000003 PHARM REV CODE 250: Performed by: PHYSICIAN ASSISTANT

## 2021-12-26 PROCEDURE — 25000003 PHARM REV CODE 250: Performed by: STUDENT IN AN ORGANIZED HEALTH CARE EDUCATION/TRAINING PROGRAM

## 2021-12-26 PROCEDURE — 63600175 PHARM REV CODE 636 W HCPCS: Performed by: PHYSICIAN ASSISTANT

## 2021-12-26 PROCEDURE — 25000003 PHARM REV CODE 250: Performed by: EMERGENCY MEDICINE

## 2021-12-26 PROCEDURE — 99232 PR SUBSEQUENT HOSPITAL CARE,LEVL II: ICD-10-PCS | Mod: ,,, | Performed by: PSYCHIATRY & NEUROLOGY

## 2021-12-26 PROCEDURE — 94667 MNPJ CHEST WALL 1ST: CPT

## 2021-12-26 PROCEDURE — 99232 SBSQ HOSP IP/OBS MODERATE 35: CPT | Mod: ,,, | Performed by: PSYCHIATRY & NEUROLOGY

## 2021-12-26 PROCEDURE — 99900035 HC TECH TIME PER 15 MIN (STAT)

## 2021-12-26 PROCEDURE — 27000190 HC CPAP FULL FACE MASK W/VALVE

## 2021-12-26 PROCEDURE — 11000001 HC ACUTE MED/SURG PRIVATE ROOM

## 2021-12-26 RX ORDER — PANTOPRAZOLE SODIUM 20 MG/1
40 TABLET, DELAYED RELEASE ORAL DAILY
Status: DISCONTINUED | OUTPATIENT
Start: 2021-12-26 | End: 2021-12-30 | Stop reason: HOSPADM

## 2021-12-26 RX ORDER — POLYETHYLENE GLYCOL 3350 17 G/17G
17 POWDER, FOR SOLUTION ORAL
Status: DISCONTINUED | OUTPATIENT
Start: 2021-12-28 | End: 2021-12-30 | Stop reason: HOSPADM

## 2021-12-26 RX ADMIN — HYDRALAZINE HYDROCHLORIDE 25 MG: 25 TABLET ORAL at 08:12

## 2021-12-26 RX ADMIN — ATORVASTATIN CALCIUM 40 MG: 20 TABLET, FILM COATED ORAL at 08:12

## 2021-12-26 RX ADMIN — SENNOSIDES AND DOCUSATE SODIUM 1 TABLET: 50; 8.6 TABLET ORAL at 09:12

## 2021-12-26 RX ADMIN — HEPARIN SODIUM 5000 UNITS: 5000 INJECTION INTRAVENOUS; SUBCUTANEOUS at 05:12

## 2021-12-26 RX ADMIN — CLOPIDOGREL 75 MG: 75 TABLET, FILM COATED ORAL at 08:12

## 2021-12-26 RX ADMIN — HYDRALAZINE HYDROCHLORIDE 25 MG: 25 TABLET ORAL at 05:12

## 2021-12-26 RX ADMIN — HEPARIN SODIUM 5000 UNITS: 5000 INJECTION INTRAVENOUS; SUBCUTANEOUS at 02:12

## 2021-12-26 RX ADMIN — SENNOSIDES AND DOCUSATE SODIUM 1 TABLET: 50; 8.6 TABLET ORAL at 08:12

## 2021-12-26 RX ADMIN — FERROUS SULFATE TAB 325 MG (65 MG ELEMENTAL FE) 1 EACH: 325 (65 FE) TAB at 08:12

## 2021-12-26 RX ADMIN — HYDRALAZINE HYDROCHLORIDE 25 MG: 25 TABLET ORAL at 02:12

## 2021-12-26 RX ADMIN — ASPIRIN 81 MG CHEWABLE TABLET 81 MG: 81 TABLET CHEWABLE at 08:12

## 2021-12-26 RX ADMIN — PANTOPRAZOLE SODIUM 40 MG: 20 TABLET, DELAYED RELEASE ORAL at 02:12

## 2021-12-26 NOTE — PLAN OF CARE
Problem: Adjustment to Illness (Stroke, Ischemic/Transient Ischemic Attack)  Goal: Optimal Coping  Outcome: Ongoing, Progressing     Problem: Adult Inpatient Plan of Care  Goal: Absence of Hospital-Acquired Illness or Injury  Outcome: Ongoing, Progressing   Patient aaox3, up with two person assist, right side absent, non verbal , can write her need, no acute distress, will cont to care.

## 2021-12-26 NOTE — PLAN OF CARE
Problem: Adjustment to Illness (Stroke, Ischemic/Transient Ischemic Attack)  Goal: Optimal Coping  12/25/2021 1815 by Suki Tiwari RN  Outcome: Ongoing, Progressing  12/25/2021 1523 by Suki Tiwari RN  Outcome: Ongoing, Progressing     Problem: Communication Impairment (Stroke, Ischemic/Transient Ischemic Attack)  Goal: Improved Communication Skills  Outcome: Ongoing, Progressing   Patient alert, nonverbal, vss, follows command, right sided weakness, given paper and marker for personal need, used bedside commode for bm , no other c/o at this time will cont to monitor.

## 2021-12-26 NOTE — SUBJECTIVE & OBJECTIVE
Neurologic Chief Complaint: RSW, aphasia     Subjective:     Interval History: Patient is seen for follow-up neurological assessment and treatment recommendations: ANA, patient has been clinically stable for the last 48 hours. No changes on neurological exam. Had a BM yesterday. Patient is medically ready for IPR.     HPI, Past Medical, Family, and Social History remains the same as documented in the initial encounter.     Review of Systems   Constitutional: Negative for appetite change and fever.   HENT: Negative for trouble swallowing.    Respiratory: Positive for cough.    Gastrointestinal: Negative for diarrhea and vomiting.   Musculoskeletal: Positive for gait problem.   Skin: Negative for rash.   Neurological: Positive for facial asymmetry, speech difficulty and weakness.     Scheduled Meds:   aspirin  81 mg Oral Daily    atorvastatin  40 mg Oral Daily    clopidogreL  75 mg Oral Daily    ferrous sulfate  1 tablet Oral BID    heparin (porcine)  5,000 Units Subcutaneous Q8H    hydrALAZINE  25 mg Oral Q8H    [START ON 12/28/2021] polyethylene glycol  17 g Oral Every Tues, Thurs, Sat    senna-docusate 8.6-50 mg  1 tablet Oral BID     Continuous Infusions:  PRN Meds:acetaminophen, albuterol-ipratropium, barium sulfate, barium sulfate, dextrose 50%, glucagon (human recombinant), HYDROcodone-acetaminophen, insulin aspart U-100, labetalol, ondansetron, sodium chloride 0.9%    Objective:     Vital Signs (Most Recent):  Temp: 98.3 °F (36.8 °C) (12/26/21 1210)  Pulse: 106 (12/26/21 1210)  Resp: 17 (12/26/21 1210)  BP: 124/61 (12/26/21 1210)  SpO2: 98 % (12/26/21 1210)  BP Location: Left arm    Vital Signs Range (Last 24H):  Temp:  [96.7 °F (35.9 °C)-98.8 °F (37.1 °C)]   Pulse:  [101-112]   Resp:  [15-17]   BP: (124-173)/(61-77)   SpO2:  [92 %-98 %]   BP Location: Left arm    Physical Exam  Constitutional:       Appearance: Normal appearance.   HENT:      Head: Normocephalic and atraumatic.      Nose: Nose  normal.      Mouth/Throat:      Mouth: Mucous membranes are moist.   Eyes:      Extraocular Movements: Extraocular movements intact.      Pupils: Pupils are equal, round, and reactive to light.   Cardiovascular:      Rate and Rhythm: Normal rate and regular rhythm.   Pulmonary:      Effort: No respiratory distress.   Abdominal:      General: Bowel sounds are normal. There is no distension.      Palpations: Abdomen is soft.      Tenderness: There is no abdominal tenderness.   Musculoskeletal:         General: No swelling. Normal range of motion.      Cervical back: Normal range of motion.   Skin:     General: Skin is warm.      Capillary Refill: Capillary refill takes less than 2 seconds.   Neurological:      Mental Status: She is alert. She is disoriented.      Cranial Nerves: Cranial nerve deficit present.      Motor: Weakness present.         Neurological Exam:   LOC: alert  Attention Span: Good   Language: Expressive aphasia, Receptive aphasia  Orientation: Not oriented to place, Not oriented to time  EOM (CN III, IV, VI): Full/intact  Facial Movement (CN VII): Lower facial weakness on the Right  Motor: Arm left  Normal 5/5  Leg left  Normal 5/5  Arm right  Plegia 0/5  Leg right Plegia 0/5        Laboratory:  CMP: No results for input(s): GLUCOSE, CALCIUM, ALBUMIN, PROT, NA, K, CO2, CL, BUN, CREATININE, ALKPHOS, ALT, AST, BILITOT in the last 24 hours.  CBC:   Recent Labs   Lab 12/24/21  0450   WBC 6.78   RBC 3.58*   HGB 8.1*   HCT 28.2*      MCV 79*   MCH 22.6*   MCHC 28.7*       Diagnostic Results     Brain Imaging   Ct head 12/23/21  Evolving large recent left MCA distribution infarct, without evidence of infarct extension, hemorrhagic conversion, or significant worsening of associated mass effect.     Otherwise stable exam as above.  No new major vascular distribution infarct or hemorrhage elsewhere.  Ct head 12/22/21        Moderate to large evolving left MCA territory infarction corresponding to  abnormality seen on MRI.  There is trace intermediate to hyperdensity in the precentral gyrus which may represent petechial hemorrhage versus artifact from parenchyma edema sparing.  Clinical correlation and follow-up advised     Mass effect with approximately 2 mm of rightward midline shift without evidence for hydrocephalus.        MRI Brain Perfusion/MRI ischemic protocol. Date: 12/19/21  1. Acute to subacute infarctions in the left MCA distribution as above.  No evidence of hemorrhagic transformation.  2. MR perfusion findings consistent with increased mean transit time t in he left MCA territory with normal cerebral blood volume, suggestive of ischemic penumbra.     CTA Head and Neck. Date: 12/19/21  1. Complete occlusion of the left M1 segment.  2. Frothy secretions within the trachea and fluid distention of the esophageal lumen.  Findings raise concern for aspiration.  3. Hypodensity measuring 1.3 cm in the left thyroid lobe which can be further evaluated with nonemergent thyroid ultrasound     CT Head. Date: 12/19/21  Hypoattenuation and loss of gray-white differentiation within the left insular cortex, frontal lobe, and basal ganglia compatible with an MCA territory acute infarction        Cardiac Imaging  TTE 12/20/21  · The left ventricle is normal in size with normal systolic function. The estimated ejection fraction is 65%.  · Normal right ventricular size with normal right ventricular systolic function.  · Normal left ventricular diastolic function.  · The estimated PA systolic pressure is 29 mmHg.  · Normal central venous pressure (3 mmHg).

## 2021-12-26 NOTE — PROGRESS NOTES
[facility] - Neurosurgery ([facility])  Vascular Neurology  Comprehensive Stroke Center  Progress Note    Assessment/Plan:     * Embolic stroke involving left middle cerebral artery  Patient is a 56 y.o. female  with PMHx of HTN, HLD, asthma, and hearing loss (deaf, communicates with American Sign Language) who presented to OSH with RSW and communication difficulty. Per family, patient began having symptoms on 12/17 with a facial droop. CT Head with L frontal infarct. CTA with L M1 occlusion. Patient out of window for tPA. Patient transferred to [facility] for possible thrombectomy. On arrival, patient taken to MRI STAT for MRI acute ischemic protocol + MR perfusion scan. MRI reviewed with neuro IR, thrombectomy deferred due to high risk of reperfusion injury given symptoms beginning ~ 48 hours ago. However, recommendation was made for admission to neuro ICU for close monitoring with low threshold for IR intervention if neuro exam declined. Echo with EF 65%.  Patient had neuro change 12/22/21 with repeat CT head with worsening edema and mild MLS. CT head has been stable. NCC evaluated patient and no need of HTS. Patient now 6-7 days post infarct, there there is less concern for progression of edema.    Antithrombotics for secondary stroke prevention: Antiplatelets: Aspirin: 81 mg daily  Clopidogrel: 75 mg daily x 30 days    Statins for secondary stroke prevention and hyperlipidemia, if present: Statins: Atorvastatin- 40 mg daily    Aggressive risk factor modification: HTN     Rehab efforts: The patient has been evaluated by a stroke team provider and the therapy needs have been fully considered based off the presenting complaints and exam findings. The following therapy evaluations are needed: PT evaluate and treat, OT evaluate and treat, SLP evaluate and treat, PM&R evaluate for appropriate placement therapy recs IPR    Diagnostics ordered/pending: None , CT HEAD STAT for any neurological change    VTE prophylaxis: Heparin  5000 units SQ every 8 hours  Mechanical prophylaxis: Place SCDs    BP parameters: Infarct: SBP <180        Mixed hyperlipidemia  Stroke RF  .2  Atorvastatin 40mg QD    Diabetes mellitus  Stroke RF  A1c 6.1 on 12/19/21, new diagnosis of diabetes, not on any medications at home.   -180 while IP, patient on diabetic diet.     -- Nutrition consulted for diabetes education   -- OP diabetes education and will need PCP for starting medication     Cytotoxic cerebral edema  Area of cerebral edema identified when reviewing brain imaging in the territory of the L middle cerebral artery. We will continue to monitor with q1h neuro checks while on floor or more frequently while in neuro critical care, as any change in the patients clinical exam may signify expansion of the insult and/or the area of the edema. Such changes may require acute interventions to prevent loss of function and/or death.    Essential hypertension  Stroke risk factor  SBP <180  Continue hydralazine 25mg TID     Deaf  Communicates with American sign language    Severe persistent asthma with acute exacerbation  duonebs prn  -- CPT BID for atelectasis ppx          12/20:  used during patient interaction. Patient able to communicate name and age. Aphasic, when trying to interpret images on stroke booklet, patient signing numbers instead of (hammock, cactus). Continues with RUE weakness. No RLE drift. Echo pending. MBSS pending.   12/21:  used during patient interaction. Continues with RUE weakness. phasic, when trying to interpret images on stroke booklet, patient signing numbers instead of (hammock, cactus). Echo with EF 65%.   12/22:  used during patient interaction. Continued RUE and new RLE weakness, exam otherwise remained stable from previous. Repeat CTH obtained to evaluated further. Serial neuro exams performed.   12/23/2021 Patient had CT head done yesterday showing worsening edema and mild mid line shift.  NCC consulted for possible need of HTS, but no indication and patient remained in VN floor. CT head repeated later in the day and this morning and stable. Patient is having worsening RU/RLE weakness and aphasia. She is having difficulty with understanding and expression. Na 139 from 135 today.   12/24 medically ready for discharge, patient without accepting rehab facility at this time, complaining of headache this morning and nurse instructed to give prn tylenol  12/25/2021 No acute events overnight, no changes in neuro exam. Still having some expressive aphasia and difficulty understanding. No new complaints today, pending IPR.   12/26/2021 NAEON, patient still having some aphasia and issues with fluency, but awake and alert, no changes on exam. Had a BM yesterday, pending IPR.       STROKE DOCUMENTATION   Acute Stroke Times   Last Known Normal Date: 12/17/21  Last Known Normal Time: 0700  Unknown Normal Time: Unknown Time  Symptom Onset Date: 12/17/21  Symptom Onset Time: 0700  Unknown Symptom Onset Time: Unknown Time  Stroke Team Called Date: 12/19/21  Stroke Team Called Time: 1633  Stroke Team Arrival Date: 12/19/21  Stroke Team Arrival Time: 1637  CT Interpretation Time: 1310  Alteplase Recommended: No  CTA Interpretation Time:  (Pending)  Thrombectomy Recommended: No  MRI Acute Stroke Protocol Interpretation Time: 1709    NIH Scale:  1a. Level of Consciousness: 0-->Alert, keenly responsive  1b. LOC Questions: 0-->Answers both questions correctly  1c. LOC Commands: 0-->Performs both tasks correctly  2. Best Gaze: 0-->Normal  3. Visual: 0-->No visual loss  4. Facial Palsy: 1-->Minor paralysis (flattened nasolabial fold, asymmetry on smiling)  5a. Motor Arm, Left: 0-->No drift, limb holds 90 (or 45) degrees for full 10 secs  5b. Motor Arm, Right: 4-->No movement  6a. Motor Leg, Left: 0-->No drift, leg holds 30 degree position for full 5 secs  6b. Motor Leg, Right: 4-->No movement  7. Limb Ataxia: 2-->Present in  two limbs  8. Sensory: 0-->Normal, no sensory loss  9. Best Language: 2-->Severe aphasia, all communication is through fragmentary expression, great need for inference, questioning, and guessing by the listener. Range of information that can be exchanged is limited, listener carries burden of. . . (see row details)  10. Dysarthria: 0-->Normal  11. Extinction and Inattention (formerly Neglect): 0-->No abnormality  Total (NIH Stroke Scale): 13       Modified Alexandr Score: 2  Darren Coma Scale:    ABCD2 Score:    [id number] Score:   HAS -BLED Score:   ICH Score:   Hunt & Nguyen Classification:      Hemorrhagic change of an Ischemic Stroke: Does this patient have an ischemic stroke with hemorrhagic changes? No     Neurologic Chief Complaint: RSW, aphasia     Subjective:     Interval History: Patient is seen for follow-up neurological assessment and treatment recommendations: ANA, patient has been clinically stable for the last 48 hours. No changes on neurological exam. Had a BM yesterday. Patient is medically ready for IPR.     HPI, Past Medical, Family, and Social History remains the same as documented in the initial encounter.     Review of Systems   Constitutional: Negative for appetite change and fever.   HENT: Negative for trouble swallowing.    Respiratory: Positive for cough.    Gastrointestinal: Negative for diarrhea and vomiting.   Musculoskeletal: Positive for gait problem.   Skin: Negative for rash.   Neurological: Positive for facial asymmetry, speech difficulty and weakness.     Scheduled Meds:   aspirin  81 mg Oral Daily    atorvastatin  40 mg Oral Daily    clopidogreL  75 mg Oral Daily    ferrous sulfate  1 tablet Oral BID    heparin (porcine)  5,000 Units Subcutaneous Q8H    hydrALAZINE  25 mg Oral Q8H    [START ON 12/28/2021] polyethylene glycol  17 g Oral Every Tues, Thurs, Sat    senna-docusate 8.6-50 mg  1 tablet Oral BID     Continuous Infusions:  PRN Meds:acetaminophen,  albuterol-ipratropium, barium sulfate, barium sulfate, dextrose 50%, glucagon (human recombinant), HYDROcodone-acetaminophen, insulin aspart U-100, labetalol, ondansetron, sodium chloride 0.9%    Objective:     Vital Signs (Most Recent):  Temp: 98.3 °F (36.8 °C) (12/26/21 1210)  Pulse: 106 (12/26/21 1210)  Resp: 17 (12/26/21 1210)  BP: 124/61 (12/26/21 1210)  SpO2: 98 % (12/26/21 1210)  BP Location: Left arm    Vital Signs Range (Last 24H):  Temp:  [96.7 °F (35.9 °C)-98.8 °F (37.1 °C)]   Pulse:  [101-112]   Resp:  [15-17]   BP: (124-173)/(61-77)   SpO2:  [92 %-98 %]   BP Location: Left arm    Physical Exam  Constitutional:       Appearance: Normal appearance.   HENT:      Head: Normocephalic and atraumatic.      Nose: Nose normal.      Mouth/Throat:      Mouth: Mucous membranes are moist.   Eyes:      Extraocular Movements: Extraocular movements intact.      Pupils: Pupils are equal, round, and reactive to light.   Cardiovascular:      Rate and Rhythm: Normal rate and regular rhythm.   Pulmonary:      Effort: No respiratory distress.   Abdominal:      General: Bowel sounds are normal. There is no distension.      Palpations: Abdomen is soft.      Tenderness: There is no abdominal tenderness.   Musculoskeletal:         General: No swelling. Normal range of motion.      Cervical back: Normal range of motion.   Skin:     General: Skin is warm.      Capillary Refill: Capillary refill takes less than 2 seconds.   Neurological:      Mental Status: She is alert. She is disoriented.      Cranial Nerves: Cranial nerve deficit present.      Motor: Weakness present.         Neurological Exam:   LOC: alert  Attention Span: Good   Language: Expressive aphasia, Receptive aphasia  Orientation: Not oriented to place, Not oriented to time  EOM (CN III, IV, VI): Full/intact  Facial Movement (CN VII): Lower facial weakness on the Right  Motor: Arm left  Normal 5/5  Leg left  Normal 5/5  Arm right  Plegia 0/5  Leg right Plegia  0/5        Laboratory:  CMP: No results for input(s): GLUCOSE, CALCIUM, ALBUMIN, PROT, NA, K, CO2, CL, BUN, CREATININE, ALKPHOS, ALT, AST, BILITOT in the last 24 hours.  CBC:   Recent Labs   Lab 12/24/21  0450   WBC 6.78   RBC 3.58*   HGB 8.1*   HCT 28.2*      MCV 79*   MCH 22.6*   MCHC 28.7*       Diagnostic Results     Brain Imaging   Ct head 12/23/21  Evolving large recent left MCA distribution infarct, without evidence of infarct extension, hemorrhagic conversion, or significant worsening of associated mass effect.     Otherwise stable exam as above.  No new major vascular distribution infarct or hemorrhage elsewhere.  Ct head 12/22/21        Moderate to large evolving left MCA territory infarction corresponding to abnormality seen on MRI.  There is trace intermediate to hyperdensity in the precentral gyrus which may represent petechial hemorrhage versus artifact from parenchyma edema sparing.  Clinical correlation and follow-up advised     Mass effect with approximately 2 mm of rightward midline shift without evidence for hydrocephalus.        MRI Brain Perfusion/MRI ischemic protocol. Date: 12/19/21  1. Acute to subacute infarctions in the left MCA distribution as above.  No evidence of hemorrhagic transformation.  2. MR perfusion findings consistent with increased mean transit time t in he left MCA territory with normal cerebral blood volume, suggestive of ischemic penumbra.     CTA Head and Neck. Date: 12/19/21  1. Complete occlusion of the left M1 segment.  2. Frothy secretions within the trachea and fluid distention of the esophageal lumen.  Findings raise concern for aspiration.  3. Hypodensity measuring 1.3 cm in the left thyroid lobe which can be further evaluated with nonemergent thyroid ultrasound     CT Head. Date: 12/19/21  Hypoattenuation and loss of gray-white differentiation within the left insular cortex, frontal lobe, and basal ganglia compatible with an MCA territory acute  infarction        Cardiac Imaging  TTE 12/20/21  · The left ventricle is normal in size with normal systolic function. The estimated ejection fraction is 65%.  · Normal right ventricular size with normal right ventricular systolic function.  · Normal left ventricular diastolic function.  · The estimated PA systolic pressure is 29 mmHg.  · Normal central venous pressure (3 mmHg).        Ann Matias MD  Comprehensive Stroke Center  Department of Vascular Neurology   [facility] Neurosurgery [facility])

## 2021-12-27 LAB — POCT GLUCOSE: 134 MG/DL (ref 70–110)

## 2021-12-27 PROCEDURE — 92507 TX SP LANG VOICE COMM INDIV: CPT

## 2021-12-27 PROCEDURE — 99232 SBSQ HOSP IP/OBS MODERATE 35: CPT | Mod: ,,, | Performed by: PSYCHIATRY & NEUROLOGY

## 2021-12-27 PROCEDURE — 25000003 PHARM REV CODE 250: Performed by: STUDENT IN AN ORGANIZED HEALTH CARE EDUCATION/TRAINING PROGRAM

## 2021-12-27 PROCEDURE — 63600175 PHARM REV CODE 636 W HCPCS: Performed by: PHYSICIAN ASSISTANT

## 2021-12-27 PROCEDURE — 25000003 PHARM REV CODE 250: Performed by: PHYSICIAN ASSISTANT

## 2021-12-27 PROCEDURE — 97535 SELF CARE MNGMENT TRAINING: CPT

## 2021-12-27 PROCEDURE — 25000003 PHARM REV CODE 250: Performed by: EMERGENCY MEDICINE

## 2021-12-27 PROCEDURE — 99232 PR SUBSEQUENT HOSPITAL CARE,LEVL II: ICD-10-PCS | Mod: ,,, | Performed by: PSYCHIATRY & NEUROLOGY

## 2021-12-27 PROCEDURE — 11000001 HC ACUTE MED/SURG PRIVATE ROOM

## 2021-12-27 RX ADMIN — HYDRALAZINE HYDROCHLORIDE 25 MG: 25 TABLET ORAL at 09:12

## 2021-12-27 RX ADMIN — PANTOPRAZOLE SODIUM 40 MG: 20 TABLET, DELAYED RELEASE ORAL at 08:12

## 2021-12-27 RX ADMIN — ASPIRIN 81 MG CHEWABLE TABLET 81 MG: 81 TABLET CHEWABLE at 08:12

## 2021-12-27 RX ADMIN — FERROUS SULFATE TAB 325 MG (65 MG ELEMENTAL FE) 1 EACH: 325 (65 FE) TAB at 09:12

## 2021-12-27 RX ADMIN — SENNOSIDES AND DOCUSATE SODIUM 1 TABLET: 50; 8.6 TABLET ORAL at 08:12

## 2021-12-27 RX ADMIN — HEPARIN SODIUM 5000 UNITS: 5000 INJECTION INTRAVENOUS; SUBCUTANEOUS at 09:12

## 2021-12-27 RX ADMIN — HEPARIN SODIUM 5000 UNITS: 5000 INJECTION INTRAVENOUS; SUBCUTANEOUS at 05:12

## 2021-12-27 RX ADMIN — SENNOSIDES AND DOCUSATE SODIUM 1 TABLET: 50; 8.6 TABLET ORAL at 09:12

## 2021-12-27 RX ADMIN — CLOPIDOGREL 75 MG: 75 TABLET, FILM COATED ORAL at 08:12

## 2021-12-27 RX ADMIN — HYDRALAZINE HYDROCHLORIDE 25 MG: 25 TABLET ORAL at 02:12

## 2021-12-27 RX ADMIN — FERROUS SULFATE TAB 325 MG (65 MG ELEMENTAL FE) 1 EACH: 325 (65 FE) TAB at 08:12

## 2021-12-27 RX ADMIN — HEPARIN SODIUM 5000 UNITS: 5000 INJECTION INTRAVENOUS; SUBCUTANEOUS at 08:12

## 2021-12-27 RX ADMIN — ATORVASTATIN CALCIUM 40 MG: 20 TABLET, FILM COATED ORAL at 08:12

## 2021-12-27 NOTE — SUBJECTIVE & OBJECTIVE
Neurologic Chief Complaint: RSW, aphasia     Subjective:     Interval History: Patient is seen for follow-up neurological assessment and treatment recommendations: NAEON; medically stable for DC; pending IPR    HPI, Past Medical, Family, and Social History remains the same as documented in the initial encounter.     Review of Systems     Constitutional: Negative for appetite change and fever.   HENT: Negative for trouble swallowing.    Respiratory: Positive for cough.    Gastrointestinal: Negative for diarrhea and vomiting.   Musculoskeletal: Positive for gait problem.   Skin: Negative for rash.   Neurological: Positive for facial asymmetry, speech difficulty and weakness.     Scheduled Meds:   aspirin  81 mg Oral Daily    atorvastatin  40 mg Oral Daily    clopidogreL  75 mg Oral Daily    ferrous sulfate  1 tablet Oral BID    heparin (porcine)  5,000 Units Subcutaneous Q8H    hydrALAZINE  25 mg Oral Q8H    pantoprazole  40 mg Oral Daily    [START ON 12/28/2021] polyethylene glycol  17 g Oral Every Tues, Thurs, Sat    senna-docusate 8.6-50 mg  1 tablet Oral BID     Continuous Infusions:  PRN Meds:acetaminophen, albuterol-ipratropium, barium sulfate, barium sulfate, dextrose 50%, glucagon (human recombinant), HYDROcodone-acetaminophen, insulin aspart U-100, labetalol, ondansetron, sodium chloride 0.9%    Objective:     Vital Signs (Most Recent):  Temp: 98.3 °F (36.8 °C) (12/27/21 0854)  Pulse: 96 (12/27/21 1057)  Resp: 16 (12/27/21 0854)  BP: 112/63 (12/27/21 0854)  SpO2: 95 % (12/27/21 0854)  BP Location: Left arm    Vital Signs Range (Last 24H):  Temp:  [98.2 °F (36.8 °C)-99.6 °F (37.6 °C)]   Pulse:  []   Resp:  [16-18]   BP: (112-145)/(61-72)   SpO2:  [93 %-99 %]   BP Location: Left arm    Physical Exam    Constitutional:       Appearance: Normal appearance.   HENT:      Head: Normocephalic and atraumatic.      Nose: Nose normal.      Mouth/Throat:      Mouth: Mucous membranes are moist.   Eyes:       Extraocular Movements: Extraocular movements intact.      Pupils: Pupils are equal, round, and reactive to light.   Cardiovascular:      Rate and Rhythm: Normal rate and regular rhythm.   Pulmonary:      Effort: No respiratory distress.   Abdominal:      General: Bowel sounds are normal. There is no distension.      Palpations: Abdomen is soft.      Tenderness: There is no abdominal tenderness.   Musculoskeletal:         General: No swelling. Normal range of motion.      Cervical back: Normal range of motion.   Skin:     General: Skin is warm.      Capillary Refill: Capillary refill takes less than 2 seconds.   Neurological:      Mental Status: She is alert. She is disoriented.      Cranial Nerves: Cranial nerve deficit present.      Motor: Weakness present.         Neurological Exam:   LOC: alert  Attention Span: Good   Language: Expressive aphasia, Receptive aphasia  Orientation: Not oriented to place, Not oriented to time  EOM (CN III, IV, VI): Full/intact  Facial Movement (CN VII): Lower facial weakness on the Right  Motor: Arm left  Normal 5/5  Leg left  Normal 5/5  Arm right  Plegia 0/5  Leg right Plegia 0/5        Laboratory:  CMP: No results for input(s): GLUCOSE, CALCIUM, ALBUMIN, PROT, NA, K, CO2, CL, BUN, CREATININE, ALKPHOS, ALT, AST, BILITOT in the last 24 hours.  CBC:   Recent Labs   Lab 12/24/21  0450   WBC 6.78   RBC 3.58*   HGB 8.1*   HCT 28.2*      MCV 79*   MCH 22.6*   MCHC 28.7*       Diagnostic Results     Brain Imaging   Ct head 12/23/21  Evolving large recent left MCA distribution infarct, without evidence of infarct extension, hemorrhagic conversion, or significant worsening of associated mass effect.     Otherwise stable exam as above.  No new major vascular distribution infarct or hemorrhage elsewhere.  Ct head 12/22/21        Moderate to large evolving left MCA territory infarction corresponding to abnormality seen on MRI.  There is trace intermediate to hyperdensity in the  precentral gyrus which may represent petechial hemorrhage versus artifact from parenchyma edema sparing.  Clinical correlation and follow-up advised     Mass effect with approximately 2 mm of rightward midline shift without evidence for hydrocephalus.        MRI Brain Perfusion/MRI ischemic protocol. Date: 12/19/21  1. Acute to subacute infarctions in the left MCA distribution as above.  No evidence of hemorrhagic transformation.  2. MR perfusion findings consistent with increased mean transit time t in he left MCA territory with normal cerebral blood volume, suggestive of ischemic penumbra.     CTA Head and Neck. Date: 12/19/21  1. Complete occlusion of the left M1 segment.  2. Frothy secretions within the trachea and fluid distention of the esophageal lumen.  Findings raise concern for aspiration.  3. Hypodensity measuring 1.3 cm in the left thyroid lobe which can be further evaluated with nonemergent thyroid ultrasound     CT Head. Date: 12/19/21  Hypoattenuation and loss of gray-white differentiation within the left insular cortex, frontal lobe, and basal ganglia compatible with an MCA territory acute infarction        Cardiac Imaging  TTE 12/20/21  · The left ventricle is normal in size with normal systolic function. The estimated ejection fraction is 65%.  · Normal right ventricular size with normal right ventricular systolic function.  · Normal left ventricular diastolic function.  · The estimated PA systolic pressure is 29 mmHg.  · Normal central venous pressure (3 mmHg).

## 2021-12-27 NOTE — NURSING
Patient in bed head 45 degree angle, alert awake non verbal. Writing pads use to communicate, no pain at this time. Call light within reach, bed in the lowest  Position. Safety measures.

## 2021-12-27 NOTE — PLAN OF CARE
[facility] - Neurosurgery (Hospital)  Discharge Reassessment    Primary Care Provider: Primary Doctor No    Expected Discharge Date: 12/30/2021     Per Samanta Enrique doing site visit today.    Reassessment (most recent)     Discharge Reassessment - 12/27/21 1429        Discharge Reassessment    Assessment Type Discharge Planning Reassessment     Did the patient's condition or plan change since previous assessment? No     Discharge Plan discussed with: Patient     Name(s) and Number(s) Klarissa Ornelas (dtr) [phone]     Communicated CHARIS with patient/caregiver Date not available/Unable to determine     Discharge Plan A Rehab     Discharge Plan B Skilled Nursing Facility     DME Needed Upon Discharge  none     Discharge Barriers Identified None     Why the patient remains in the hospital Requires continued medical care        Post-Acute Status    Post-Acute Authorization Placement     Post-Acute Placement Status Pending post-acute provider review/more information requested     Discharge Delays None known at this time

## 2021-12-27 NOTE — PROGRESS NOTES
[facility] - Neurosurgery ([facility])  Vascular Neurology  Comprehensive Stroke Center  Progress Note    Assessment/Plan:     * Embolic stroke involving left middle cerebral artery  Patient is a 56 y.o. female  with PMHx of HTN, HLD, asthma, and hearing loss (deaf, communicates with American Sign Language) who presented to OSH with RSW and communication difficulty. Per family, patient began having symptoms on 12/17 with a facial droop. CT Head with L frontal infarct. CTA with L M1 occlusion. Patient out of window for tPA. [place] [facility] [place] On arrival, patient taken to MRI STAT for MRI acute ischemic protocol + MR perfusion scan. MRI reviewed with neuro IR, thrombectomy deferred due to high risk of reperfusion injury given symptoms beginning ~ 48 hours ago. However, recommendation was made for admission to neuro ICU for close monitoring with low threshold for IR intervention if neuro exam declined. Echo with EF 65%. Cardiac monitor at discharge.    Antithrombotics for secondary stroke prevention: Antiplatelets: Aspirin: 81 mg daily  Clopidogrel: 75 mg daily x 30 days    Statins for secondary stroke prevention and hyperlipidemia, if present: Statins: Atorvastatin- 40 mg daily    Aggressive risk factor modification: HTN     Rehab efforts: The patient has been evaluated by a stroke team provider and the therapy needs have been fully considered based off the presenting complaints and exam findings. The following therapy evaluations are needed: PT evaluate and treat, OT evaluate and treat, SLP evaluate and treat, PM&R evaluate for appropriate placement therapy recs IPR    Diagnostics ordered/pending: None , CT HEAD STAT for any neurological change    VTE prophylaxis: Heparin 5000 units SQ every 8 hours  Mechanical prophylaxis: Place SCDs    BP parameters: Infarct: SBP <180        Mixed hyperlipidemia  Stroke RF  .2  Atorvastatin 40mg QD    Diabetes mellitus  Stroke RF  A1c 6.1 on  12/19/21, new diagnosis of diabetes, not on any medications at home.   -180 while IP, patient on diabetic diet.     -- Nutrition consulted for diabetes education   -- OP diabetes education and will need PCP for starting medication     Cytotoxic cerebral edema  Area of cerebral edema identified when reviewing brain imaging in the territory of the L middle cerebral artery. We will continue to monitor with q1h neuro checks while on floor or more frequently while in neuro critical care, as any change in the patients clinical exam may signify expansion of the insult and/or the area of the edema. Such changes may require acute interventions to prevent loss of function and/or death.    Essential hypertension  Stroke risk factor  SBP <180  Continue hydralazine 25mg TID     Deaf  Communicates with American sign language    Severe persistent asthma with acute exacerbation  duonebs prn  -- CPT BID for atelectasis ppx          12/20:  used during patient interaction. Patient able to communicate name and age. Aphasic, when trying to interpret images on stroke booklet, patient signing numbers instead of (hammock, cactus). Continues with RUE weakness. No RLE drift. Echo pending. MBSS pending.   12/21:  used during patient interaction. Continues with RUE weakness. phasic, when trying to interpret images on stroke booklet, patient signing numbers instead of (hammock, cactus). Echo with EF 65%.   12/22:  used during patient interaction. Continued RUE and new RLE weakness, exam otherwise remained stable from previous. Repeat CTH obtained to evaluated further. Serial neuro exams performed.   12/23/2021 Patient had CT head done yesterday showing worsening edema and mild mid line shift. NCC consulted for possible need of HTS, but no indication and patient remained in VN floor. CT head repeated later in the day and this morning and stable. Patient is having worsening RU/RLE weakness and aphasia. She  is having difficulty with understanding and expression. Na 139 from 135 today.   12/24 medically ready for discharge, patient without accepting rehab facility at this time, complaining of headache this morning and nurse instructed to give prn tylenol  12/25/2021 No acute events overnight, no changes in neuro exam. Still having some expressive aphasia and difficulty understanding. No new complaints today, pending IPR.   12/26/2021 NAEON, patient still having some aphasia and issues with fluency, but awake and alert, no changes on exam. Had a BM yesterday, pending IPR.   12/27/2021 NAEON; medically stable for DC; pending IPR      STROKE DOCUMENTATION   Acute Stroke Times   Last Known Normal Date: 12/17/21  Last Known Normal Time: 0700  Unknown Normal Time: Unknown Time  Symptom Onset Date: 12/17/21  Symptom Onset Time: 0700  Unknown Symptom Onset Time: Unknown Time  Stroke Team Called Date: 12/19/21  Stroke Team Called Time: 1633  Stroke Team Arrival Date: 12/19/21  Stroke Team Arrival Time: 1637  CT Interpretation Time: 1310  Alteplase Recommended: No  CTA Interpretation Time:  (Pending)  Thrombectomy Recommended: No  MRI Acute Stroke Protocol Interpretation Time: 1709    NIH Scale:  1a. Level of Consciousness: 0-->Alert, keenly responsive  1b. LOC Questions: 0-->Answers both questions correctly  1c. LOC Commands: 0-->Performs both tasks correctly  2. Best Gaze: 0-->Normal  3. Visual: 0-->No visual loss  4. Facial Palsy: 1-->Minor paralysis (flattened nasolabial fold, asymmetry on smiling)  5a. Motor Arm, Left: 0-->No drift, limb holds 90 (or 45) degrees for full 10 secs  5b. Motor Arm, Right: 4-->No movement  6a. Motor Leg, Left: 0-->No drift, leg holds 30 degree position for full 5 secs  6b. Motor Leg, Right: 4-->No movement  7. Limb Ataxia: 2-->Present in two limbs  8. Sensory: 0-->Normal, no sensory loss  9. Best Language: 2-->Severe aphasia, all communication is through fragmentary expression, great need for  inference, questioning, and guessing by the listener. Range of information that can be exchanged is limited, listener carries burden of. . . (see row details)  10. Dysarthria: 0-->Normal  11. Extinction and Inattention (formerly Neglect): 0-->No abnormality  Total (NIH Stroke Scale): 13       Modified Alexandr Score: 2  Darren Coma Scale:15   ABCD2 Score:    [id number] Score:   HAS -BLED Score:   ICH Score:   Hunt & Nguyen Classification:      Hemorrhagic change of an Ischemic Stroke: Does this patient have an ischemic stroke with hemorrhagic changes? No     Neurologic Chief Complaint: RSW, aphasia     Subjective:     Interval History: Patient is seen for follow-up neurological assessment and treatment recommendations: NAEON; medically stable for DC; pending IPR    HPI, Past Medical, Family, and Social History remains the same as documented in the initial encounter.     Review of Systems     Constitutional: Negative for appetite change and fever.   HENT: Negative for trouble swallowing.    Respiratory: Positive for cough.    Gastrointestinal: Negative for diarrhea and vomiting.   Musculoskeletal: Positive for gait problem.   Skin: Negative for rash.   Neurological: Positive for facial asymmetry, speech difficulty and weakness.     Scheduled Meds:   aspirin  81 mg Oral Daily    atorvastatin  40 mg Oral Daily    clopidogreL  75 mg Oral Daily    ferrous sulfate  1 tablet Oral BID    heparin (porcine)  5,000 Units Subcutaneous Q8H    hydrALAZINE  25 mg Oral Q8H    pantoprazole  40 mg Oral Daily    [START ON 12/28/2021] polyethylene glycol  17 g Oral Every Tues, Thurs, Sat    senna-docusate 8.6-50 mg  1 tablet Oral BID     Continuous Infusions:  PRN Meds:acetaminophen, albuterol-ipratropium, barium sulfate, barium sulfate, dextrose 50%, glucagon (human recombinant), HYDROcodone-acetaminophen, insulin aspart U-100, labetalol, ondansetron, sodium chloride 0.9%    Objective:     Vital Signs (Most Recent):  Temp:  98.3 °F (36.8 °C) (12/27/21 0854)  Pulse: 96 (12/27/21 1057)  Resp: 16 (12/27/21 0854)  BP: 112/63 (12/27/21 0854)  SpO2: 95 % (12/27/21 0854)  BP Location: Left arm    Vital Signs Range (Last 24H):  Temp:  [98.2 °F (36.8 °C)-99.6 °F (37.6 °C)]   Pulse:  []   Resp:  [16-18]   BP: (112-145)/(61-72)   SpO2:  [93 %-99 %]   BP Location: Left arm    Physical Exam    Constitutional:       Appearance: Normal appearance.   HENT:      Head: Normocephalic and atraumatic.      Nose: Nose normal.      Mouth/Throat:      Mouth: Mucous membranes are moist.   Eyes:      Extraocular Movements: Extraocular movements intact.      Pupils: Pupils are equal, round, and reactive to light.   Cardiovascular:      Rate and Rhythm: Normal rate and regular rhythm.   Pulmonary:      Effort: No respiratory distress.   Abdominal:      General: Bowel sounds are normal. There is no distension.      Palpations: Abdomen is soft.      Tenderness: There is no abdominal tenderness.   Musculoskeletal:         General: No swelling. Normal range of motion.      Cervical back: Normal range of motion.   Skin:     General: Skin is warm.      Capillary Refill: Capillary refill takes less than 2 seconds.   Neurological:      Mental Status: She is alert. She is disoriented.      Cranial Nerves: Cranial nerve deficit present.      Motor: Weakness present.         Neurological Exam:   LOC: alert  Attention Span: Good   Language: Expressive aphasia, Receptive aphasia  Orientation: Not oriented to place, Not oriented to time  EOM (CN III, IV, VI): Full/intact  Facial Movement (CN VII): Lower facial weakness on the Right  Motor: Arm left  Normal 5/5  Leg left  Normal 5/5  Arm right  Plegia 0/5  Leg right Plegia 0/5        Laboratory:  CMP: No results for input(s): GLUCOSE, CALCIUM, ALBUMIN, PROT, NA, K, CO2, CL, BUN, CREATININE, ALKPHOS, ALT, AST, BILITOT in the last 24 hours.  CBC:   Recent Labs   Lab 12/24/21  0450   WBC 6.78   RBC 3.58*   HGB 8.1*   HCT  28.2*      MCV 79*   MCH 22.6*   MCHC 28.7*       Diagnostic Results     Brain Imaging   Ct head 12/23/21  Evolving large recent left MCA distribution infarct, without evidence of infarct extension, hemorrhagic conversion, or significant worsening of associated mass effect.     Otherwise stable exam as above.  No new major vascular distribution infarct or hemorrhage elsewhere.  Ct head 12/22/21        Moderate to large evolving left MCA territory infarction corresponding to abnormality seen on MRI.  There is trace intermediate to hyperdensity in the precentral gyrus which may represent petechial hemorrhage versus artifact from parenchyma edema sparing.  Clinical correlation and follow-up advised     Mass effect with approximately 2 mm of rightward midline shift without evidence for hydrocephalus.        MRI Brain Perfusion/MRI ischemic protocol. Date: 12/19/21  1. Acute to subacute infarctions in the left MCA distribution as above.  No evidence of hemorrhagic transformation.  2. MR perfusion findings consistent with increased mean transit time t in he left MCA territory with normal cerebral blood volume, suggestive of ischemic penumbra.     CTA Head and Neck. Date: 12/19/21  1. Complete occlusion of the left M1 segment.  2. Frothy secretions within the trachea and fluid distention of the esophageal lumen.  Findings raise concern for aspiration.  3. Hypodensity measuring 1.3 cm in the left thyroid lobe which can be further evaluated with nonemergent thyroid ultrasound     CT Head. Date: 12/19/21  Hypoattenuation and loss of gray-white differentiation within the left insular cortex, frontal lobe, and basal ganglia compatible with an MCA territory acute infarction        Cardiac Imaging  TTE 12/20/21  · The left ventricle is normal in size with normal systolic function. The estimated ejection fraction is 65%.  · Normal right ventricular size with normal right ventricular systolic function.  · Normal left  ventricular diastolic function.  · The estimated PA systolic pressure is 29 mmHg.  · Normal central venous pressure (3 mmHg).        Shalom Ortiz MD  [facility]  Department of Vascular Neurology   [facility] [place])

## 2021-12-27 NOTE — PT/OT/SLP PROGRESS
Speech Language Pathology Treatment    Patient Name:  Aurora Ornelas   MRN:  [id number]  Admitting Diagnosis: Embolic stroke involving left middle cerebral artery     utilized throughout session: John- #[id number]    Recommendations:                 General Recommendations:  Speech/language therapy  Diet recommendations:  Regular, Thin   Aspiration Precautions: HOB to 90 degrees   General Precautions: Standard, fall,deaf  Communication strategies:  go to room if call light pushed and utilize  as pt uses ASL as primary language     Subjective     Pt found resting in bed upon SLP entry into room. Pt alert throughout session and willing to participate in all aspects of treatment.     Patient goals: none stated     Pain/Comfort:  Pain Rating 1: 0/10  Pain Rating Post-Intervention 1: 0/10    Respiratory Status: Room air    Objective:     Has the patient been evaluated by SLP for swallowing?   Yes  Keep patient NPO? No   Current Respiratory Status:        Note: ASL contains 5 elements for linguistic construction including handshape, orientation (such as wrist shaking or movement), location (location of dominant and non-dominant hand), movement (direction, shape, and size of movement), and non-manual expression (including verbalizations, facial expression, and mouth shapes). These will be commented on throughout documentation to reflect pt's progress    Pt seen for ongoing language therapy. During counting tasks, pt able to consistently count from 1-10 following modeling from  but had increased difficulty when counting from 11-20 as she perseverated on numbers 7, 8, and 9. She required multiple instances of modeling from  and hand over hand assistance from clinician to produce accurate hand shaping.  Given automatic phrases, pt filled in logical ending given ongoing MAX multi-modality cueing and modeling x2 trials. Given item, pt identified item color x2 trials given MOD-MAX cueing  "and modeling; pt noted to identify ancillary information of presented physical item (i.e. when handed soda bottle on table and asked what color is this? Pt responses "pineapple" to indicate drink's flavor).  noted that pt exhibited clean, fluent hand shapes when counting from 1-10 but poor shape quality on all additional language tasks. However, clinician noted that pt was more intelligible with responses overall from  than compared to previous session. Pt additionally more verbal during signing this date and increasing signing movement and non-manual expression more clearly/accurately this date but continues to exhibit decreased hand shape, orientation, and location. SLP provide ongoing education to pt regarding aphasia and ongoing SLP POC. Pt continued to perseverate on finger spelling during education but did exhibit increased understanding of teachings as evidenced by some teach back following education. No additional questions or concerns verbalized prior to session termination.       Assessment:     Aurora Ornelas is a 56 y.o. female with an SLP diagnosis of Aphasia. She presents with ongoing need for speech services at current and next level of care. SLP to continue to follow.    Goals:   Multidisciplinary Problems     SLP Goals        Problem: SLP Goal    Goal Priority Disciplines Outcome   SLP Goal     SLP Ongoing, Progressing   Description: Speech Language Pathology Goals  Goals expected to be met by 12/27/21    1. Pt will participate in ongoing assessment of swallow function to determine safest, least restrictive means of nutrition/hydration  2. Educate Pt and family on aspiration precautions and SLP POC  3. Pt will answer personal yes/no questions with 80% accuracy or higher, MOD A  4. Pt will follow simple commands with 80% accuracy or higher, MOD A  5. Pt will orient x3+ via any modality 90% of the time, MOD A  6. Pt will participate in ongoing assessment of cognition and " visiospatial skills to determine ongoing POC needs  7. Educate Pt and family on safety awareness and compensatory strategies for communication                     Plan:     · Patient to be seen:  4 x/week   · Plan of Care expires:  01/18/22  · Plan of Care reviewed with:  patient   · SLP Follow-Up:  Yes       Discharge recommendations:  rehabilitation facility   Barriers to Discharge:  Level of Skilled Assistance Needed     Time Tracking:     SLP Treatment Date:   12/27/21  Speech Start Time:  1154  Speech Stop Time:  1221     Speech Total Time (min):  27 min    Billable Minutes: Speech Therapy Individual 19 Self Care/Home Management Training 8      12/27/2021

## 2021-12-27 NOTE — PLAN OF CARE
Problem: SLP Goal  Goal: SLP Goal  Description: Speech Language Pathology Goals  Goals expected to be met by 1/10/21    1. Pt will answer personal yes/no questions with 80% accuracy or higher given MOD cueing  2. Pt will follow simple commands with 80% accuracy given MOD cueing  3. Pt will orient x3+ via any modality 90% of the time given MOD cueing  4. Pt will complete automatic phrases with 70% accuracy given MOD cueing  4. Pt will participate in ongoing assessment of cognition and visiospatial skills to determine ongoing POC needs  5. Educate Pt and family on safety awareness and compensatory strategies for communication          Speech Language Pathology Goals  Goals expected to be met by 12/27/21    1. Pt will participate in ongoing assessment of swallow function to determine safest, least restrictive means of nutrition/hydration  2. Educate Pt and family on aspiration precautions and SLP POC  3. Pt will answer personal yes/no questions with 80% accuracy or higher, MOD A  4. Pt will follow simple commands with 80% accuracy or higher, MOD A  5. Pt will orient x3+ via any modality 90% of the time, MOD A  6. Pt will participate in ongoing assessment of cognition and visiospatial skills to determine ongoing POC needs  7. Educate Pt and family on safety awareness and compensatory strategies for communication    Outcome: Ongoing, Progressing       Extend POC

## 2021-12-28 PROCEDURE — 25000003 PHARM REV CODE 250: Performed by: EMERGENCY MEDICINE

## 2021-12-28 PROCEDURE — 25000003 PHARM REV CODE 250: Performed by: STUDENT IN AN ORGANIZED HEALTH CARE EDUCATION/TRAINING PROGRAM

## 2021-12-28 PROCEDURE — 97535 SELF CARE MNGMENT TRAINING: CPT

## 2021-12-28 PROCEDURE — 99232 PR SUBSEQUENT HOSPITAL CARE,LEVL II: ICD-10-PCS | Mod: ,,, | Performed by: PSYCHIATRY & NEUROLOGY

## 2021-12-28 PROCEDURE — 92507 TX SP LANG VOICE COMM INDIV: CPT

## 2021-12-28 PROCEDURE — 97110 THERAPEUTIC EXERCISES: CPT | Mod: CQ

## 2021-12-28 PROCEDURE — 63600175 PHARM REV CODE 636 W HCPCS: Performed by: PHYSICIAN ASSISTANT

## 2021-12-28 PROCEDURE — 97112 NEUROMUSCULAR REEDUCATION: CPT

## 2021-12-28 PROCEDURE — 11000001 HC ACUTE MED/SURG PRIVATE ROOM

## 2021-12-28 PROCEDURE — 97530 THERAPEUTIC ACTIVITIES: CPT | Mod: CQ

## 2021-12-28 PROCEDURE — 97530 THERAPEUTIC ACTIVITIES: CPT

## 2021-12-28 PROCEDURE — 25000003 PHARM REV CODE 250: Performed by: PHYSICIAN ASSISTANT

## 2021-12-28 PROCEDURE — 99232 SBSQ HOSP IP/OBS MODERATE 35: CPT | Mod: ,,, | Performed by: PSYCHIATRY & NEUROLOGY

## 2021-12-28 PROCEDURE — 25000003 PHARM REV CODE 250: Performed by: PSYCHIATRY & NEUROLOGY

## 2021-12-28 RX ADMIN — HYDRALAZINE HYDROCHLORIDE 25 MG: 25 TABLET ORAL at 03:12

## 2021-12-28 RX ADMIN — HYDRALAZINE HYDROCHLORIDE 25 MG: 25 TABLET ORAL at 06:12

## 2021-12-28 RX ADMIN — PANTOPRAZOLE SODIUM 40 MG: 20 TABLET, DELAYED RELEASE ORAL at 08:12

## 2021-12-28 RX ADMIN — ASPIRIN 81 MG CHEWABLE TABLET 81 MG: 81 TABLET CHEWABLE at 08:12

## 2021-12-28 RX ADMIN — CLOPIDOGREL 75 MG: 75 TABLET, FILM COATED ORAL at 08:12

## 2021-12-28 RX ADMIN — HEPARIN SODIUM 5000 UNITS: 5000 INJECTION INTRAVENOUS; SUBCUTANEOUS at 03:12

## 2021-12-28 RX ADMIN — HEPARIN SODIUM 5000 UNITS: 5000 INJECTION INTRAVENOUS; SUBCUTANEOUS at 09:12

## 2021-12-28 RX ADMIN — FERROUS SULFATE TAB 325 MG (65 MG ELEMENTAL FE) 1 EACH: 325 (65 FE) TAB at 08:12

## 2021-12-28 RX ADMIN — HEPARIN SODIUM 5000 UNITS: 5000 INJECTION INTRAVENOUS; SUBCUTANEOUS at 06:12

## 2021-12-28 RX ADMIN — FERROUS SULFATE TAB 325 MG (65 MG ELEMENTAL FE) 1 EACH: 325 (65 FE) TAB at 09:12

## 2021-12-28 RX ADMIN — SENNOSIDES AND DOCUSATE SODIUM 1 TABLET: 50; 8.6 TABLET ORAL at 08:12

## 2021-12-28 RX ADMIN — POLYETHYLENE GLYCOL 3350 17 G: 17 POWDER, FOR SOLUTION ORAL at 08:12

## 2021-12-28 RX ADMIN — ATORVASTATIN CALCIUM 40 MG: 20 TABLET, FILM COATED ORAL at 08:12

## 2021-12-28 RX ADMIN — SENNOSIDES AND DOCUSATE SODIUM 1 TABLET: 50; 8.6 TABLET ORAL at 09:12

## 2021-12-28 RX ADMIN — HYDRALAZINE HYDROCHLORIDE 25 MG: 25 TABLET ORAL at 09:12

## 2021-12-28 NOTE — PT/OT/SLP PROGRESS
Speech Language Pathology Treatment    Patient Name:  Aurora Ornelas   MRN:  [id number]  Admitting Diagnosis: Embolic stroke involving left middle cerebral artery     utilized throughout session: Judd- #[id number] and Aileen- #[id number]    Recommendations:                 General Recommendations:  Speech/language therapy  Diet recommendations:  Regular, Thin   Aspiration Precautions: HOB to 90 degrees   General Precautions: Standard, fall,deaf  Communication strategies:  go to room if call light pushed and utilize  as pt uses ASL as primary language     Subjective     Pt found resting in bed upon SLP entry into room. Pt alert throughout session and willing to participate in all aspects of treatment.     Patient goals: none stated     Pain/Comfort:  Pain Rating 1: 0/10  Pain Rating Post-Intervention 1: 0/10    Respiratory Status: Room air    Objective:     Has the patient been evaluated by SLP for swallowing?   Yes  Keep patient NPO? No   Current Respiratory Status:        Note: ASL contains 5 elements for linguistic construction including handshape, orientation (such as wrist shaking or movement), location (location of dominant and non-dominant hand), movement (direction, shape, and size of movement), and non-manual expression (including verbalizations, facial expression, and mouth shapes). These will be commented on throughout documentation to reflect pt's progress    Pt seen for ongoing language therapy.During ABC, pt with multiple omissions including letters H, I, K, P, Q, R and U (slight improvement as pt required consistent models of all letters on last session). She required persistent modeling of finger spelled letters and was able to replicate hand signs following modeling on all attempts. Pt appeared to benefit from SLP sitting at bedside and  placed in clear view as she was less distracted by clinician this date. She answered basic Y/N questions on 1/2 trials; she exhibited  understanding of questions but poor use of Y/N responses (i.e. when asked Are you a woman? Pt consistently responding with woman). Pt unable to provide correct signs for colors but identified appropriate signs when given a FO2 on 1/2 trials. Increased time needed for processing of linguistic information across session.  noted that pt exhibited good orientation and location during automatic speech tasks. Persistent vocalizations made across session and were appropriate to questions and responses. She continues to require persistent modeling of stimuli and repetition of tasks. Perseveration on finger spelling noted across session. SLP provide ongoing education to pt regarding aphasia and ongoing SLP POC. No additional questions or concerns verbalized prior to session termination.     Assessment:     Aurora Ornelas is a 56 y.o. female with an SLP diagnosis of Aphasia. She presents with ongoing need for speech services at current and next level of care. SLP to continue to follow.    Goals:   Multidisciplinary Problems     SLP Goals        Problem: SLP Goal    Goal Priority Disciplines Outcome   SLP Goal     SLP Ongoing, Progressing   Description: Speech Language Pathology Goals  Goals expected to be met by 12/27/21    1. Pt will participate in ongoing assessment of swallow function to determine safest, least restrictive means of nutrition/hydration  2. Educate Pt and family on aspiration precautions and SLP POC  3. Pt will answer personal yes/no questions with 80% accuracy or higher, MOD A  4. Pt will follow simple commands with 80% accuracy or higher, MOD A  5. Pt will orient x3+ via any modality 90% of the time, MOD A  6. Pt will participate in ongoing assessment of cognition and visiospatial skills to determine ongoing POC needs  7. Educate Pt and family on safety awareness and compensatory strategies for communication                     Plan:     · Patient to be seen:  4 x/week   · Plan of Care expires:   01/18/22  · Plan of Care reviewed with:  patient   · SLP Follow-Up:  Yes       Discharge recommendations:  rehabilitation facility   Barriers to Discharge:  Level of Skilled Assistance Needed     Time Tracking:     SLP Treatment Date:   12/28/21  Speech Start Time:  1342  Speech Stop Time:  1410     Speech Total Time (min):  28 min    Billable Minutes: Speech Therapy Individual 20 Self Care/Home Management Training 8      12/28/2021

## 2021-12-28 NOTE — PT/OT/SLP PROGRESS
"Physical Therapy Treatment    Patient Name:  Aurora Ornelas   MRN:  [id number]    Recommendations:     Discharge Recommendations:  rehabilitation facility   Discharge Equipment Recommendations:  (TBD)   Barriers to discharge: None    Assessment:     Aurora Ornelas is a 56 y.o. female admitted with a medical diagnosis of Embolic stroke involving left middle cerebral artery.  She presents with the following impairments/functional limitations:  weakness,impaired endurance,impaired sensation,impaired self care skills,gait instability,impaired functional mobilty,impaired balance,decreased ROM.    Pt tolerated therapy session fairly well. Pt will occasionally sign and occasionally point and grunt without signing. Pt demonstrated fair-good static standing balance for 20-30 seconds. Verbal cues for upright posture with good compliance. Pt was unwilling to ambulate, pt only grunted and pointed at RLE and did not sign or state reason. Pt is progressing well and contiunes to benefit from therapy to improve functional mobility.     Rehab Prognosis: Good; patient would benefit from acute skilled PT services to address these deficits and reach maximum level of function.    Recent Surgery: * No surgery found *      Plan:     During this hospitalization, patient to be seen 4 x/week to address the identified rehab impairments via gait training,therapeutic activities,therapeutic exercises,neuromuscular re-education and progress toward the following goals:    · Plan of Care Expires:  01/19/22    Subjective     Chief Complaint: pt signed "i'm fine"  Pain/Comfort:  · Pain Rating 1: 0/10      Objective:     Communicated with nurse prior to session.  Patient found HOB elevated with telemetry,bed alarm,peripheral IV upon PT entry to room.    via Grace: Minna #[id number]  Additional staff present: Rehab tech    General Precautions: Standard, deaf,fall   Orthopedic Precautions:N/A   Braces: N/A  Respiratory Status: Room air   "   Functional Mobility:  · Bed Mobility:     · Rolling Left:  stand by assistance  · Scooting to EOB: moderate assistance  · Supine to Sit: minimum assistance with RLE and trunk control.   · Sit to Supine: minimum assistance with RLE and trunk control.   · Transfers:     · Sit to Stand: 4 trials from EOB. minimum assistance with hand-held assist. RLE blocked.  · Pt was able to maintain standing for ~20-30 seconds each trial.   · Gait: not assessed, pt unwilling to take a step. Pt did not sign or state reason.       AM-PAC 6 CLICK MOBILITY  Turning over in bed (including adjusting bedclothes, sheets and blankets)?: 3  Sitting down on and standing up from a chair with arms (e.g., wheelchair, bedside commode, etc.): 3  Moving from lying on back to sitting on the side of the bed?: 3  Moving to and from a bed to a chair (including a wheelchair)?: 2  Need to walk in hospital room?: 2  Climbing 3-5 steps with a railing?: 1  Basic Mobility Total Score: 14       Therapeutic Activities and Exercises:   Donned on new gown secondary to blood on old gown where PIV site. Nurse notified.     Patient left HOB elevated with all lines intact and call button in reach..    GOALS:   Multidisciplinary Problems     Physical Therapy Goals        Problem: Physical Therapy Goal    Goal Priority Disciplines Outcome Goal Variances Interventions   Physical Therapy Goal     PT, PT/OT Ongoing, Progressing     Description: Goals to be met by:      Patient will increase functional independence with mobility by performin. Supine to sit with stand by assistance   2. Sit to supine with Stand-by Assistance  3. Sit to stand transfer with Contact Guard Assistance  4. Stand pivot bed to chair contact guard assist   5. Gait  x 15 feet with Minimal Assistance using LRAD.   6. Ascend/descend 6 stair with unilateral Handrails Minimal Assistance using LRAD.   7. Stand for 1 minutes with Contact Guard Assistance using No Assistive Device to prepare  for functional activities in standing.                      Time Tracking:     PT Received On: 12/28/21  PT Start Time: 1303     PT Stop Time: 1326  PT Total Time (min): 23 min     Billable Minutes: Therapeutic Activity 8 and Therapeutic Exercise 15    Treatment Type: Treatment  PT/PTA: PTA     PTA Visit Number: 2     12/28/2021

## 2021-12-28 NOTE — PT/OT/SLP PROGRESS
Occupational Therapy   Treatment    Name: Aurora Ornelas  MRN: [id number]  Admitting Diagnosis: Embolic stroke involving left middle cerebral artery       Recommendations:     Discharge Recommendations: rehabilitation facility  Discharge Equipment Recommendations: TBD next level of care  Barriers to discharge: increased assistance needed    Assessment:     Aurora Ornelas is a 56 y.o. female with a medical diagnosis of Embolic stroke involving left middle cerebral artery. She presents with performance deficits including weakness,impaired endurance,impaired self care skills,impaired functional mobilty,gait instability,impaired balance,decreased safety awareness,decreased lower extremity function,decreased upper extremity function,decreased coordination,decreased ROM. Pt cooperative and motivated to participate, continues with R hemiparesis and difficulty communicating at times due to aphasia. Pt would continue to benefit from OT to increase functional independence and safety. Recommend rehab upon D/C as pt is motivated to return to PLOF.    Rehab Prognosis: Good; patient would benefit from acute skilled OT services to address these deficits and reach maximum level of function.       Plan:     Patient to be seen 4 x/week to address the above listed problems via self-care/home management,therapeutic activities,therapeutic exercises,neuromuscular re-education  · Plan of Care Expires: 01/20/22  · Plan of Care Reviewed with: patient    Subjective     Pain/Comfort:  · Pain Rating 1: 0/10  · Pain Rating Post-Intervention 1: 0/10    Objective:     Communicated with: RN prior to session. Patient found with HOB elevated with telemetry,bed alarm,peripheral IV,PureWick upon OT entry to room, no family present.  Session completed with  via ipad.  Pt signed December when asked the day of the week.    General Precautions: Standard, fall,deaf   Orthopedic Precautions:N/A   Braces: N/A  Respiratory Status: Room air      Occupational Performance:     Bed Mobility:    · Patient completed Supine to Sit with moderate assistance     Functional Mobility/Transfers:  · Patient completed Sit <> Stand Transfer with moderate assistance with hand-held assist and R knee blocked-- 2 trials from EOB  · Patient completed stand pivot to bedside chair toward L side with maximal assistance and R knee blocked    Activities of Daily Living:  · Grooming: stand by assistance while seated to wash face with L UE  · Lower Body Dressing: maximal assistance to don socks sitting EOB; able to come to 4 pt position on the L and assist with L UE, total assist on the R      AMPAC 6 Click ADL: 12    Treatment & Education:  Pt sat EOB ~15 min with close SBA for static sitting, CGA-Min A during dynamic task; completed LB dressing and standing trial; completed T/F to bedside chair and R UE PROM all planes with end-range stretch; educated on self-ROM and R UE positioning and discussed POC and to call for assistance; RN updated on pt's assistance levels    Patient left up in chair with all lines intact, call button in reach and RN notifiedEducation:      GOALS:   Multidisciplinary Problems     Occupational Therapy Goals        Problem: Occupational Therapy Goal    Goal Priority Disciplines Outcome Interventions   Occupational Therapy Goal     OT, PT/OT Ongoing, Progressing    Description: Updated Goals to be met by: 7 days (1/4/22)     Patient will increase functional independence with ADLs by performing:    UE Dressing with Minimal Assistance.  LE Dressing with Minimal Assistance.  Grooming while seated with Supervision.  Toileting from bedside commode with Moderate Assistance for hygiene and clothing management.   Supine to sit with Contact Guard Assistance.  Toilet transfer to bedside commode with Minimal Assistance.  Increased functional strength to WFL for ADLs.    Goals to be met by: 7 days (12/27/21)     Patient will increase functional independence with ADLs  by performing:    UE Dressing with Minimal Assistance.  LE Dressing with Minimal Assistance.  Grooming while standing at sink with Minimal Assistance.  Toileting from toilet with Minimal Assistance for hygiene and clothing management.   Supine to sit with Contact Guard Assistance.  Toilet transfer to toilet with Minimal Assistance.  Increased functional strength to WFL for ADLs.                     Time Tracking:     OT Date of Treatment: 12/28/21  OT Start Time: 0921  OT Stop Time: 0955  OT Total Time (min): 34 min    Billable Minutes:Therapeutic Activity 15  Neuromuscular Re-education 15    OT/AYLA: OT          12/28/2021

## 2021-12-28 NOTE — PLAN OF CARE
12/28/21 1406   Post-Acute Status   Post-Acute Authorization Placement   Post-Acute Placement Status Pending payor review/awaiting authorization (if required)

## 2021-12-28 NOTE — PROGRESS NOTES
[facility] - Neurosurgery ([facility])  Vascular Neurology  Comprehensive Stroke Center  Progress Note    Assessment/Plan:     * Embolic stroke involving left middle cerebral artery  Patient is a 56 y.o. female  with PMHx of HTN, HLD, asthma, and hearing loss (deaf, communicates with American Sign Language) who presented to OSH with RSW and communication difficulty. Per family, patient began having symptoms on 12/17 with a facial droop. CT Head with L frontal infarct. CTA with L M1 occlusion. Patient out of window for tPA. Patient [place] [facility] [place]. On arrival, patient taken to MRI STAT for MRI acute ischemic protocol + MR perfusion scan. MRI reviewed with neuro IR, thrombectomy deferred due to high risk of reperfusion injury given symptoms beginning ~ 48 hours ago. However, recommendation was made for admission to neuro ICU for close monitoring with low threshold for IR intervention if neuro exam declined. Echo with EF 65%. Cardiac monitor at discharge.    Antithrombotics for secondary stroke prevention: Antiplatelets: Aspirin: 81 mg daily  Clopidogrel: 75 mg daily x 30 days    Statins for secondary stroke prevention and hyperlipidemia, if present: Statins: Atorvastatin- 40 mg daily    Aggressive risk factor modification: HTN     Rehab efforts: The patient has been evaluated by a stroke team provider and the therapy needs have been fully considered based off the presenting complaints and exam findings. The following therapy evaluations are needed: PT evaluate and treat, OT evaluate and treat, SLP evaluate and treat, PM&R evaluate for appropriate placement therapy recs IPR    Diagnostics ordered/pending: None , CT HEAD STAT for any neurological change    VTE prophylaxis: Heparin 5000 units SQ every 8 hours  Mechanical prophylaxis: Place SCDs    BP parameters: Infarct: SBP <180        Mixed hyperlipidemia  Stroke RF  .2  Atorvastatin 40mg QD    Diabetes mellitus  Stroke RF  A1c 6.1 on  12/19/21, new diagnosis of diabetes, not on any medications at home.   -180 while IP, patient on diabetic diet.     -- Nutrition consulted for diabetes education   -- OP diabetes education and will need PCP for starting medication     Cytotoxic cerebral edema  Area of cerebral edema identified when reviewing brain imaging in the territory of the L middle cerebral artery. We will continue to monitor with q1h neuro checks while on floor or more frequently while in neuro critical care, as any change in the patients clinical exam may signify expansion of the insult and/or the area of the edema. Such changes may require acute interventions to prevent loss of function and/or death.    Essential hypertension  Stroke risk factor  SBP <180  Continue hydralazine 25mg TID     Deaf  Communicates with American sign language    Severe persistent asthma with acute exacerbation  duonebs prn  -- CPT BID for atelectasis ppx          12/20:  used during patient interaction. Patient able to communicate name and age. Aphasic, when trying to interpret images on stroke booklet, patient signing numbers instead of (hammock, cactus). Continues with RUE weakness. No RLE drift. Echo pending. MBSS pending.   12/21:  used during patient interaction. Continues with RUE weakness. phasic, when trying to interpret images on stroke booklet, patient signing numbers instead of (hammock, cactus). Echo with EF 65%.   12/22:  used during patient interaction. Continued RUE and new RLE weakness, exam otherwise remained stable from previous. Repeat CTH obtained to evaluated further. Serial neuro exams performed.   12/23/2021 Patient had CT head done yesterday showing worsening edema and mild mid line shift. NCC consulted for possible need of HTS, but no indication and patient remained in VN floor. CT head repeated later in the day and this morning and stable. Patient is having worsening RU/RLE weakness and aphasia. She  is having difficulty with understanding and expression. Na 139 from 135 today.   12/24 medically ready for discharge, patient without accepting rehab facility at this time, complaining of headache this morning and nurse instructed to give prn tylenol  12/25/2021 No acute events overnight, no changes in neuro exam. Still having some expressive aphasia and difficulty understanding. No new complaints today, pending IPR.   12/26/2021 NAEON, patient still having some aphasia and issues with fluency, but awake and alert, no changes on exam. Had a BM yesterday, pending IPR.   12/27/2021 NAEON; medically stable for DC; pending IPR  12/28/2021 Patient neurologically stable on assessment this AM; medically ready for DC pending IPR        STROKE DOCUMENTATION   Acute Stroke Times   Last Known Normal Date: 12/17/21  Last Known Normal Time: 0700  Unknown Normal Time: Unknown Time  Symptom Onset Date: 12/17/21  Symptom Onset Time: 0700  Unknown Symptom Onset Time: Unknown Time  Stroke Team Called Date: 12/19/21  Stroke Team Called Time: 1633  Stroke Team Arrival Date: 12/19/21  Stroke Team Arrival Time: 1637  CT Interpretation Time: 1310  Alteplase Recommended: No  CTA Interpretation Time:  (Pending)  Thrombectomy Recommended: No  MRI Acute Stroke Protocol Interpretation Time: 1709    NIH Scale:  1a. Level of Consciousness: 0-->Alert, keenly responsive  1b. LOC Questions: 0-->Answers both questions correctly  1c. LOC Commands: 0-->Performs both tasks correctly  2. Best Gaze: 0-->Normal  3. Visual: 0-->No visual loss  4. Facial Palsy: 1-->Minor paralysis (flattened nasolabial fold, asymmetry on smiling)  5a. Motor Arm, Left: 0-->No drift, limb holds 90 (or 45) degrees for full 10 secs  5b. Motor Arm, Right: 4-->No movement  6a. Motor Leg, Left: 0-->No drift, leg holds 30 degree position for full 5 secs  6b. Motor Leg, Right: 4-->No movement  7. Limb Ataxia: 2-->Present in two limbs  8. Sensory: 0-->Normal, no sensory loss  9. Best  Language: 3-->Mute, global aphasia, no usable speech or auditory comprehension  10. Dysarthria: 0-->Normal  11. Extinction and Inattention (formerly Neglect): 0-->No abnormality  Total (NIH Stroke Scale): 14       Modified Woodford Score: 2  Darren Coma Scale:15   ABCD2 Score:    [id number] Score:   HAS -BLED Score:   ICH Score:   Hunt & Nguyen Classification:      Hemorrhagic change of an Ischemic Stroke: Does this patient have an ischemic stroke with hemorrhagic changes? No     Neurologic Chief Complaint: RSW, aphasia     Subjective:     Interval History: Patient is seen for follow-up neurological assessment and treatment recommendations: Patient neurologically stable on assessment this AM; medically ready for DC pending IPR    HPI, Past Medical, Family, and Social History remains the same as documented in the initial encounter.     Review of Systems     Constitutional: Negative for appetite change and fever.   HENT: Negative for trouble swallowing.    Respiratory: Negative for cough.    Gastrointestinal: Negative for diarrhea and vomiting.   Musculoskeletal: Positive for gait problem.   Skin: Negative for rash.   Neurological: Positive for facial asymmetry, speech difficulty and weakness.     Scheduled Meds:   aspirin  81 mg Oral Daily    atorvastatin  40 mg Oral Daily    clopidogreL  75 mg Oral Daily    ferrous sulfate  1 tablet Oral BID    heparin (porcine)  5,000 Units Subcutaneous Q8H    hydrALAZINE  25 mg Oral Q8H    pantoprazole  40 mg Oral Daily    polyethylene glycol  17 g Oral Every Tues, Thurs, Sat    senna-docusate 8.6-50 mg  1 tablet Oral BID     Continuous Infusions:  PRN Meds:acetaminophen, albuterol-ipratropium, barium sulfate, barium sulfate, dextrose 50%, glucagon (human recombinant), HYDROcodone-acetaminophen, insulin aspart U-100, labetalol, ondansetron, sodium chloride 0.9%    Objective:     Vital Signs (Most Recent):  Temp: 98.4 °F (36.9 °C) (12/28/21 0807)  Pulse: 101 (12/28/21  0807)  Resp: 16 (12/28/21 0807)  BP: (!) 118/55 (12/28/21 0807)  SpO2: 100 % (12/28/21 0807)  BP Location: Left arm    Vital Signs Range (Last 24H):  Temp:  [97.3 °F (36.3 °C)-98.4 °F (36.9 °C)]   Pulse:  []   Resp:  [16-18]   BP: (118-146)/(55-75)   SpO2:  [94 %-100 %]   BP Location: Left arm    Physical Exam    Constitutional:       Appearance: Normal appearance.   HENT:      Head: Normocephalic and atraumatic.      Nose: Nose normal.      Mouth/Throat:      Mouth: Mucous membranes are moist.   Eyes:      Extraocular Movements: Extraocular movements intact.      Pupils: Pupils are equal, round, and reactive to light.   Cardiovascular:      Rate and Rhythm: Normal rate and regular rhythm.   Pulmonary:      Effort: No respiratory distress.   Abdominal:      General: Bowel sounds are normal. There is no distension.      Palpations: Abdomen is soft.      Tenderness: There is no abdominal tenderness.   Musculoskeletal:         General: No swelling. Normal range of motion.      Cervical back: Normal range of motion.   Skin:     General: Skin is warm.      Capillary Refill: Capillary refill takes less than 2 seconds.   Neurological:      Mental Status: She is alert. She is occasionally disoriented.      Cranial Nerves: Cranial nerve deficit present.      Motor: Weakness present.         Neurological Exam:   LOC: alert  Attention Span: Good   Language: Expressive aphasia, Receptive aphasia  Orientation: Oriented to place (at times), Not oriented to year  EOM (CN III, IV, VI): Full/intact  Facial Movement (CN VII): Lower facial weakness on the right  Motor: Arm left  Normal 5/5  Leg left  Normal 5/5  Arm right  Plegia 0/5  Leg right Plegia 0/5        Laboratory:  CMP: No results for input(s): GLUCOSE, CALCIUM, ALBUMIN, PROT, NA, K, CO2, CL, BUN, CREATININE, ALKPHOS, ALT, AST, BILITOT in the last 24 hours.  CBC:   Recent Labs   Lab 12/24/21  0450   WBC 6.78   RBC 3.58*   HGB 8.1*   HCT 28.2*      MCV 79*   MCH  22.6*   [facility]       Diagnostic Results     Brain Imaging   Ct head 12/23/21  Evolving large recent left MCA distribution infarct, without evidence of infarct extension, hemorrhagic conversion, or significant worsening of associated mass effect.     Otherwise stable exam as above.  No new major vascular distribution infarct or hemorrhage elsewhere.  Ct head 12/22/21        Moderate to large evolving left MCA territory infarction corresponding to abnormality seen on MRI.  There is trace intermediate to hyperdensity in the precentral gyrus which may represent petechial hemorrhage versus artifact from parenchyma edema sparing.  Clinical correlation and follow-up advised     Mass effect with approximately 2 mm of rightward midline shift without evidence for hydrocephalus.        MRI Brain Perfusion/MRI ischemic protocol. Date: 12/19/21  1. Acute to subacute infarctions in the left MCA distribution as above.  No evidence of hemorrhagic transformation.  2. MR perfusion findings consistent with increased mean transit time t in he left MCA territory with normal cerebral blood volume, suggestive of ischemic penumbra.     CTA Head and Neck. Date: 12/19/21  1. Complete occlusion of the left M1 segment.  2. Frothy secretions within the trachea and fluid distention of the esophageal lumen.  Findings raise concern for aspiration.  3. Hypodensity measuring 1.3 cm in the left thyroid lobe which can be further evaluated with nonemergent thyroid ultrasound     CT Head. Date: 12/19/21  Hypoattenuation and loss of gray-white differentiation within the left insular cortex, frontal lobe, and basal ganglia compatible with an MCA territory acute infarction        Cardiac Imaging  TTE 12/20/21  · The left ventricle is normal in size with normal systolic function. The estimated ejection fraction is 65%.  · Normal right ventricular size with normal right ventricular systolic function.  · Normal left ventricular diastolic function.  · The  estimated PA systolic pressure is 29 mmHg.  · Normal central venous pressure (3 mmHg).        Shalom Ortiz MD  [facility]  Department of Vascular Neurology   [facility] - Neurosurgery [facility])

## 2021-12-28 NOTE — PHYSICIAN QUERY
PT Name: Aurora Ornelas  MR #: [id number]     DOCUMENTATION CLARIFICATION     CDS/:  Sammy Horner RN, CDS                   Contact Information:  [email or web address]    This form is a permanent document in the medical record.    Query Date: December 28, 2021    By submitting this query, we are merely seeking further clarification of documentation.  Please utilize your independent clinical judgment when addressing the question(s) below.    The Medical Record contains the following:  Indicators Supporting Clinical Findings Location in Medical Record   X Decreased LOC, neurological deficits Patient is having worsening RU/RLE weakness and aphasia. She is having difficulty with understanding and expression. Na 139 from 135 today.  patient still having some aphasia and issues with fluency  RUE weakness    Positive for facial asymmetry, speech difficulty and weakness.      Positive for gait problem   [facility] Neuro PN 12/28    Darren Coma Scale (GCS)      Traumatic Injury     X Acute/Chronic Conditions Embolic stroke involving left middle cerebral artery  Mixed hyperlipidemia  Dm  HTN  Cytotoxic cerebral edema   [facility] Neuro PN 12/28   X Radiology Stable mass effect with localized sulcal effacement.  Slight rightward bowing of the septum pellucidum without overt subfalcine herniation    Mass effect with approximately 2 mm of rightward midline shift without evidence for hydrocephalus.   CT 12/23          CT 12/22      Treatment (i.e. IV Steroids, Mannitol, Surgery)     X Other 12/23: Patient had CT head done yesterday showing worsening edema and mild mid line shift.    NCC ws consulted today for concern of cerebral edema w/ MLS   [facility] Neuro PN 12/28        NCC PN 12/22       Provider, please specify diagnosis or diagnoses associated with above clinical findings.     [  x ] Cerebral Compression     [   ] Other neurological diagnosis (please specify):________     [   ] Clinically Undetermined         Please  document in your progress notes daily for the duration of treatment until resolved and include in your discharge summary.

## 2021-12-28 NOTE — SUBJECTIVE & OBJECTIVE
Neurologic Chief Complaint: RSW, aphasia     Subjective:     Interval History: Patient is seen for follow-up neurological assessment and treatment recommendations: Patient neurologically stable on assessment this AM; medically ready for DC pending IPR    HPI, Past Medical, Family, and Social History remains the same as documented in the initial encounter.     Review of Systems     Constitutional: Negative for appetite change and fever.   HENT: Negative for trouble swallowing.    Respiratory: Negative for cough.    Gastrointestinal: Negative for diarrhea and vomiting.   Musculoskeletal: Positive for gait problem.   Skin: Negative for rash.   Neurological: Positive for facial asymmetry, speech difficulty and weakness.     Scheduled Meds:   aspirin  81 mg Oral Daily    atorvastatin  40 mg Oral Daily    clopidogreL  75 mg Oral Daily    ferrous sulfate  1 tablet Oral BID    heparin (porcine)  5,000 Units Subcutaneous Q8H    hydrALAZINE  25 mg Oral Q8H    pantoprazole  40 mg Oral Daily    polyethylene glycol  17 g Oral Every Tues, Thurs, Sat    senna-docusate 8.6-50 mg  1 tablet Oral BID     Continuous Infusions:  PRN Meds:acetaminophen, albuterol-ipratropium, barium sulfate, barium sulfate, dextrose 50%, glucagon (human recombinant), HYDROcodone-acetaminophen, insulin aspart U-100, labetalol, ondansetron, sodium chloride 0.9%    Objective:     Vital Signs (Most Recent):  Temp: 98.4 °F (36.9 °C) (12/28/21 0807)  Pulse: 101 (12/28/21 0807)  Resp: 16 (12/28/21 0807)  BP: (!) 118/55 (12/28/21 0807)  SpO2: 100 % (12/28/21 0807)  BP Location: Left arm    Vital Signs Range (Last 24H):  Temp:  [97.3 °F (36.3 °C)-98.4 °F (36.9 °C)]   Pulse:  []   Resp:  [16-18]   BP: (118-146)/(55-75)   SpO2:  [94 %-100 %]   BP Location: Left arm    Physical Exam    Constitutional:       Appearance: Normal appearance.   HENT:      Head: Normocephalic and atraumatic.      Nose: Nose normal.      Mouth/Throat:      Mouth: Mucous  membranes are moist.   Eyes:      Extraocular Movements: Extraocular movements intact.      Pupils: Pupils are equal, round, and reactive to light.   Cardiovascular:      Rate and Rhythm: Normal rate and regular rhythm.   Pulmonary:      Effort: No respiratory distress.   Abdominal:      General: Bowel sounds are normal. There is no distension.      Palpations: Abdomen is soft.      Tenderness: There is no abdominal tenderness.   Musculoskeletal:         General: No swelling. Normal range of motion.      Cervical back: Normal range of motion.   Skin:     General: Skin is warm.      Capillary Refill: Capillary refill takes less than 2 seconds.   Neurological:      Mental Status: She is alert. She is occasionally disoriented.      Cranial Nerves: Cranial nerve deficit present.      Motor: Weakness present.         Neurological Exam:   LOC: alert  Attention Span: Good   Language: Expressive aphasia, Receptive aphasia  Orientation: Oriented to place (at times), Not oriented to year  EOM (CN III, IV, VI): Full/intact  Facial Movement (CN VII): Lower facial weakness on the right  Motor: Arm left  Normal 5/5  Leg left  Normal 5/5  Arm right  Plegia 0/5  Leg right Plegia 0/5        Laboratory:  CMP: No results for input(s): GLUCOSE, CALCIUM, ALBUMIN, PROT, NA, K, CO2, CL, BUN, CREATININE, ALKPHOS, ALT, AST, BILITOT in the last 24 hours.  CBC:   Recent Labs   Lab 12/24/21  0450   WBC 6.78   RBC 3.58*   HGB 8.1*   HCT 28.2*      MCV 79*   MCH 22.6*   MCHC 28.7*       Diagnostic Results     Brain Imaging   Ct head 12/23/21  Evolving large recent left MCA distribution infarct, without evidence of infarct extension, hemorrhagic conversion, or significant worsening of associated mass effect.     Otherwise stable exam as above.  No new major vascular distribution infarct or hemorrhage elsewhere.  Ct head 12/22/21        Moderate to large evolving left MCA territory infarction corresponding to abnormality seen on MRI.  There  is trace intermediate to hyperdensity in the precentral gyrus which may represent petechial hemorrhage versus artifact from parenchyma edema sparing.  Clinical correlation and follow-up advised     Mass effect with approximately 2 mm of rightward midline shift without evidence for hydrocephalus.        MRI Brain Perfusion/MRI ischemic protocol. Date: 12/19/21  1. Acute to subacute infarctions in the left MCA distribution as above.  No evidence of hemorrhagic transformation.  2. MR perfusion findings consistent with increased mean transit time t in he left MCA territory with normal cerebral blood volume, suggestive of ischemic penumbra.     CTA Head and Neck. Date: 12/19/21  1. Complete occlusion of the left M1 segment.  2. Frothy secretions within the trachea and fluid distention of the esophageal lumen.  Findings raise concern for aspiration.  3. Hypodensity measuring 1.3 cm in the left thyroid lobe which can be further evaluated with nonemergent thyroid ultrasound     CT Head. Date: 12/19/21  Hypoattenuation and loss of gray-white differentiation within the left insular cortex, frontal lobe, and basal ganglia compatible with an MCA territory acute infarction        Cardiac Imaging  TTE 12/20/21  · The left ventricle is normal in size with normal systolic function. The estimated ejection fraction is 65%.  · Normal right ventricular size with normal right ventricular systolic function.  · Normal left ventricular diastolic function.  · The estimated PA systolic pressure is 29 mmHg.  · Normal central venous pressure (3 mmHg).

## 2021-12-28 NOTE — PROGRESS NOTES
RN communicated with patient using  services. Per the , the patient was not signing properly throughout the conversation to make intelligible communication. Patient kept pointing to arm when asked questions about a different topic such as me asking what year it is. When questioned about what the patient wanted to say about her arm she could not give a response that the nurse or  could understand. Patient was able to tell her name and she knows she is in a hospital. She could not answer what month or year it is. Patient has absent movement on the right side

## 2021-12-28 NOTE — PLAN OF CARE
Goals updated; continue OT plan of care    Problem: Occupational Therapy Goal  Goal: Occupational Therapy Goal  Description: Updated Goals to be met by: 7 days (1/4/22)     Patient will increase functional independence with ADLs by performing:    UE Dressing with Minimal Assistance.  LE Dressing with Minimal Assistance.  Grooming while seated with Supervision.  Toileting from bedside commode with Moderate Assistance for hygiene and clothing management.   Supine to sit with Contact Guard Assistance.  Toilet transfer to bedside commode with Minimal Assistance.  Increased functional strength to WFL for ADLs.    Goals to be met by: 7 days (12/27/21)     Patient will increase functional independence with ADLs by performing:    UE Dressing with Minimal Assistance.  LE Dressing with Minimal Assistance.  Grooming while standing at sink with Minimal Assistance.  Toileting from toilet with Minimal Assistance for hygiene and clothing management.   Supine to sit with Contact Guard Assistance.  Toilet transfer to toilet with Minimal Assistance.  Increased functional strength to WFL for ADLs.    Outcome: Ongoing, Progressing

## 2021-12-28 NOTE — ASSESSMENT & PLAN NOTE
Patient is a 56 y.o. female  with PMHx of HTN, HLD, asthma, and hearing loss (deaf, communicates with American Sign Language) who presented to OSH with RSW and communication difficulty. Per family, patient began having symptoms on 12/17 with a facial droop. CT Head with L frontal infarct. CTA with L M1 occlusion. Patient out of window for tPA. Patient transferred to [facility] for possible thrombectomy. On arrival, patient taken to MRI STAT for MRI acute ischemic protocol + MR perfusion scan. MRI reviewed with neuro IR, thrombectomy deferred due to high risk of reperfusion injury given symptoms beginning ~ 48 hours ago. However, recommendation was made for admission to neuro ICU for close monitoring with low threshold for IR intervention if neuro exam declined. Echo with EF 65%. Cardiac monitor at discharge.    Antithrombotics for secondary stroke prevention: Antiplatelets: Aspirin: 81 mg daily  Clopidogrel: 75 mg daily x 30 days    Statins for secondary stroke prevention and hyperlipidemia, if present: Statins: Atorvastatin- 40 mg daily    Aggressive risk factor modification: HTN     Rehab efforts: The patient has been evaluated by a stroke team provider and the therapy needs have been fully considered based off the presenting complaints and exam findings. The following therapy evaluations are needed: PT evaluate and treat, OT evaluate and treat, SLP evaluate and treat, PM&R evaluate for appropriate placement therapy recs IPR    Diagnostics ordered/pending: None , CT HEAD STAT for any neurological change    VTE prophylaxis: Heparin 5000 units SQ every 8 hours  Mechanical prophylaxis: Place SCDs    BP parameters: Infarct: SBP <180

## 2021-12-29 VITALS
DIASTOLIC BLOOD PRESSURE: 70 MMHG | SYSTOLIC BLOOD PRESSURE: 150 MMHG | HEIGHT: 66 IN | RESPIRATION RATE: 18 BRPM | BODY MASS INDEX: 26.01 KG/M2 | WEIGHT: 161.81 LBS | OXYGEN SATURATION: 96 % | HEART RATE: 116 BPM | TEMPERATURE: 99 F

## 2021-12-29 LAB
ALBUMIN SERPL BCP-MCNC: 3 G/DL (ref 3.5–5.2)
ALP SERPL-CCNC: 61 U/L (ref 55–135)
ALT SERPL W/O P-5'-P-CCNC: 34 U/L (ref 10–44)
ANION GAP SERPL CALC-SCNC: 6 MMOL/L (ref 8–16)
AST SERPL-CCNC: 29 U/L (ref 10–40)
BASOPHILS # BLD AUTO: 0.03 K/UL (ref 0–0.2)
BASOPHILS NFR BLD: 0.5 % (ref 0–1.9)
BILIRUB SERPL-MCNC: 0.4 MG/DL (ref 0.1–1)
BUN SERPL-MCNC: 7 MG/DL (ref 6–20)
CALCIUM SERPL-MCNC: 9 MG/DL (ref 8.7–10.5)
CHLORIDE SERPL-SCNC: 104 MMOL/L (ref 95–110)
CO2 SERPL-SCNC: 27 MMOL/L (ref 23–29)
CREAT SERPL-MCNC: 0.7 MG/DL (ref 0.5–1.4)
DIFFERENTIAL METHOD: ABNORMAL
EOSINOPHIL # BLD AUTO: 0.2 K/UL (ref 0–0.5)
EOSINOPHIL NFR BLD: 3.4 % (ref 0–8)
ERYTHROCYTE [DISTWIDTH] IN BLOOD BY AUTOMATED COUNT: 25 % (ref 11.5–14.5)
EST. GFR  (AFRICAN AMERICAN): >60 ML/MIN/1.73 M^2
EST. GFR  (NON AFRICAN AMERICAN): >60 ML/MIN/1.73 M^2
GLUCOSE SERPL-MCNC: 122 MG/DL (ref 70–110)
HCT VFR BLD AUTO: 30.6 % (ref 37–48.5)
HGB BLD-MCNC: 8.4 G/DL (ref 12–16)
IMM GRANULOCYTES # BLD AUTO: 0.02 K/UL (ref 0–0.04)
IMM GRANULOCYTES NFR BLD AUTO: 0.3 % (ref 0–0.5)
LYMPHOCYTES # BLD AUTO: 1.3 K/UL (ref 1–4.8)
LYMPHOCYTES NFR BLD: 21.4 % (ref 18–48)
MCH RBC QN AUTO: 22.6 PG (ref 27–31)
MCHC RBC AUTO-ENTMCNC: 27.5 G/DL (ref 32–36)
MCV RBC AUTO: 82 FL (ref 82–98)
MONOCYTES # BLD AUTO: 0.7 K/UL (ref 0.3–1)
MONOCYTES NFR BLD: 10.7 % (ref 4–15)
NEUTROPHILS # BLD AUTO: 3.9 K/UL (ref 1.8–7.7)
NEUTROPHILS NFR BLD: 63.7 % (ref 38–73)
NRBC BLD-RTO: 0 /100 WBC
PLATELET # BLD AUTO: 398 K/UL (ref 150–450)
PMV BLD AUTO: 9.9 FL (ref 9.2–12.9)
POTASSIUM SERPL-SCNC: 3.4 MMOL/L (ref 3.5–5.1)
PROT SERPL-MCNC: 7.1 G/DL (ref 6–8.4)
RBC # BLD AUTO: 3.72 M/UL (ref 4–5.4)
SARS-COV-2 RNA RESP QL NAA+PROBE: NOT DETECTED
SODIUM SERPL-SCNC: 137 MMOL/L (ref 136–145)
WBC # BLD AUTO: 6.18 K/UL (ref 3.9–12.7)

## 2021-12-29 PROCEDURE — 99900035 HC TECH TIME PER 15 MIN (STAT)

## 2021-12-29 PROCEDURE — 11000001 HC ACUTE MED/SURG PRIVATE ROOM

## 2021-12-29 PROCEDURE — 25000003 PHARM REV CODE 250: Performed by: EMERGENCY MEDICINE

## 2021-12-29 PROCEDURE — 94668 MNPJ CHEST WALL SBSQ: CPT

## 2021-12-29 PROCEDURE — 63600175 PHARM REV CODE 636 W HCPCS: Performed by: PHYSICIAN ASSISTANT

## 2021-12-29 PROCEDURE — U0005 INFEC AGEN DETEC AMPLI PROBE: HCPCS | Performed by: STUDENT IN AN ORGANIZED HEALTH CARE EDUCATION/TRAINING PROGRAM

## 2021-12-29 PROCEDURE — 80053 COMPREHEN METABOLIC PANEL: CPT | Performed by: STUDENT IN AN ORGANIZED HEALTH CARE EDUCATION/TRAINING PROGRAM

## 2021-12-29 PROCEDURE — 99239 PR HOSPITAL DISCHARGE DAY,>30 MIN: ICD-10-PCS | Mod: ,,, | Performed by: PSYCHIATRY & NEUROLOGY

## 2021-12-29 PROCEDURE — 25000003 PHARM REV CODE 250: Performed by: STUDENT IN AN ORGANIZED HEALTH CARE EDUCATION/TRAINING PROGRAM

## 2021-12-29 PROCEDURE — 94760 N-INVAS EAR/PLS OXIMETRY 1: CPT

## 2021-12-29 PROCEDURE — 99239 HOSP IP/OBS DSCHRG MGMT >30: CPT | Mod: ,,, | Performed by: PSYCHIATRY & NEUROLOGY

## 2021-12-29 PROCEDURE — 25000003 PHARM REV CODE 250: Performed by: PHYSICIAN ASSISTANT

## 2021-12-29 PROCEDURE — U0003 INFECTIOUS AGENT DETECTION BY NUCLEIC ACID (DNA OR RNA); SEVERE ACUTE RESPIRATORY SYNDROME CORONAVIRUS 2 (SARS-COV-2) (CORONAVIRUS DISEASE [COVID-19]), AMPLIFIED PROBE TECHNIQUE, MAKING USE OF HIGH THROUGHPUT TECHNOLOGIES AS DESCRIBED BY CMS-2020-01-R: HCPCS | Performed by: STUDENT IN AN ORGANIZED HEALTH CARE EDUCATION/TRAINING PROGRAM

## 2021-12-29 PROCEDURE — 97530 THERAPEUTIC ACTIVITIES: CPT | Mod: CQ

## 2021-12-29 PROCEDURE — 36415 COLL VENOUS BLD VENIPUNCTURE: CPT | Performed by: STUDENT IN AN ORGANIZED HEALTH CARE EDUCATION/TRAINING PROGRAM

## 2021-12-29 PROCEDURE — 85025 COMPLETE CBC W/AUTO DIFF WBC: CPT | Performed by: STUDENT IN AN ORGANIZED HEALTH CARE EDUCATION/TRAINING PROGRAM

## 2021-12-29 RX ORDER — CLOPIDOGREL BISULFATE 75 MG/1
75 TABLET ORAL DAILY
Qty: 21 TABLET | Refills: 0 | Status: ON HOLD | OUTPATIENT
Start: 2021-12-30 | End: 2022-11-08

## 2021-12-29 RX ORDER — POTASSIUM CHLORIDE 20 MEQ/1
40 TABLET, EXTENDED RELEASE ORAL ONCE
Status: COMPLETED | OUTPATIENT
Start: 2021-12-29 | End: 2021-12-29

## 2021-12-29 RX ORDER — NAPROXEN SODIUM 220 MG/1
81 TABLET, FILM COATED ORAL DAILY
Qty: 360 TABLET | Refills: 0 | Status: SHIPPED | OUTPATIENT
Start: 2021-12-30 | End: 2022-12-30

## 2021-12-29 RX ORDER — ATORVASTATIN CALCIUM 40 MG/1
40 TABLET, FILM COATED ORAL DAILY
Qty: 90 TABLET | Refills: 3 | Status: SHIPPED | OUTPATIENT
Start: 2021-12-30 | End: 2022-12-30

## 2021-12-29 RX ADMIN — HEPARIN SODIUM 5000 UNITS: 5000 INJECTION INTRAVENOUS; SUBCUTANEOUS at 05:12

## 2021-12-29 RX ADMIN — FERROUS SULFATE TAB 325 MG (65 MG ELEMENTAL FE) 1 EACH: 325 (65 FE) TAB at 08:12

## 2021-12-29 RX ADMIN — HYDRALAZINE HYDROCHLORIDE 25 MG: 25 TABLET ORAL at 02:12

## 2021-12-29 RX ADMIN — CLOPIDOGREL 75 MG: 75 TABLET, FILM COATED ORAL at 08:12

## 2021-12-29 RX ADMIN — ASPIRIN 81 MG CHEWABLE TABLET 81 MG: 81 TABLET CHEWABLE at 08:12

## 2021-12-29 RX ADMIN — PANTOPRAZOLE SODIUM 40 MG: 20 TABLET, DELAYED RELEASE ORAL at 08:12

## 2021-12-29 RX ADMIN — SENNOSIDES AND DOCUSATE SODIUM 1 TABLET: 50; 8.6 TABLET ORAL at 08:12

## 2021-12-29 RX ADMIN — POTASSIUM CHLORIDE 40 MEQ: 1500 TABLET, EXTENDED RELEASE ORAL at 03:12

## 2021-12-29 RX ADMIN — HYDRALAZINE HYDROCHLORIDE 25 MG: 25 TABLET ORAL at 05:12

## 2021-12-29 RX ADMIN — HEPARIN SODIUM 5000 UNITS: 5000 INJECTION INTRAVENOUS; SUBCUTANEOUS at 02:12

## 2021-12-29 RX ADMIN — ATORVASTATIN CALCIUM 40 MG: 20 TABLET, FILM COATED ORAL at 08:12

## 2021-12-29 NOTE — PT/OT/SLP PROGRESS
Speech Language Pathology      Aurora Ornelas  MRN: [id number]    Patient not seen today secondary to toileting. Will follow-up at next scheduled treatment session.      12/29/2021

## 2021-12-29 NOTE — PLAN OF CARE
[facility]    FACILITY TRANSFER ORDERS      Patient Name: Aurora Ornelas  YOB: 1965    PCP: Primary Doctor No   PCP Address: None  PCP Phone Number: None  PCP Fax: None    Encounter Date: 12/29/2021    Admit to: Rehab    Vital Signs:  Routine    Diagnoses:   Active Hospital Problems    Diagnosis  POA    *Embolic stroke involving left middle cerebral artery [I63.412]  Yes    Cytotoxic cerebral edema [G93.6]  Yes    Diabetes mellitus [E11.9]  Yes    Sinus tachycardia [R00.0]  Yes    Mixed hyperlipidemia [E78.2]  Yes    Deaf [H91.90]  Yes    Essential hypertension [I10]  Yes    Severe persistent asthma with acute exacerbation [J45.51]  Yes      Resolved Hospital Problems   No resolved problems to display.       Allergies:Review of patient's allergies indicates:  No Known Allergies    Diet: regular diet    Activities: Activity as tolerated    Nursing: Per facility protocol     Labs: CBC and CMP Once and then per facility protocol    CONSULTS:    Physical Therapy to evaluate and treat. , Occupational Therapy to evaluate and treat., Speech Therapy to evaluate and treat for Language and Cognition. and  to evaluate for community resources/long-range planning.    MISCELLANEOUS CARE:  NA    WOUND CARE ORDERS  None    Medications: Review discharge medications with patient and family and provide education.      Current Discharge Medication List      START taking these medications    Details   aspirin 81 MG Chew Take 1 tablet (81 mg total) by mouth once daily.  Qty: 360 tablet, Refills: 0      atorvastatin (LIPITOR) 40 MG tablet Take 1 tablet (40 mg total) by mouth once daily.  Qty: 90 tablet, Refills: 3      clopidogreL (PLAVIX) 75 mg tablet Take 1 tablet (75 mg total) by mouth once daily. for 21 days  Qty: 21 tablet, Refills: 0         CONTINUE these medications which have NOT CHANGED    Details   albuterol sulfate 2.5 mg/0.5 mL Nebu Take 2.5 mg by nebulization every 4 (four)  hours as needed. Rescue  Qty: 1 each, Refills: 0      !! albuterol-ipratropium (DUO-NEB) 2.5 mg-0.5 mg/3 mL nebulizer solution USE 1 AMPULE IN NEBULIZER EVERY 6 HOURS WHILE AWAKE      !! albuterol-ipratropium (DUO-NEB) 2.5 mg-0.5 mg/3 mL nebulizer solution Inhale 3 mLs into the lungs.      ergocalciferol (ERGOCALCIFEROL) 50,000 unit Cap Take 50,000 Units by mouth.      ferrous sulfate 325 (65 FE) MG EC tablet Take 325 mg by mouth 2 (two) times daily.      ipratropium (ATROVENT) 0.02 % nebulizer solution Take 2.5 mLs (500 mcg total) by nebulization 4 (four) times daily as needed for Wheezing. Rescue  Qty: 1 Box, Refills: 0      predniSONE (DELTASONE) 50 MG Tab Take 50 mg by mouth once daily.      sucralfate (CARAFATE) 1 gram tablet Take 1 tablet (1 g total) by mouth 4 (four) times daily before meals and nightly.  Qty: 120 tablet, Refills: 1      traMADoL (ULTRAM) 50 mg tablet TAKE 1 TABLET BY MOUTH ONCE DAILY AS NEEDED FOR PAIN    Comments: Quantity prescribed more than 7 day supply? Press F2 and select one:[id number]         !! - Potential duplicate medications found. Please discuss with provider.      STOP taking these medications       hydrALAZINE (APRESOLINE) 25 MG tablet Comments:   Reason for Stopping:         pantoprazole (PROTONIX) 40 MG tablet Comments:   Reason for Stopping:                  Immunizations Administered as of 12/29/2021     Name Date Dose VIS Date Route Exp Date    COVID-19, vector-nr, [id number] (J&J) 4/12/2021  5:11 PM 0.5 mL 1/13/2021 Intramuscular 6/23/2021    Site: Left deltoid     Given By: Tiny Del Valle MA     : [employer]     Lot: [id number]           This patient has had both covid vaccinations    Some patients may experience side effects after vaccination.  These may include fever, headache, muscle or joint aches.  Most symptoms resolve with 24-48 hours and do not require urgent medical evaluation unless they persist for more than 72 hours or symptoms are concerning for an unrelated  medical condition.          _________________________________  Shalom Ortiz MD  12/29/2021

## 2021-12-29 NOTE — DISCHARGE SUMMARY
Arvind Beck - Neurosurgery ([facility])  Vascular Neurology  Comprehensive Stroke Center  Discharge Summary     Summary:     Admit Date: 12/19/2021 12:35 PM    Discharge Date and Time:  12/29/2021 1:38 PM    Attending Physician: Renetta Breaux MD     Discharge Provider: Shalom Ortiz MD    History of Present Illness: Aurora Ornelas is a 56 y.o. female with PMHx of HTN, asthma, and hearing loss (deaf, communicates with American Sign Language) who presented to OSH with RSW and communication difficulty. CT Head with L frontal infarct. CTA with L M1 occlusion. Patient out of window for tPA. Patient transferred to [facility] for possible thrombectomy. Patient aphasic on arrival. Per daughter, they noticed a R facial droop on 12/17. She reported to the ED today due to worsening symptoms of RSW and communication difficulty.          Hospital Course (synopsis of major diagnoses, care, treatment, and services provided during the course of the hospital stay): 12/20:  used during patient interaction. Patient able to communicate name and age. Aphasic, when trying to interpret images on stroke booklet, patient signing numbers instead of (hammock, cactus). Continues with RUE weakness. No RLE drift. Echo pending. MBSS pending.   12/21:  used during patient interaction. Continues with RUE weakness. phasic, when trying to interpret images on stroke booklet, patient signing numbers instead of (hammock, cactus). Echo with EF 65%.   12/22:  used during patient interaction. Continued RUE and new RLE weakness, exam otherwise remained stable from previous. Repeat CTH obtained to evaluated further. Serial neuro exams performed.   12/23/2021 Patient had CT head done yesterday showing worsening edema and mild mid line shift. NCC consulted for possible need of HTS, but no indication and patient remained in VN floor. CT head repeated later in the day and this morning and stable. Patient is having worsening RU/RLE  weakness and aphasia. She is having difficulty with understanding and expression. Na 139 from 135 today.   12/24 medically ready for discharge, patient without accepting rehab facility at this time, complaining of headache this morning and nurse instructed to give prn tylenol  12/25/2021 No acute events overnight, no changes in neuro exam. Still having some expressive aphasia and difficulty understanding. No new complaints today, pending IPR.   12/26/2021 NAEON, patient still having some aphasia and issues with fluency, but awake and alert, no changes on exam. Had a BM yesterday, pending IPR.   12/27/2021 NAEON; medically stable for DC; pending IPR  12/28/2021 Patient neurologically stable on assessment this AM; medically ready for DC pending IPR  12/29/2021 NAEON; anticipate discharge today with 30 day event monitor and vas neuro f/u in 4-6 weeks          Goals of Care Treatment Preferences:  Code Status: Full Code      Stroke Etiology: Ischemic Undetermined Cause Cryptogenic Embolism / ESUS (Embolic Stroke of Undetermined Source)    STROKE DOCUMENTATION   Acute Stroke Times   Last Known Normal Date: 12/17/21  Last Known Normal Time: 0700  Unknown Normal Time: Unknown Time  Symptom Onset Date: 12/17/21  Symptom Onset Time: 0700  Unknown Symptom Onset Time: Unknown Time  Stroke Team Called Date: 12/19/21  Stroke Team Called Time: 1633  Stroke Team Arrival Date: 12/19/21  Stroke Team Arrival Time: 1637  CT Interpretation Time: 1310  Alteplase Recommended: No  CTA Interpretation Time:  (Pending)  Thrombectomy Recommended: No  MRI Acute Stroke Protocol Interpretation Time: 1709     NIH Scale:  1a. Level of Consciousness: 0-->Alert, keenly responsive  1b. LOC Questions: 0-->Answers both questions correctly  1c. LOC Commands: 0-->Performs both tasks correctly  2. Best Gaze: 0-->Normal  3. Visual: 0-->No visual loss  4. Facial Palsy: 1-->Minor paralysis (flattened nasolabial fold, asymmetry on smiling)  5a. Motor Arm,  Left: 0-->No drift, limb holds 90 (or 45) degrees for full 10 secs  5b. Motor Arm, Right: 4-->No movement  6a. Motor Leg, Left: 0-->No drift, leg holds 30 degree position for full 5 secs  6b. Motor Leg, Right: 4-->No movement  7. Limb Ataxia: 2-->Present in two limbs  8. Sensory: 2-->Severe to total sensory loss, patient is not aware of being touched in the face, arm, and leg  9. Best Language: 3-->Mute, global aphasia, no usable speech or auditory comprehension  10. Dysarthria: 0-->Normal  11. Extinction and Inattention (formerly Neglect): 0-->No abnormality  Total (NIH Stroke Scale): 16        Modified Alexandr Score: 4  Darren Coma Scale:15   ABCD2 Score:    [id number] Score:   HAS -BLED Score:   ICH Score:   Hunt & Nguyen Classification:       Assessment/Plan:     Diagnostic Results:      Brain Imaging   Ct head 12/23/21  Evolving large recent left MCA distribution infarct, without evidence of infarct extension, hemorrhagic conversion, or significant worsening of associated mass effect.     Otherwise stable exam as above.  No new major vascular distribution infarct or hemorrhage elsewhere.  Ct head 12/22/21        Moderate to large evolving left MCA territory infarction corresponding to abnormality seen on MRI.  There is trace intermediate to hyperdensity in the precentral gyrus which may represent petechial hemorrhage versus artifact from parenchyma edema sparing.  Clinical correlation and follow-up advised     Mass effect with approximately 2 mm of rightward midline shift without evidence for hydrocephalus.        MRI Brain Perfusion/MRI ischemic protocol. Date: 12/19/21  1. Acute to subacute infarctions in the left MCA distribution as above.  No evidence of hemorrhagic transformation.  2. MR perfusion findings consistent with increased mean transit time t in he left MCA territory with normal cerebral blood volume, suggestive of ischemic penumbra.     CTA Head and Neck. Date: 12/19/21  1. Complete occlusion of  the left M1 segment.  2. Frothy secretions within the trachea and fluid distention of the esophageal lumen.  Findings raise concern for aspiration.  3. Hypodensity measuring 1.3 cm in the left thyroid lobe which can be further evaluated with nonemergent thyroid ultrasound     CT Head. Date: 12/19/21  Hypoattenuation and loss of gray-white differentiation within the left insular cortex, frontal lobe, and basal ganglia compatible with an MCA territory acute infarction        Cardiac Imaging  TTE 12/20/21  · The left ventricle is normal in size with normal systolic function. The estimated ejection fraction is 65%.  · Normal right ventricular size with normal right ventricular systolic function.  · Normal left ventricular diastolic function.  · The estimated PA systolic pressure is 29 mmHg.  · Normal central venous pressure (3 mmHg).    Interventions: None    Complications: None    Disposition: Rehab Facility    Final Active Diagnoses:    Diagnosis Date Noted POA    PRINCIPAL PROBLEM:  Embolic stroke involving left middle cerebral artery [I63.412] 12/19/2021 Yes    Cytotoxic cerebral edema [G93.6] 12/19/2021 Yes    Diabetes mellitus [E11.9] 12/19/2021 Yes    Sinus tachycardia [R00.0] 12/19/2021 Yes    Mixed hyperlipidemia [E78.2] 12/19/2021 Yes    Deaf [H91.90] 04/13/2018 Yes    Essential hypertension [I10] 04/13/2018 Yes    Severe persistent asthma with acute exacerbation [J45.51]  Yes      Problems Resolved During this Admission:     * Embolic stroke involving left middle cerebral artery  Patient is a 56 y.o. female  with PMHx of HTN, HLD, asthma, and hearing loss (deaf, communicates with American Sign Language) who presented to OSH with RSW and communication difficulty. Per family, patient began having symptoms on 12/17 with a facial droop. CT Head with L frontal infarct. CTA with L M1 occlusion. Patient out of window for tPA. Patient transferred to [facility] for possible thrombectomy. On arrival, patient taken to  MRI STAT for MRI acute ischemic protocol + MR perfusion scan. MRI reviewed with neuro IR, thrombectomy deferred due to high risk of reperfusion injury given symptoms beginning ~ 48 hours ago. However, recommendation was made for admission to neuro ICU for close monitoring with low threshold for IR intervention if neuro exam declined. Echo with EF 65%. Cardiac monitor at discharge.    Antithrombotics for secondary stroke prevention: Antiplatelets: Aspirin: 81 mg daily  Clopidogrel: 75 mg daily x 30 days    Statins for secondary stroke prevention and hyperlipidemia, if present: Statins: Atorvastatin- 40 mg daily    Aggressive risk factor modification: HTN     Rehab efforts: The patient has been evaluated by a stroke team provider and the therapy needs have been fully considered based off the presenting complaints and exam findings. The following therapy evaluations are needed: PT evaluate and treat, OT evaluate and treat, SLP evaluate and treat, PM&R evaluate for appropriate placement therapy recs IPR    Diagnostics ordered/pending: None , CT HEAD STAT for any neurological change    VTE prophylaxis: Heparin 5000 units SQ every 8 hours  Mechanical prophylaxis: Place SCDs    BP parameters: Infarct: SBP <180        Mixed hyperlipidemia  Stroke RF  .2  Atorvastatin 40mg QD    Diabetes mellitus  Stroke RF  A1c 6.1 on 12/19/21, new diagnosis of diabetes, not on any medications at home.   -180 while IP, patient on diabetic diet.     -- Nutrition consulted for diabetes education   -- OP diabetes education and will need PCP for starting medication     Cytotoxic cerebral edema  Area of cerebral edema identified when reviewing brain imaging in the territory of the L middle cerebral artery. We will continue to monitor with q1h neuro checks while on floor or more frequently while in neuro critical care, as any change in the patients clinical exam may signify expansion of the insult and/or the area of the edema. Such  changes may require acute interventions to prevent loss of function and/or death.    Essential hypertension  Stroke risk factor  SBP <180  Continue hydralazine 25mg TID     Deaf  Communicates with American sign language    Severe persistent asthma with acute exacerbation  duonebs prn  -- CPT BID for atelectasis ppx         Recommendations:     Post-discharge complication risks: Falls, Pneumonia, Seizure, Skin breakdown, Urinary tract infections    Stroke Education given to: patient and family    Follow-up in Stroke Clinic in 4-6 weeks.     Discharge Plan:  Antithrombotics: Aspirin 81mg, Clopidogrel 75mg  Statin: Atorvastatin 40mg  Aggresive risk factor modification:  Hypertension  Diabetes  High Cholesterol  Diet  Exercise    Follow Up:   Follow-up Information     Primary Doctor No.    Why: Please establish with a PCP for 7-day Hospital follow-up after facility discharge.                       Patient Instructions:      Ambulatory referral/consult to Vascular Neurology   Standing Status: Future   Referral Priority: Routine Referral Type: Consultation   Referral Reason: Specialty Services Required   Requested Specialty: Vascular Neurology   Number of Visits Requested: 1     Diet Cardiac   Order Comments: See Stroke Patient Education Guide Booklet for details.     Call 911 for any of the following:   Order Comments: Call 911  right away if any of the following warning signs come on suddenly, even if the symptoms only last for a few minutes. With stroke, timing is very important.   - Warning Signs of Stroke:  - Weakness: You may feel a sudden weakness, tingling or loss of feeling on one side of your face or body.  - Vision Problems: You may have sudden double vision or trouble seeing in one or both eyes.  - Speech Problems: You may have sudden trouble talking, slured speech, or problems understanding others.  - Headache: You may have sudden, severe headache.  - Movement Problems: You may experience dizziness, a feeling  of spinning, a loss of balance, a feeling of falling or blackouts.     Activity as tolerated     Cardiac event monitor   Standing Status: Future Standing Exp. Date: 12/28/22   Order Comments: 30 day event monitor     Order Specific Question Answer Comments   Cardiac Event Monitor Auto Trigger    Release to patient Immediate        Medications:  Reconciled Home Medications:      Medication List      START taking these medications    aspirin 81 MG Chew  Take 1 tablet (81 mg total) by mouth once daily.  Start taking on: December 30, 2021     atorvastatin 40 MG tablet  Commonly known as: LIPITOR  Take 1 tablet (40 mg total) by mouth once daily.  Start taking on: December 30, 2021     clopidogreL 75 mg tablet  Commonly known as: PLAVIX  Take 1 tablet (75 mg total) by mouth once daily. for 21 days  Start taking on: December 30, 2021        CONTINUE taking these medications    albuterol sulfate 2.5 mg/0.5 mL Nebu  Take 2.5 mg by nebulization every 4 (four) hours as needed. Rescue     * albuterol-ipratropium 2.5 mg-0.5 mg/3 mL nebulizer solution  Commonly known as: DUO-NEB  USE 1 AMPULE IN NEBULIZER EVERY 6 HOURS WHILE AWAKE     * albuterol-ipratropium 2.5 mg-0.5 mg/3 mL nebulizer solution  Commonly known as: DUO-NEB  Inhale 3 mLs into the lungs.     ergocalciferol 50,000 unit Cap  Commonly known as: ERGOCALCIFEROL  Take 50,000 Units by mouth.     ferrous sulfate 325 (65 FE) MG EC tablet  Take 325 mg by mouth 2 (two) times daily.     ipratropium 0.02 % nebulizer solution  Commonly known as: ATROVENT  Take 2.5 mLs (500 mcg total) by nebulization 4 (four) times daily as needed for Wheezing. Rescue     predniSONE 50 MG Tab  Commonly known as: DELTASONE  Take 50 mg by mouth once daily.     sucralfate 1 gram tablet  Commonly known as: CARAFATE  Take 1 tablet (1 g total) by mouth 4 (four) times daily before meals and nightly.     traMADoL 50 mg tablet  Commonly known as: ULTRAM  TAKE 1 TABLET BY MOUTH ONCE DAILY AS NEEDED FOR  PAIN         * This list has 2 medication(s) that are the same as other medications prescribed for you. Read the directions carefully, and ask your doctor or other care provider to review them with you.            STOP taking these medications    hydrALAZINE 25 MG tablet  Commonly known as: APRESOLINE     pantoprazole 40 MG tablet  Commonly known as: PROTONIX            Shalom Ortiz MD  Comprehensive Stroke Center  Department of Vascular Neurology   [facility] Neurosurgery [facility])

## 2021-12-29 NOTE — PROGRESS NOTES
Arvind Beck - Neurosurgery ([facility])  Vascular Neurology  Comprehensive Stroke Center  Progress Note    Assessment/Plan:     * Embolic stroke involving left middle cerebral artery  Patient is a 56 y.o. female  with PMHx of HTN, HLD, asthma, and hearing loss (deaf, communicates with American Sign Language) who presented to OSH with RSW and communication difficulty. Per family, patient began having symptoms on 12/17 with a facial droop. CT Head with L frontal infarct. CTA with L M1 occlusion. Patient out of window for tPA. Patient transferred to [facility] for possible thrombectomy. On arrival, patient taken to MRI STAT for MRI acute ischemic protocol + MR perfusion scan. MRI reviewed with neuro IR, thrombectomy deferred due to high risk of reperfusion injury given symptoms beginning ~ 48 hours ago. However, recommendation was made for admission to neuro ICU for close monitoring with low threshold for IR intervention if neuro exam declined. Echo with EF 65%. Cardiac monitor at discharge.    Antithrombotics for secondary stroke prevention: Antiplatelets: Aspirin: 81 mg daily  Clopidogrel: 75 mg daily x 30 days    Statins for secondary stroke prevention and hyperlipidemia, if present: Statins: Atorvastatin- 40 mg daily    Aggressive risk factor modification: HTN     Rehab efforts: The patient has been evaluated by a stroke team provider and the therapy needs have been fully considered based off the presenting complaints and exam findings. The following therapy evaluations are needed: PT evaluate and treat, OT evaluate and treat, SLP evaluate and treat, PM&R evaluate for appropriate placement therapy recs IPR    Diagnostics ordered/pending: None , CT HEAD STAT for any neurological change    VTE prophylaxis: Heparin 5000 units SQ every 8 hours  Mechanical prophylaxis: Place SCDs    BP parameters: Infarct: SBP <180        Mixed hyperlipidemia  Stroke RF  .2  Atorvastatin 40mg QD    Diabetes mellitus  Stroke RF  A1c 6.1 on  12/19/21, new diagnosis of diabetes, not on any medications at home.   -180 while IP, patient on diabetic diet.     -- Nutrition consulted for diabetes education   -- OP diabetes education and will need PCP for starting medication     Cytotoxic cerebral edema  Area of cerebral edema identified when reviewing brain imaging in the territory of the L middle cerebral artery. We will continue to monitor with q1h neuro checks while on floor or more frequently while in neuro critical care, as any change in the patients clinical exam may signify expansion of the insult and/or the area of the edema. Such changes may require acute interventions to prevent loss of function and/or death.    Essential hypertension  Stroke risk factor  SBP <180  Continue hydralazine 25mg TID     Deaf  Communicates with American sign language    Severe persistent asthma with acute exacerbation  duonebs prn  -- CPT BID for atelectasis ppx          12/20:  used during patient interaction. Patient able to communicate name and age. Aphasic, when trying to interpret images on stroke booklet, patient signing numbers instead of (hammock, cactus). Continues with RUE weakness. No RLE drift. Echo pending. MBSS pending.   12/21:  used during patient interaction. Continues with RUE weakness. phasic, when trying to interpret images on stroke booklet, patient signing numbers instead of (hammock, cactus). Echo with EF 65%.   12/22:  used during patient interaction. Continued RUE and new RLE weakness, exam otherwise remained stable from previous. Repeat CTH obtained to evaluated further. Serial neuro exams performed.   12/23/2021 Patient had CT head done yesterday showing worsening edema and mild mid line shift. NCC consulted for possible need of HTS, but no indication and patient remained in VN floor. CT head repeated later in the day and this morning and stable. Patient is having worsening RU/RLE weakness and aphasia. She  is having difficulty with understanding and expression. Na 139 from 135 today.   12/24 medically ready for discharge, patient without accepting rehab facility at this time, complaining of headache this morning and nurse instructed to give prn tylenol  12/25/2021 No acute events overnight, no changes in neuro exam. Still having some expressive aphasia and difficulty understanding. No new complaints today, pending IPR.   12/26/2021 NAEON, patient still having some aphasia and issues with fluency, but awake and alert, no changes on exam. Had a BM yesterday, pending IPR.   12/27/2021 NAEON; medically stable for DC; pending IPR  12/28/2021 Patient neurologically stable on assessment this AM; medically ready for DC pending IPR  12/29/2021 NAEON; pending placement          STROKE DOCUMENTATION   Acute Stroke Times   Last Known Normal Date: 12/17/21  Last Known Normal Time: 0700  Unknown Normal Time: Unknown Time  Symptom Onset Date: 12/17/21  Symptom Onset Time: 0700  Unknown Symptom Onset Time: Unknown Time  Stroke Team Called Date: 12/19/21  Stroke Team Called Time: 1633  Stroke Team Arrival Date: 12/19/21  Stroke Team Arrival Time: 1637  CT Interpretation Time: 1310  Alteplase Recommended: No  CTA Interpretation Time:  (Pending)  Thrombectomy Recommended: No  MRI Acute Stroke Protocol Interpretation Time: 1709    NIH Scale:  1a. Level of Consciousness: 0-->Alert, keenly responsive  1b. LOC Questions: 0-->Answers both questions correctly  1c. LOC Commands: 0-->Performs both tasks correctly  2. Best Gaze: 0-->Normal  3. Visual: 0-->No visual loss  4. Facial Palsy: 1-->Minor paralysis (flattened nasolabial fold, asymmetry on smiling)  5a. Motor Arm, Left: 0-->No drift, limb holds 90 (or 45) degrees for full 10 secs  5b. Motor Arm, Right: 4-->No movement  6a. Motor Leg, Left: 0-->No drift, leg holds 30 degree position for full 5 secs  6b. Motor Leg, Right: 4-->No movement  7. Limb Ataxia: 2-->Present in two limbs  8.  Sensory: 2-->Severe to total sensory loss, patient is not aware of being touched in the face, arm, and leg  9. Best Language: 3-->Mute, global aphasia, no usable speech or auditory comprehension  10. Dysarthria: 0-->Normal  11. Extinction and Inattention (formerly Neglect): 0-->No abnormality  Total (NIH Stroke Scale): 16       Modified [place] Score: 2  [place] Coma Scale:    ABCD2 Score:    [id number]-SMM Score:   HAS -BLED Score:   ICH Score:   Hunt & Nguyen Classification:      Hemorrhagic change of an Ischemic Stroke: Does this patient have an ischemic stroke with hemorrhagic changes? No     Neurologic Chief Complaint: RSW, aphasia     Subjective:     Interval History: Patient is seen for follow-up neurological assessment and treatment recommendations: ANITAEON; pending placement    HPI, Past Medical, Family, and Social History remains the same as documented in the initial encounter.     Review of Systems     Constitutional: Negative for appetite change and fever.   HENT: Negative for trouble swallowing.    Respiratory: Negative for cough.    Gastrointestinal: Negative for diarrhea and vomiting.   Musculoskeletal: Positive for gait problem.   Skin: Negative for rash.   Neurological: Positive for facial asymmetry, speech difficulty and weakness.     Scheduled Meds:   aspirin  81 mg Oral Daily    atorvastatin  40 mg Oral Daily    clopidogreL  75 mg Oral Daily    ferrous sulfate  1 tablet Oral BID    heparin (porcine)  5,000 Units Subcutaneous Q8H    hydrALAZINE  25 mg Oral Q8H    pantoprazole  40 mg Oral Daily    polyethylene glycol  17 g Oral Every Tues, Thurs, Sat    senna-docusate 8.6-50 mg  1 tablet Oral BID     Continuous Infusions:  PRN Meds:acetaminophen, albuterol-ipratropium, barium sulfate, barium sulfate, dextrose 50%, glucagon (human recombinant), HYDROcodone-acetaminophen, insulin aspart U-100, labetalol, ondansetron, sodium chloride 0.9%    Objective:     Vital Signs (Most Recent):  Temp: 98.6 °F  (37 °C) (12/29/21 1203)  Pulse: (!) 114 (12/29/21 1203)  Resp: 16 (12/29/21 1203)  BP: 134/69 (12/29/21 1203)  SpO2: (!) 92 % (12/29/21 1203)  BP Location: Left arm    Vital Signs Range (Last 24H):  Temp:  [96 °F (35.6 °C)-99.2 °F (37.3 °C)]   Pulse:  []   Resp:  [16-18]   BP: (130-150)/(62-70)   SpO2:  [91 %-97 %]   BP Location: Left arm    Physical Exam    Constitutional:       Appearance: Normal appearance.   HENT:      Head: Normocephalic and atraumatic.      Nose: Nose normal.      Mouth/Throat:      Mouth: Mucous membranes are moist.   Eyes:      Extraocular Movements: Extraocular movements intact.      Pupils: Pupils are equal, round, and reactive to light.   Cardiovascular:      Rate and Rhythm: Normal rate and regular rhythm.   Pulmonary:      Effort: No respiratory distress.   Abdominal:      General: Bowel sounds are normal. There is no distension.      Palpations: Abdomen is soft.      Tenderness: There is no abdominal tenderness.   Musculoskeletal:         General: No swelling. Normal range of motion.      Cervical back: Normal range of motion.   Skin:     General: Skin is warm.      Capillary Refill: Capillary refill takes less than 2 seconds.   Neurological:      Mental Status: She is alert. She is occasionally disoriented.      Cranial Nerves: Cranial nerve deficit present.      Motor: Weakness present.         Neurological Exam:   LOC: alert  Attention Span: Good   Language: Expressive aphasia, Receptive aphasia  Orientation: Oriented to place (at times), Not oriented to year  EOM (CN III, IV, VI): Full/intact  Facial Movement (CN VII): Lower facial weakness on the right  Motor: Arm left  Normal 5/5  Leg left  Normal 5/5  Arm right  Plegia 0/5  Leg right Plegia 0/5        Laboratory:  CMP:   Recent Labs   Lab 12/29/21  0830   CALCIUM 9.0   ALBUMIN 3.0*   PROT 7.1      K 3.4*   CO2 27      BUN 7   CREATININE 0.7   ALKPHOS 61   ALT 34   AST 29   BILITOT 0.4     CBC:   Recent Labs    Lab 12/29/21  0830   WBC 6.18   RBC 3.72*   HGB 8.4*   HCT 30.6*      MCV 82   MCH 22.6*   MCHC 27.5*       Diagnostic Results     Brain Imaging   Ct head 12/23/21  Evolving large recent left MCA distribution infarct, without evidence of infarct extension, hemorrhagic conversion, or significant worsening of associated mass effect.     Otherwise stable exam as above.  No new major vascular distribution infarct or hemorrhage elsewhere.  Ct head 12/22/21        Moderate to large evolving left MCA territory infarction corresponding to abnormality seen on MRI.  There is trace intermediate to hyperdensity in the precentral gyrus which may represent petechial hemorrhage versus artifact from parenchyma edema sparing.  Clinical correlation and follow-up advised     Mass effect with approximately 2 mm of rightward midline shift without evidence for hydrocephalus.        MRI Brain Perfusion/MRI ischemic protocol. Date: 12/19/21  1. Acute to subacute infarctions in the left MCA distribution as above.  No evidence of hemorrhagic transformation.  2. MR perfusion findings consistent with increased mean transit time t in he left MCA territory with normal cerebral blood volume, suggestive of ischemic penumbra.     CTA Head and Neck. Date: 12/19/21  1. Complete occlusion of the left M1 segment.  2. Frothy secretions within the trachea and fluid distention of the esophageal lumen.  Findings raise concern for aspiration.  3. Hypodensity measuring 1.3 cm in the left thyroid lobe which can be further evaluated with nonemergent thyroid ultrasound     CT Head. Date: 12/19/21  Hypoattenuation and loss of gray-white differentiation within the left insular cortex, frontal lobe, and basal ganglia compatible with an MCA territory acute infarction        Cardiac Imaging  TTE 12/20/21  · The left ventricle is normal in size with normal systolic function. The estimated ejection fraction is 65%.  · Normal right ventricular size with normal  right ventricular systolic function.  · Normal left ventricular diastolic function.  · The estimated PA systolic pressure is 29 mmHg.  · Normal central venous pressure (3 mmHg).        Shalom Ortiz MD  Comprehensive Stroke Center  Department of Vascular Neurology   [facility])

## 2021-12-29 NOTE — PT/OT/SLP PROGRESS
Physical Therapy Treatment    Patient Name:  Aurora Ornelas   MRN:  [id number]    Recommendations:     Discharge Recommendations:  rehabilitation facility   Discharge Equipment Recommendations:  (TBD)   Barriers to discharge: None    Assessment:     Aurora Ornelas is a 56 y.o. female admitted with a medical diagnosis of Embolic stroke involving left middle cerebral artery.  She presents with the following impairments/functional limitations:  weakness,impaired endurance,impaired sensation,impaired self care skills,impaired functional mobilty,gait instability,impaired balance,decreased ROM,abnormal tone,decreased coordination,decreased upper extremity function,decreased lower extremity function,impaired coordination,impaired fine motor,decreased safety awareness. Writing therapist alerted to emergent assistance needed in patients room by SLP team member. Pt found laid chest across bed with knees nearly to floor and dorsum of feet against the ground. RN guarding pt to prevent fall to ground. Writing therapist positions pts LLE with foot to ground and with RN assistance pt is pulled up into standing on LLE. RLE extensor tone noted. Pt pivoted and sat down to EOB. Pt at this point self returns to supine. Assisted with repositioning of pt. Pt then left with HOB elevated in care of RN. Pt with plans for discharge pending result of covid test, no further treatment/intervention planned for this day. Pt will continue to benefit from therapy services to address impairments listed above.     Rehab Prognosis: Good; patient would benefit from acute skilled PT services to address these deficits and reach maximum level of function.    Recent Surgery: * No surgery found *      Plan:     During this hospitalization, patient to be seen 4 x/week to address the identified rehab impairments via gait training,therapeutic activities,therapeutic exercises,neuromuscular re-education and progress toward the following goals:    · Plan of Care  Expires:  01/19/22    Subjective     Chief Complaint: no c/o   Pain/Comfort:  · Pain Rating 1: other (see comments) (pt reports pain in RLE to SLP at bedside interpreting pts ASL)  · Location - Side 1: Right  · Location - Orientation 1: generalized  · Location 1: leg  · Pain Rating Post-Intervention 1: other (see comments) (unrated)      Objective:     Communicated with RN prior to session.  Patient found HOB elevated with telemetry,bed alarm,peripheral IV upon PTA entry to room.     General Precautions: Standard, deaf,fall   Orthopedic Precautions:N/A   Braces: N/A  Respiratory Status: Room air     Functional Mobility:  · Bed Mobility:     · Sit to Supine: supervision  · Transfers:     · Sit to Stand:  maximal assistance with hand-held assist to stand from near-quadruped positioning laid across bed; LLE positioned to assist, RLE unable to be repositioned for support d/t extensor tone.       AM-PAC 6 CLICK MOBILITY  Turning over in bed (including adjusting bedclothes, sheets and blankets)?: 3  Sitting down on and standing up from a chair with arms (e.g., wheelchair, bedside commode, etc.): 3  Moving from lying on back to sitting on the side of the bed?: 3  Moving to and from a bed to a chair (including a wheelchair)?: 2  Need to walk in hospital room?: 2  Climbing 3-5 steps with a railing?: 1  Basic Mobility Total Score: 14       Therapeutic Activities and Exercises:   Pt assisted with mobility as noted in assessment as response to emergent call to room for assistance with pt mobility d/t near fall.     Patient left HOB elevated with all lines intact, call button in reach and SLP and RN present.    GOALS:   Multidisciplinary Problems     Physical Therapy Goals        Problem: Physical Therapy Goal    Goal Priority Disciplines Outcome Goal Variances Interventions   Physical Therapy Goal     PT, PT/OT Ongoing, Progressing     Description: Goals to be met by: 12/30     Patient will increase functional independence with  mobility by performin. Supine to sit with stand by assistance   2. Sit to supine with Stand-by Assistance  3. Sit to stand transfer with Contact Guard Assistance  4. Stand pivot bed to chair contact guard assist   5. Gait  x 15 feet with Minimal Assistance using LRAD.   6. Ascend/descend 6 stair with unilateral Handrails Minimal Assistance using LRAD.   7. Stand for 1 minutes with Contact Guard Assistance using No Assistive Device to prepare for functional activities in standing.                      Time Tracking:     PT Received On: 21  PT Start Time: 1400     PT Stop Time: 1410  PT Total Time (min): 10 min     Billable Minutes: Therapeutic Activity 10    Treatment Type: Treatment  PT/PTA: PTA     PTA Visit Number: 3     2021

## 2021-12-29 NOTE — SUBJECTIVE & OBJECTIVE
Neurologic Chief Complaint: RSW, aphasia     Subjective:     Interval History: Patient is seen for follow-up neurological assessment and treatment recommendations: ANITAEON; pending placement    HPI, Past Medical, Family, and Social History remains the same as documented in the initial encounter.     Review of Systems     Constitutional: Negative for appetite change and fever.   HENT: Negative for trouble swallowing.    Respiratory: Negative for cough.    Gastrointestinal: Negative for diarrhea and vomiting.   Musculoskeletal: Positive for gait problem.   Skin: Negative for rash.   Neurological: Positive for facial asymmetry, speech difficulty and weakness.     Scheduled Meds:   aspirin  81 mg Oral Daily    atorvastatin  40 mg Oral Daily    clopidogreL  75 mg Oral Daily    ferrous sulfate  1 tablet Oral BID    heparin (porcine)  5,000 Units Subcutaneous Q8H    hydrALAZINE  25 mg Oral Q8H    pantoprazole  40 mg Oral Daily    polyethylene glycol  17 g Oral Every Tues, Thurs, Sat    senna-docusate 8.6-50 mg  1 tablet Oral BID     Continuous Infusions:  PRN Meds:acetaminophen, albuterol-ipratropium, barium sulfate, barium sulfate, dextrose 50%, glucagon (human recombinant), HYDROcodone-acetaminophen, insulin aspart U-100, labetalol, ondansetron, sodium chloride 0.9%    Objective:     Vital Signs (Most Recent):  Temp: 98.6 °F (37 °C) (12/29/21 1203)  Pulse: (!) 114 (12/29/21 1203)  Resp: 16 (12/29/21 1203)  BP: 134/69 (12/29/21 1203)  SpO2: (!) 92 % (12/29/21 1203)  BP Location: Left arm    Vital Signs Range (Last 24H):  Temp:  [96 °F (35.6 °C)-99.2 °F (37.3 °C)]   Pulse:  []   Resp:  [16-18]   BP: (130-150)/(62-70)   SpO2:  [91 %-97 %]   BP Location: Left arm    Physical Exam    Constitutional:       Appearance: Normal appearance.   HENT:      Head: Normocephalic and atraumatic.      Nose: Nose normal.      Mouth/Throat:      Mouth: Mucous membranes are moist.   Eyes:      Extraocular Movements: Extraocular  movements intact.      Pupils: Pupils are equal, round, and reactive to light.   Cardiovascular:      Rate and Rhythm: Normal rate and regular rhythm.   Pulmonary:      Effort: No respiratory distress.   Abdominal:      General: Bowel sounds are normal. There is no distension.      Palpations: Abdomen is soft.      Tenderness: There is no abdominal tenderness.   Musculoskeletal:         General: No swelling. Normal range of motion.      Cervical back: Normal range of motion.   Skin:     General: Skin is warm.      Capillary Refill: Capillary refill takes less than 2 seconds.   Neurological:      Mental Status: She is alert. She is occasionally disoriented.      Cranial Nerves: Cranial nerve deficit present.      Motor: Weakness present.         Neurological Exam:   LOC: alert  Attention Span: Good   Language: Expressive aphasia, Receptive aphasia  Orientation: Oriented to place (at times), Not oriented to year  EOM (CN III, IV, VI): Full/intact  Facial Movement (CN VII): Lower facial weakness on the right  Motor: Arm left  Normal 5/5  Leg left  Normal 5/5  Arm right  Plegia 0/5  Leg right Plegia 0/5        Laboratory:  CMP:   Recent Labs   Lab 12/29/21  0830   CALCIUM 9.0   ALBUMIN 3.0*   PROT 7.1      K 3.4*   CO2 27      BUN 7   CREATININE 0.7   ALKPHOS 61   ALT 34   AST 29   BILITOT 0.4     CBC:   Recent Labs   Lab 12/29/21  0830   WBC 6.18   RBC 3.72*   HGB 8.4*   HCT 30.6*      MCV 82   MCH 22.6*   MCHC 27.5*       Diagnostic Results     Brain Imaging   Ct head 12/23/21  Evolving large recent left MCA distribution infarct, without evidence of infarct extension, hemorrhagic conversion, or significant worsening of associated mass effect.     Otherwise stable exam as above.  No new major vascular distribution infarct or hemorrhage elsewhere.  Ct head 12/22/21        Moderate to large evolving left MCA territory infarction corresponding to abnormality seen on MRI.  There is trace intermediate to  hyperdensity in the precentral gyrus which may represent petechial hemorrhage versus artifact from parenchyma edema sparing.  Clinical correlation and follow-up advised     Mass effect with approximately 2 mm of rightward midline shift without evidence for hydrocephalus.        MRI Brain Perfusion/MRI ischemic protocol. Date: 12/19/21  1. Acute to subacute infarctions in the left MCA distribution as above.  No evidence of hemorrhagic transformation.  2. MR perfusion findings consistent with increased mean transit time t in he left MCA territory with normal cerebral blood volume, suggestive of ischemic penumbra.     CTA Head and Neck. Date: 12/19/21  1. Complete occlusion of the left M1 segment.  2. Frothy secretions within the trachea and fluid distention of the esophageal lumen.  Findings raise concern for aspiration.  3. Hypodensity measuring 1.3 cm in the left thyroid lobe which can be further evaluated with nonemergent thyroid ultrasound     CT Head. Date: 12/19/21  Hypoattenuation and loss of gray-white differentiation within the left insular cortex, frontal lobe, and basal ganglia compatible with an MCA territory acute infarction        Cardiac Imaging  TTE 12/20/21  · The left ventricle is normal in size with normal systolic function. The estimated ejection fraction is 65%.  · Normal right ventricular size with normal right ventricular systolic function.  · Normal left ventricular diastolic function.  · The estimated PA systolic pressure is 29 mmHg.  · Normal central venous pressure (3 mmHg).

## 2021-12-29 NOTE — PLAN OF CARE
Problem: Communication Impairment (Stroke, Ischemic/Transient Ischemic Attack)  Goal: Improved Communication Skills  Outcome: Ongoing, Progressing     Problem: Adjustment to Illness (Stroke, Ischemic/Transient Ischemic Attack)  Goal: Optimal Coping  Outcome: Ongoing, Progressing   Patient alert, nonverbal, sign language, RSW, vss, follows command, up in the for few hours, Poc explained will cont to monitor.

## 2021-12-29 NOTE — PLAN OF CARE
Covid resulted, placed in CP and asked nurse to print result and send with the Pt. Sw will arrange transport, nurse to call report to  [phone], stretcher arranged for 630pm.

## 2021-12-30 NOTE — PLAN OF CARE
Problem: Cerebral Tissue Perfusion (Stroke, Ischemic/Transient Ischemic Attack)  Goal: Optimal Cerebral Tissue Perfusion  Outcome: Ongoing, Progressing     Problem: Adjustment to Illness (Stroke, Ischemic/Transient Ischemic Attack)  Goal: Optimal Coping  Outcome: Ongoing, Progressing   Patient alert, nonverbal, vss, follows command, dc to rehab, waiting for strecher, no acute distress will cont to monitor.

## 2021-12-30 NOTE — NURSING
Called to given report to ISABEL sims test result  given to patient waiting for stretcher, no other concern at this time.

## 2021-12-30 NOTE — PLAN OF CARE
[facility]vind Beck - Neurosurgery (Hospital)  Discharge Final Note    Primary Care Provider: Primary Doctor No  Expected Discharge Date: 12/29/2021    [facility]  Nurse can call report to NA given by on call SW last evening.  Transportation arranged per on SW last evening.   Is family/patient aware of discharge yes - patient per nursing staff and on call SW.  Hospital follow up scheduled, per rehab.  Vascular Neurology to schedule follow up if necessary.  Final Discharge Note (most recent)     Final Note - 12/30/21 0858        Final Note    Assessment Type Final Discharge Note     Anticipated Discharge Disposition Rehab Facility     Hospital Resources/Appts/Education Provided --   per rehab       Post-Acute Status    Post-Acute Authorization Placement     Post-Acute Placement Status Set-up Complete/Auth obtained     Discharge Delays None known at this time                 Important Message from Medicare         Referral Info (most recent)     Referral Info    No documentation.               Contact Info     No, Primary Doctor   Relationship: PCP - General        Next Steps: Follow up    Instructions: Please establish with a PCP for 7-day Hospital follow-up after facility discharge.        Future Appointments   Date Time Provider Department Center   1/12/2022  3:30 PM Felicia Bishop MD [facility] GASTRO [facility]   2/4/2022  9:00 AM LAB,  HOSPITAL [facility] LAB [facility]   2/9/2022  2:40 PM Kenzie Diaz MD [facility] HEM ONC [facility]     Milly Glover RN  Case Management  Ext: [phone]  12/30/2021

## 2021-12-30 NOTE — PT/OT/SLP DISCHARGE
Occupational Therapy Discharge Summary    Aurora Ornelas  MRN: [id number]   Principal Problem: Embolic stroke involving left middle cerebral artery      Patient Discharged from acute Occupational Therapy on 12/29/21.  Please refer to prior OT note dated 12/28/21 for functional status.    Assessment:      Patient appropriate for care in another setting.    Objective:     GOALS:   Multidisciplinary Problems     Occupational Therapy Goals        Problem: Occupational Therapy Goal    Goal Priority Disciplines Outcome Interventions   Occupational Therapy Goal     OT, PT/OT Ongoing, Progressing    Description: Updated Goals to be met by: 7 days (1/4/22)     Patient will increase functional independence with ADLs by performing:    UE Dressing with Minimal Assistance.  LE Dressing with Minimal Assistance.  Grooming while seated with Supervision.  Toileting from bedside commode with Moderate Assistance for hygiene and clothing management.   Supine to sit with Contact Guard Assistance.  Toilet transfer to bedside commode with Minimal Assistance.  Increased functional strength to WFL for ADLs.    Goals to be met by: 7 days (12/27/21)     Patient will increase functional independence with ADLs by performing:    UE Dressing with Minimal Assistance.  LE Dressing with Minimal Assistance.  Grooming while standing at sink with Minimal Assistance.  Toileting from toilet with Minimal Assistance for hygiene and clothing management.   Supine to sit with Contact Guard Assistance.  Toilet transfer to toilet with Minimal Assistance.  Increased functional strength to WFL for ADLs.                     Reasons for Discontinuation of Therapy Services  Transfer to alternate level of care.      Plan:     Patient Discharged to: Inpatient Rehab    12/30/2021

## 2022-01-20 ENCOUNTER — TELEPHONE (OUTPATIENT)
Dept: NEUROLOGY | Facility: HOSPITAL | Age: 57
End: 2022-01-20
Payer: MEDICARE

## 2022-01-28 ENCOUNTER — TELEPHONE (OUTPATIENT)
Dept: NEUROLOGY | Facility: CLINIC | Age: 57
End: 2022-01-28
Payer: MEDICARE

## 2022-01-28 NOTE — TELEPHONE ENCOUNTER
Called pt through hearing impaired number on file. Left a  with clinic number with  for pt to return call to clinic number to schedule.

## 2022-03-22 ENCOUNTER — HOSPITAL ENCOUNTER (EMERGENCY)
Facility: HOSPITAL | Age: 57
Discharge: HOME | End: 2022-03-22
Attending: EMERGENCY MEDICINE
Payer: MEDICARE

## 2022-03-22 VITALS
HEART RATE: 115 BPM | WEIGHT: 170 LBS | BODY MASS INDEX: 28.32 KG/M2 | DIASTOLIC BLOOD PRESSURE: 94 MMHG | HEIGHT: 65 IN | SYSTOLIC BLOOD PRESSURE: 162 MMHG | TEMPERATURE: 99 F | RESPIRATION RATE: 18 BRPM | OXYGEN SATURATION: 96 %

## 2022-03-22 DIAGNOSIS — H26.9 CATARACT, UNSPECIFIED CATARACT TYPE, UNSPECIFIED LATERALITY: ICD-10-CM

## 2022-03-22 DIAGNOSIS — H54.61 VISION LOSS OF RIGHT EYE: Primary | ICD-10-CM

## 2022-03-22 PROCEDURE — 99282 EMERGENCY DEPT VISIT SF MDM: CPT

## 2022-03-22 NOTE — DISCHARGE INSTRUCTIONS
Call your ophthalmologist for an appointment tomorrow.  If you are unable to be seen by your ophthalmologist call our same day ophthalmologist to be checked tomorrow at [facility]

## 2022-03-22 NOTE — ED PROVIDER NOTES
"SCRIBE #1 NOTE: IRamona-Jay and am scribing for, and in the presence of, Keyona Lewis MD.           EM PHYSICIAN NOTE    HPI  This patient presents with a complaint of   Chief Complaint   Patient presents with    Eye Problem     Pt to triage with daughter. Pt is deaf and daughter translates for her. Pt states she has been losing sight in her R eye over the last 2 days. Pt daughter also states that her L eye looks like it is getting white.       HPI: 56 year old female with a PMHx of stoke, deafness, DM, HTN, hyperlipidemia, anemia, and right sided weakness presents to the ED accompanied by her daughter with complaints of right eye pain and blurry vision that began two days ago. The patient is deaf and communicates through her daughter using sign language. The daughter also expressed concerns over the patient's left eye as the pupil has been cloudy for "a long time." She has not been see by an eye doctor. The patient suffered from a stroke in December 2021 and has the residual deficit of left sided paralysis and brain damage. She uses a wheelchair and cannot ambulate. The daughter is unsure if the patient takes blood thinners.    REVIEW of PMH, SOC History and Family History:  Past Medical History:   Diagnosis Date    Anemia     Arthritis     Asthma     Deaf     Diabetes mellitus     Hyperlipidemia     Hypertension      Social history noncontributory  Family history noncontributory  Review of patient's allergies indicates:  No Known Allergies        REVIEW of SYSTEMS  Source: HPI  The nurse's notes and triage vital signs were reviewed.  GENERAL/CONSTITUTIONAL: There is no report of fever, fatigue, weakness, or unexplained weight loss.   CARDIOVASCULAR: There is no report of chest pain   RESPIRATORY: There is no report of cough or SOB  GASTROINTESTINAL: There is no report of nausea, vomiting, diarrhea  MUSCULOSKELETAL: There is no report of joint or muscle pain. No muscle tenderness. Positive " "for chronic right sided hemiparesis.   SKIN AND BREASTS: There is no report of easy bruising, skin redness, skin rash.  HEMATOLOGIC/LYMPHATIC: There is no report of anemia, bleeding or clotting defects. There is no report of anticoagulant use.  EYES: see hpi  EARS: Chronic deafness.  The remainder of the ROS is negative.        PHYSICAL EXAMINATION    ED Triage Vitals [03/22/22 1320]   Enc Vitals Group      BP (!) 162/94      Pulse (!) 115      Resp 18      Temp 99.2 °F (37.3 °C)      Temp src Oral      SpO2 96 %      Weight 170 lb      Height 5' 5"      Head Circumference       Peak Flow       Pain Score       Pain Loc       Pain Edu?       Excl. in GC?      Vital signs and Pulse Ox reviewed in clinical context. Abnormalities noted: none:  Heart rate, blood pressure, temperature, respiratory rate and pulse ox are all within normal limits for age  Pt's level of consciousness is alert, and the patient is in mild distress.  Skin: warm, pink and dry.  Capillary refill is less than 2 seconds.  Mucosa:moist  Head and Neck: WNL  Eyes: Anterior chamber on left eye is cloudy. Not able to visualize left eye cornea or retina. Right retinal blood vessels are visible. Positive for right eye pain. Positive for blurry vision in right eye: unable to accurately count fingers. Positive for a cloudy appearing lens of left eye, but able to count fingers. No elevated pressure on rené.  No injected conj or fb noted. EOMI. PERRL.  Cardiac exam: RRR  Pulmonary exam: unlabored and clear  Abd Exam: soft nontender   Musculoskeletal: no joint tenderness, deformity or swelling   Neurologic: GCS: GCS 15, cranial nerves intact, neck supple, dense right sided hemiparesis.       Initial Impression:  Decreased visual acuity, cataract  Plan:  Ophtho in the a.m.  Micelle HEYDI Lewis MD, 1:45 PM 3/22/2022      Medical decision making:   Nurses notes and Vital Signs reviewed.  No orders of the defined types were placed in this encounter.       "     Diagnoses that have been ruled out:   None   Diagnoses that are still under consideration:   None   Final diagnoses:   Vision loss of right eye   Cataract, unspecified cataract type, unspecified laterality            Follow-up Information     Follow up With Specialties Details Why Contact Info    Call [phone] Ophthalmology triage nurse  In 1 day For same day appointment tomorrow. Call at 8 or 830 in the morning to get a   same-day Appointment. Tell nurse you were seen at [facility] today and need to be seen for a same day appointment due to acute vision loss        ED Prescriptions     None          This note was created using Dictation Software.  This program may occasionally misinterpret certain words and phrases.      SCRIBE ATTESTATION NOTE:  I attest that I personally performed the services documented by the scribe and acknowledged and confirm the content of the note.   Nurses notes were reviewed.  Keyona Lewis        Nurses notes were reviewed.     ED Disposition Condition    Discharge Stable                          Keyona Lewis MD  03/22/22 3495

## 2022-05-13 ENCOUNTER — TELEPHONE (OUTPATIENT)
Dept: NEUROLOGY | Facility: CLINIC | Age: 57
End: 2022-05-13

## 2022-05-13 NOTE — TELEPHONE ENCOUNTER
Left message with  requesting a call back in regards to following up with the Neurology department.

## 2022-05-13 NOTE — TELEPHONE ENCOUNTER
----- Message from Rose Todd sent at 1/19/2022  8:48 AM CST -----  Regarding: appt  Contact: Nusrat @ [phone]  Rec'd call from Nusrat ([facility]) calling to schedule a hospital follow up appt for patient, dx:  MCA Stroke. No available appts in epic until MAY 2022. Please call.

## 2022-07-19 ENCOUNTER — HOSPITAL ENCOUNTER (EMERGENCY)
Facility: HOSPITAL | Age: 57
Discharge: HOME | End: 2022-07-19
Attending: EMERGENCY MEDICINE
Payer: MEDICARE

## 2022-07-19 VITALS
OXYGEN SATURATION: 97 % | DIASTOLIC BLOOD PRESSURE: 94 MMHG | WEIGHT: 170 LBS | TEMPERATURE: 98 F | BODY MASS INDEX: 28.32 KG/M2 | HEART RATE: 95 BPM | SYSTOLIC BLOOD PRESSURE: 137 MMHG | RESPIRATION RATE: 16 BRPM | HEIGHT: 65 IN

## 2022-07-19 DIAGNOSIS — R50.9 FEVER, UNSPECIFIED FEVER CAUSE: Primary | ICD-10-CM

## 2022-07-19 DIAGNOSIS — R00.0 SINUS TACHYCARDIA: ICD-10-CM

## 2022-07-19 DIAGNOSIS — K44.9 HIATAL HERNIA: ICD-10-CM

## 2022-07-19 DIAGNOSIS — N83.202 LEFT OVARIAN CYST: ICD-10-CM

## 2022-07-19 LAB
ALBUMIN SERPL BCP-MCNC: 3.3 G/DL (ref 3.5–5.2)
ALP SERPL-CCNC: 55 U/L (ref 55–135)
ALT SERPL W/O P-5'-P-CCNC: 6 U/L (ref 10–44)
ANION GAP SERPL CALC-SCNC: 10 MMOL/L (ref 8–16)
AST SERPL-CCNC: 14 U/L (ref 10–40)
BASOPHILS # BLD AUTO: 0.01 K/UL (ref 0–0.2)
BASOPHILS NFR BLD: 0.4 % (ref 0–1.9)
BILIRUB SERPL-MCNC: 0.6 MG/DL (ref 0.1–1)
BILIRUB UR QL STRIP: NEGATIVE
BUN SERPL-MCNC: 6 MG/DL (ref 6–20)
CALCIUM SERPL-MCNC: 8.6 MG/DL (ref 8.7–10.5)
CHLORIDE SERPL-SCNC: 101 MMOL/L (ref 95–110)
CLARITY UR: CLEAR
CO2 SERPL-SCNC: 28 MMOL/L (ref 23–29)
COLOR UR: COLORLESS
CREAT SERPL-MCNC: 0.8 MG/DL (ref 0.5–1.4)
CTP QC/QA: YES
DIFFERENTIAL METHOD: ABNORMAL
EOSINOPHIL # BLD AUTO: 0 K/UL (ref 0–0.5)
EOSINOPHIL NFR BLD: 0 % (ref 0–8)
ERYTHROCYTE [DISTWIDTH] IN BLOOD BY AUTOMATED COUNT: 12.8 % (ref 11.5–14.5)
EST. GFR  (AFRICAN AMERICAN): >60 ML/MIN/1.73 M^2
EST. GFR  (NON AFRICAN AMERICAN): >60 ML/MIN/1.73 M^2
GLUCOSE SERPL-MCNC: 89 MG/DL (ref 70–110)
GLUCOSE UR QL STRIP: NEGATIVE
HCT VFR BLD AUTO: 37.9 % (ref 37–48.5)
HGB BLD-MCNC: 13.3 G/DL (ref 12–16)
HGB UR QL STRIP: NEGATIVE
IMM GRANULOCYTES # BLD AUTO: 0 K/UL (ref 0–0.04)
IMM GRANULOCYTES NFR BLD AUTO: 0 % (ref 0–0.5)
KETONES UR QL STRIP: NEGATIVE
LACTATE SERPL-SCNC: 0.8 MMOL/L (ref 0.5–2.2)
LACTATE SERPL-SCNC: 2.9 MMOL/L (ref 0.5–2.2)
LEUKOCYTE ESTERASE UR QL STRIP: NEGATIVE
LIPASE SERPL-CCNC: 13 U/L (ref 4–60)
LYMPHOCYTES # BLD AUTO: 0.5 K/UL (ref 1–4.8)
LYMPHOCYTES NFR BLD: 20.1 % (ref 18–48)
MCH RBC QN AUTO: 31.4 PG (ref 27–31)
MCHC RBC AUTO-ENTMCNC: 35.1 G/DL (ref 32–36)
MCV RBC AUTO: 89 FL (ref 82–98)
MONOCYTES # BLD AUTO: 0.3 K/UL (ref 0.3–1)
MONOCYTES NFR BLD: 12.9 % (ref 4–15)
NEUTROPHILS # BLD AUTO: 1.8 K/UL (ref 1.8–7.7)
NEUTROPHILS NFR BLD: 66.6 % (ref 38–73)
NITRITE UR QL STRIP: NEGATIVE
NRBC BLD-RTO: 0 /100 WBC
PH UR STRIP: 8 [PH] (ref 5–8)
PLATELET # BLD AUTO: 163 K/UL (ref 150–450)
PMV BLD AUTO: 9 FL (ref 9.2–12.9)
POC MOLECULAR INFLUENZA A AGN: NEGATIVE
POC MOLECULAR INFLUENZA B AGN: NEGATIVE
POTASSIUM SERPL-SCNC: 3.2 MMOL/L (ref 3.5–5.1)
PROCALCITONIN SERPL IA-MCNC: 0.02 NG/ML
PROT SERPL-MCNC: 6.9 G/DL (ref 6–8.4)
PROT UR QL STRIP: NEGATIVE
RBC # BLD AUTO: 4.24 M/UL (ref 4–5.4)
SODIUM SERPL-SCNC: 139 MMOL/L (ref 136–145)
SP GR UR STRIP: 1 (ref 1–1.03)
URN SPEC COLLECT METH UR: ABNORMAL
UROBILINOGEN UR STRIP-ACNC: NEGATIVE EU/DL
WBC # BLD AUTO: 2.64 K/UL (ref 3.9–12.7)

## 2022-07-19 PROCEDURE — 87040 BLOOD CULTURE FOR BACTERIA: CPT | Mod: 59 | Performed by: EMERGENCY MEDICINE

## 2022-07-19 PROCEDURE — 81003 URINALYSIS AUTO W/O SCOPE: CPT | Performed by: EMERGENCY MEDICINE

## 2022-07-19 PROCEDURE — 83690 ASSAY OF LIPASE: CPT | Performed by: EMERGENCY MEDICINE

## 2022-07-19 PROCEDURE — 25000003 PHARM REV CODE 250: Performed by: EMERGENCY MEDICINE

## 2022-07-19 PROCEDURE — 87502 INFLUENZA DNA AMP PROBE: CPT

## 2022-07-19 PROCEDURE — 84145 PROCALCITONIN (PCT): CPT | Performed by: EMERGENCY MEDICINE

## 2022-07-19 PROCEDURE — 93005 ELECTROCARDIOGRAM TRACING: CPT

## 2022-07-19 PROCEDURE — 83605 ASSAY OF LACTIC ACID: CPT | Performed by: EMERGENCY MEDICINE

## 2022-07-19 PROCEDURE — 99285 EMERGENCY DEPT VISIT HI MDM: CPT | Mod: 25

## 2022-07-19 PROCEDURE — 63600175 PHARM REV CODE 636 W HCPCS: Performed by: EMERGENCY MEDICINE

## 2022-07-19 PROCEDURE — 80053 COMPREHEN METABOLIC PANEL: CPT | Performed by: EMERGENCY MEDICINE

## 2022-07-19 PROCEDURE — 96365 THER/PROPH/DIAG IV INF INIT: CPT

## 2022-07-19 PROCEDURE — 85025 COMPLETE CBC W/AUTO DIFF WBC: CPT | Performed by: EMERGENCY MEDICINE

## 2022-07-19 PROCEDURE — 93010 ELECTROCARDIOGRAM REPORT: CPT | Mod: ,,, | Performed by: INTERNAL MEDICINE

## 2022-07-19 PROCEDURE — 93010 EKG 12-LEAD: ICD-10-PCS | Mod: ,,, | Performed by: INTERNAL MEDICINE

## 2022-07-19 RX ORDER — ACETAMINOPHEN 500 MG
1000 TABLET ORAL
Status: COMPLETED | OUTPATIENT
Start: 2022-07-19 | End: 2022-07-19

## 2022-07-19 RX ORDER — DICYCLOMINE HYDROCHLORIDE 20 MG/1
20 TABLET ORAL EVERY 6 HOURS PRN
Qty: 20 TABLET | Refills: 0 | Status: ON HOLD | OUTPATIENT
Start: 2022-07-19 | End: 2022-11-08 | Stop reason: HOSPADM

## 2022-07-19 RX ADMIN — SODIUM CHLORIDE, SODIUM LACTATE, POTASSIUM CHLORIDE, AND CALCIUM CHLORIDE 2313 ML: .6; .31; .03; .02 INJECTION, SOLUTION INTRAVENOUS at 04:07

## 2022-07-19 RX ADMIN — ACETAMINOPHEN 1000 MG: 500 TABLET ORAL at 03:07

## 2022-07-19 RX ADMIN — PIPERACILLIN AND TAZOBACTAM 4.5 G: 4; .5 INJECTION, POWDER, LYOPHILIZED, FOR SOLUTION INTRAVENOUS; PARENTERAL at 04:07

## 2022-07-19 NOTE — ED PROVIDER NOTES
Encounter Date: 7/19/2022    SCRIBE #1 NOTE: I, Bela Nolan, am scribing for, and in the presence of,  Franki Torres MD. I have scribed the following portions of the note - Other sections scribed: HPI, ROS.       History     Chief Complaint   Patient presents with    Abdominal Pain     Pt arrived via ems, pt chief complaint is Abdominal pain. Pt is non verbal, per ems and pt care giver. The pt was complaining of above naval abd pain since last night and denies N/V/D.      This is a 56 y.o. female with a past medical history of Deaf, DM, HLD, and HTN, who presents to the ED with a chief complaint of abdominal pain onset PTA. The patient was brought to this ED facility via EMS with abdominal pain above her navel. The patient denies vomiting or nausea. Per EMS, the patient has cough and chest pain. EMS also states that the patient's last bowel movement was yesterday, and she does not have difficulty urinating. The patient also has a high temperature of 102.2. History is limited due to patient's hearing impairment.     The history is provided by the patient and the EMS personnel. The history is limited by the condition of the patient. No  was used.     Review of patient's allergies indicates:  No Known Allergies  Past Medical History:   Diagnosis Date    Anemia     Arthritis     Asthma     Deaf     Diabetes mellitus     Hyperlipidemia     Hypertension      Past Surgical History:   Procedure Laterality Date    COLONOSCOPY N/A 12/4/2020    Procedure: COLONOSCOPY;  Surgeon: Felicia Bishop MD;  Location: [facility];  Service: Endoscopy;  Laterality: N/A;    ESOPHAGOGASTRODUODENOSCOPY N/A 2/28/2019    Procedure: EGD (ESOPHAGOGASTRODUODENOSCOPY);  Surgeon: Yolanda Gary MD;  Location: [facility];  Service: Endoscopy;  Laterality: N/A;    ESOPHAGOGASTRODUODENOSCOPY N/A 12/4/2020    Procedure: EGD (ESOPHAGOGASTRODUODENOSCOPY);  Surgeon: Felicia Bishop MD;  Location: [facility];  Service:  Endoscopy;  Laterality: N/A;    ESOPHAGOGASTRODUODENOSCOPY N/A 7/1/2021    Procedure: EGD (ESOPHAGOGASTRODUODENOSCOPY)-in March 2021;  Surgeon: Sharath Kohler MD;  Location: [facility];  Service: Endoscopy;  Laterality: N/A;  hearing impaired, will need  - Maria Dolores Kimberly assigned-MS  fully vaccinated-see immunization record    HYSTERECTOMY       Family History   Problem Relation Age of Onset    Diabetes Mother     Hypertension Mother     Colon cancer Neg Hx     Esophageal cancer Neg Hx      Social History     Tobacco Use    Smoking status: Never Smoker    Smokeless tobacco: Never Used   Substance Use Topics    Alcohol use: No    Drug use: No     Review of Systems   Unable to perform ROS: Patient nonverbal   Cardiovascular: Positive for chest pain.   Gastrointestinal: Positive for abdominal pain (above navel). Negative for nausea and vomiting.   Genitourinary: Negative for difficulty urinating.       Physical Exam     Initial Vitals [07/19/22 1425]   BP Pulse Resp Temp SpO2   (!) 166/99 110 16 (!) 102.2 °F (39 °C) 99 %      MAP       --         Physical Exam  The patient was examined specifically for the following:   General:No significant distress, Good color, Warm and dry. Head and neck:Scalp atraumatic, Neck supple. Neurological:Appropriate conversation, Gross motor deficits. Eyes:Conjugate gaze, Clear corneas. ENT: No epistaxis. Cardiac: Regular rate and rhythm, Grossly normal heart tones. Pulmonary: Wheezing, Rales. Gastrointestinal: Abdominal tenderness, Abdominal distention. Musculoskeletal: Extremity deformity, Apparent pain with range of motion of the joints. Skin: Rash.   The findings on examination were normal except for the following:  Patient's heart rate is 110.  The patient's blood pressure is 100 2.2.  The lungs are clear and free of wheezing rales rubs or rhonchi.  Heart tones are normal.  The patient has regular rate and rhythm.  The abdomen may be  remarkable for some very mild abdominal tenderness.  There is no real guarding rebound mass or distention.  The lungs are clear.  The heart tones are normal.  There is no lower extremity swelling redness or tenderness.  Patient is alert and bright.  She communicates yes and no and points and uses gestures.   ED Course   Procedures  Labs Reviewed   CBC W/ AUTO DIFFERENTIAL - Abnormal; Notable for the following components:       Result Value    WBC 2.64 (*)     MCH 31.4 (*)     MPV 9.0 (*)     Lymph # 0.5 (*)     All other components within normal limits   COMPREHENSIVE METABOLIC PANEL - Abnormal; Notable for the following components:    Potassium 3.2 (*)     Calcium 8.6 (*)     Albumin 3.3 (*)     ALT 6 (*)     All other components within normal limits   URINALYSIS, REFLEX TO URINE CULTURE - Abnormal; Notable for the following components:    Color, UA Colorless (*)     All other components within normal limits    Narrative:     You may catheterize this patient for her urine specimen.  Specimen Source->Urine   LACTIC ACID, PLASMA - Abnormal; Notable for the following components:    Lactate (Lactic Acid) 2.9 (*)     All other components within normal limits   CULTURE, BLOOD   CULTURE, BLOOD   LACTIC ACID, PLASMA   PROCALCITONIN   LIPASE   POCT INFLUENZA A/B MOLECULAR          Imaging Results          X-Ray Chest AP Portable (Final result)  Result time 07/19/22 17:17:01    Final result by Bernardo Marcano MD (07/19/22 17:17:01)                 Impression:      1.  No acute process.    2.  No airspace disease.      Electronically signed by: Bernardo Marcano MD  Date:    07/19/2022  Time:    17:17             Narrative:    EXAMINATION:  XR CHEST AP PORTABLE    CLINICAL HISTORY:  Sepsis;    TECHNIQUE:  Single frontal view of the chest was performed.    COMPARISON:  12/19/2021.    FINDINGS:  Monitoring EKG leads are present.  The trachea is unremarkable.  The cardiomediastinal silhouette is stable.  There is no evidence of free air  beneath the hemidiaphragms.  There are no pleural effusions.  There is no evidence of a pneumothorax.  There is no evidence of pneumomediastinum.  No airspace opacity is present.  The osseous structures demonstrate degenerative changes.                               CT Abdomen Pelvis  Without Contrast (Final result)  Result time 07/19/22 15:37:28    Final result by Gurwinder Swenson III, MD (07/19/22 15:37:28)                 Impression:      Large hiatal hernia.    Left ovarian cyst measuring 4 x 2.3 cm.    Small amount of free fluid in the pelvis.    Within ovarian cyst and a small amount of free fluid rupture of an ovarian cysts cannot be excluded.      Electronically signed by: Gurwinder Swenson MD  Date:    07/19/2022  Time:    15:37             Narrative:    EXAMINATION:  CT ABDOMEN PELVIS WITHOUT CONTRAST    CLINICAL HISTORY:  Abdominal pain, acute, nonlocalized;    FINDINGS:  There is posterior costophrenic angle subsegmental atelectasis.  There is a large hiatal hernia.  The liver, gallbladder, biliary tree, and spleen show nothing unusual.  The pancreas and duodenum are unremarkable.  The adrenal glands are not enlarged.  The kidneys demonstrate no evidence hydronephrosis or renal calculus.  The ureters are nondilated.  No adenopathy or ascites is seen.  The pelvic organs show nothing unusual.  The bladder and ureters demonstrate no calculi.  Colon, and bowel loops show nothing unusual.  No retroperitoneal or peritoneal aortic adenopathy is seen.  The gallbladder and biliary tree are unremarkable.  The appendix is unremarkable.  There is a small amount of free fluid in the pelvis.  There is a left ovarian cyst measuring 4 x 2.3 cm.  The right ovary is unremarkable.  Bowel loops demonstrate nothing unusual.  Bones reveal DJD.                              Medical decision making:  Given the above this patient presents to the emergency room complaining of periumbilical abdominal pain.  Her dominant examination  was somewhat equivocal.  A CT scan fails to reveal any significant abnormalities besides a large left ovarian cyst.  The patient also has a large hiatal hernia.  The patient was incidentally noted to be febrile.  She was treated with Tylenol and her symptoms improved.  Her procalcitonin is negative.  Her urine is clean a chest x-ray is unremarkable.  I doubt bacterial infection.  A repeat evaluation at 8:30 p.m. the patient is comfortable relaxed and watching television.  She is no longer warm to touch.  I will discharge her on Tylenol and dicyclomine and have her follow up with primary care.  A 1st lactate was normal 2nd lactate was elevated.  The patient seems comfortable and relaxed and in no distress.  I cannot ascribe any clinical significance to this 2nd elevated lactate.  This patient is not in shock or even tachycardic on repeat evaluation.  I suspect a viral syndrome.       Medications   lactated ringers bolus 2,313 mL (0 mL/kg × 77.1 kg Intravenous Stopped 7/19/22 1709)   acetaminophen tablet 1,000 mg (1,000 mg Oral Given 7/19/22 1559)   piperacillin-tazobactam 4.5 g in dextrose 5 % 100 mL IVPB (ready to mix system) (0 g Intravenous Stopped 7/19/22 1645)              Scribe Attestation:   Scribe #1: I performed the above scribed service and the documentation accurately describes the services I performed. I attest to the accuracy of the note.                 Clinical Impression:   Final diagnoses:  [R00.0] Sinus tachycardia  [R50.9] Fever, unspecified fever cause (Primary)  [N83.202] Left ovarian cyst  [K44.9] Hiatal hernia       I personally performed the services described in this documentation.  All medical record  entries made by the scribe are at my direction and in my presence.  Signed, Dr. Torres   ED Disposition Condition    Discharge Stable        ED Prescriptions     Medication Sig Dispense Start Date End Date Auth. Provider    dicyclomine (BENTYL) 20 mg tablet Take 1 tablet (20 mg total) by  mouth every 6 (six) hours as needed (every 6 hours as needed for pain). 20 tablet 7/19/2022  Franki Torres MD        Follow-up Information     Follow up With Specialties Details Why Contact Info    Son ALEXANDR FayMD [facility] In 2 days  [address] [facility] [address]  [phone]             Franki Torres MD  07/19/22 2034

## 2022-07-20 NOTE — ED NOTES
Pt omar left at ER, daughter Alberta called and advised, states she will  tomorrow. Per charge nurse, necklace will be labeled and left with security for FM retrieval, daughter aware

## 2022-07-20 NOTE — ED NOTES
Daughter Klarissa at ER to take pt home via [employer]. Discharge education/instructions given to pt and caretaker/daughter with prescription x 1. To f/u with PCP/return to ER if condition worsens. Pt and daughter deny questions. Pt assisted into WC via daughter and into rear seat of SUV, uber driving.

## 2022-07-20 NOTE — DISCHARGE INSTRUCTIONS
Lots of clear liquids only if you have abdominal pain.  Dicyclomine for abdominal pain.  Tylenol, 1000 mg by mouth every 8 hours as needed for fever and discomfort.  Return immediately if you get worse or if new problems develop.  Light clothing.  Please follow-up with your primary care doctor, or the clinic above in 48 hours.  Call the morning for an appointment

## 2022-07-21 NOTE — ASSESSMENT & PLAN NOTE
Final Anesthesia Post-op Assessment    Patient: Tete Rose  Procedure(s) Performed: LABOR ANALGESIA  Anesthesia type: L&D Epidural    Vitals Value Taken Time   Temp 36.5 07/20/22 2057   Pulse 76 07/20/22 2051   Resp 18 07/20/22 2057   SpO2 100 % 07/20/22 2045   /54 07/20/22 2051         Patient Location: Labor and Delivery  Post-op Vital Signs:stable  Level of Consciousness: awake and alert  Respiratory Status: spontaneous ventilation  Cardiovascular stable  Hydration: euvolemic  Pain Management: adequately controlled  Handoff: Handoff to receiving clinician was performed and questions were answered  Vomiting: none  Nausea: None  Airway Patency:patent  Post-op Assessment: no complications and patient tolerated procedure well with no complications      No complications documented.    Patient is a 56 y.o. female  with PMHx of HTN, HLD, asthma, and hearing loss (deaf, communicates with American Sign Language) who presented to OSH with RSW and communication difficulty. Per family, patient began having symptoms on 12/17 with a facial droop. CT Head with L frontal infarct. CTA with L M1 occlusion. Patient out of window for tPA. Patient transferred to [facility] for possible thrombectomy. On arrival, patient taken to MRI STAT for MRI acute ischemic protocol + MR perfusion scan. MRI reviewed with neuro IR, thrombectomy deferred due to high risk of reperfusion injury given symptoms beginning ~ 48 hours ago. However, recommendation was made for admission to neuro ICU for close monitoring with low threshold for IR intervention if neuro exam declined. Echo with EF 65%. Cardiac monitor at discharge.    Antithrombotics for secondary stroke prevention: Antiplatelets: Aspirin: 81 mg daily  Clopidogrel: 75 mg daily x 30 days    Statins for secondary stroke prevention and hyperlipidemia, if present: Statins: Atorvastatin- 40 mg daily    Aggressive risk factor modification: HTN     Rehab efforts: The patient has been evaluated by a stroke team provider and the therapy needs have been fully considered based off the presenting complaints and exam findings. The following therapy evaluations are needed: PT evaluate and treat, OT evaluate and treat, SLP evaluate and treat, PM&R evaluate for appropriate placement therapy recs IPR    Diagnostics ordered/pending: None , CT HEAD STAT for any neurological change    VTE prophylaxis: Heparin 5000 units SQ every 8 hours  Mechanical prophylaxis: Place SCDs    BP parameters: Infarct: SBP <180

## 2022-07-23 LAB
BACTERIA BLD CULT: NORMAL
BACTERIA BLD CULT: NORMAL

## 2022-10-18 ENCOUNTER — HOSPITAL ENCOUNTER (EMERGENCY)
Facility: HOSPITAL | Age: 57
Discharge: HOME | End: 2022-10-18
Attending: EMERGENCY MEDICINE
Payer: MEDICARE

## 2022-10-18 VITALS
WEIGHT: 170 LBS | TEMPERATURE: 98 F | BODY MASS INDEX: 28.29 KG/M2 | HEART RATE: 87 BPM | RESPIRATION RATE: 16 BRPM | DIASTOLIC BLOOD PRESSURE: 91 MMHG | SYSTOLIC BLOOD PRESSURE: 167 MMHG | OXYGEN SATURATION: 99 %

## 2022-10-18 DIAGNOSIS — M79.604 ACUTE PAIN OF RIGHT LOWER EXTREMITY: ICD-10-CM

## 2022-10-18 DIAGNOSIS — M79.601 DIFFUSE PAIN IN RIGHT UPPER EXTREMITY: ICD-10-CM

## 2022-10-18 DIAGNOSIS — M54.50 LUMBAR BACK PAIN: Primary | ICD-10-CM

## 2022-10-18 PROCEDURE — 99283 EMERGENCY DEPT VISIT LOW MDM: CPT

## 2022-10-18 PROCEDURE — 25000003 PHARM REV CODE 250: Performed by: EMERGENCY MEDICINE

## 2022-10-18 RX ORDER — HYDROCODONE BITARTRATE AND ACETAMINOPHEN 5; 325 MG/1; MG/1
1 TABLET ORAL EVERY 4 HOURS PRN
Qty: 18 TABLET | Refills: 0 | Status: SHIPPED | OUTPATIENT
Start: 2022-10-18 | End: 2022-10-28

## 2022-10-18 RX ORDER — HYDROCODONE BITARTRATE AND ACETAMINOPHEN 5; 325 MG/1; MG/1
1 TABLET ORAL
Status: COMPLETED | OUTPATIENT
Start: 2022-10-18 | End: 2022-10-18

## 2022-10-18 RX ADMIN — HYDROCODONE BITARTRATE AND ACETAMINOPHEN 1 TABLET: 5; 325 TABLET ORAL at 03:10

## 2022-10-18 NOTE — ED PROVIDER NOTES
Encounter Date: 10/18/2022    SCRIBE #1 NOTE: IJessenia, am scribing for, and in the presence of,  Franki Torres MD.     History     Chief Complaint   Patient presents with    Abdominal Pain     Right sided abd pain x7 days. +nvd.  needed for full HPI     Aurora Ornelas is a 56 y.o. female with a PMHx of asthma, HTN, anemia, HLD, DM, deaf, who presents to the ED c/o sudden onset severe, constant R arm, R lumbar back, and R leg pain that began yesterday evening. The pain in her R leg beings in her distal thigh and radiates distally down the R lower extremity. Pt denies any recent falls or trauma. Pt reports that these same symptoms happened 2 years ago and 1 year ago. Pt's L eye has had vision issues. Patient denies cough, shortness of breath, fever, chills, nausea, vomiting, diarrhea, dysuria, headaches, congestion, sore throat, arm or leg trouble, eye pain, ear pain, rash, or other associated symptoms.          The history is provided by the patient. A  was used (ASL).   Review of patient's allergies indicates:  No Known Allergies  Past Medical History:   Diagnosis Date    Anemia     Arthritis     Asthma     Deaf     Diabetes mellitus     Hyperlipidemia     Hypertension      Past Surgical History:   Procedure Laterality Date    COLONOSCOPY N/A 12/4/2020    Procedure: COLONOSCOPY;  Surgeon: Felicia Bishop MD;  Location: [facility];  Service: Endoscopy;  Laterality: N/A;    ESOPHAGOGASTRODUODENOSCOPY N/A 2/28/2019    Procedure: EGD (ESOPHAGOGASTRODUODENOSCOPY);  Surgeon: Yolanda Gary MD;  Location: [facility];  Service: Endoscopy;  Laterality: N/A;    ESOPHAGOGASTRODUODENOSCOPY N/A 12/4/2020    Procedure: EGD (ESOPHAGOGASTRODUODENOSCOPY);  Surgeon: Felicia Bishop MD;  Location: [facility];  Service: Endoscopy;  Laterality: N/A;    ESOPHAGOGASTRODUODENOSCOPY N/A 7/1/2021    Procedure: EGD (ESOPHAGOGASTRODUODENOSCOPY)-in March 2021;  Surgeon: Sharath Kohler MD;   Location: [facility];  Service: Endoscopy;  Laterality: N/A;  hearing impaired, will need  - Maria Dolores Harris assigned-MS  fully vaccinated-see immunization record    HYSTERECTOMY       Family History   Problem Relation Age of Onset    Diabetes Mother     Hypertension Mother     Colon cancer Neg Hx     Esophageal cancer Neg Hx      Social History     Tobacco Use    Smoking status: Never    Smokeless tobacco: Never   Substance Use Topics    Alcohol use: No    Drug use: No     Review of Systems   Constitutional:  Negative for chills, diaphoresis and fever.   HENT:  Negative for ear pain and sore throat.    Eyes:  Positive for visual disturbance (L eye). Negative for pain.   Respiratory:  Negative for cough and shortness of breath.    Cardiovascular:  Negative for chest pain.   Gastrointestinal:  Negative for abdominal pain, diarrhea, nausea and vomiting.   Genitourinary:  Negative for dysuria.   Musculoskeletal:  Positive for back pain (R lumbar).        +ve for R arm and R leg pain   Skin:  Negative for rash.   Neurological:  Negative for headaches.   Psychiatric/Behavioral:  Negative for confusion.      Physical Exam     Initial Vitals [10/18/22 1336]   BP Pulse Resp Temp SpO2   (!) 193/99 91 18 97.6 °F (36.4 °C) 98 %      MAP       --         Physical Exam  The patient was examined specifically for the following:   General:No significant distress, Good color, Warm and dry. Head and neck:Scalp atraumatic, Neck supple. Neurological:Appropriate conversation, Gross motor deficits. Eyes:Conjugate gaze, Clear corneas. ENT: No epistaxis. Cardiac: Regular rate and rhythm, Grossly normal heart tones. Pulmonary: Wheezing, Rales. Gastrointestinal: Abdominal tenderness, Abdominal distention. Musculoskeletal: Extremity deformity, Apparent pain with range of motion of the joints. Skin: Rash.   The findings on examination were normal except for the following:  Patient's blood pressure is 193/99.  Lungs  are clear.  The heart tones are normal.  The abdomen is nontender.  There is no significant tenderness of the lumbar back.  There is no significant tenderness of the right upper extremity of the right lower extremity.  The patient has good dorsalis pedis pulse on the right side and good radial pulse on the right side.  The patient is paralyzed in the right arm the right leg.  There is no swelling erythema warmth ecchymosis long bone instability pain with range of motion of joints.  There are no abrasions or contusions.  There is no hematoma.  ED Course   Procedures  Labs Reviewed - No data to display       Imaging Results    None       Medical decision making:  Given the above this patient presents to the emergency room basically complaining of pain in the lumbar back, right arm right leg.  There is no history of fever or trauma.  Patient is have this before on other occasions.  She was treated with medicine and her pain resolved.  Communication was difficult.  Used both a , as well as a hearing impaired .  She has no family with her today.  The patient denied chest or abdominal pain.  There is no splinting of the lumbar spine.  There is no tenderness of the lumbar spine.  There is no apparent tenderness of the right upper extremity right lower extremity.  There is not even any apparent pain with range of motion of joints.  There is limited extension of the elbow.  We were unable to contact the patient's son or daughter.  The patient is comfortable with the treatment plan.     Medications - No data to display           Scribe Attestation:   Scribe #1: I performed the above scribed service and the documentation accurately describes the services I performed. I attest to the accuracy of the note.                   Clinical Impression:   Final diagnoses:  [M54.50] Lumbar back pain (Primary)  [M79.601] Diffuse pain in right upper extremity  [M79.604] Acute pain of right lower extremity       ED Disposition Condition    Discharge Stable          ED Prescriptions       Medication Sig Dispense Start Date End Date Auth. Provider    HYDROcodone-acetaminophen (NORCO) 5-325 mg per tablet Take 1 tablet by mouth every 4 (four) hours as needed. 18 tablet 10/18/2022 10/28/2022 Franki Torres MD          Follow-up Information       Follow up With Specialties Details Why Contact Info    Rosalinda Curiel MD Family Medicine In 1 week  [address] [facility]  [place] [address]  [phone]          I personally performed the services described in this documentation.  All medical record  entries made by the scribe are at my direction and in my presence.  Signed, Dr. Melissa Torres MD  10/19/22 2589

## 2022-11-06 ENCOUNTER — HOSPITAL ENCOUNTER (OUTPATIENT)
Facility: HOSPITAL | Age: 57
Discharge: HOME HEALTH | End: 2022-11-08
Attending: EMERGENCY MEDICINE | Admitting: STUDENT IN AN ORGANIZED HEALTH CARE EDUCATION/TRAINING PROGRAM
Payer: MEDICARE

## 2022-11-06 DIAGNOSIS — R56.9 NEW ONSET SEIZURE: ICD-10-CM

## 2022-11-06 DIAGNOSIS — R07.9 CHEST PAIN: ICD-10-CM

## 2022-11-06 DIAGNOSIS — R56.9 SEIZURE: Primary | ICD-10-CM

## 2022-11-06 DIAGNOSIS — N30.00 ACUTE CYSTITIS WITHOUT HEMATURIA: ICD-10-CM

## 2022-11-06 DIAGNOSIS — R29.90 STROKE-LIKE SYMPTOM: ICD-10-CM

## 2022-11-06 DIAGNOSIS — I63.412 EMBOLIC STROKE INVOLVING LEFT MIDDLE CEREBRAL ARTERY: ICD-10-CM

## 2022-11-06 LAB
ALBUMIN SERPL BCP-MCNC: 2.9 G/DL (ref 3.5–5.2)
ALP SERPL-CCNC: 52 U/L (ref 55–135)
ALT SERPL W/O P-5'-P-CCNC: 10 U/L (ref 10–44)
ANION GAP SERPL CALC-SCNC: 9 MMOL/L (ref 8–16)
AST SERPL-CCNC: 10 U/L (ref 10–40)
BACTERIA #/AREA URNS HPF: ABNORMAL /HPF
BASOPHILS # BLD AUTO: 0.02 K/UL (ref 0–0.2)
BASOPHILS NFR BLD: 0.5 % (ref 0–1.9)
BILIRUB SERPL-MCNC: 0.3 MG/DL (ref 0.1–1)
BILIRUB UR QL STRIP: NEGATIVE
BUN SERPL-MCNC: 7 MG/DL (ref 6–20)
CALCIUM SERPL-MCNC: 8.2 MG/DL (ref 8.7–10.5)
CHLORIDE SERPL-SCNC: 111 MMOL/L (ref 95–110)
CK SERPL-CCNC: 32 U/L (ref 20–180)
CLARITY UR: ABNORMAL
CO2 SERPL-SCNC: 21 MMOL/L (ref 23–29)
COLOR UR: YELLOW
CREAT SERPL-MCNC: 0.6 MG/DL (ref 0.5–1.4)
DIFFERENTIAL METHOD: ABNORMAL
EOSINOPHIL # BLD AUTO: 0.1 K/UL (ref 0–0.5)
EOSINOPHIL NFR BLD: 1.5 % (ref 0–8)
ERYTHROCYTE [DISTWIDTH] IN BLOOD BY AUTOMATED COUNT: 11.7 % (ref 11.5–14.5)
EST. GFR  (NO RACE VARIABLE): >60 ML/MIN/1.73 M^2
GLUCOSE SERPL-MCNC: 90 MG/DL (ref 70–110)
GLUCOSE UR QL STRIP: NEGATIVE
HCT VFR BLD AUTO: 34.5 % (ref 37–48.5)
HGB BLD-MCNC: 11.9 G/DL (ref 12–16)
HGB UR QL STRIP: NEGATIVE
IMM GRANULOCYTES # BLD AUTO: 0.01 K/UL (ref 0–0.04)
IMM GRANULOCYTES NFR BLD AUTO: 0.2 % (ref 0–0.5)
KETONES UR QL STRIP: NEGATIVE
LEUKOCYTE ESTERASE UR QL STRIP: ABNORMAL
LYMPHOCYTES # BLD AUTO: 0.9 K/UL (ref 1–4.8)
LYMPHOCYTES NFR BLD: 21.5 % (ref 18–48)
MCH RBC QN AUTO: 31.5 PG (ref 27–31)
MCHC RBC AUTO-ENTMCNC: 34.5 G/DL (ref 32–36)
MCV RBC AUTO: 91 FL (ref 82–98)
MICROSCOPIC COMMENT: ABNORMAL
MONOCYTES # BLD AUTO: 0.3 K/UL (ref 0.3–1)
MONOCYTES NFR BLD: 7.9 % (ref 4–15)
NEUTROPHILS # BLD AUTO: 2.8 K/UL (ref 1.8–7.7)
NEUTROPHILS NFR BLD: 68.4 % (ref 38–73)
NITRITE UR QL STRIP: NEGATIVE
NRBC BLD-RTO: 0 /100 WBC
PH UR STRIP: 7 [PH] (ref 5–8)
PLATELET # BLD AUTO: 264 K/UL (ref 150–450)
PMV BLD AUTO: 9.1 FL (ref 9.2–12.9)
POCT GLUCOSE: 130 MG/DL (ref 70–110)
POTASSIUM SERPL-SCNC: 3.5 MMOL/L (ref 3.5–5.1)
PROT SERPL-MCNC: 6.4 G/DL (ref 6–8.4)
PROT UR QL STRIP: NEGATIVE
RBC # BLD AUTO: 3.78 M/UL (ref 4–5.4)
RBC #/AREA URNS HPF: 3 /HPF (ref 0–4)
SODIUM SERPL-SCNC: 141 MMOL/L (ref 136–145)
SP GR UR STRIP: 1.02 (ref 1–1.03)
SQUAMOUS #/AREA URNS HPF: 0 /HPF
URN SPEC COLLECT METH UR: ABNORMAL
UROBILINOGEN UR STRIP-ACNC: ABNORMAL EU/DL
WBC # BLD AUTO: 4.04 K/UL (ref 3.9–12.7)
WBC #/AREA URNS HPF: 95 /HPF (ref 0–5)

## 2022-11-06 PROCEDURE — 93010 ELECTROCARDIOGRAM REPORT: CPT | Mod: ,,, | Performed by: INTERNAL MEDICINE

## 2022-11-06 PROCEDURE — 82550 ASSAY OF CK (CPK): CPT | Performed by: EMERGENCY MEDICINE

## 2022-11-06 PROCEDURE — 85025 COMPLETE CBC W/AUTO DIFF WBC: CPT | Performed by: EMERGENCY MEDICINE

## 2022-11-06 PROCEDURE — 87088 URINE BACTERIA CULTURE: CPT | Performed by: EMERGENCY MEDICINE

## 2022-11-06 PROCEDURE — 87186 SC STD MICRODIL/AGAR DIL: CPT | Performed by: EMERGENCY MEDICINE

## 2022-11-06 PROCEDURE — 87086 URINE CULTURE/COLONY COUNT: CPT | Performed by: EMERGENCY MEDICINE

## 2022-11-06 PROCEDURE — 87077 CULTURE AEROBIC IDENTIFY: CPT | Performed by: EMERGENCY MEDICINE

## 2022-11-06 PROCEDURE — 82550 ASSAY OF CK (CPK): CPT | Mod: 91 | Performed by: PHYSICIAN ASSISTANT

## 2022-11-06 PROCEDURE — 63600175 PHARM REV CODE 636 W HCPCS: Performed by: EMERGENCY MEDICINE

## 2022-11-06 PROCEDURE — 82962 GLUCOSE BLOOD TEST: CPT

## 2022-11-06 PROCEDURE — G0378 HOSPITAL OBSERVATION PER HR: HCPCS

## 2022-11-06 PROCEDURE — 93005 ELECTROCARDIOGRAM TRACING: CPT

## 2022-11-06 PROCEDURE — 81000 URINALYSIS NONAUTO W/SCOPE: CPT | Performed by: EMERGENCY MEDICINE

## 2022-11-06 PROCEDURE — 80053 COMPREHEN METABOLIC PANEL: CPT | Performed by: EMERGENCY MEDICINE

## 2022-11-06 PROCEDURE — 63600175 PHARM REV CODE 636 W HCPCS: Performed by: PHYSICIAN ASSISTANT

## 2022-11-06 PROCEDURE — 93010 EKG 12-LEAD: ICD-10-PCS | Mod: ,,, | Performed by: INTERNAL MEDICINE

## 2022-11-06 PROCEDURE — 96365 THER/PROPH/DIAG IV INF INIT: CPT

## 2022-11-06 PROCEDURE — 99285 EMERGENCY DEPT VISIT HI MDM: CPT | Mod: 25

## 2022-11-06 PROCEDURE — 96375 TX/PRO/DX INJ NEW DRUG ADDON: CPT

## 2022-11-06 RX ORDER — ACETAMINOPHEN 500 MG
1000 TABLET ORAL DAILY
Status: ON HOLD | COMMUNITY
End: 2022-11-08 | Stop reason: HOSPADM

## 2022-11-06 RX ORDER — GLUCAGON 1 MG
1 KIT INJECTION
Status: DISCONTINUED | OUTPATIENT
Start: 2022-11-06 | End: 2022-11-08 | Stop reason: HOSPADM

## 2022-11-06 RX ORDER — CALCIUM CARBONATE 200(500)MG
500 TABLET,CHEWABLE ORAL DAILY PRN
Status: DISCONTINUED | OUTPATIENT
Start: 2022-11-06 | End: 2022-11-06

## 2022-11-06 RX ORDER — ACETAMINOPHEN 325 MG/1
650 TABLET ORAL EVERY 4 HOURS PRN
Status: DISCONTINUED | OUTPATIENT
Start: 2022-11-06 | End: 2022-11-08 | Stop reason: HOSPADM

## 2022-11-06 RX ORDER — NAPROXEN SODIUM 220 MG/1
81 TABLET, FILM COATED ORAL DAILY
Status: DISCONTINUED | OUTPATIENT
Start: 2022-11-07 | End: 2022-11-08 | Stop reason: HOSPADM

## 2022-11-06 RX ORDER — TALC
6 POWDER (GRAM) TOPICAL NIGHTLY PRN
Status: DISCONTINUED | OUTPATIENT
Start: 2022-11-06 | End: 2022-11-08 | Stop reason: HOSPADM

## 2022-11-06 RX ORDER — SODIUM CHLORIDE 0.9 % (FLUSH) 0.9 %
10 SYRINGE (ML) INJECTION
Status: DISCONTINUED | OUTPATIENT
Start: 2022-11-06 | End: 2022-11-08 | Stop reason: HOSPADM

## 2022-11-06 RX ORDER — AMLODIPINE BESYLATE 10 MG/1
10 TABLET ORAL DAILY
COMMUNITY

## 2022-11-06 RX ORDER — SODIUM CHLORIDE 0.9 % (FLUSH) 0.9 %
10 SYRINGE (ML) INJECTION EVERY 8 HOURS
Status: DISCONTINUED | OUTPATIENT
Start: 2022-11-06 | End: 2022-11-06

## 2022-11-06 RX ORDER — ONDANSETRON 2 MG/ML
4 INJECTION INTRAMUSCULAR; INTRAVENOUS EVERY 6 HOURS PRN
Status: DISCONTINUED | OUTPATIENT
Start: 2022-11-06 | End: 2022-11-08 | Stop reason: HOSPADM

## 2022-11-06 RX ORDER — AMOXICILLIN 250 MG
1 CAPSULE ORAL DAILY PRN
Status: DISCONTINUED | OUTPATIENT
Start: 2022-11-06 | End: 2022-11-08 | Stop reason: HOSPADM

## 2022-11-06 RX ORDER — IBUPROFEN 200 MG
16 TABLET ORAL
Status: DISCONTINUED | OUTPATIENT
Start: 2022-11-06 | End: 2022-11-08 | Stop reason: HOSPADM

## 2022-11-06 RX ORDER — ATORVASTATIN CALCIUM 40 MG/1
40 TABLET, FILM COATED ORAL DAILY
Status: DISCONTINUED | OUTPATIENT
Start: 2022-11-07 | End: 2022-11-08 | Stop reason: HOSPADM

## 2022-11-06 RX ORDER — LANOLIN ALCOHOL/MO/W.PET/CERES
800 CREAM (GRAM) TOPICAL
Status: DISCONTINUED | OUTPATIENT
Start: 2022-11-06 | End: 2022-11-08 | Stop reason: HOSPADM

## 2022-11-06 RX ORDER — IBUPROFEN 200 MG
24 TABLET ORAL
Status: DISCONTINUED | OUTPATIENT
Start: 2022-11-06 | End: 2022-11-08 | Stop reason: HOSPADM

## 2022-11-06 RX ORDER — NALOXONE HCL 0.4 MG/ML
0.02 VIAL (ML) INJECTION
Status: DISCONTINUED | OUTPATIENT
Start: 2022-11-06 | End: 2022-11-08 | Stop reason: HOSPADM

## 2022-11-06 RX ADMIN — ONDANSETRON 4 MG: 2 INJECTION INTRAMUSCULAR; INTRAVENOUS at 09:11

## 2022-11-06 RX ADMIN — CEFTRIAXONE 1 G: 1 INJECTION, SOLUTION INTRAVENOUS at 06:11

## 2022-11-06 NOTE — ED PROVIDER NOTES
"SCRIBE #1 NOTE: I, Alexandrea Valencia, am scribing for, and in the presence of,  Keyona Lewis MD. I have scribed the following portions of the note - Other sections scribed: HPI, FELICIA, PE.         EM PHYSICIAN NOTE       This patient presents with a complaint of seizure onset prior to arrival today.  Chief Complaint   Patient presents with    Seizures     EMS called to 55yo female that family stated had seizure like activity with whole body shaking that lasted 10 minutes. No hx of seizures. When ems arrived she was back to her normal baseline state. Patient cannot hear or speak and has right side paralysis from previous cva.        HPI:   Aurora Ornelas is a 56 y.o. female, with a past medical history of Stroke (w/right-sided hemiparesis), DM, HLD, and HTN, who presents to the ED with seizure onset prior to arrival today. Per patient's daughter, the patient was watching TV when she "locked up out of nowhere." Patient's daughter states that this seizure lasted for 10 minutes and that the patient required a sternal rub to become alert afterwards. Patient's daughter notes associated symptoms of bite wound to tongue and urinary incontinence. No exacerbating or alleviating factors. Patient's daughter denies the patient having a history of seizures. Per patient's daughter, patient is not followed by a neurologist. Patient's daughter denies other associated symptoms.       REVIEW of PMH, SOC History and Family History:  Past Medical History:   Diagnosis Date    Anemia     Arthritis     Asthma     Deaf     Diabetes mellitus     Hyperlipidemia     Hypertension      Social history noncontributory  Family history noncontributory  Review of patient's allergies indicates:  No Known Allergies        REVIEW of SYSTEMS  Source: Patient's daughter  The nurse's notes and triage vital signs were reviewed.  GENERAL/CONSTITUTIONAL: There is no report of fever, fatigue, weakness, or unexplained weight loss.  CARDIOVASCULAR: There is no " "report of chest pain   HENT: SEE HPI.  RESPIRATORY: There is no report of cough or SOB  GASTROINTESTINAL: There is no report of nausea, vomiting, diarrhea  GENITOURINARY: SEE HPI.  MUSCULOSKELETAL: There is no report of joint or muscle pain. Right-sided hemiparesis x1 year  NEUROLOGICAL: SEE HPI.  SKIN AND BREASTS: There is no report of easy bruising, skin redness, skin rash.   HEMATOLOGIC/LYMPHATIC: There is no report of anemia, bleeding or clotting defects. There is no report of anticoagulant use.  The remainder of the ROS is negative.        PHYSICAL EXAMINATION    ED Triage Vitals [11/06/22 1220]   Enc Vitals Group      BP (!) 142/83      Pulse 108      Resp 18      Temp 99.3 °F (37.4 °C)      Temp src Oral      SpO2 99 %      Weight 160 lb      Height 5' 5"      Head Circumference       Peak Flow       Pain Score       Pain Loc       Pain Edu?       Excl. in GC?      Vital signs and Pulse Ox reviewed in clinical context. Abnormalities noted: none:  Heart rate, blood pressure, temperature, respiratory rate and pulse ox are wnl, Hypertension  Pt's level of consciousness is alert, and the patient is in mild distress.  Skin: warm, pink and dry.  Capillary refill is less than 2 seconds.  Mucosa:moist  Head and Neck: WNL  Cardiac exam: RRR. +2 pulses.  Pulmonary exam: unlabored and clear  Abd Exam: soft nontender   Musculoskeletal: no joint tenderness, deformity or swelling   Neurologic: GCS: GCS 15 via sign language with daughter.  5 over 5 strength on left.  No facial droop, neck supple. Right hemiparesis.       Initial Impression:  New onset seizure  Plan:  Labs, imaging, seizure precautions  Keyona Lewis MD, 12:41 PM 11/6/2022      Medical decision making:   Nurses notes and Vital Signs reviewed.  Orders Placed This Encounter   Procedures    CT Head Without Contrast    CPK    Comprehensive Metabolic Panel    CBC Auto Differential    Urinalysis    Urinalysis Microscopic    Cardiac Monitoring - Adult    " Inpatient consult to Neurology    EKG 12-lead    Place in Observation       ED Course as of 11/06/22 1732   Sun Nov 06, 2022   1249 My independent interpretation of the EKG is sinus tach at 104 beats per minute.  There is no ST segment elevation or depression concerning for infarct.  Normal axis and normal intervals.  The QT interval is 454 [MH]   1540 UA reveals white cells and bacteria [MH]   1540 CMP reveals a bicarb of 21.  [MH]   1540 CBC reveals mild anemia of 11 and 34 otherwise normal [MH]   1541 Temporal evolution of large remote left MCA distribution infarct, without acute large vascular territory infarct or intracranial hemorrhage identified.     Lower pontine subtle hypoattenuation which may be related to streak artifact versus age-indeterminate lacunar type infarct. []   1725 DW Neuro, Dr. Restrepo:  Admit for neuro checks, MRI, EKG []      ED Course User Index  [] Micelle HEYDI Lewis MD       MDM  Number of Diagnoses or Management Options  Acute cystitis without hematuria: new, needed workup  New onset seizure: new, needed workup  Stroke-like symptom: new, needed workup     Amount and/or Complexity of Data Reviewed  Clinical lab tests: ordered and reviewed  Tests in the radiology section of CPT®: ordered and reviewed  Obtain history from someone other than the patient: yes  Review and summarize past medical records: yes  Discuss the patient with other providers: yes  Independent visualization of images, tracings, or specimens: yes    Risk of Complications, Morbidity, and/or Mortality  Presenting problems: high  Diagnostic procedures: high  Management options: high    Critical Care  Total time providing critical care: 30-74 minutes         Diagnoses that have been ruled out:   None   Diagnoses that are still under consideration:   None   Final diagnoses:   Seizure   Acute cystitis without hematuria   Stroke-like symptom   New onset seizure            Follow-up Information    None       ED  Prescriptions    None         This note was created using Dictation Software.  This program may occasionally misinterpret certain words and phrases.        Scribe Attestation:   Scribe #1: I performed the above scribed service and the documentation accurately describes the services I performed. I attest to the accuracy of the note.      SCRIBE ATTESTATION NOTE:   I attest that I personally performed the services documented by the scribe and acknowledged and confirm the content of the note.   Nurses notes were reviewed.  Keyona Lewis      Nurses notes were reviewed.    Transfer of Care: 5:32 PM  The medical management of Aurora Ornelas has been transferred to the admitting team.  At this time, the patient's condition is unchanged, and this was relayed to the accepting team.  Please see notes from the admitting team for continued care.  Keyona Lewis 5:32 PM             ED Disposition Condition    Observation Stable                            Keyona Lewis MD  11/06/22 2373

## 2022-11-06 NOTE — Clinical Note
Diagnosis: New onset seizure [060501]   Future Attending Provider: TRES ALBERTS [[id number]]   Admitting Provider:: TRES ALBERTS [[id number]]   Special Needs:: Hearing Impaired [3]   Special Needs:: Rule out stroke [37]

## 2022-11-06 NOTE — ED TRIAGE NOTES
EMS called to 57yo female that family stated had seizure like activity with whole body shaking that lasted 10 minutes. No hx of seizures. When ems arrived she was back to her normal baseline state. Patient cannot hear or speak and has right side paralysis from previous cva.

## 2022-11-07 PROBLEM — N39.0 UTI (URINARY TRACT INFECTION): Status: ACTIVE | Noted: 2022-11-07

## 2022-11-07 LAB
ALBUMIN SERPL BCP-MCNC: 3.3 G/DL (ref 3.5–5.2)
ALP SERPL-CCNC: 60 U/L (ref 55–135)
ALT SERPL W/O P-5'-P-CCNC: 8 U/L (ref 10–44)
ANION GAP SERPL CALC-SCNC: 11 MMOL/L (ref 8–16)
AST SERPL-CCNC: 11 U/L (ref 10–40)
BASOPHILS # BLD AUTO: 0.02 K/UL (ref 0–0.2)
BASOPHILS NFR BLD: 0.5 % (ref 0–1.9)
BILIRUB SERPL-MCNC: 0.5 MG/DL (ref 0.1–1)
BUN SERPL-MCNC: 5 MG/DL (ref 6–20)
CALCIUM SERPL-MCNC: 9.3 MG/DL (ref 8.7–10.5)
CHLORIDE SERPL-SCNC: 104 MMOL/L (ref 95–110)
CK SERPL-CCNC: 42 U/L (ref 20–180)
CO2 SERPL-SCNC: 27 MMOL/L (ref 23–29)
CREAT SERPL-MCNC: 0.7 MG/DL (ref 0.5–1.4)
DIFFERENTIAL METHOD: ABNORMAL
EOSINOPHIL # BLD AUTO: 0.1 K/UL (ref 0–0.5)
EOSINOPHIL NFR BLD: 1.9 % (ref 0–8)
ERYTHROCYTE [DISTWIDTH] IN BLOOD BY AUTOMATED COUNT: 11.7 % (ref 11.5–14.5)
EST. GFR  (NO RACE VARIABLE): >60 ML/MIN/1.73 M^2
GLUCOSE SERPL-MCNC: 86 MG/DL (ref 70–110)
HCT VFR BLD AUTO: 38.7 % (ref 37–48.5)
HGB BLD-MCNC: 13.1 G/DL (ref 12–16)
IMM GRANULOCYTES # BLD AUTO: 0.01 K/UL (ref 0–0.04)
IMM GRANULOCYTES NFR BLD AUTO: 0.3 % (ref 0–0.5)
LYMPHOCYTES # BLD AUTO: 1.4 K/UL (ref 1–4.8)
LYMPHOCYTES NFR BLD: 37.1 % (ref 18–48)
MCH RBC QN AUTO: 31.2 PG (ref 27–31)
MCHC RBC AUTO-ENTMCNC: 33.9 G/DL (ref 32–36)
MCV RBC AUTO: 92 FL (ref 82–98)
MONOCYTES # BLD AUTO: 0.4 K/UL (ref 0.3–1)
MONOCYTES NFR BLD: 10.3 % (ref 4–15)
NEUTROPHILS # BLD AUTO: 1.9 K/UL (ref 1.8–7.7)
NEUTROPHILS NFR BLD: 49.9 % (ref 38–73)
NRBC BLD-RTO: 0 /100 WBC
PLATELET # BLD AUTO: 278 K/UL (ref 150–450)
PMV BLD AUTO: 9 FL (ref 9.2–12.9)
POTASSIUM SERPL-SCNC: 3.8 MMOL/L (ref 3.5–5.1)
PROT SERPL-MCNC: 7.3 G/DL (ref 6–8.4)
RBC # BLD AUTO: 4.2 M/UL (ref 4–5.4)
SODIUM SERPL-SCNC: 142 MMOL/L (ref 136–145)
WBC # BLD AUTO: 3.77 K/UL (ref 3.9–12.7)

## 2022-11-07 PROCEDURE — 95718 EEG PHYS/QHP 2-12 HR W/VEEG: CPT | Mod: ,,, | Performed by: PSYCHIATRY & NEUROLOGY

## 2022-11-07 PROCEDURE — 25000003 PHARM REV CODE 250: Performed by: PHYSICIAN ASSISTANT

## 2022-11-07 PROCEDURE — 80053 COMPREHEN METABOLIC PANEL: CPT | Performed by: PHYSICIAN ASSISTANT

## 2022-11-07 PROCEDURE — 63600175 PHARM REV CODE 636 W HCPCS: Performed by: PHYSICIAN ASSISTANT

## 2022-11-07 PROCEDURE — 95700 EEG CONT REC W/VID EEG TECH: CPT

## 2022-11-07 PROCEDURE — G0378 HOSPITAL OBSERVATION PER HR: HCPCS

## 2022-11-07 PROCEDURE — 95711 VEEG 2-12 HR UNMONITORED: CPT

## 2022-11-07 PROCEDURE — 95718 PR EEG, W/VIDEO, CONT RECORD, I&R, 2-12 HRS: ICD-10-PCS | Mod: ,,, | Performed by: PSYCHIATRY & NEUROLOGY

## 2022-11-07 PROCEDURE — 99214 OFFICE O/P EST MOD 30 MIN: CPT | Mod: ,,, | Performed by: STUDENT IN AN ORGANIZED HEALTH CARE EDUCATION/TRAINING PROGRAM

## 2022-11-07 PROCEDURE — 85025 COMPLETE CBC W/AUTO DIFF WBC: CPT | Performed by: PHYSICIAN ASSISTANT

## 2022-11-07 PROCEDURE — 99214 PR OFFICE/OUTPT VISIT, EST, LEVL IV, 30-39 MIN: ICD-10-PCS | Mod: ,,, | Performed by: STUDENT IN AN ORGANIZED HEALTH CARE EDUCATION/TRAINING PROGRAM

## 2022-11-07 PROCEDURE — 96366 THER/PROPH/DIAG IV INF ADDON: CPT

## 2022-11-07 RX ADMIN — CEFTRIAXONE 1 G: 1 INJECTION, SOLUTION INTRAVENOUS at 06:11

## 2022-11-07 RX ADMIN — ASPIRIN 81 MG CHEWABLE TABLET 81 MG: 81 TABLET CHEWABLE at 08:11

## 2022-11-07 RX ADMIN — ATORVASTATIN CALCIUM 40 MG: 40 TABLET, FILM COATED ORAL at 08:11

## 2022-11-07 NOTE — SUBJECTIVE & OBJECTIVE
Past Medical History:   Diagnosis Date    Anemia     Arthritis     Asthma     Deaf     Diabetes mellitus     Hyperlipidemia     Hypertension        Past Surgical History:   Procedure Laterality Date    COLONOSCOPY N/A 12/4/2020    Procedure: COLONOSCOPY;  Surgeon: Felicia Bishop MD;  Location: [facility] END[facility];  Service: Endoscopy;  Laterality: N/A;    ESOPHAGOGASTRODUODENOSCOPY N/A 2/28/2019    Procedure: EGD (ESOPHAGOGASTRODUODENOSCOPY);  Surgeon: Yolanda Gary MD;  Location: [facility] ENDO;  Service: Endoscopy;  Laterality: N/A;    ESOPHAGOGASTRODUODENOSCOPY N/A 12/4/2020    Procedure: EGD (ESOPHAGOGASTRODUODENOSCOPY);  Surgeon: Felicia Bishop MD;  Location: [facility] ENDO;  Service: Endoscopy;  Laterality: N/A;    ESOPHAGOGASTRODUODENOSCOPY N/A 7/1/2021    Procedure: EGD (ESOPHAGOGASTRODUODENOSCOPY)-in March 2021;  Surgeon: Sharath Kohler MD;  Location: [facility];  Service: Endoscopy;  Laterality: N/A;  hearing impaired, will need  - Maria Dolores Harris assigned-MS  fully vaccinated-see immunization record    HYSTERECTOMY         Review of patient's allergies indicates:  No Known Allergies    No current facility-administered medications on file prior to encounter.     Current Outpatient Medications on File Prior to Encounter   Medication Sig    acetaminophen (TYLENOL) 500 MG tablet Take 1,000 mg by mouth once daily.    amLODIPine (NORVASC) 10 MG tablet Take 10 mg by mouth once daily.    mv,calcium,min/iron/folic/vitK (ONE-A-DAY WOMEN'S COMPLETE ORAL) Take by mouth.    albuterol sulfate 2.5 mg/0.5 mL Nebu Take 2.5 mg by nebulization every 4 (four) hours as needed. Rescue    albuterol-ipratropium (DUO-NEB) 2.5 mg-0.5 mg/3 mL nebulizer solution USE 1 AMPULE IN NEBULIZER EVERY 6 HOURS WHILE AWAKE    albuterol-ipratropium (DUO-NEB) 2.5 mg-0.5 mg/3 mL nebulizer solution Inhale 3 mLs into the lungs.    aspirin 81 MG Chew Take 1 tablet (81 mg total) by mouth once daily.    atorvastatin (LIPITOR) 40  MG tablet Take 1 tablet (40 mg total) by mouth once daily.    clopidogreL (PLAVIX) 75 mg tablet Take 1 tablet (75 mg total) by mouth once daily. for 21 days    dicyclomine (BENTYL) 20 mg tablet Take 1 tablet (20 mg total) by mouth every 6 (six) hours as needed (every 6 hours as needed for pain).    ergocalciferol (ERGOCALCIFEROL) 50,000 unit Cap Take 50,000 Units by mouth.    ferrous sulfate 325 (65 FE) MG EC tablet Take 325 mg by mouth 2 (two) times daily.    ipratropium (ATROVENT) 0.02 % nebulizer solution Take 2.5 mLs (500 mcg total) by nebulization 4 (four) times daily as needed for Wheezing. Rescue    predniSONE (DELTASONE) 50 MG Tab Take 50 mg by mouth once daily.    sucralfate (CARAFATE) 1 gram tablet Take 1 tablet (1 g total) by mouth 4 (four) times daily before meals and nightly.     Family History       Problem Relation (Age of Onset)    Diabetes Mother    Hypertension Mother          Tobacco Use    Smoking status: Never    Smokeless tobacco: Never   Substance and Sexual Activity    Alcohol use: No    Drug use: No    Sexual activity: Yes     Partners: Male     Birth control/protection: None     Review of Systems   Constitutional:  Negative for chills and fever.   HENT:  Negative for nosebleeds and tinnitus.    Eyes:  Negative for photophobia and visual disturbance.   Respiratory:  Negative for shortness of breath and wheezing.    Cardiovascular:  Negative for chest pain, palpitations and leg swelling.   Gastrointestinal:  Negative for abdominal distention, nausea and vomiting.   Genitourinary:  Negative for dysuria, flank pain and hematuria.   Musculoskeletal:  Negative for gait problem and joint swelling.   Skin:  Negative for rash and wound.   Neurological:  Positive for seizures. Negative for syncope.   Objective:     Vital Signs (Most Recent):  Temp: 99.3 °F (37.4 °C) (11/06/22 1220)  Pulse: 95 (11/06/22 1717)  Resp: 18 (11/06/22 1220)  BP: (!) 164/87 (11/06/22 1717)  SpO2: 99 % (11/06/22 1717)   Vital  Signs (24h Range):  Temp:  [99.3 °F (37.4 °C)] 99.3 °F (37.4 °C)  Pulse:  [] 95  Resp:  [18] 18  SpO2:  [97 %-99 %] 99 %  BP: (142-164)/(83-88) 164/87     Weight: 72.6 kg (160 lb)  Body mass index is 26.63 kg/m².    Physical Exam  Vitals and nursing note reviewed.   Constitutional:       General: She is not in acute distress.     Appearance: She is well-developed. She is not diaphoretic.   HENT:      Head: Normocephalic and atraumatic.      Right Ear: External ear normal.      Left Ear: External ear normal.   Eyes:      General:         Right eye: No discharge.         Left eye: No discharge.      Conjunctiva/sclera: Conjunctivae normal.   Neck:      Thyroid: No thyromegaly.   Cardiovascular:      Rate and Rhythm: Normal rate and regular rhythm.      Heart sounds: No murmur heard.  Pulmonary:      Effort: Pulmonary effort is normal. No respiratory distress.      Breath sounds: Normal breath sounds.   Abdominal:      General: Bowel sounds are normal. There is no distension.      Palpations: Abdomen is soft. There is no mass.      Tenderness: There is no abdominal tenderness.   Musculoskeletal:         General: No deformity.      Cervical back: Normal range of motion and neck supple.   Skin:     General: Skin is warm and dry.   Neurological:      Mental Status: She is alert and oriented to person, place, and time.      Sensory: Sensory deficit present.      Comments: Right sided paralysis to arm/leg. Reports at baseline   Psychiatric:         Mood and Affect: Mood normal.         Behavior: Behavior normal.           Significant Labs: CBC:   Recent Labs   Lab 11/06/22  1326   WBC 4.04   HGB 11.9*   HCT 34.5*        CMP:   Recent Labs   Lab 11/06/22  1326      K 3.5   *   CO2 21*   GLU 90   BUN 7   CREATININE 0.6   CALCIUM 8.2*   PROT 6.4   ALBUMIN 2.9*   BILITOT 0.3   ALKPHOS 52*   AST 10   ALT 10   ANIONGAP 9     Urine Studies:   Recent Labs   Lab 11/06/22  1327   COLORU Yellow   APPEARANCEUA  Hazy*   PHUR 7.0   SPECGRAV 1.020   PROTEINUA Negative   GLUCUA Negative   KETONESU Negative   BILIRUBINUA Negative   OCCULTUA Negative   NITRITE Negative   UROBILINOGEN 2.0-3.0*   LEUKOCYTESUR 3+*   RBCUA 3   WBCUA 95*   BACTERIA Moderate*   SQUAMEPITHEL 0       Significant Imaging:   Imaging Results              CT Head Without Contrast (Final result)  Result time 11/06/22 13:51:50      Final result by Nigel Westfall MD (11/06/22 13:51:50)                   Impression:      Temporal evolution of large remote left MCA distribution infarct, without acute large vascular territory infarct or intracranial hemorrhage identified.    Lower pontine subtle hypoattenuation which may be related to streak artifact versus age-indeterminate lacunar type infarct.  Clinical correlation advised.      Electronically signed by: Nigel Westfall MD  Date:    11/06/2022  Time:    13:51               Narrative:    EXAMINATION:  CT HEAD WITHOUT CONTRAST    CLINICAL HISTORY:  Seizure, new-onset, no history of trauma;    TECHNIQUE:  Low dose axial CT images obtained throughout the head without intravenous contrast. Sagittal and coronal reconstructions were performed.    COMPARISON:  Head CT most recent 12/23/2021 and MRI brain 12/19/2021    FINDINGS:  Intracranial compartment:    Large area of encephalomalacia within the left MCA distribution consistent with remote infarct, noting temporal evolution with more volume loss and Wallerian degeneration involving the left basal ganglia, left cerebral peduncle and left aspect of the midbrain and left erika mervin, from 12/23/2021 study.  There is new compensatory ex vacuole dilatation of the left lateral ventricle.  Remaining ventricles are normal in overall appearance without distortion by mass effect or acute hydrocephalus.  No extra-axial blood or fluid collections.    Mild periventricular white matter hypoattenuation elsewhere consistent with chronic microvascular ischemic change.  Subtle  hypoattenuation at the lower mervin in a region of streak artifact.  No new focal areas of abnormal parenchymal attenuation elsewhere.  No parenchymal mass, hemorrhage, edema or evidence of acute/new major vascular distribution infarct.  Skull base atherosclerotic vascular calcifications noted.    Skull/extracranial contents (limited evaluation): No fracture. Mastoid air cells and paranasal sinuses are essentially clear.  Imaged portions of the orbits are within normal limits.

## 2022-11-07 NOTE — SUBJECTIVE & OBJECTIVE
Interval History: no further seizures    Review of Systems   HENT:  Negative for ear discharge and ear pain.    Eyes:  Negative for discharge and itching.   Cardiovascular:  Negative for chest pain and palpitations.   Endocrine: Negative for cold intolerance and heat intolerance.   Objective:     Vital Signs (Most Recent):  Temp: 98.3 °F (36.8 °C) (11/07/22 1138)  Pulse: 81 (11/07/22 1138)  Resp: 18 (11/07/22 1138)  BP: 134/74 (11/07/22 1138)  SpO2: 96 % (11/07/22 1138) Vital Signs (24h Range):  Temp:  [96.6 °F (35.9 °C)-98.3 °F (36.8 °C)] 98.3 °F (36.8 °C)  Pulse:  [71-97] 81  Resp:  [14-18] 18  SpO2:  [94 %-99 %] 96 %  BP: (127-164)/(73-88) 134/74     Weight: 52.6 kg (115 lb 15.4 oz)  Body mass index is 19.3 kg/m².  No intake or output data in the 24 hours ending 11/07/22 1521   Physical Exam  Vitals and nursing note reviewed.   Constitutional:       General: She is not in acute distress.     Appearance: She is well-developed. She is not diaphoretic.   HENT:      Head: Normocephalic and atraumatic.      Right Ear: External ear normal.      Left Ear: External ear normal.   Eyes:      General:         Right eye: No discharge.         Left eye: No discharge.      Conjunctiva/sclera: Conjunctivae normal.   Neck:      Thyroid: No thyromegaly.   Cardiovascular:      Rate and Rhythm: Normal rate and regular rhythm.      Heart sounds: No murmur heard.  Pulmonary:      Effort: Pulmonary effort is normal. No respiratory distress.      Breath sounds: Normal breath sounds.   Abdominal:      General: Bowel sounds are normal. There is no distension.      Palpations: Abdomen is soft. There is no mass.      Tenderness: There is no abdominal tenderness.   Musculoskeletal:         General: No deformity.      Cervical back: Normal range of motion and neck supple.   Skin:     General: Skin is warm and dry.   Neurological:      Mental Status: She is alert and oriented to person, place, and time.      Sensory: Sensory deficit present.       Comments: Right sided paralysis to arm/leg. Reports at baseline   Psychiatric:         Mood and Affect: Mood normal.         Behavior: Behavior normal.       Significant Labs: All pertinent labs within the past 24 hours have been reviewed.  BMP:   Recent Labs   Lab 11/07/22  0605   GLU 86      K 3.8      CO2 27   BUN 5*   CREATININE 0.7   CALCIUM 9.3     CBC:   Recent Labs   Lab 11/06/22  1326 11/07/22  0605   WBC 4.04 3.77*   HGB 11.9* 13.1   HCT 34.5* 38.7    278       Significant Imaging: I have reviewed all pertinent imaging results/findings within the past 24 hours.

## 2022-11-07 NOTE — PHARMACY MED REC
"Admission Medication History     The home medication history was taken by Yuliya Ley.    You may go to "Admission" then "Reconcile Home Medications" tabs to review and/or act upon these items.     The home medication list has been updated by the Pharmacy department.   Please read ALL comments highlighted in yellow.   Please address this information as you see fit.    Feel free to contact us if you have any questions or require assistance.    Medications listed below were obtained from: Patient/family and Analytic software- [employer] and added to home medication:   Amlodipine 10 mg   Women's Multivitamin   Acetaminophen 500 mg    The medication reconciliation was completed by the patient's bedside.      Yuliya Ley  [phone]                           .        "

## 2022-11-07 NOTE — ASSESSMENT & PLAN NOTE
Fair control. Will hold home amlodipine pending MRI results for permissive HTN given new CT head findings.

## 2022-11-07 NOTE — HPI
Aurora Ornelas 56 y.o. female with previous CVA and residual right sided deficits, HTN, HLD deafness presents to the hospital with a chief complaint of seizure. She is unsure of the events that brought her to the hospital. She denies complaints currently and reports she fells well.  She denies fever chest pain shortness breast nausea vomiting abdominal pain leg swelling syncope melena hematuria hematemesis.  She reports her right-sided deficits from previous CVA her baseline.  She denies any new left-sided deficits.  There has been no facial droop or difficulty swallowing.    Per discussion with the patient's daughter she had a witnessed seizure today that occurred while she was sitting down watching TV elastic for multiple minutes was associated with tongue trauma.  It resolved by the time EMS arrived.  She has never had a seizure before.  She was at her baseline health status without complaints prior to the seizure.    In the ED, CPK negative UA with leukocytes white blood cells and bacteria afebrile without leukocytosis CT head with temporal evolution of large remote left MCA distribution infarct without acute large vascular territory infarct or intracranial hemorrhage identified lower pontine subtle hypoattenuation which may be related to streak artifact versus age-indeterminate lacunar type infarct.

## 2022-11-07 NOTE — NURSING
Patient arrived to unit via stretcher with tech.  No distress noted.  Respirations even and unlabored. Used communication board to and per patient it was okay to use.  Bmx 1 noted. Skin clean dry and intact.  Poc explained.  Will continue to monitor.

## 2022-11-07 NOTE — H&P
[facility] [place] [facility]  [facility] Medicine  History & Physical    Patient Name: Aurora Ornelas  MRN: [id number]  Patient Class: OP- Observation  Admission Date: 11/6/2022  Attending Physician: Robert Scott MD   Primary Care Provider: Primary Doctor No         Patient information was obtained from patient, past medical records and ER records.     Subjective:     Principal Problem:Seizure    Chief Complaint:   Chief Complaint   Patient presents with    Seizures     EMS called to 57yo female that family stated had seizure like activity with whole body shaking that lasted 10 minutes. No hx of seizures. When ems arrived she was back to her normal baseline state. Patient cannot hear or speak and has right side paralysis from previous cva.         HPI: Aurora Ornelas 56 y.o. female with previous CVA and residual right sided deficits, HTN, HLD deafness presents to the hospital with a chief complaint of seizure. She is unsure of the events that brought her to the hospital. She denies complaints currently and reports she fells well.  She denies fever chest pain shortness breast nausea vomiting abdominal pain leg swelling syncope melena hematuria hematemesis.  She reports her right-sided deficits from previous CVA her baseline.  She denies any new left-sided deficits.  There has been no facial droop or difficulty swallowing.    Per discussion with the patient's daughter she had a witnessed seizure today that occurred while she was sitting down watching TV elastic for multiple minutes was associated with tongue trauma.  It resolved by the time EMS arrived.  She has never had a seizure before.  She was at her baseline health status without complaints prior to the seizure.    In the ED, CPK negative UA with leukocytes white blood cells and bacteria afebrile without leukocytosis CT head with temporal evolution of large remote left MCA distribution infarct without acute large vascular territory infarct or intracranial  hemorrhage identified lower pontine subtle hypoattenuation which may be related to streak artifact versus age-indeterminate lacunar type infarct.      Past Medical History:   Diagnosis Date    Anemia     Arthritis     Asthma     Deaf     Diabetes mellitus     Hyperlipidemia     Hypertension        Past Surgical History:   Procedure Laterality Date    COLONOSCOPY N/A 12/4/2020    Procedure: COLONOSCOPY;  Surgeon: Felicia Bishop MD;  Location: [facility] ENDO;  Service: Endoscopy;  Laterality: N/A;    ESOPHAGOGASTRODUODENOSCOPY N/A 2/28/2019    Procedure: EGD (ESOPHAGOGASTRODUODENOSCOPY);  Surgeon: Yolanda Gary MD;  Location: [facility] ENDO;  Service: Endoscopy;  Laterality: N/A;    ESOPHAGOGASTRODUODENOSCOPY N/A 12/4/2020    Procedure: EGD (ESOPHAGOGASTRODUODENOSCOPY);  Surgeon: Felicia Bishop MD;  Location: [facility] ENDO;  Service: Endoscopy;  Laterality: N/A;    ESOPHAGOGASTRODUODENOSCOPY N/A 7/1/2021    Procedure: EGD (ESOPHAGOGASTRODUODENOSCOPY)-in March 2021;  Surgeon: Sharath Kohler MD;  Location: [facility] ENDO;  Service: Endoscopy;  Laterality: N/A;  hearing impaired, will need  - Maria Dolores Harris assigned-MS  fully vaccinated-see immunization record    HYSTERECTOMY         Review of patient's allergies indicates:  No Known Allergies    No current facility-administered medications on file prior to encounter.     Current Outpatient Medications on File Prior to Encounter   Medication Sig    acetaminophen (TYLENOL) 500 MG tablet Take 1,000 mg by mouth once daily.    amLODIPine (NORVASC) 10 MG tablet Take 10 mg by mouth once daily.    mv,calcium,min/iron/folic/vitK (ONE-A-DAY WOMEN'S COMPLETE ORAL) Take by mouth.    albuterol sulfate 2.5 mg/0.5 mL Nebu Take 2.5 mg by nebulization every 4 (four) hours as needed. Rescue    albuterol-ipratropium (DUO-NEB) 2.5 mg-0.5 mg/3 mL nebulizer solution USE 1 AMPULE IN NEBULIZER EVERY 6 HOURS WHILE AWAKE    albuterol-ipratropium (DUO-NEB)  2.5 mg-0.5 mg/3 mL nebulizer solution Inhale 3 mLs into the lungs.    aspirin 81 MG Chew Take 1 tablet (81 mg total) by mouth once daily.    atorvastatin (LIPITOR) 40 MG tablet Take 1 tablet (40 mg total) by mouth once daily.    clopidogreL (PLAVIX) 75 mg tablet Take 1 tablet (75 mg total) by mouth once daily. for 21 days    dicyclomine (BENTYL) 20 mg tablet Take 1 tablet (20 mg total) by mouth every 6 (six) hours as needed (every 6 hours as needed for pain).    ergocalciferol (ERGOCALCIFEROL) 50,000 unit Cap Take 50,000 Units by mouth.    ferrous sulfate 325 (65 FE) MG EC tablet Take 325 mg by mouth 2 (two) times daily.    ipratropium (ATROVENT) 0.02 % nebulizer solution Take 2.5 mLs (500 mcg total) by nebulization 4 (four) times daily as needed for Wheezing. Rescue    predniSONE (DELTASONE) 50 MG Tab Take 50 mg by mouth once daily.    sucralfate (CARAFATE) 1 gram tablet Take 1 tablet (1 g total) by mouth 4 (four) times daily before meals and nightly.     Family History       Problem Relation (Age of Onset)    Diabetes Mother    Hypertension Mother          Tobacco Use    Smoking status: Never    Smokeless tobacco: Never   Substance and Sexual Activity    Alcohol use: No    Drug use: No    Sexual activity: Yes     Partners: Male     Birth control/protection: None     Review of Systems   Constitutional:  Negative for chills and fever.   HENT:  Negative for nosebleeds and tinnitus.    Eyes:  Negative for photophobia and visual disturbance.   Respiratory:  Negative for shortness of breath and wheezing.    Cardiovascular:  Negative for chest pain, palpitations and leg swelling.   Gastrointestinal:  Negative for abdominal distention, nausea and vomiting.   Genitourinary:  Negative for dysuria, flank pain and hematuria.   Musculoskeletal:  Negative for gait problem and joint swelling.   Skin:  Negative for rash and wound.   Neurological:  Positive for seizures. Negative for syncope.   Objective:     Vital  Signs (Most Recent):  Temp: 99.3 °F (37.4 °C) (11/06/22 1220)  Pulse: 95 (11/06/22 1717)  Resp: 18 (11/06/22 1220)  BP: (!) 164/87 (11/06/22 1717)  SpO2: 99 % (11/06/22 1717)   Vital Signs (24h Range):  Temp:  [99.3 °F (37.4 °C)] 99.3 °F (37.4 °C)  Pulse:  [] 95  Resp:  [18] 18  SpO2:  [97 %-99 %] 99 %  BP: (142-164)/(83-88) 164/87     Weight: 72.6 kg (160 lb)  Body mass index is 26.63 kg/m².    Physical Exam  Vitals and nursing note reviewed.   Constitutional:       General: She is not in acute distress.     Appearance: She is well-developed. She is not diaphoretic.   HENT:      Head: Normocephalic and atraumatic.      Right Ear: External ear normal.      Left Ear: External ear normal.   Eyes:      General:         Right eye: No discharge.         Left eye: No discharge.      Conjunctiva/sclera: Conjunctivae normal.   Neck:      Thyroid: No thyromegaly.   Cardiovascular:      Rate and Rhythm: Normal rate and regular rhythm.      Heart sounds: No murmur heard.  Pulmonary:      Effort: Pulmonary effort is normal. No respiratory distress.      Breath sounds: Normal breath sounds.   Abdominal:      General: Bowel sounds are normal. There is no distension.      Palpations: Abdomen is soft. There is no mass.      Tenderness: There is no abdominal tenderness.   Musculoskeletal:         General: No deformity.      Cervical back: Normal range of motion and neck supple.   Skin:     General: Skin is warm and dry.   Neurological:      Mental Status: She is alert and oriented to person, place, and time.      Sensory: Sensory deficit present.      Comments: Right sided paralysis to arm/leg. Reports at baseline   Psychiatric:         Mood and Affect: Mood normal.         Behavior: Behavior normal.           Significant Labs: CBC:   Recent Labs   Lab 11/06/22  1326   WBC 4.04   HGB 11.9*   HCT 34.5*        CMP:   Recent Labs   Lab 11/06/22  1326      K 3.5   *   CO2 21*   GLU 90   BUN 7   CREATININE 0.6    CALCIUM 8.2*   PROT 6.4   ALBUMIN 2.9*   BILITOT 0.3   ALKPHOS 52*   AST 10   ALT 10   ANIONGAP 9     Urine Studies:   Recent Labs   Lab 11/06/22  1327   COLORU Yellow   APPEARANCEUA Hazy*   PHUR 7.0   SPECGRAV 1.020   PROTEINUA Negative   GLUCUA Negative   KETONESU Negative   BILIRUBINUA Negative   OCCULTUA Negative   NITRITE Negative   UROBILINOGEN 2.0-3.0*   LEUKOCYTESUR 3+*   RBCUA 3   WBCUA 95*   BACTERIA Moderate*   SQUAMEPITHEL 0       Significant Imaging:   Imaging Results              CT Head Without Contrast (Final result)  Result time 11/06/22 13:51:50      Final result by Nigel Westfall MD (11/06/22 13:51:50)                   Impression:      Temporal evolution of large remote left MCA distribution infarct, without acute large vascular territory infarct or intracranial hemorrhage identified.    Lower pontine subtle hypoattenuation which may be related to streak artifact versus age-indeterminate lacunar type infarct.  Clinical correlation advised.      Electronically signed by: Nigel Westfall MD  Date:    11/06/2022  Time:    13:51               Narrative:    EXAMINATION:  CT HEAD WITHOUT CONTRAST    CLINICAL HISTORY:  Seizure, new-onset, no history of trauma;    TECHNIQUE:  Low dose axial CT images obtained throughout the head without intravenous contrast. Sagittal and coronal reconstructions were performed.    COMPARISON:  Head CT most recent 12/23/2021 and MRI brain 12/19/2021    FINDINGS:  Intracranial compartment:    Large area of encephalomalacia within the left MCA distribution consistent with remote infarct, noting temporal evolution with more volume loss and Wallerian degeneration involving the left basal ganglia, left cerebral peduncle and left aspect of the midbrain and left erika mervin, from 12/23/2021 study.  There is new compensatory ex vacuole dilatation of the left lateral ventricle.  Remaining ventricles are normal in overall appearance without distortion by mass effect or acute  hydrocephalus.  No extra-axial blood or fluid collections.    Mild periventricular white matter hypoattenuation elsewhere consistent with chronic microvascular ischemic change.  Subtle hypoattenuation at the lower mervin in a region of streak artifact.  No new focal areas of abnormal parenchymal attenuation elsewhere.  No parenchymal mass, hemorrhage, edema or evidence of acute/new major vascular distribution infarct.  Skull base atherosclerotic vascular calcifications noted.    Skull/extracranial contents (limited evaluation): No fracture. Mastoid air cells and paranasal sinuses are essentially clear.  Imaged portions of the orbits are within normal limits.                                        Assessment/Plan:     * Seizure  Presents with new onset seizure with associated tongue trauma. CT head with temporal evolution of previous MCA stroke and lower pontine subtle hypo attenuation  MRI pending  Neurology consulted  Seizure precautions/aspiration precautions  Continue home aspirin/statin  UA with WBC, bacteria, and leukocytes. Will continue rocephin\  Will obtain EEG    Mixed hyperlipidemia  Resume home statin    Embolic stroke involving left middle cerebral artery  See above. CT head with temporal evolution  Resume home aspirin/statin    Essential hypertension  Fair control. Will hold home amlodipine pending MRI results for permissive HTN given new CT head findings.     Deaf  At baseline. AMN  used for history      VTE Risk Mitigation (From admission, onward)         Ordered     IP VTE LOW RISK PATIENT  Once         11/06/22 1937     Place sequential compression device  Until discontinued         11/06/22 1937              VTE: SCD  Code: full  Diet: mechanical soft  Dispo: pending MRI and neuro eval  As clarification, on 11/6/2022, patient should be admitted for hospital observation services under my care in collaboration with Robert Scott MD. Erich Snowden PA-C  Department  of Hospital Medicine   [facility] - Emergency Dept

## 2022-11-07 NOTE — CONSULTS
[facility] - [place] - Telemetry  Neurology  Consult Note    Patient Name: Aurora Ornelas  MRN: [id number]  Admission Date: 11/6/2022  Hospital Length of Stay: 0 days  Code Status: Full Code   Attending Provider: Randal Granda MD  Consulting Provider: Aileen Restrepo MD  Primary Care Physician: Primary Doctor No  Principal Problem:Seizure    Inpatient consult to Neurology  Consult performed by: Aileen Restrepo MD  Consult ordered by: Keyona Lewis MD      Subjective:     Chief Complaint:  seizure    HPI:   56 year old f w history of iatrogenic L MCA stroke thought to be 2/2 to embolic etiology (lost to followup, on asp and statin) who presents w a seizure like activity lasted for 10 minutes, witnessed by her niece. Had possible few minutes post-ictal confusion.     Of note, patient is deaf and mute but communicates via writing and sign language.    Labs remarkable for UTI. CTH w temporal evolution of remote L MCA.     Patient admitted for treatment of UTI and EEG and MRI.     Past Medical History:   Diagnosis Date    Anemia     Arthritis     Asthma     Deaf     Diabetes mellitus     Hyperlipidemia     Hypertension        Past Surgical History:   Procedure Laterality Date    COLONOSCOPY N/A 12/4/2020    Procedure: COLONOSCOPY;  Surgeon: Felicia Bishop MD;  Location: [facility];  Service: Endoscopy;  Laterality: N/A;    ESOPHAGOGASTRODUODENOSCOPY N/A 2/28/2019    Procedure: EGD (ESOPHAGOGASTRODUODENOSCOPY);  Surgeon: Yolanda Gary MD;  Location: [facility];  Service: Endoscopy;  Laterality: N/A;    ESOPHAGOGASTRODUODENOSCOPY N/A 12/4/2020    Procedure: EGD (ESOPHAGOGASTRODUODENOSCOPY);  Surgeon: Felicia Bishop MD;  Location: [facility];  Service: Endoscopy;  Laterality: N/A;    ESOPHAGOGASTRODUODENOSCOPY N/A 7/1/2021    Procedure: EGD (ESOPHAGOGASTRODUODENOSCOPY)-in March 2021;  Surgeon: Sharath Kohler MD;  Location: [facility];  Service: Endoscopy;  Laterality: N/A;  hearing impaired, will need American sign language   - Maria Dolores Harris assigned-MS  fully vaccinated-see immunization record    HYSTERECTOMY         Review of patient's allergies indicates:  No Known Allergies    Current Neurological Medications:     No current facility-administered medications on file prior to encounter.     Current Outpatient Medications on File Prior to Encounter   Medication Sig    acetaminophen (TYLENOL) 500 MG tablet Take 1,000 mg by mouth once daily.    amLODIPine (NORVASC) 10 MG tablet Take 10 mg by mouth once daily.    mv,calcium,min/iron/folic/vitK (ONE-A-DAY WOMEN'S COMPLETE ORAL) Take by mouth.    albuterol sulfate 2.5 mg/0.5 mL Nebu Take 2.5 mg by nebulization every 4 (four) hours as needed. Rescue    albuterol-ipratropium (DUO-NEB) 2.5 mg-0.5 mg/3 mL nebulizer solution USE 1 AMPULE IN NEBULIZER EVERY 6 HOURS WHILE AWAKE    albuterol-ipratropium (DUO-NEB) 2.5 mg-0.5 mg/3 mL nebulizer solution Inhale 3 mLs into the lungs.    aspirin 81 MG Chew Take 1 tablet (81 mg total) by mouth once daily.    atorvastatin (LIPITOR) 40 MG tablet Take 1 tablet (40 mg total) by mouth once daily.    clopidogreL (PLAVIX) 75 mg tablet Take 1 tablet (75 mg total) by mouth once daily. for 21 days    dicyclomine (BENTYL) 20 mg tablet Take 1 tablet (20 mg total) by mouth every 6 (six) hours as needed (every 6 hours as needed for pain).    ergocalciferol (ERGOCALCIFEROL) 50,000 unit Cap Take 50,000 Units by mouth.    ferrous sulfate 325 (65 FE) MG EC tablet Take 325 mg by mouth 2 (two) times daily.    ipratropium (ATROVENT) 0.02 % nebulizer solution Take 2.5 mLs (500 mcg total) by nebulization 4 (four) times daily as needed for Wheezing. Rescue    predniSONE (DELTASONE) 50 MG Tab Take 50 mg by mouth once daily.    sucralfate (CARAFATE) 1 gram tablet Take 1 tablet (1 g total) by mouth 4 (four) times daily before meals and nightly.      Family History       Problem Relation (Age of Onset)    Diabetes Mother    Hypertension Mother          Tobacco Use     Smoking status: Never    Smokeless tobacco: Never   Substance and Sexual Activity    Alcohol use: No    Drug use: No    Sexual activity: Yes     Partners: Male     Birth control/protection: None     ROS negative other than stated in HPI    Objective:     Vital Signs (Most Recent):  Temp: 98.3 °F (36.8 °C) (11/07/22 1138)  Pulse: 81 (11/07/22 1138)  Resp: 18 (11/07/22 1138)  BP: 134/74 (11/07/22 1138)  SpO2: 96 % (11/07/22 1138) Vital Signs (24h Range):  Temp:  [96.6 °F (35.9 °C)-98.3 °F (36.8 °C)] 98.3 °F (36.8 °C)  Pulse:  [71-97] 81  Resp:  [14-18] 18  SpO2:  [94 %-99 %] 96 %  BP: (127-164)/(73-88) 134/74     Weight: 52.6 kg (115 lb 15.4 oz)  Body mass index is 19.3 kg/m².    Gen: in NAD, patient mute and deaf  HEENT: NCAT, MMM  Resp effort: normal  Abd: soft  Skin: no rashes    Gen: alert and oriented.   Attn: normal, communicates via writing and reading  Speech: mute  CN: PERRL, EOMI, facial sensation V1-V3 intact, symmetric facies, deaf, uvula and palate midline  Motor: R upper and lower extremity weakness, does not lift to gravity. L extremity 5/5   Reflexes 3+ brisk throughout  Sensation to light touch intact    Coordination not assessd  Gait not assessed    Significant Labs:   UA w + WBC and leukocytes    Significant Imaging:   CTH w temporal evolution of remote L MCA.   MRI brain in 12/2021 w L MCA stroke    Assessment and Plan:     56 year old f w history of iatrogenic L MCA stroke thought to be 2/2 to embolic etiology (lost to followup, on asp and statin) who presents w a seizure like activity lasted for 10 minutes. New onset seizure. Back to baseline. However, given degree of encephalomalacia in the L MCA region which is prone to seizures, will get EEG and repeat MRI brain (for comparison to Dec 2021) to determine if placing patient on Keppra.    Pending studies:  -EEG read pending  -MRI brain pending    Discussed w patient's daughter to have patient followup as outpatient, lost to followup for workup of  iatrogenic embolic L MCA stroke. No signs of arrhythmias on past admit but may require loop recorder for long term monitoring. To continue on aspirin and statin for stroke prevention.     Active Diagnoses:    Diagnosis Date Noted POA    PRINCIPAL PROBLEM:  Seizure [R56.9] 11/06/2022 Unknown    Mixed hyperlipidemia [E78.2] 12/19/2021 Yes    Embolic stroke involving left middle cerebral artery [I63.412] 12/19/2021 Yes    Essential hypertension [I10] 04/13/2018 Yes    Deaf [H91.90] 04/13/2018 Yes      Problems Resolved During this Admission:       VTE Risk Mitigation (From admission, onward)           Ordered     IP VTE LOW RISK PATIENT  Once         11/06/22 1937     Place sequential compression device  Until discontinued         11/06/22 1937                    Thank you for your consult. I will follow-up with patient. Please contact us if you have any additional questions.    Aileen Restrepo MD  Neurology  [facility] - [place] - Telemetry

## 2022-11-07 NOTE — HOSPITAL COURSE
56 year old female with history of iatrogenic L MCA stroke thought to be 2/2 to embolic etiology presents with seizure like activity that apparently lasted for 10 minutes, witnessed by her niece. Had possible few minutes post-ictal confusion.  Neurology consulted.  MRI and EEG ordered.

## 2022-11-07 NOTE — PLAN OF CARE
[facility] - Telemetry  Discharge Assessment    SW completed brief assessment and discussed discharge planning with patient's daughter Klarissa over the phone. Patient's daughter stated that she is her mom support and she will provide transportation for mom to get home when discharge from the hospital.    Primary Care Provider: Primary Doctor No     Discharge Assessment (most recent)       BRIEF DISCHARGE ASSESSMENT - 11/07/22 1411          Discharge Planning    Assessment Type Discharge Planning Brief Assessment     Resource/Environmental Concerns none     Support Systems Children     Equipment Currently Used at Home wheelchair     Current Living Arrangements home/apartment/condo     Patient/Family Anticipates Transition to home with family     Patient/Family Anticipated Services at Transition none     DME Needed Upon Discharge  none     Discharge Plan A Home with family     Discharge Plan B Home with family

## 2022-11-07 NOTE — PROGRESS NOTES
[facility] Medicine  Progress Note    Patient Name: Aurora Ornelas  MRN: [id number]  Patient Class: OP- Observation   Admission Date: 11/6/2022  Length of Stay: 0 days  Attending Physician: Randal Granda MD  Primary Care Provider: Primary Doctor No        Subjective:     Principal Problem:Seizure        HPI:  Aurora Ornelas 56 y.o. female with previous CVA and residual right sided deficits, HTN, HLD deafness presents to the hospital with a chief complaint of seizure. She is unsure of the events that brought her to the hospital. She denies complaints currently and reports she fells well.  She denies fever chest pain shortness breast nausea vomiting abdominal pain leg swelling syncope melena hematuria hematemesis.  She reports her right-sided deficits from previous CVA her baseline.  She denies any new left-sided deficits.  There has been no facial droop or difficulty swallowing.    Per discussion with the patient's daughter she had a witnessed seizure today that occurred while she was sitting down watching TV elastic for multiple minutes was associated with tongue trauma.  It resolved by the time EMS arrived.  She has never had a seizure before.  She was at her baseline health status without complaints prior to the seizure.    In the ED, CPK negative UA with leukocytes white blood cells and bacteria afebrile without leukocytosis CT head with temporal evolution of large remote left MCA distribution infarct without acute large vascular territory infarct or intracranial hemorrhage identified lower pontine subtle hypoattenuation which may be related to streak artifact versus age-indeterminate lacunar type infarct.      Overview/Hospital Course:  56 year old female with history of iatrogenic L MCA stroke thought to be 2/2 to embolic etiology presents with seizure like activity that apparently lasted for 10 minutes, witnessed by her niece. Had possible few minutes post-ictal confusion.  Neurology  consulted.  MRI and EEG ordered.      Interval History: no further seizures    Review of Systems   HENT:  Negative for ear discharge and ear pain.    Eyes:  Negative for discharge and itching.   Cardiovascular:  Negative for chest pain and palpitations.   Endocrine: Negative for cold intolerance and heat intolerance.   Objective:     Vital Signs (Most Recent):  Temp: 98.3 °F (36.8 °C) (11/07/22 1138)  Pulse: 81 (11/07/22 1138)  Resp: 18 (11/07/22 1138)  BP: 134/74 (11/07/22 1138)  SpO2: 96 % (11/07/22 1138) Vital Signs (24h Range):  Temp:  [96.6 °F (35.9 °C)-98.3 °F (36.8 °C)] 98.3 °F (36.8 °C)  Pulse:  [71-97] 81  Resp:  [14-18] 18  SpO2:  [94 %-99 %] 96 %  BP: (127-164)/(73-88) 134/74     Weight: 52.6 kg (115 lb 15.4 oz)  Body mass index is 19.3 kg/m².  No intake or output data in the 24 hours ending 11/07/22 1521   Physical Exam  Vitals and nursing note reviewed.   Constitutional:       General: She is not in acute distress.     Appearance: She is well-developed. She is not diaphoretic.   HENT:      Head: Normocephalic and atraumatic.      Right Ear: External ear normal.      Left Ear: External ear normal.   Eyes:      General:         Right eye: No discharge.         Left eye: No discharge.      Conjunctiva/sclera: Conjunctivae normal.   Neck:      Thyroid: No thyromegaly.   Cardiovascular:      Rate and Rhythm: Normal rate and regular rhythm.      Heart sounds: No murmur heard.  Pulmonary:      Effort: Pulmonary effort is normal. No respiratory distress.      Breath sounds: Normal breath sounds.   Abdominal:      General: Bowel sounds are normal. There is no distension.      Palpations: Abdomen is soft. There is no mass.      Tenderness: There is no abdominal tenderness.   Musculoskeletal:         General: No deformity.      Cervical back: Normal range of motion and neck supple.   Skin:     General: Skin is warm and dry.   Neurological:      Mental Status: She is alert and oriented to person, place, and time.       Sensory: Sensory deficit present.      Comments: Right sided paralysis to arm/leg. Reports at baseline   Psychiatric:         Mood and Affect: Mood normal.         Behavior: Behavior normal.       Significant Labs: All pertinent labs within the past 24 hours have been reviewed.  BMP:   Recent Labs   Lab 11/07/22  0605   GLU 86      K 3.8      CO2 27   BUN 5*   CREATININE 0.7   CALCIUM 9.3     CBC:   Recent Labs   Lab 11/06/22  1326 11/07/22  0605   WBC 4.04 3.77*   HGB 11.9* 13.1   HCT 34.5* 38.7    278       Significant Imaging: I have reviewed all pertinent imaging results/findings within the past 24 hours.      Assessment/Plan:      * Seizure  Presents with new onset seizure with associated tongue trauma. CT head with temporal evolution of previous MCA stroke and lower pontine subtle hypo attenuation  Appreciate Neuro input.  MRI and EEG pending.  Seizure precautions/aspiration precautions  Continue home aspirin/statin  UA with WBC, bacteria, and leukocytes. Will continue rocephin  Plan pending MRI and EEG results.    UTI (urinary tract infection)  On Rocephin.  Await final cultures.      Mixed hyperlipidemia  Resume home statin    Embolic stroke involving left middle cerebral artery  See above. CT head with temporal evolution  Resume home aspirin/statin    Essential hypertension  Fair control. Will hold home amlodipine pending MRI results for permissive HTN given new CT head findings.     Deaf  At baseline. AMN  used for history      VTE Risk Mitigation (From admission, onward)         Ordered     IP VTE LOW RISK PATIENT  Once         11/06/22 1937     Place sequential compression device  Until discontinued         11/06/22 1937                Discharge Planning   CHARIS: 11/8/2022     Code Status: Full Code   Is the patient medically ready for discharge?:     Reason for patient still in hospital (select all that apply): Laboratory test  Discharge Plan A: Home with family                   Randal Granda MD  Department of Hospital Medicine   [facility]

## 2022-11-07 NOTE — ASSESSMENT & PLAN NOTE
Presents with new onset seizure with associated tongue trauma. CT head with temporal evolution of previous MCA stroke and lower pontine subtle hypo attenuation  Appreciate Neuro input.  MRI and EEG pending.  Seizure precautions/aspiration precautions  Continue home aspirin/statin  UA with WBC, bacteria, and leukocytes. Will continue rocephin  Plan pending MRI and EEG results.

## 2022-11-07 NOTE — ASSESSMENT & PLAN NOTE
Presents with new onset seizure with associated tongue trauma. CT head with temporal evolution of previous MCA stroke and lower pontine subtle hypo attenuation  MRI pending  Neurology consulted  Seizure precautions/aspiration precautions  Continue home aspirin/statin  UA with WBC, bacteria, and leukocytes. Will continue rocephin\  Will obtain EEG

## 2022-11-08 VITALS
OXYGEN SATURATION: 99 % | DIASTOLIC BLOOD PRESSURE: 63 MMHG | RESPIRATION RATE: 18 BRPM | WEIGHT: 115.94 LBS | SYSTOLIC BLOOD PRESSURE: 113 MMHG | HEART RATE: 76 BPM | BODY MASS INDEX: 19.32 KG/M2 | HEIGHT: 65 IN | TEMPERATURE: 98 F

## 2022-11-08 LAB — BACTERIA UR CULT: ABNORMAL

## 2022-11-08 PROCEDURE — 97161 PT EVAL LOW COMPLEX 20 MIN: CPT

## 2022-11-08 PROCEDURE — 25000003 PHARM REV CODE 250: Performed by: PHYSICIAN ASSISTANT

## 2022-11-08 PROCEDURE — G0378 HOSPITAL OBSERVATION PER HR: HCPCS

## 2022-11-08 PROCEDURE — 99214 PR OFFICE/OUTPT VISIT, EST, LEVL IV, 30-39 MIN: ICD-10-PCS | Mod: ,,, | Performed by: STUDENT IN AN ORGANIZED HEALTH CARE EDUCATION/TRAINING PROGRAM

## 2022-11-08 PROCEDURE — 63600175 PHARM REV CODE 636 W HCPCS: Performed by: PHYSICIAN ASSISTANT

## 2022-11-08 PROCEDURE — 25500020 PHARM REV CODE 255: Performed by: HOSPITALIST

## 2022-11-08 PROCEDURE — 97530 THERAPEUTIC ACTIVITIES: CPT

## 2022-11-08 PROCEDURE — 97166 OT EVAL MOD COMPLEX 45 MIN: CPT

## 2022-11-08 PROCEDURE — 25000003 PHARM REV CODE 250: Performed by: HOSPITALIST

## 2022-11-08 PROCEDURE — 99214 OFFICE O/P EST MOD 30 MIN: CPT | Mod: ,,, | Performed by: STUDENT IN AN ORGANIZED HEALTH CARE EDUCATION/TRAINING PROGRAM

## 2022-11-08 PROCEDURE — 92610 EVALUATE SWALLOWING FUNCTION: CPT

## 2022-11-08 PROCEDURE — 96366 THER/PROPH/DIAG IV INF ADDON: CPT

## 2022-11-08 RX ORDER — CIPROFLOXACIN 750 MG/1
750 TABLET, FILM COATED ORAL EVERY 12 HOURS
Qty: 4 TABLET | Refills: 0 | Status: SHIPPED | OUTPATIENT
Start: 2022-11-08 | End: 2022-11-10

## 2022-11-08 RX ORDER — LEVETIRACETAM 500 MG/1
1000 TABLET ORAL 2 TIMES DAILY
Status: DISCONTINUED | OUTPATIENT
Start: 2022-11-08 | End: 2022-11-08 | Stop reason: HOSPADM

## 2022-11-08 RX ORDER — LEVETIRACETAM 1000 MG/1
1000 TABLET ORAL 2 TIMES DAILY
Qty: 60 TABLET | Refills: 1 | Status: SHIPPED | OUTPATIENT
Start: 2022-11-08 | End: 2022-11-23 | Stop reason: SDUPTHER

## 2022-11-08 RX ORDER — CLOPIDOGREL BISULFATE 75 MG/1
75 TABLET ORAL DAILY
Qty: 21 TABLET | Refills: 1 | Status: SHIPPED | OUTPATIENT
Start: 2022-11-08 | End: 2022-11-29

## 2022-11-08 RX ADMIN — LEVETIRACETAM 1000 MG: 500 TABLET, FILM COATED ORAL at 10:11

## 2022-11-08 RX ADMIN — ATORVASTATIN CALCIUM 40 MG: 40 TABLET, FILM COATED ORAL at 09:11

## 2022-11-08 RX ADMIN — SENNOSIDES AND DOCUSATE SODIUM 1 TABLET: 50; 8.6 TABLET ORAL at 10:11

## 2022-11-08 RX ADMIN — ASPIRIN 81 MG CHEWABLE TABLET 81 MG: 81 TABLET CHEWABLE at 09:11

## 2022-11-08 RX ADMIN — CEFTRIAXONE 1 G: 1 INJECTION, SOLUTION INTRAVENOUS at 05:11

## 2022-11-08 RX ADMIN — IOHEXOL 75 ML: 350 INJECTION, SOLUTION INTRAVENOUS at 12:11

## 2022-11-08 NOTE — DISCHARGE INSTRUCTIONS
Take all medications as prescribed.  Eat a strict low salt cardiac diet.  Follow up with your physicians as scheduled - pcp within 1 week.  Neurology with 2 weeks.  We placed a referral for her to be evaluated by a vascular surgeon.  Thank you for trusting [facility] and Dr. Jaramillo with your care.  We are honored that you entrusted us with your healthcare needs. Your satisfaction is very important to us and we hope you have been very pleased with your experience at [facility]. After your discharge you may receive a survey asking you to rate your hospital experience. We encourage you to take the time to complete the survey as your feedback allows us to identify areas for improvement as well as recognize our staff.   We hope that you have received the very best care possible during your hospitalization at [facility], as your satisfaction is our top priority.

## 2022-11-08 NOTE — PT/OT/SLP PROGRESS
Physical Therapy      Patient Name:  Aurora Ornelas   MRN:  [id number]    Patient not seen today secondary to Nurse/ KONRAD hold, Other (Comment) (NurseAlba about to attempt IV access then pt to go to CT.). Will follow-up when available.

## 2022-11-08 NOTE — PROCEDURES
[facility] EEG/VIDEO MONITORING REPORT  Aurora Ornelas  [id number]  1965    DATE OF SERVICE: 11/7/2022  DATE OF ADMISSION: 11/6/2022 12:30 PM    ADMITTING/REQUESTING PROVIDER: Robert Scott MD    REASON FOR CONSULT:  56-year-old woman with a previous large left-sided stroke admitted after an episode of decreased responsiveness with abnormal movements.  Evaluate for evidence of epileptiform activity.    METHODOLOGY   Electroencephalographic (EEG) recording is with electrodes placed according to the International 10-20 placement system.  Thirty two (32) channels of digital signal (sampling rate of 512/sec) including T1 and T2 was simultaneously recorded from the scalp and may include  EKG, EMG, and/or eye monitors.  Recording band pass was 0.1 to 512 hz.  Digital video recording of the patient is simultaneously recorded with the EEG.  The patient is instructed report clinical symptoms which may occur during the recording session.  EEG and video recording is stored and archived in digital format.  Activation procedures which include photic stimulation, hyperventilation and instructing patients to perform simple task are done in selected patients.   The EEG is displayed on a monitor screen and can be reviewed using different montages.  Computer assisted analysis is employed to detect spike and electrographic seizure activity.   The entire record is submitted for computer analysis.  The entire recording is visually reviewed and the times identified by computer analysis as being spikes or seizures are reviewed again.  Compresses spectral analysis (CSA) is also performed on the activity recorded from each individual channel.  This is displayed as a power display of frequencies from 0 to 30 Hz over time.   The CSA is reviewed looking for asymmetries in power between homologous areas of the scalp and then compared with the original EEG recording.     [employer] software is also utilized in the review of this study.  This  software suite analyzes the EEG recording in multiple domains.  Coherence and rhythmicity is computed to identify EEG sections which may contain organized seizures.  Each channel undergoes analysis to detect presence of spike and sharp waves which have special and morphological characteristic of epileptic activity.  The routine EEG recording is converted from spacial into frequency domain.  This is then displayed comparing homologous areas to identify areas of significant asymmetry.  Algorithm to identify non-cortically generated artifact is used to separate eye movement, EMG and other artifact from the EEG.      RECORDING TIMES  Start on 11/07/2022 at 12:55 p.m.  Stop on 11/07/2022 at 15:39 p.m.-> End of the Recording Session  A total of 2 hours and 41 minutes of EEG recording is obtained.    EEG FINDINGS  Background activity:   The waking background is continuous and asymmetric mixed frequency theta/delta activity with higher voltages and more prominent theta slowing seen over the left temporal leads.  At times, in the left temporal region, the slowing is sharply contoured with poorly formed embedded discharges phase reversing at T3/T5 which occasionally occur in brief runs.  There is plenty of intermittent generalized and multifocal slowing seen bilaterally.    Sleep:  There are periods with poorly formed sleep architecture.    Activation procedures:   The patient is awake and breathing room air.  She is able to answer some orientation questions by writing her answers on a pad of paper.  She knows her name and date of birth but not the president or today's date.    Cardiac Monitor:   Heart rate appears generally regular on a single lead EKG.    Impression:   This is an abnormal continuous EEG monitoring study because of sharply contoured slowing over the left temporal leads at times with poorly formed embedded discharges occurring in brief runs consistent with focal cortical dysfunction/irritation and a possible  seizure focus in this area.  There is generalized background slowing consistent with a mild-moderate encephalopathy with evidence of subcortical/deep midline dysfunction.  There are no pushbutton activations and no electrographic seizures.    Jenniffer Rodrigues MD PhD  Neurology-Epilepsy  [facility].

## 2022-11-08 NOTE — PT/OT/SLP EVAL
Occupational Therapy   Evaluation    Name: Aurora Ornelas  MRN: [id number]  Admitting Diagnosis:  Seizure  Recent Surgery: * No surgery found *      Recommendations:     Discharge Recommendations: home health OT  Discharge Equipment Recommendations: shower chair  Barriers to Discharge: Other (Comment) (Patient presently requires increased assistance for T/F's and ADL's.  She is at risk of falls/injuries.)    Assessment:     Aurora Ornelas is a 56 y.o. female with a medical diagnosis of seizure.  She presents with a pleasant and cooperative affect.  Performance deficits affecting function are impaired endurance, impaired functional mobility, impaired self-care skills, decreased ROM, weakness, and abnormal tone.      Rehab Prognosis: Fair; patient would benefit from acute skilled OT services to address these deficits and reach maximum level of function.       Plan:     Patient to be seen 5 x/week to address the above listed problems via self-care/home management, therapeutic activities, therapeutic exercises, and neuromuscular re-education.  Plan of Care Expires: 11/22/22  Plan of Care Reviewed with: patient    Subjective     Chief Complaint: Patient with a mild c/o a headache.   Patient/Family Comments/Goals: Patient indicated that she was comfortable staying in her chair and that she did not yet want to return to bed.     Occupational Profile:  Living Environment: The patient previously lived at home with her sister.  The home has a total of 5 steps indoors and/or outdoors.   Previous Level of Function: Patient was Mod I for most ADL's and T/F's.   Roles and Routines: Patient has a Hx of CVA; she uses her previously non-dominant L side as her dominant side now.   Equipment Used at Home: Wheelchair and bedside commode; Patient also has a cane but she was not using it recently.  Assistance upon Discharge: Patient will have assistance from her sister and from home health services upon D/C.     Pain/Comfort:  Pain Rating  1: 0/10  Pain Rating Post-Intervention 1: 0/10    Patients cultural, spiritual, Yazidi conflicts discussed given the current situation: No     Objective:     Communicated with: The assigned PT and nurse, Alba, prior to session.  Patient found sitting in recliner with one IV site at each UE, pillow placed under BLE's,  socks, and call button upon OT entry to room.    General Precautions: Standard, fall   Orthopedic Precautions: N/A   Braces: N/A  Respiratory Status: Room air    Occupational Performance:    Bed Mobility:    N/A for today's session     Functional Mobility/Transfers:  Transfers: Patient stood from recliner with use of gait belt and Mod A from the treating OT.  Patient was unable to use a RW due to her affected RUE (no AROM due to prior CVA).  Patient was unable to fully extend BLE's.   Functional Mobility: No further functional mob's were performed.     Activities of Daily Living:  UE Dressing: Max A/Mod A to gary and straighten gown while seated in chair     Cognitive/Visual Perceptual:  Cognitive/Psychosocial Skills:     -       Oriented to: Person, Place, and Situation (Partially---> Patient is at least partially oriented to her situation; this was difficult to assess due to alternate methods of communication used.)    -       Follows Commands/Attention: Follows one-step commands and some two-step commands; patient requires visual demo and/or written instructions.  -       Communication: Patient is nearly non-verbal.  Patient gave a single-word verbal response throughout session.  Patient communicates via ASL or written correspondence; the latter was employed during today's session.   -       Memory: No deficits were noted.  -       Mood/Affect/Coping Skills/Emotional Control: Appropriate to situation, Cooperative, Pleasant, and Smiling  Visual/Perceptual:      -Intact; no obvious deficits were noted.     Physical Exam:  Balance:    -       Standing balance is poor.  Sitting balance is  impaired; patient presents with a R lateral lean.   Dominant Hand:    -       Patient was previously R-handed but her prior CVA affected her R side.  Patient is now L-hand dominant.   Upper Extremity Range of Motion:     -       Right Upper Extremity: No RUE AROM was observed.  Patient tolerated passive R shoulder flexion to approximately 90 degrees.  Mild tone was noted at R elbow.  -       Left Upper Extremity: WNL  Upper Extremity Strength:    -       Left Upper Extremity: 3+/5  Fine Motor Coordination:    -       Impaired at RUE and intact at LUE  Gross Motor Coordination: R-sided hemiplegia/paresis is present from prior CVA.     [facility] 6 Click ADL:  AMPA Total Score: 13    Treatment & Education:  Patient evaluated today by OT.  Patient to be treated 5 x's a week and discharged to home with home health and assistance from family as needed.  DME Needs: Shower chair, prn.  Please see above for further details/functional levels.     Patient left in recliner with BLE's elevated with call button within her reach, nurse informed of her status,  socks donned, pillow placed under distal BLE's, and IV sites intact.      GOALS:   Multidisciplinary Problems       Occupational Therapy Goals          Problem: Occupational Therapy    Goal Priority Disciplines Outcome Interventions   Occupational Therapy Goal     OT, PT/OT Ongoing, Progressing    Description: Goals to be met by: 11/22/2022     Patient will increase functional [place] with ADL's by performing:    Feeding with Set-up Assistance.  UE Dressing with Set-up Assistance.  LE Dressing with Minimal Assistance.  Squat-pivot transfers with Supervision to Stand-by Assistance.  Toilet transfer to bedside commode with Supervision to Stand-by Assistance.  Upper extremity exercise program x 10 reps per handout, with assistance/cues as needed.                          History:     Past Medical History:   Diagnosis Date    Anemia     Arthritis     Asthma     Deaf      Diabetes mellitus     Hyperlipidemia     Hypertension          Past Surgical History:   Procedure Laterality Date    COLONOSCOPY N/A 12/4/2020    Procedure: COLONOSCOPY;  Surgeon: Felicia Bishop MD;  Location: [facility];  Service: Endoscopy;  Laterality: N/A;    ESOPHAGOGASTRODUODENOSCOPY N/A 2/28/2019    Procedure: EGD (ESOPHAGOGASTRODUODENOSCOPY);  Surgeon: Yolanda Gary MD;  Location: [facility];  Service: Endoscopy;  Laterality: N/A;    ESOPHAGOGASTRODUODENOSCOPY N/A 12/4/2020    Procedure: EGD (ESOPHAGOGASTRODUODENOSCOPY);  Surgeon: Felicia Bishop MD;  Location: [facility];  Service: Endoscopy;  Laterality: N/A;    ESOPHAGOGASTRODUODENOSCOPY N/A 7/1/2021    Procedure: EGD (ESOPHAGOGASTRODUODENOSCOPY)-in March 2021;  Surgeon: Sharath Kohler MD;  Location: [facility];  Service: Endoscopy;  Laterality: N/A;  hearing impaired, will need  - Maria Dolores Harris assigned-MS  fully vaccinated-see immunization record    HYSTERECTOMY         Time Tracking:     OT Date of Treatment: 11/08/22  OT Start Time: 1518  OT Stop Time: 1533  OT Total Time (min): 15 min    Billable Minutes:Evaluation 15 mins     11/8/2022

## 2022-11-08 NOTE — PT/OT/SLP EVAL
Physical Therapy Evaluation    Patient Name:  Aurora Ornelas   MRN:  [id number]    Recommendations:     Discharge Recommendations:  home health PT   Discharge Equipment Recommendations: none       Assessment:     Aurora Ornelas is a 56 y.o. female admitted with a medical diagnosis of Seizure.  She presents with the following impairments/functional limitations:  impaired endurance, impaired functional mobility, decreased ROM, weakness .  Pt uses sign language to communicate.    Rehab Prognosis: Good; patient would benefit from acute skilled PT services to address these deficits and reach maximum level of function.    Recent Surgery: * No surgery found *      Plan:     During this hospitalization, patient to be seen 5 x/week to address the identified rehab impairments via therapeutic activities, therapeutic exercises and progress toward the following goals:    Plan of Care Expires:  11/15/22    Subjective     Chief Complaint: None  Patient/Family Comments/goals: Pt agreeable to PT eval and treat.  Pain/Comfort:  Pain Rating 1: 0/10    Patients cultural, spiritual, Christian conflicts given the current situation: no    Living Environment:  PTA pt lived with her sister.  Prior to admission, patients level of function was mod I.  Equipment used at home: wheelchair, bedside commode.  DME owned (not currently used): single point cane.  Upon discharge, patient will have assistance from family.    Objective:     Communicated with nurseAlba prior to session.  Patient found supine with telemetry  upon PT entry to room.    General Precautions: Standard,     Orthopedic Precautions:N/A   Braces: N/A  Respiratory Status: Room air    Exams:  RLE ROM: decreased  RLE Strength: decreased  LLE ROM: WFL  LLE Strength: WFL    Functional Mobility:  Bed Mobility:     Supine to Sit: minimum assistance  Transfers:     Bed to Chair: moderate assistance with  no AD  using  Squat Pivot      AM-PAC 6 CLICK MOBILITY  Total  Score:14       Treatment & Education:  Educated on role of PT and POC.    Patient left  reclined in chair  with call button in reach and all needs in reach .    GOALS:   Multidisciplinary Problems       Physical Therapy Goals          Problem: Physical Therapy    Goal Priority Disciplines Outcome Goal Variances Interventions   Physical Therapy Goal     PT, PT/OT      Description: Goals to be met by: 11/15/22     Patient will increase functional independence with mobility by performin. Pt to be mod I with bed mobility.  2. Pt to transfer with supervision.  3. Pt to demonstrate (I) with wheelchair mobility.                         History:     Past Medical History:   Diagnosis Date    Anemia     Arthritis     Asthma     Deaf     Diabetes mellitus     Hyperlipidemia     Hypertension        Past Surgical History:   Procedure Laterality Date    COLONOSCOPY N/A 2020    Procedure: COLONOSCOPY;  Surgeon: Felicia Bishop MD;  Location: [facility];  Service: Endoscopy;  Laterality: N/A;    ESOPHAGOGASTRODUODENOSCOPY N/A 2019    Procedure: EGD (ESOPHAGOGASTRODUODENOSCOPY);  Surgeon: Yolanda Gary MD;  Location: [facility];  Service: Endoscopy;  Laterality: N/A;    ESOPHAGOGASTRODUODENOSCOPY N/A 2020    Procedure: EGD (ESOPHAGOGASTRODUODENOSCOPY);  Surgeon: Felicia Bishop MD;  Location: [facility];  Service: Endoscopy;  Laterality: N/A;    ESOPHAGOGASTRODUODENOSCOPY N/A 2021    Procedure: EGD (ESOPHAGOGASTRODUODENOSCOPY)-in 2021;  Surgeon: Sharath Kohler MD;  Location: [facility];  Service: Endoscopy;  Laterality: N/A;  hearing impaired, will need  - Maria Dolores Harris assigned-MS  fully vaccinated-see immunization record    HYSTERECTOMY         Time Tracking:     PT Received On: 22  PT Start Time: 1350     PT Stop Time: 1413  PT Total Time (min): 23 min     Billable Minutes: Evaluation 15 and Therapeutic Activity 8      2022

## 2022-11-08 NOTE — PT/OT/SLP PROGRESS
Occupational Therapy      Patient Name:  Aurora Ornelas   MRN:  [id number]    10:03 AM:     Patient not seen today secondary to the following: PT spoke to nurse Alba and she was doing an IV placement on the patient.  Patient was then going for a CT scan afterward.  Will follow-up tomorrow or as able.     11/8/2022

## 2022-11-08 NOTE — PLAN OF CARE
Problem: Occupational Therapy  Goal: Occupational Therapy Goal  Description: Goals to be met by: 11/22/2022     Patient will increase functional [place] with ADL's by performing:    Feeding with Set-up Assistance.  UE Dressing with Set-up Assistance.  LE Dressing with Minimal Assistance.  Squat-pivot transfers with Supervision to Stand-by Assistance.  Toilet transfer to bedside commode with Supervision to Stand-by Assistance.  Upper extremity exercise program x 10 reps per handout, with assistance/cues as needed.     Outcome: Ongoing, Progressing     Patient evaluated today by OT.  Patient to be treated 5 x's a week and discharged to home with home health and assistance from family as needed.  DME Needs: Shower chair, prn.

## 2022-11-08 NOTE — PT/OT/SLP EVAL
Speech Language Pathology Evaluation  Bedside Swallow    Patient Name:  Aurora Ornelas   MRN:  [id number]  Admitting Diagnosis: Seizure    Recommendations:                 General Recommendations:  Follow-up not indicated  Diet recommendations:  Mechanical soft, Thin   Aspiration Precautions: 1 bite/sip at a time   General Precautions: Standard,    Communication strategies:  Pt is deaf and prefers to communicate via written language    History:     Past Medical History:   Diagnosis Date    Anemia     Arthritis     Asthma     Deaf     Diabetes mellitus     Hyperlipidemia     Hypertension        Past Surgical History:   Procedure Laterality Date    COLONOSCOPY N/A 12/4/2020    Procedure: COLONOSCOPY;  Surgeon: Felicia Bishop MD;  Location: [facility];  Service: Endoscopy;  Laterality: N/A;    ESOPHAGOGASTRODUODENOSCOPY N/A 2/28/2019    Procedure: EGD (ESOPHAGOGASTRODUODENOSCOPY);  Surgeon: Yolanda Gary MD;  Location: [facility];  Service: Endoscopy;  Laterality: N/A;    ESOPHAGOGASTRODUODENOSCOPY N/A 12/4/2020    Procedure: EGD (ESOPHAGOGASTRODUODENOSCOPY);  Surgeon: Felicia Bishop MD;  Location: [facility];  Service: Endoscopy;  Laterality: N/A;    ESOPHAGOGASTRODUODENOSCOPY N/A 7/1/2021    Procedure: EGD (ESOPHAGOGASTRODUODENOSCOPY)-in March 2021;  Surgeon: Sharath Kohler MD;  Location: [facility];  Service: Endoscopy;  Laterality: N/A;  hearing impaired, will need  - Maria Dolores Harris assigned-MS  fully vaccinated-see immunization record    HYSTERECTOMY         Social History: Patient lives with family    Modified Barium Swallow: 12/21 Pt exhibited functional oral and pharyngeal phases of the swallow for support of a regular diet with thin liquids at this time.    Prior diet: regular with thin    Subjective   Pt reporting her cognitive linguistic skills are at baseline and that she does not have swallowing issues, she followed commands presented via written language. Attention and  eye contact was good throughout she easily followed gestures to perform simple motoric commands.   Patient goals: d/c admit    Pain/Comfort:  Pain Rating 1: 0/10  Pain Rating Post-Intervention 2: 0/10    Respiratory Status: Room air    Objective:     Oral Musculature Evaluation  Oral Musculature: WFL  Dentition: present and adequate  Secretion Management: adequate  Mucosal Quality: ulcerated  Oral Labial Strength and Mobility: WFL  Lingual Strength and Mobility: other (see comments) (poor coordination)  Voice Prior to PO Intake: hypernasal responance  Oral Musculature Comments: grossly symmetrical    Bedside Swallow Eval:   Consistencies Assessed:  Thin liquids ~3oz via straw  Puree X5  Solids X1 cracker      Oral Phase:   WFL    Pharyngeal Phase:   no overt clinical signs/symptoms of aspiration    Compensatory Strategies  None    Treatment: please note silent aspiration cannot be r/o at bedside. Pt self feeds with LUE extremity due to right weakness therefore will recommend continue mech soft at this facility for ease of self feeding.     Assessment:     Aurora Ornelas is a 56 y.o. female with dx of sz she presents with functional swallow and language at baseline.     Goals:   Multidisciplinary Problems       SLP Goals       Not on file              Multidisciplinary Problems (Resolved)          Problem: SLP    Goal Priority Disciplines Outcome   SLP Goal   (Resolved)    Low SLP Met   Description: Pt will participate in slp eval (goal met 11/8)                       Plan:     Plan of Care expires:  11/08/22  Plan of Care reviewed with:  patient   SLP Follow-Up:  No       Discharge recommendations:    no futher ST is warranted.   Barriers to Discharge:  None    Time Tracking:     SLP Treatment Date:   11/08/22  Speech Start Time:  1336  Speech Stop Time:  1345     Speech Total Time (min):  9 min    Billable Minutes: Eval Swallow and Oral Function 9    11/08/2022

## 2022-11-08 NOTE — PROGRESS NOTES
[facility] [place] - Telemetry  Neurology  Progress Note    Patient Name: Aurora Ornelas  MRN: [id number]  Admission Date: 11/6/2022  Hospital Length of Stay: 0 days  Code Status: Full Code   Attending Provider: Franki Jaramillo MD  Consulting Provider: Aileen Restrepo MD  Primary Care Physician: Primary Doctor No  Principal Problem:Seizure    Progress Note  Subjective:     Chief Complaint:  seizure    HPI: 56 year old f w history of iatrogenic L MCA stroke thought to be 2/2 to embolic etiology (lost to followup, on asp and statin) who presents w a seizure like activity lasted for 10 minutes, witnessed by her niece. Had possible few minutes post-ictal confusion.      Of note, patient is deaf and mute but communicates via writing and sign language.     Labs remarkable for UTI. CTH w temporal evolution of remote L MCA.      Patient admitted for treatment of UTI and EEG and MRI.     Interim Period: doing well, had MRI and EEG.     Past Medical History:   Diagnosis Date    Anemia     Arthritis     Asthma     Deaf     Diabetes mellitus     Hyperlipidemia     Hypertension        Past Surgical History:   Procedure Laterality Date    COLONOSCOPY N/A 12/4/2020    Procedure: COLONOSCOPY;  Surgeon: Felicia Bishop MD;  Location: [facility];  Service: Endoscopy;  Laterality: N/A;    ESOPHAGOGASTRODUODENOSCOPY N/A 2/28/2019    Procedure: EGD (ESOPHAGOGASTRODUODENOSCOPY);  Surgeon: Yolanda Gary MD;  Location: [facility];  Service: Endoscopy;  Laterality: N/A;    ESOPHAGOGASTRODUODENOSCOPY N/A 12/4/2020    Procedure: EGD (ESOPHAGOGASTRODUODENOSCOPY);  Surgeon: Felicia Bishop MD;  Location: [facility];  Service: Endoscopy;  Laterality: N/A;    ESOPHAGOGASTRODUODENOSCOPY N/A 7/1/2021    Procedure: EGD (ESOPHAGOGASTRODUODENOSCOPY)-in March 2021;  Surgeon: Sharath Kohler MD;  Location: [facility];  Service: Endoscopy;  Laterality: N/A;  hearing impaired, will need  - Maria Dolores Harris assigned-MS  fully  vaccinated-see immunization record    HYSTERECTOMY         Review of patient's allergies indicates:  No Known Allergies    Current Neurological Medications:     No current facility-administered medications on file prior to encounter.     Current Outpatient Medications on File Prior to Encounter   Medication Sig    acetaminophen (TYLENOL) 500 MG tablet Take 1,000 mg by mouth once daily.    amLODIPine (NORVASC) 10 MG tablet Take 10 mg by mouth once daily.    mv,calcium,min/iron/folic/vitK (ONE-A-DAY WOMEN'S COMPLETE ORAL) Take by mouth.    albuterol sulfate 2.5 mg/0.5 mL Nebu Take 2.5 mg by nebulization every 4 (four) hours as needed. Rescue    albuterol-ipratropium (DUO-NEB) 2.5 mg-0.5 mg/3 mL nebulizer solution USE 1 AMPULE IN NEBULIZER EVERY 6 HOURS WHILE AWAKE    albuterol-ipratropium (DUO-NEB) 2.5 mg-0.5 mg/3 mL nebulizer solution Inhale 3 mLs into the lungs.    aspirin 81 MG Chew Take 1 tablet (81 mg total) by mouth once daily.    atorvastatin (LIPITOR) 40 MG tablet Take 1 tablet (40 mg total) by mouth once daily.    clopidogreL (PLAVIX) 75 mg tablet Take 1 tablet (75 mg total) by mouth once daily. for 21 days    dicyclomine (BENTYL) 20 mg tablet Take 1 tablet (20 mg total) by mouth every 6 (six) hours as needed (every 6 hours as needed for pain).    ergocalciferol (ERGOCALCIFEROL) 50,000 unit Cap Take 50,000 Units by mouth.    ferrous sulfate 325 (65 FE) MG EC tablet Take 325 mg by mouth 2 (two) times daily.    ipratropium (ATROVENT) 0.02 % nebulizer solution Take 2.5 mLs (500 mcg total) by nebulization 4 (four) times daily as needed for Wheezing. Rescue    predniSONE (DELTASONE) 50 MG Tab Take 50 mg by mouth once daily.    sucralfate (CARAFATE) 1 gram tablet Take 1 tablet (1 g total) by mouth 4 (four) times daily before meals and nightly.      Family History       Problem Relation (Age of Onset)    Diabetes Mother    Hypertension Mother          Tobacco Use    Smoking status: Never    Smokeless tobacco: Never    Substance and Sexual Activity    Alcohol use: No    Drug use: No    Sexual activity: Yes     Partners: Male     Birth control/protection: None     ROS: negative other than stated in HPI    Objective:     Vital Signs (Most Recent):  Temp: 98.1 °F (36.7 °C) (11/08/22 1113)  Pulse: 75 (11/08/22 1113)  Resp: 18 (11/08/22 1113)  BP: 122/69 (11/08/22 1113)  SpO2: 97 % (11/08/22 1113) Vital Signs (24h Range):  Temp:  [97.8 °F (36.6 °C)-98.5 °F (36.9 °C)] 98.1 °F (36.7 °C)  Pulse:  [72-81] 75  Resp:  [18] 18  SpO2:  [96 %-100 %] 97 %  BP: (122-146)/(68-79) 122/69     Weight: 52.6 kg (115 lb 15.4 oz)  Body mass index is 19.3 kg/m².    Gen: in NAD, patient mute and deaf  HEENT: NCAT, MMM  Resp effort: normal  Abd: soft  Skin: no rashes     Gen: alert and oriented.   Attn: normal, communicates via writing and reading  Speech: mute  CN: PERRL, EOMI, facial sensation V1-V3 intact, symmetric facies, deaf, uvula and palate midline  Motor: R upper and lower extremity weakness, does not lift to gravity. L extremity 5/5   Reflexes 3+ brisk throughout  Sensation to light touch intact     Coordination not assessd  Gait not assessed     Significant Labs:   UA w + WBC and leukocytes     Significant Imaging:   CTH w temporal evolution of remote L MCA.   MRI brain in 12/2021 w L MCA stroke    Mri brain 11/7/22   Subacute lacunar infarction in the left frontal lobe adjacent to a large area of remote left MCA distribution infarction. L MCA encephalomalacia    CTA H&N with chronic L MCA occlusion w mild stenosis of right M 1 and L A1 segment. Patent ALBERTINA    EEG with focal cortical dysfunction in L temporal, possible seizure focus    Assessment and Plan:     56 year old f w history of iatrogenic L MCA stroke thought to be 2/2 to embolic etiology (lost to followup, on asp and statin) who presents w new seizure like activity. EEG showing L temporal cortical dysfunction (no seizures). MRI brain showing remote L MCA encephalomalacia, with CTA  showing chronic MCA occlusion.     Recommendations:  Keppra 1g BID with seizure precautions  For stroke optimization: continue DAPT, followup with vascular surgery for possible intervention for chronic L MCA occlusion. Continue high intensity statin  Can followup as outpatient. Discussed w daughter to make appt w outpatient neurology.      Discussed w patient's daughter to have patient followup as outpatient, lost to followup for workup of iatrogenic embolic L MCA stroke. No signs of arrhythmias on past admit but may require loop recorder for long term monitoring. To continue on aspirin and statin for stroke prevention.       Active Diagnoses:    Diagnosis Date Noted POA    PRINCIPAL PROBLEM:  Seizure [R56.9] 11/06/2022 Yes    UTI (urinary tract infection) [N39.0] 11/07/2022 Yes    Mixed hyperlipidemia [E78.2] 12/19/2021 Yes    Embolic stroke involving left middle cerebral artery [I63.412] 12/19/2021 Yes    Essential hypertension [I10] 04/13/2018 Yes    Deaf [H91.90] 04/13/2018 Yes      Problems Resolved During this Admission:       VTE Risk Mitigation (From admission, onward)           Ordered     IP VTE LOW RISK PATIENT  Once         11/06/22 1937     Place sequential compression device  Until discontinued         11/06/22 1937                    Thank you for your consult. I will sign off. Please contact us if you have any additional questions.    Aileen Restrepo MD  Neurology  [facility] [place]

## 2022-11-08 NOTE — PLAN OF CARE
Continue mech soft (for ease of self feeding) Pt reporting cognitive linguistic skills are at baseline she passed screen performed via written language.

## 2022-11-08 NOTE — PLAN OF CARE
[facility]      HOME HEALTH ORDERS  FACE TO FACE ENCOUNTER    Patient Name: Aurora Ornelas  YOB: 1965    PCP: Priyanka Castle MD   PCP Address: [id number]  PCP Phone Number: [phone]  PCP Fax: [phone]    Encounter Date: 11/6/22    Admit to Home Health    Diagnoses:  Active Hospital Problems    Diagnosis  POA    *Seizure [R56.9]  Yes     Priority: 1 - High    Embolic stroke involving left middle cerebral artery [I63.412]  Yes     Priority: 2     UTI (urinary tract infection) [N39.0]  Yes     Priority: 3     Mixed hyperlipidemia [E78.2]  Yes    Essential hypertension [I10]  Yes    Deaf [H91.90]  Yes      Resolved Hospital Problems   No resolved problems to display.       Follow Up Appointments:  No future appointments.    Allergies:Review of patient's allergies indicates:  No Known Allergies    Medications: Review discharge medications with patient and family and provide education.      Current Discharge Medication List        START taking these medications    Details   ciprofloxacin HCl (CIPRO) 750 MG tablet Take 1 tablet (750 mg total) by mouth every 12 (twelve) hours. for 2 days  Qty: 4 tablet, Refills: 0      levETIRAcetam (KEPPRA) 1000 MG tablet Take 1 tablet (1,000 mg total) by mouth 2 (two) times daily.  Qty: 60 tablet, Refills: 1           CONTINUE these medications which have CHANGED    Details   clopidogreL (PLAVIX) 75 mg tablet Take 1 tablet (75 mg total) by mouth once daily. for 21 days  Qty: 21 tablet, Refills: 1           CONTINUE these medications which have NOT CHANGED    Details   amLODIPine (NORVASC) 10 MG tablet Take 10 mg by mouth once daily.      albuterol-ipratropium (DUO-NEB) 2.5 mg-0.5 mg/3 mL nebulizer solution USE 1 AMPULE IN NEBULIZER EVERY 6 HOURS as needed for shortness of breath      aspirin 81 MG Chew Take 1 tablet (81 mg total) by mouth once daily.  Qty: 360 tablet, Refills: 0      atorvastatin (LIPITOR) 40 MG tablet Take 1  tablet (40 mg total) by mouth once daily.  Qty: 90 tablet, Refills: 3           STOP taking these medications       acetaminophen (TYLENOL) 500 MG tablet Comments:   Reason for Stopping:         mv,calcium,min/iron/folic/vitK (ONE-A-DAY WOMEN'S COMPLETE ORAL) Comments:   Reason for Stopping:         albuterol sulfate 2.5 mg/0.5 mL Nebu Comments:   Reason for Stopping:         dicyclomine (BENTYL) 20 mg tablet Comments:   Reason for Stopping:         ergocalciferol (ERGOCALCIFEROL) 50,000 unit Cap Comments:   Reason for Stopping:         ferrous sulfate 325 (65 FE) MG EC tablet Comments:   Reason for Stopping:         ipratropium (ATROVENT) 0.02 % nebulizer solution Comments:   Reason for Stopping:         predniSONE (DELTASONE) 50 MG Tab Comments:   Reason for Stopping:         sucralfate (CARAFATE) 1 gram tablet Comments:   Reason for Stopping:                 I have seen and examined this patient within the last 30 days. My clinical findings that support the need for the home health skilled services and home bound status are the following:no   Weakness/numbness causing balance and gait disturbance due to Weakness/Debility making it taxing to leave home.  Medical restrictions requiring assistance of another human to leave home due to  Unstable ambulation.     Diet:   cardiac diet    Referrals/ Consults  Physical Therapy to evaluate and treat. Evaluate for home safety and equipment needs; Establish/upgrade home exercise program. Perform / instruct on therapeutic exercises, gait training, transfer training, and Range of Motion.  Occupational Therapy to evaluate and treat. Evaluate home environment for safety and equipment needs. Perform/Instruct on transfers, ADL training, ROM, and therapeutic exercises.  Aide to provide assistance with personal care, ADLs, and vital signs.    Activities:   ambulate in house with assistance    Nursing:   Agency to admit patient within 24 hours of hospital discharge unless specified  on physician order or at patient request    SN to complete comprehensive assessment including routine vital signs. Instruct on disease process and s/s of complications to report to MD. Review/verify medication list sent home with the patient at time of discharge  and instruct patient/caregiver as needed. Frequency may be adjusted depending on start of care date.     Skilled nurse to perform up to 3 visits PRN for symptoms related to diagnosis    Notify MD if SBP > 160 or < 90; DBP > 90 or < 50; HR > 120 or < 50; Temp > 101; O2 < 88%; Other:       Ok to schedule additional visits based on staff availability and patient request on consecutive days within the home health episode.    When multiple disciplines ordered:    Start of Care occurs on Sunday - Wednesday schedule remaining discipline evaluations as ordered on separate consecutive days following the start of care.    Thursday SOC -schedule subsequent evaluations Friday and Monday the following week.     Friday - Saturday SOC - schedule subsequent discipline evaluations on consecutive days starting Monday of the following week.    For all post-discharge communication and subsequent orders please contact patient's primary care physician.     Home Health Aide:  Nursing Twice weekly, Physical Therapy Three times weekly, Occupational Therapy Three times weekly, and Home Health Aide Twice weekly    Wound Care Orders  no    I certify that this patient is confined to her home and needs intermittent skilled nursing care, physical therapy, and occupational therapy.

## 2022-11-09 NOTE — PROGRESS NOTES
Call back received from Bela with the Medical Team.  Will work on getting an authorization.  After the authorization is obtained, the referral will be accepted in [facility].

## 2022-11-09 NOTE — NURSING
Respirations even and unlabored.  Communication board used to communicate with patient all questions answered.  Patient off unit via wheelchair with transportation service.  Discharge instructions and medications given to patient. .  PIV x 2 removed.   Off unit without difficulty.

## 2022-11-09 NOTE — PROGRESS NOTES
Call back received from Bela with the Medical Team .  Referral is being reviewed but there is no Aide available at this time.  Bela verified that patient has a caregiver.  Discussed communication method (written due to hearing impairment.).  Bela will call SW back.

## 2022-11-09 NOTE — PLAN OF CARE
[place] - Telemetry  Discharge Final Note    Primary Care Provider: Priyanka Castle MD    Expected Discharge Date: 11/8/2022    Final Discharge Note (most recent)       Final Note - 11/08/22 1816          Final Note    Assessment Type Final Discharge Note     Anticipated Discharge Disposition Home-Health Care [facility]     What phone number can be called within the next 1-3 days to see how you are doing after discharge? [phone]     Hospital Resources/Appts/Education Provided Community resources provided;Post-Acute resouces added to AVS        Post-Acute Status    Discharge Delays PFC Arranged Transportation                     Important Message from Medicare             Contact Info       Priyanka Castle MD   Specialty: Internal Medicine   Relationship: PCP - General    [address] [place] [address]   Phone: [phone]       Next Steps: Follow up in 7 day(s)    Instructions: Call to schedule a hospital followup appointment.    [place] [address] [place]  [address] [phone]       Next Steps: Follow up    Instructions: Home Health:  Your home health referral has been submitted to [facility].  The agency will contact you.        Spoke with patient's daughter, Joaquín, to verify home address.  Patient awaiting wheelchair van.

## 2022-11-09 NOTE — PROGRESS NOTES
Post discharge f/u:  [facility] unable to accept referral.  Referral sent to [facility].    11/9/2022

## 2022-11-09 NOTE — PLAN OF CARE
SW received call from Dr. Jaramillo advising that patient ready for discharge and needs home health as well as PCP f/u.      SW met with patient at the bedside and provided a list of home health agencies obtained from her insurance provider's website.  Patient's preferred provider is [facility].  Demographics, HH order, PT/OT/ST notes and H&P sent to [facility] via [employer].    SAMIR spoke with patient's daughter Klarissa regarding transport home.  Daughter was going to try to secure  an Uber to transport home but was unable to do so.  SW advised daughter  that a wheelchair van could be scheduled and advised her that patient would not arrive home until late tonight.  Daughter in agreement with wheelchair van transport.  Stated that she would continue to try to find a ride.    SAMIR advised patient's nurse, Alba, via secure chat that it is ok for patient to discharge from  standpoint.

## 2022-11-09 NOTE — NURSING
Patient is a late discharge, transport is setup. Pharmacy was able to deliver meds to bedside that was no charge (plavix and cipro). Daughter informed by primary nurse that one more medication needs to be picked up (keppra-$10 co pay) due to pharmacy being closed. Daughter, Kristin, stated that she will be able to  tomorrow.

## 2022-11-09 NOTE — PROGRESS NOTES
TRansportation ETA 7:15 p.m.    If tranport does not arrive at 7:15 p.m. call the Patient Flow Center at [phone] for an update.

## 2022-11-11 ENCOUNTER — PES CALL (OUTPATIENT)
Dept: ADMINISTRATIVE | Facility: CLINIC | Age: 57
End: 2022-11-11
Payer: MEDICARE

## 2022-11-14 ENCOUNTER — PES CALL (OUTPATIENT)
Dept: ADMINISTRATIVE | Facility: CLINIC | Age: 57
End: 2022-11-14
Payer: MEDICARE

## 2022-11-15 ENCOUNTER — PES CALL (OUTPATIENT)
Dept: ADMINISTRATIVE | Facility: CLINIC | Age: 57
End: 2022-11-15
Payer: MEDICARE

## 2022-11-23 ENCOUNTER — HOSPITAL ENCOUNTER (EMERGENCY)
Facility: HOSPITAL | Age: 57
Discharge: HOME | End: 2022-11-23
Attending: EMERGENCY MEDICINE
Payer: MEDICARE

## 2022-11-23 VITALS
HEART RATE: 98 BPM | OXYGEN SATURATION: 99 % | DIASTOLIC BLOOD PRESSURE: 80 MMHG | TEMPERATURE: 98 F | BODY MASS INDEX: 19.16 KG/M2 | HEIGHT: 65 IN | SYSTOLIC BLOOD PRESSURE: 134 MMHG | RESPIRATION RATE: 18 BRPM | WEIGHT: 115 LBS

## 2022-11-23 DIAGNOSIS — R56.9 OBSERVED SEIZURE-LIKE ACTIVITY: ICD-10-CM

## 2022-11-23 DIAGNOSIS — R56.9 SEIZURE: Primary | ICD-10-CM

## 2022-11-23 LAB
ALBUMIN SERPL BCP-MCNC: 4 G/DL (ref 3.5–5.2)
ALP SERPL-CCNC: 68 U/L (ref 55–135)
ALT SERPL W/O P-5'-P-CCNC: 8 U/L (ref 10–44)
ANION GAP SERPL CALC-SCNC: 12 MMOL/L (ref 8–16)
AST SERPL-CCNC: 13 U/L (ref 10–40)
BASOPHILS # BLD AUTO: 0.01 K/UL (ref 0–0.2)
BASOPHILS NFR BLD: 0.2 % (ref 0–1.9)
BILIRUB SERPL-MCNC: 0.6 MG/DL (ref 0.1–1)
BUN SERPL-MCNC: 7 MG/DL (ref 6–20)
CALCIUM SERPL-MCNC: 9.9 MG/DL (ref 8.7–10.5)
CHLORIDE SERPL-SCNC: 106 MMOL/L (ref 95–110)
CO2 SERPL-SCNC: 19 MMOL/L (ref 23–29)
CREAT SERPL-MCNC: 0.7 MG/DL (ref 0.5–1.4)
DIFFERENTIAL METHOD: ABNORMAL
EOSINOPHIL # BLD AUTO: 0 K/UL (ref 0–0.5)
EOSINOPHIL NFR BLD: 0.5 % (ref 0–8)
ERYTHROCYTE [DISTWIDTH] IN BLOOD BY AUTOMATED COUNT: 11.9 % (ref 11.5–14.5)
EST. GFR  (NO RACE VARIABLE): >60 ML/MIN/1.73 M^2
GLUCOSE SERPL-MCNC: 111 MG/DL (ref 70–110)
HCT VFR BLD AUTO: 43.9 % (ref 37–48.5)
HGB BLD-MCNC: 15.1 G/DL (ref 12–16)
IMM GRANULOCYTES # BLD AUTO: 0.02 K/UL (ref 0–0.04)
IMM GRANULOCYTES NFR BLD AUTO: 0.3 % (ref 0–0.5)
LYMPHOCYTES # BLD AUTO: 0.8 K/UL (ref 1–4.8)
LYMPHOCYTES NFR BLD: 14 % (ref 18–48)
MAGNESIUM SERPL-MCNC: 1.9 MG/DL (ref 1.6–2.6)
MCH RBC QN AUTO: 31.3 PG (ref 27–31)
MCHC RBC AUTO-ENTMCNC: 34.4 G/DL (ref 32–36)
MCV RBC AUTO: 91 FL (ref 82–98)
MONOCYTES # BLD AUTO: 0.2 K/UL (ref 0.3–1)
MONOCYTES NFR BLD: 4.1 % (ref 4–15)
NEUTROPHILS # BLD AUTO: 4.7 K/UL (ref 1.8–7.7)
NEUTROPHILS NFR BLD: 80.9 % (ref 38–73)
NRBC BLD-RTO: 0 /100 WBC
PLATELET # BLD AUTO: 217 K/UL (ref 150–450)
PMV BLD AUTO: 9.2 FL (ref 9.2–12.9)
POTASSIUM SERPL-SCNC: 3.9 MMOL/L (ref 3.5–5.1)
PROT SERPL-MCNC: 8.2 G/DL (ref 6–8.4)
RBC # BLD AUTO: 4.82 M/UL (ref 4–5.4)
SODIUM SERPL-SCNC: 137 MMOL/L (ref 136–145)
WBC # BLD AUTO: 5.85 K/UL (ref 3.9–12.7)

## 2022-11-23 PROCEDURE — 93010 ELECTROCARDIOGRAM REPORT: CPT | Mod: ,,, | Performed by: INTERNAL MEDICINE

## 2022-11-23 PROCEDURE — 99284 EMERGENCY DEPT VISIT MOD MDM: CPT

## 2022-11-23 PROCEDURE — 93010 EKG 12-LEAD: ICD-10-PCS | Mod: ,,, | Performed by: INTERNAL MEDICINE

## 2022-11-23 PROCEDURE — 85025 COMPLETE CBC W/AUTO DIFF WBC: CPT | Performed by: EMERGENCY MEDICINE

## 2022-11-23 PROCEDURE — 83735 ASSAY OF MAGNESIUM: CPT | Performed by: EMERGENCY MEDICINE

## 2022-11-23 PROCEDURE — 93005 ELECTROCARDIOGRAM TRACING: CPT

## 2022-11-23 PROCEDURE — 80053 COMPREHEN METABOLIC PANEL: CPT | Performed by: EMERGENCY MEDICINE

## 2022-11-23 RX ORDER — LEVETIRACETAM 750 MG/1
1500 TABLET ORAL 2 TIMES DAILY
Qty: 60 TABLET | Refills: 1 | Status: SHIPPED | OUTPATIENT
Start: 2022-11-23 | End: 2023-11-23

## 2022-11-23 NOTE — ED PROVIDER NOTES
Encounter Date: 11/23/2022    SCRIBE #1 NOTE: I, Mitzi Grant, am scribing for, and in the presence of,  Renetta Drew MD. I have scribed the following portions of the note - Other sections scribed: HPI, ROS, PE.     History     Chief Complaint   Patient presents with    Seizures     Pt arrives via ems with c/o unwitnessed seizure. Hx seizure. Per ems pt is aaox4, pt is deaf. Per ems pt c/o dysuria [id number].      This 57 y.o female, with a medical history of Anemia, Arthritis, Asthma, Deafness, Diabetes mellitus, Hyperlipidemia, and Hypertension, presents to the ED via EMS transportation accompanied by her daughter c/o a witnessed seizure. Relative reports that pt was sitting up talking to a relative when she began to experience a seizure. Relative states that the episode lasted a few minutes. No falls or head trauma. She notes that pt is currently on Keppra 1 g BID as she experienced her first seizure on 11/6/22. Pt last took her Keppra this morning. Daughter reports that pt recently had a follow up appointment and was referred to neurology (appointment scheduled for February 2023). Pt presently denies any pain, but reports that she is nervous. Daughter adds that pt appears confused presently. Daughter reports that pt is currently on Cipro for treatment of a urinary tract infection. Of note, she states that pt experienced a stroke in December 2021, and has residual right sided paralysis. There are no other associated symptoms at this time.     Patient with recent admission for new onset seizure.  She was seen by the neurologist, she had an MRI and EEG performed.    The history is provided by the patient and a relative. History limited by: Patient is deaf. A  was used (Daughter is translating for patient).   Review of patient's allergies indicates:  No Known Allergies  Past Medical History:   Diagnosis Date    Anemia     Arthritis     Asthma     Deaf     Diabetes mellitus     Hyperlipidemia      Hypertension      Past Surgical History:   Procedure Laterality Date    COLONOSCOPY N/A 12/4/2020    Procedure: COLONOSCOPY;  Surgeon: Felicia Bishop MD;  Location: [facility] ENDO;  Service: Endoscopy;  Laterality: N/A;    ESOPHAGOGASTRODUODENOSCOPY N/A 2/28/2019    Procedure: EGD (ESOPHAGOGASTRODUODENOSCOPY);  Surgeon: Yolanda Gary MD;  Location: [facility] ENDO;  Service: Endoscopy;  Laterality: N/A;    ESOPHAGOGASTRODUODENOSCOPY N/A 12/4/2020    Procedure: EGD (ESOPHAGOGASTRODUODENOSCOPY);  Surgeon: Felicia Bishop MD;  Location: [facility] ENDO;  Service: Endoscopy;  Laterality: N/A;    ESOPHAGOGASTRODUODENOSCOPY N/A 7/1/2021    Procedure: EGD (ESOPHAGOGASTRODUODENOSCOPY)-in March 2021;  Surgeon: Sharath Kohler MD;  Location: [facility];  Service: Endoscopy;  Laterality: N/A;  hearing impaired, will need  - Maria Dolores Harris assigned-MS  fully vaccinated-see immunization record    HYSTERECTOMY       Family History   Problem Relation Age of Onset    Diabetes Mother     Hypertension Mother     Colon cancer Neg Hx     Esophageal cancer Neg Hx      Social History     Tobacco Use    Smoking status: Never    Smokeless tobacco: Never   Substance Use Topics    Alcohol use: No    Drug use: No     Review of Systems   Constitutional:  Negative for chills and fever.   HENT:  Negative for congestion and sore throat.    Eyes:  Negative for visual disturbance.   Respiratory:  Negative for cough and shortness of breath.    Cardiovascular:  Negative for chest pain.   Gastrointestinal:  Negative for abdominal pain, nausea and vomiting.   Genitourinary:  Negative for dysuria and vaginal discharge.   Skin:  Negative for rash.   Neurological:  Positive for seizures. Negative for headaches.   Psychiatric/Behavioral:  Positive for confusion. Negative for decreased concentration. The patient is nervous/anxious.      Physical Exam     Initial Vitals [11/23/22 1307]   BP Pulse Resp Temp SpO2   132/78 (!) 115 18  98.1 °F (36.7 °C) 97 %      MAP       --         Physical Exam    Nursing note and vitals reviewed.  Constitutional: She appears well-developed and well-nourished. She is not diaphoretic. No distress.   HENT:   Mouth/Throat: Oropharynx is clear and moist.   Eyes: EOM are normal. Pupils are equal, round, and reactive to light.   Neck: Neck supple.   Cardiovascular:  Normal rate and regular rhythm.           Pulmonary/Chest: Breath sounds normal.   Abdominal: Abdomen is soft. There is no abdominal tenderness.   Musculoskeletal:         General: No edema.      Cervical back: Neck supple.     Neurological: She is alert and oriented to person, place, and time.   Right sided hemiparesis present. She follows commands. Strength in the left upper and lower extremity is 5/5. She is non-verbal, but alert and using sign language to communicate with her daughter. No facial droop.   Skin: Skin is warm and dry.   Psychiatric: She has a normal mood and affect.       ED Course   Procedures  Labs Reviewed   COMPREHENSIVE METABOLIC PANEL - Abnormal; Notable for the following components:       Result Value    CO2 19 (*)     Glucose 111 (*)     ALT 8 (*)     All other components within normal limits   CBC W/ AUTO DIFFERENTIAL - Abnormal; Notable for the following components:    MCH 31.3 (*)     Lymph # 0.8 (*)     Mono # 0.2 (*)     Gran % 80.9 (*)     Lymph % 14.0 (*)     All other components within normal limits   MAGNESIUM          Imaging Results    None          Medications - No data to display  Medical Decision Making:   Initial Assessment:   57-year-old female with history of left MCA stroke, residual right-sided paralysis, seizure new onset diagnosed this month.  She had seizure activity at home.  She is currently on Cipro for UTI.  She has no other complaints at this time.  On exam, the patient has right-sided hemiparesis, she is alert, follows commands in his using sign language to communicate with her daughter.  Will check  labs to ensure no electrolyte disturbance.  Patient is nontoxic, do not suspect severe infection.  Patient with recent CNS imaging, will not repeat today is seizure is not new as of today.  ED Management:  Labs show mild decrease in bicarb, suspect related to seizure activity.  Otherwise within acceptable limits.  Case reviewed with Dr. Kelly De La Fuente, neurology on-call, who recommends increasing Keppra to 1500 mg b.i.d..  I reviewed instructions the patient and her daughter at bedside.  All questions answered.  Initially, patient's daughter concerned that she needs follow-up with vascular surgery.  I placed a referral.  During the ER visit, she was able to call her sister and confirm that the family medicine provider has set up this appointment for her.  All questions answered and patient will be discharged with outpatient follow-up.        Scribe Attestation:   Scribe #1: I performed the above scribed service and the documentation accurately describes the services I performed. I attest to the accuracy of the note.      ED Course as of 11/23/22 1527   Wed Nov 23, 2022   1512 Bicarb 19, suspect related to seizure.  Do not suspect severe infection as there is no fever or leukocytosis.  [LH]      ED Course User Index  [LH] Renetta Drew MD               I, Renetta Drew MD, personally performed the services described in this documentation. All medical record entries made by the scribe were at my direction and in my presence. I have reviewed the chart and agree that the record reflects my personal performance and is accurate and complete.   Clinical Impression:   Final diagnoses:  [R56.9] Observed seizure-like activity  [R56.9] Seizure (Primary)      ED Disposition Condition    Discharge Stable        This dictation has been generated using M-Modal Fluency Direct dictation; some phonetic errors may occur.     ED Prescriptions       Medication Sig Dispense Start Date End Date Auth. Provider    levETIRAcetam (KEPPRA)  750 MG Tab Take 2 tablets (1,500 mg total) by mouth 2 (two) times daily. 60 tablet 11/23/2022 11/23/2023 Renetta Drew MD          Follow-up Information       Follow up With Specialties Details Why Contact Info    [facility] VASCULAR NEUROLOGY Vascular Neurology Schedule an appointment as soon as possible for a visit   [address]  [facility] [address]  [phone]    [facility] - Emergency Dept Emergency Medicine  As needed, If symptoms worsen [address] [facility] [address] [phone]    Chuy Corbin MD Neurology On 2/14/2023  [address] [place] [address]  [id number]  [facility] [id number] [phone]               Renetta Drew MD  11/23/22 0691

## 2022-11-23 NOTE — ED TRIAGE NOTES
Patient presents to the ED via EMS for evaluation of post seizure activity. Pt is deaf, daughter is at bedside signing/translating for her. Per daughter pt was dx w/ seizure disorder after having a CVA 12/2021 and was recently dx w/ uti and is taking Ciprofloxacin for treatment. Pter daughter, pt is also compliant w/ Keppra. Pt has right sided weakness from prev CVA. Seizure precautions initiated. Daughter at bedside.

## 2022-11-23 NOTE — DISCHARGE INSTRUCTIONS
Thank you for coming to our Emergency Department today. It is important to remember that some problems are difficult to diagnose and may not be found during your Emergency Department visit. Be sure to follow up with your primary care doctor and review all labs/imaging/tests that were performed during this visit with them. Some labs/tests may be outside of the normal range and require non-emergent follow-up and further investigation to help diagnose/exclude/prevent complications or other medical conditions.    If you do not have a primary care doctor, you may contact the one listed on your discharge paperwork or you may also call the [facility] Appointment Desk at [phone] to schedule an appointment and establish care with one. It is important to your health that you have a primary care doctor.    Medicaid Escalation Line:   [phone] - Please contact this number if you are having difficulty getting follow up with a Primary Care Provider or Speciality Provider.     Please take all medications as directed. All medications may potentially have side-effects and it is impossible to predict which medications may give you side-effects or what side-effects (if any) they will give you.. If you feel that you are having a negative effect or side-effect of any medication you should immediately stop taking them and seek medical attention. If you feel that you are having a life-threatening reaction call [phone].    Return to the ER with any questions/concerns, new/concerning symptoms, worsening or failure to improve.     Do not drive, swim, climb to height, take a bath or make any important decisions for 24 hours if you have received any pain medications, sedatives or mood altering drugs during your ER visit.        BELOW THIS LINE ONLY APPLIES IF YOU HAVE A COVID TEST PENDING OR IF YOU HAVE BEEN DIAGNOSED WITH COVID:  Please access [facility] to review the results of your test. Until the results of your COVID test  return, you should isolate yourself so as not to potentially spread illness to others.   If your COVID test returns positive, you should isolate yourself so as not to spread illness to others. After five full days, if you are feeling better and you have not had fever for 24 hours, you can return to your typical daily activities, but you must wear a mask around others for an additional 5 days.   If your COVID test returns negative and you are either unvaccinated or more than six months out from your two-dose vaccine and are not yet boosted, you should still quarantine for 5 full days followed by strict mask use for an additional 5 full days.   If your COVID test returns negative and you have received your 2-dose initial vaccine as well as a booster, you should continue strict mask use for 10 full days after the exposure.  For all those exposed, best practice includes a test at day 5 after the exposure. This can be a home test or a test through one of the many testing centers throughout our community.   Masking is always advised to limit the spread of COVID. Cdc.gov is an excellent site to obtain the latest up to date recommendations regarding COVID and COVID testing.     CDC Testing and Quarantine Guidelines for patients with exposure to a known-positive COVID-19 person:  A close exposure is defined as anyone who has had an exposure (masked or unmasked) to a known COVID -19 positive person within 6 feet of someone for a cumulative total of 15 minutes or more over a 24-hour period.   Vaccinated and/or if you recently had a positive covid test within 90 days do NOT need to quarantine after contact with someone who had COVID-19 unless you develop symptoms.   Fully vaccinated people who have not had a positive test within 90 days, should get tested 3-5 days after their exposure, even if they don't have symptoms and wear a mask indoors in public for 14 days following exposure or until their test result is  negative.      Unvaccinated and/or NOT had a positive test within 90 days and meet close exposure  You are required by CDC guidelines to quarantine for at least 5 days from time of exposure followed by 5 days of strict masking. It is recommended, but not required to test after 5 days, unless you develop symptoms, in which case you should test at that time.  If you get tested after 5 days and your test is positive, your 5 day period of isolation starts the day of the positive test.    If your exposure does not meet the above definition, you can return to your normal daily activities to include social distancing, wearing a mask and frequent handwashing.      Here is a link to guidance from the CDC:  https://www.cdc.gov/media/releases/2021/s1227-isolation-quarantine-guidance.html      [facility]  Health Testing Sites:  https://ldh.[place].gov/page/3934      [facility] website with testing locations and guidance:  [email or web address]

## 2022-11-24 NOTE — DISCHARGE SUMMARY
[facility] Medicine  Discharge Summary      Patient Name: Aurora Ornelas  MRN: [id number]  CAMILO: [id number]  Patient Class: OP- Observation  Admission Date: 11/6/2022  Hospital Length of Stay: 0 days  Discharge Date and Time: 11/8/2022  7:45 PM  Attending Physician: No att. providers found   Discharging Provider: Franki Jaramillo MD  Primary Care Provider: Priyanka Castle MD    Primary Care Team: Networked reference to record PCT     HPI:   Aurora Ornelas 56 y.o. female with previous CVA and residual right sided deficits, HTN, HLD deafness presents to the hospital with a chief complaint of seizure. She is unsure of the events that brought her to the hospital. She denies complaints currently and reports she fells well.  She denies fever chest pain shortness breast nausea vomiting abdominal pain leg swelling syncope melena hematuria hematemesis.  She reports her right-sided deficits from previous CVA her baseline.  She denies any new left-sided deficits.  There has been no facial droop or difficulty swallowing.    Per discussion with the patient's daughter she had a witnessed seizure today that occurred while she was sitting down watching TV elastic for multiple minutes was associated with tongue trauma.  It resolved by the time EMS arrived.  She has never had a seizure before.  She was at her baseline health status without complaints prior to the seizure.    In the ED, CPK negative UA with leukocytes white blood cells and bacteria afebrile without leukocytosis CT head with temporal evolution of large remote left MCA distribution infarct without acute large vascular territory infarct or intracranial hemorrhage identified lower pontine subtle hypoattenuation which may be related to streak artifact versus age-indeterminate lacunar type infarct.      Hospital Course:   56 year old female with HTN, HLD, deafness and history of iatrogenic L MCA stroke thought to be 2/2 to embolic etiology who presented  with seizure like activity that apparently lasted for 10 minutes, witnessed by her niece. Had possible few minutes post-ictal confusion.  She was diagnosed with UTI and urine culture grew E.coli sensitive to cipro which she can complete as an outpatient.  Neurology consulted and input appreciated.  EEG obtained and she was treated with keppra and had no further seizures.  CTA showed chronic MCA occlusion.  Will continue with DAPT until follow up with vascular surgery (recommended by neurologist).  Patient will follow up with neurology in clinic as well.  PT/OT/SLP also consulted.  Patient was discharged home with  for pt/ot and an aid.      Goals of Care Treatment Preferences:  Code Status: Full Code      Consults:   Consults (From admission, onward)        Status Ordering Provider     Case Management/  Once        Provider:  (Not yet assigned)    APOLLO Ruano     Inpatient consult to Neurology  Once        Provider:  Aileen Restrepo MD    Completed ADAM DEL ROSARIO          Final Active Diagnoses:    Diagnosis Date Noted POA    PRINCIPAL PROBLEM:  Seizure [R56.9] 11/06/2022 Yes    Embolic stroke involving left middle cerebral artery [I63.412] 12/19/2021 Yes    UTI (urinary tract infection) [N39.0] 11/07/2022 Yes    Mixed hyperlipidemia [E78.2] 12/19/2021 Yes    Essential hypertension [I10] 04/13/2018 Yes    Deaf [H91.90] 04/13/2018 Yes      Problems Resolved During this Admission:       Discharged Condition: stable    Disposition: Home-Health Care [facility]    Follow Up:   Follow-up Information     Priyanka Castle MD Follow up in 7 day(s).    Specialty: Internal Medicine  Why: Call to schedule a hospital followup appointment.  Contact information:  [address] [place] [address] [phone]             [facility] Follow up.    Why: Home Health:  Your home health referral has been submitted to [facility].  The agency will contact you.  Contact  information:  [address] [place]  [address]  [phone]                     Patient Instructions:      Ambulatory referral/consult to Vascular Surgery   Standing Status: Future   Referral Priority: Routine Referral Type: Consultation   Referral Reason: Specialty Services Required   Requested Specialty: Vascular Surgery   Number of Visits Requested: 1     Ambulatory referral/consult to Neurology   Standing Status: Future   Referral Priority: Routine Referral Type: Consultation   Referral Reason: Specialty Services Required   Requested Specialty: Neurology   Number of Visits Requested: 1     Ambulatory referral/consult to [facility]   Standing Status: Future   Referral Priority: Routine Referral Type: Consultation   Referral Reason: Specialty Services Required   Number of Visits Requested: 1     Diet Cardiac     Notify your health care provider if you experience any of the following:  increased confusion or weakness     Notify your health care provider if you experience any of the following:  persistent dizziness, light-headedness, or visual disturbances     Notify your health care provider if you experience any of the following:  worsening rash     Notify your health care provider if you experience any of the following:  severe persistent headache     Notify your health care provider if you experience any of the following:  difficulty breathing or increased cough     Notify your health care provider if you experience any of the following:  severe uncontrolled pain     Notify your health care provider if you experience any of the following:  persistent nausea and vomiting or diarrhea     Notify your health care provider if you experience any of the following:  temperature >100.4     Activity as tolerated       Significant Diagnostic Studies: Labs:   BMP:   Recent Labs   Lab 11/23/22  1436   *      K 3.9      CO2 19*   BUN 7   CREATININE 0.7   CALCIUM 9.9    MG 1.9   , CMP   Recent Labs   Lab 11/23/22  1436      K 3.9      CO2 19*   *   BUN 7   CREATININE 0.7   CALCIUM 9.9   PROT 8.2   ALBUMIN 4.0   BILITOT 0.6   ALKPHOS 68   AST 13   ALT 8*   ANIONGAP 12   , CBC   Recent Labs   Lab 11/23/22  1436   WBC 5.85   HGB 15.1   HCT 43.9      , INR   Lab Results   Component Value Date    INR 1.0 12/19/2021    INR 1.0 09/17/2016    INR 0.9 10/20/2010   , Lipid Panel   Lab Results   Component Value Date    CHOL 214 (H) 12/19/2021    HDL 59 12/19/2021    LDLCALC 127.2 12/19/2021    TRIG 139 12/19/2021    CHOLHDL 27.6 12/19/2021   , Troponin No results for input(s): TROPONINI in the last 168 hours. and A1C: No results for input(s): HGBA1C in the last 4320 hours.    Pending Diagnostic Studies:     None         Medications:  Reconciled Home Medications:      Medication List     ciprofloxacin HCl 750 MG tablet  Commonly known as: CIPRO  Take 1 tablet (750 mg total) by mouth every 12 (twelve) hours. for 2 days     CHANGE how you take these medications    albuterol-ipratropium 2.5 mg-0.5 mg/3 mL nebulizer solution  Commonly known as: DUO-NEB  USE 1 AMPULE IN NEBULIZER EVERY 6 HOURS WHILE AWAKE  What changed: Another medication with the same name was removed. Continue taking this medication, and follow the directions you see here.        CONTINUE taking these medications    amLODIPine 10 MG tablet  Commonly known as: NORVASC  Take 10 mg by mouth once daily.     aspirin 81 MG Chew  Take 1 tablet (81 mg total) by mouth once daily.     atorvastatin 40 MG tablet  Commonly known as: LIPITOR  Take 1 tablet (40 mg total) by mouth once daily.     clopidogreL 75 mg tablet  Commonly known as: PLAVIX  Take 1 tablet (75 mg total) by mouth once daily. for 21 days        STOP taking these medications    acetaminophen 500 MG tablet  Commonly known as: TYLENOL     albuterol sulfate 2.5 mg/0.5 mL Nebu     dicyclomine 20 mg tablet  Commonly known as: BENTYL     ergocalciferol  50,000 unit Cap  Commonly known as: ERGOCALCIFEROL     ferrous sulfate 325 (65 FE) MG EC tablet     ipratropium 0.02 % nebulizer solution  Commonly known as: ATROVENT     ONE-A-DAY WOMEN'S COMPLETE ORAL     predniSONE 50 MG Tab  Commonly known as: DELTASONE     sucralfate 1 gram tablet  Commonly known as: CARAFATE                       Indwelling Lines/Drains at time of discharge:   Lines/Drains/Airways     None                 Time spent on the discharge of patient: 35 minutes         Franki Jaramillo MD  Department of [facility] Medicine  [facility] [place] [facility]

## 2022-12-22 ENCOUNTER — HOSPITAL ENCOUNTER (EMERGENCY)
Facility: HOSPITAL | Age: 57
Discharge: HOME | End: 2022-12-22
Attending: EMERGENCY MEDICINE
Payer: MEDICARE

## 2022-12-22 VITALS
BODY MASS INDEX: 19.16 KG/M2 | WEIGHT: 115 LBS | HEIGHT: 65 IN | DIASTOLIC BLOOD PRESSURE: 91 MMHG | SYSTOLIC BLOOD PRESSURE: 156 MMHG | RESPIRATION RATE: 18 BRPM | TEMPERATURE: 98 F | OXYGEN SATURATION: 96 % | HEART RATE: 101 BPM

## 2022-12-22 DIAGNOSIS — M79.604 LEG PAIN, DIFFUSE, RIGHT: ICD-10-CM

## 2022-12-22 DIAGNOSIS — Z86.73 HISTORY OF STROKE: ICD-10-CM

## 2022-12-22 DIAGNOSIS — R10.9 ABDOMINAL PAIN: ICD-10-CM

## 2022-12-22 DIAGNOSIS — M79.601 ARM PAIN, DIFFUSE, RIGHT: ICD-10-CM

## 2022-12-22 DIAGNOSIS — R11.0 NAUSEA: ICD-10-CM

## 2022-12-22 DIAGNOSIS — G81.91 RIGHT HEMIPARESIS: ICD-10-CM

## 2022-12-22 DIAGNOSIS — K59.00 CONSTIPATION, UNSPECIFIED CONSTIPATION TYPE: Primary | ICD-10-CM

## 2022-12-22 LAB
ALBUMIN SERPL BCP-MCNC: 4 G/DL (ref 3.5–5.2)
ALP SERPL-CCNC: 62 U/L (ref 55–135)
ALT SERPL W/O P-5'-P-CCNC: 8 U/L (ref 10–44)
ANION GAP SERPL CALC-SCNC: 9 MMOL/L (ref 8–16)
AST SERPL-CCNC: 11 U/L (ref 10–40)
BASOPHILS # BLD AUTO: 0.02 K/UL (ref 0–0.2)
BASOPHILS NFR BLD: 0.7 % (ref 0–1.9)
BILIRUB SERPL-MCNC: 0.7 MG/DL (ref 0.1–1)
BILIRUB UR QL STRIP: NEGATIVE
BUN SERPL-MCNC: 8 MG/DL (ref 6–20)
CALCIUM SERPL-MCNC: 9.4 MG/DL (ref 8.7–10.5)
CHLORIDE SERPL-SCNC: 103 MMOL/L (ref 95–110)
CLARITY UR: CLEAR
CO2 SERPL-SCNC: 28 MMOL/L (ref 23–29)
COLOR UR: COLORLESS
CREAT SERPL-MCNC: 0.7 MG/DL (ref 0.5–1.4)
DIFFERENTIAL METHOD: ABNORMAL
EOSINOPHIL # BLD AUTO: 0.1 K/UL (ref 0–0.5)
EOSINOPHIL NFR BLD: 3 % (ref 0–8)
ERYTHROCYTE [DISTWIDTH] IN BLOOD BY AUTOMATED COUNT: 11.8 % (ref 11.5–14.5)
EST. GFR  (NO RACE VARIABLE): >60 ML/MIN/1.73 M^2
GLUCOSE SERPL-MCNC: 111 MG/DL (ref 70–110)
GLUCOSE UR QL STRIP: NEGATIVE
HCT VFR BLD AUTO: 44.5 % (ref 37–48.5)
HGB BLD-MCNC: 15.2 G/DL (ref 12–16)
HGB UR QL STRIP: NEGATIVE
IMM GRANULOCYTES # BLD AUTO: 0 K/UL (ref 0–0.04)
IMM GRANULOCYTES NFR BLD AUTO: 0 % (ref 0–0.5)
KETONES UR QL STRIP: NEGATIVE
LACTATE SERPL-SCNC: 1 MMOL/L (ref 0.5–2.2)
LEUKOCYTE ESTERASE UR QL STRIP: NEGATIVE
LIPASE SERPL-CCNC: 18 U/L (ref 4–60)
LYMPHOCYTES # BLD AUTO: 1.5 K/UL (ref 1–4.8)
LYMPHOCYTES NFR BLD: 50.5 % (ref 18–48)
MCH RBC QN AUTO: 30.6 PG (ref 27–31)
MCHC RBC AUTO-ENTMCNC: 34.2 G/DL (ref 32–36)
MCV RBC AUTO: 90 FL (ref 82–98)
MONOCYTES # BLD AUTO: 0.3 K/UL (ref 0.3–1)
MONOCYTES NFR BLD: 9.2 % (ref 4–15)
NEUTROPHILS # BLD AUTO: 1.1 K/UL (ref 1.8–7.7)
NEUTROPHILS NFR BLD: 36.6 % (ref 38–73)
NITRITE UR QL STRIP: NEGATIVE
NRBC BLD-RTO: 0 /100 WBC
PH UR STRIP: 8 [PH] (ref 5–8)
PLATELET # BLD AUTO: 157 K/UL (ref 150–450)
PMV BLD AUTO: 10.2 FL (ref 9.2–12.9)
POTASSIUM SERPL-SCNC: 3.5 MMOL/L (ref 3.5–5.1)
PROT SERPL-MCNC: 8.3 G/DL (ref 6–8.4)
PROT UR QL STRIP: NEGATIVE
RBC # BLD AUTO: 4.97 M/UL (ref 4–5.4)
SODIUM SERPL-SCNC: 140 MMOL/L (ref 136–145)
SP GR UR STRIP: 1.02 (ref 1–1.03)
TROPONIN I SERPL DL<=0.01 NG/ML-MCNC: <0.006 NG/ML (ref 0–0.03)
URN SPEC COLLECT METH UR: ABNORMAL
UROBILINOGEN UR STRIP-ACNC: NEGATIVE EU/DL
WBC # BLD AUTO: 3.05 K/UL (ref 3.9–12.7)

## 2022-12-22 PROCEDURE — 84484 ASSAY OF TROPONIN QUANT: CPT | Performed by: EMERGENCY MEDICINE

## 2022-12-22 PROCEDURE — 96361 HYDRATE IV INFUSION ADD-ON: CPT

## 2022-12-22 PROCEDURE — 93010 EKG 12-LEAD: ICD-10-PCS | Mod: ,,, | Performed by: INTERNAL MEDICINE

## 2022-12-22 PROCEDURE — 25000003 PHARM REV CODE 250: Performed by: INTERNAL MEDICINE

## 2022-12-22 PROCEDURE — 93010 ELECTROCARDIOGRAM REPORT: CPT | Mod: ,,, | Performed by: INTERNAL MEDICINE

## 2022-12-22 PROCEDURE — 85025 COMPLETE CBC W/AUTO DIFF WBC: CPT | Performed by: EMERGENCY MEDICINE

## 2022-12-22 PROCEDURE — 25500020 PHARM REV CODE 255: Performed by: EMERGENCY MEDICINE

## 2022-12-22 PROCEDURE — 96374 THER/PROPH/DIAG INJ IV PUSH: CPT | Mod: 59

## 2022-12-22 PROCEDURE — 96372 THER/PROPH/DIAG INJ SC/IM: CPT | Performed by: EMERGENCY MEDICINE

## 2022-12-22 PROCEDURE — 80053 COMPREHEN METABOLIC PANEL: CPT | Performed by: EMERGENCY MEDICINE

## 2022-12-22 PROCEDURE — 63600175 PHARM REV CODE 636 W HCPCS: Performed by: EMERGENCY MEDICINE

## 2022-12-22 PROCEDURE — 83690 ASSAY OF LIPASE: CPT | Performed by: EMERGENCY MEDICINE

## 2022-12-22 PROCEDURE — 96375 TX/PRO/DX INJ NEW DRUG ADDON: CPT

## 2022-12-22 PROCEDURE — 93005 ELECTROCARDIOGRAM TRACING: CPT

## 2022-12-22 PROCEDURE — 99285 EMERGENCY DEPT VISIT HI MDM: CPT | Mod: 25

## 2022-12-22 PROCEDURE — 83605 ASSAY OF LACTIC ACID: CPT | Performed by: EMERGENCY MEDICINE

## 2022-12-22 PROCEDURE — 25000003 PHARM REV CODE 250: Performed by: EMERGENCY MEDICINE

## 2022-12-22 PROCEDURE — 81003 URINALYSIS AUTO W/O SCOPE: CPT | Performed by: EMERGENCY MEDICINE

## 2022-12-22 RX ORDER — DOCUSATE SODIUM 100 MG/1
100 CAPSULE, LIQUID FILLED ORAL 2 TIMES DAILY
Qty: 60 CAPSULE | Refills: 3 | Status: SHIPPED | OUTPATIENT
Start: 2022-12-22

## 2022-12-22 RX ORDER — DEXTROMETHORPHAN POLISTIREX 30 MG/5 ML
1 SUSPENSION, EXTENDED RELEASE 12 HR ORAL ONCE
Status: COMPLETED | OUTPATIENT
Start: 2022-12-22 | End: 2022-12-22

## 2022-12-22 RX ORDER — ONDANSETRON 2 MG/ML
8 INJECTION INTRAMUSCULAR; INTRAVENOUS
Status: COMPLETED | OUTPATIENT
Start: 2022-12-22 | End: 2022-12-22

## 2022-12-22 RX ORDER — DICYCLOMINE HYDROCHLORIDE 10 MG/ML
20 INJECTION INTRAMUSCULAR
Status: COMPLETED | OUTPATIENT
Start: 2022-12-22 | End: 2022-12-22

## 2022-12-22 RX ORDER — POLYETHYLENE GLYCOL 3350 17 G/17G
17 POWDER, FOR SOLUTION ORAL 3 TIMES DAILY PRN
Qty: 24 PACKET | Refills: 0 | Status: SHIPPED | OUTPATIENT
Start: 2022-12-22

## 2022-12-22 RX ORDER — HYDROMORPHONE HYDROCHLORIDE 1 MG/ML
0.5 INJECTION, SOLUTION INTRAMUSCULAR; INTRAVENOUS; SUBCUTANEOUS
Status: COMPLETED | OUTPATIENT
Start: 2022-12-22 | End: 2022-12-22

## 2022-12-22 RX ORDER — PSEUDOEPHEDRINE/ACETAMINOPHEN 30MG-500MG
100 TABLET ORAL
Status: DISCONTINUED | OUTPATIENT
Start: 2022-12-22 | End: 2022-12-22

## 2022-12-22 RX ORDER — ONDANSETRON 4 MG/1
4 TABLET, FILM COATED ORAL EVERY 8 HOURS PRN
Qty: 12 TABLET | Refills: 0 | Status: SHIPPED | OUTPATIENT
Start: 2022-12-22

## 2022-12-22 RX ORDER — ACETAMINOPHEN 500 MG
1000 TABLET ORAL EVERY 8 HOURS PRN
Qty: 60 TABLET | Refills: 0 | Status: SHIPPED | OUTPATIENT
Start: 2022-12-22

## 2022-12-22 RX ORDER — DEXTROMETHORPHAN POLISTIREX 30 MG/5 ML
1 SUSPENSION, EXTENDED RELEASE 12 HR ORAL ONCE
Status: DISCONTINUED | OUTPATIENT
Start: 2022-12-22 | End: 2022-12-22 | Stop reason: HOSPADM

## 2022-12-22 RX ORDER — POLYETHYLENE GLYCOL 3350, SODIUM SULFATE ANHYDROUS, SODIUM BICARBONATE, SODIUM CHLORIDE, POTASSIUM CHLORIDE 236; 22.74; 6.74; 5.86; 2.97 G/4L; G/4L; G/4L; G/4L; G/4L
4 POWDER, FOR SOLUTION ORAL ONCE
Qty: 4000 ML | Refills: 0 | Status: SHIPPED | OUTPATIENT
Start: 2022-12-22 | End: 2022-12-22

## 2022-12-22 RX ORDER — SYRING-NEEDL,DISP,INSUL,0.3 ML 29 G X1/2"
296 SYRINGE, EMPTY DISPOSABLE MISCELLANEOUS
Status: DISCONTINUED | OUTPATIENT
Start: 2022-12-22 | End: 2022-12-22

## 2022-12-22 RX ADMIN — SODIUM PHOSPHATE, DIBASIC AND SODIUM PHOSPHATE, MONOBASIC 1 ENEMA: 7; 19 ENEMA RECTAL at 08:12

## 2022-12-22 RX ADMIN — HYDROMORPHONE HYDROCHLORIDE 0.5 MG: 1 INJECTION, SOLUTION INTRAMUSCULAR; INTRAVENOUS; SUBCUTANEOUS at 03:12

## 2022-12-22 RX ADMIN — DICYCLOMINE HYDROCHLORIDE 20 MG: 20 INJECTION INTRAMUSCULAR at 02:12

## 2022-12-22 RX ADMIN — SODIUM CHLORIDE 1000 ML: 0.9 INJECTION, SOLUTION INTRAVENOUS at 02:12

## 2022-12-22 RX ADMIN — MINERAL OIL 1 ENEMA: 100 ENEMA RECTAL at 04:12

## 2022-12-22 RX ADMIN — ONDANSETRON 8 MG: 2 INJECTION INTRAMUSCULAR; INTRAVENOUS at 02:12

## 2022-12-22 RX ADMIN — IOHEXOL 60 ML: 350 INJECTION, SOLUTION INTRAVENOUS at 02:12

## 2022-12-22 NOTE — ED TRIAGE NOTES
Pt presents to ED via EMS c/o 10/10  LLQ abd pain and nausea. Pt reports hx of stroke with R sided hemiplegia. Pt is hearing impaired,  used. Pt placed on cardiac monitoring and continuous pulse ox, 98% on RA.

## 2022-12-22 NOTE — ED NOTES
Adult Physical Assessment  LOC: Aurora Ornelas, 57 y.o. female verified via two identifiers.  The patient is awake, alert, oriented and speaking appropriately at this time.  APPEARANCE: Patient resting comfortably and appears to be in no acute distress at this time. Patient is clean and well groomed, patient's clothing is properly fastened.  SKIN:The skin is warm and dry, color consistent with ethnicity, patient has normal skin turgor and moist mucus membranes, skin intact, no breakdown or brusing noted.  MUSCULOSKELETAL: Patient moving all extremities well, no obvious swelling or deformities noted.  RESPIRATORY: Airway is open and patent, respirations are spontaneous, patient has a normal effort and rate, no accessory muscle use noted.  CARDIAC: Patient has a normal rate and rhythm, no periphreal edema noted in any extremity, capillary refill < 3 seconds in all extremities  ABDOMEN:  Fleet type enema administered. Soft and non tender to palpation, no abdominal distention noted. Bowel sounds present in all four quadrants.  NEUROLOGIC: Eyes open spontaneously, behavior appropriate to situation, follows commands, facial expression symmetrical, bilateral hand grasp equal and even, purposeful motor response noted, normal sensation in all extremities when touched with a finger.

## 2022-12-22 NOTE — ED PROVIDER NOTES
Encounter Date: 12/22/2022       History     Chief Complaint   Patient presents with    Abdominal Pain     Pt here via EMS with c/o lower abdominal pain x 2 hours. Pt denies N/V/D. Pt is deaf, uses ASL.     57 y.o. female Past Medical History:  No date: Anemia  No date: Arthritis  No date: Asthma  No date: Deaf  No date: Diabetes mellitus  No date: Hyperlipidemia  No date: Hypertension     Hx obtained via language link  and collateral history via phone with pts daughter Klarissa.     Htn, hld, prior stroke with R residual deficits, seizures on keppra, DM, pts daughter activated EMS for pt c/o lower abd pain for 2 hour. She denies pt with hx of diarrhea/dysuria/vomiting or other complaints. Pt does endorse nausea.    12/19/21 Echo  Summary    · The left ventricle is normal in size with normal systolic function. The estimated ejection fraction is 65%.  · Normal right ventricular size with normal right ventricular systolic function.  · Normal left ventricular diastolic function.  · The estimated PA systolic pressure is 29 mmHg.  · Normal central venous pressure (3 mmHg).        Review of patient's allergies indicates:  No Known Allergies  Past Medical History:   Diagnosis Date    Anemia     Arthritis     Asthma     Deaf     Diabetes mellitus     Hyperlipidemia     Hypertension      Past Surgical History:   Procedure Laterality Date    COLONOSCOPY N/A 12/4/2020    Procedure: COLONOSCOPY;  Surgeon: Felicia Bishop MD;  Location: [facility];  Service: Endoscopy;  Laterality: N/A;    ESOPHAGOGASTRODUODENOSCOPY N/A 2/28/2019    Procedure: EGD (ESOPHAGOGASTRODUODENOSCOPY);  Surgeon: Yolanda Gary MD;  Location: [facility];  Service: Endoscopy;  Laterality: N/A;    ESOPHAGOGASTRODUODENOSCOPY N/A 12/4/2020    Procedure: EGD (ESOPHAGOGASTRODUODENOSCOPY);  Surgeon: Felicia Bishop MD;  Location: [facility];  Service: Endoscopy;  Laterality: N/A;    ESOPHAGOGASTRODUODENOSCOPY N/A 7/1/2021    Procedure: EGD  (ESOPHAGOGASTRODUODENOSCOPY)-in March 2021;  Surgeon: Sharath Kohler MD;  Location: [facility];  Service: Endoscopy;  Laterality: N/A;  hearing impaired, will need  - Maria Dolores Harris assigned-MS  fully vaccinated-see immunization record    HYSTERECTOMY       Family History   Problem Relation Age of Onset    Diabetes Mother     Hypertension Mother     Colon cancer Neg Hx     Esophageal cancer Neg Hx      Social History     Tobacco Use    Smoking status: Never    Smokeless tobacco: Never   Substance Use Topics    Alcohol use: No    Drug use: No     Review of Systems   Constitutional:  Negative for fever.   HENT:  Negative for sore throat.    Respiratory:  Negative for shortness of breath.    Cardiovascular:  Negative for chest pain.   Gastrointestinal:  Positive for abdominal pain and nausea.   Genitourinary:  Negative for dysuria.   Musculoskeletal:  Negative for back pain.   Skin:  Negative for rash.   Neurological:  Negative for weakness.   Hematological:  Does not bruise/bleed easily.   All other systems reviewed and are negative.    Physical Exam     Initial Vitals [12/22/22 0038]   BP Pulse Resp Temp SpO2   (!) 144/86 (!) 119 18 98.3 °F (36.8 °C) 99 %      MAP       --         Physical Exam    Nursing note and vitals reviewed.  Constitutional: She appears well-developed and well-nourished.   HENT:   Head: Normocephalic and atraumatic.   Eyes: Conjunctivae and EOM are normal. Pupils are equal, round, and reactive to light.   Neck:   Normal range of motion.  Cardiovascular:  Normal rate.           Pulmonary/Chest: No respiratory distress.   Abdominal: She exhibits no distension.   Musculoskeletal:         General: Normal range of motion.      Cervical back: Normal range of motion.     Neurological: She is alert. No cranial nerve deficit. GCS score is 15. GCS eye subscore is 4. GCS verbal subscore is 5. GCS motor subscore is 6.   Skin: Skin is warm and dry.   Psychiatric: She has a  normal mood and affect. Thought content normal.   Ttp b/l lower quadrants    ED Course   Procedures  Labs Reviewed   CBC W/ AUTO DIFFERENTIAL - Abnormal; Notable for the following components:       Result Value    WBC 3.05 (*)     Gran # (ANC) 1.1 (*)     Gran % 36.6 (*)     Lymph % 50.5 (*)     All other components within normal limits   COMPREHENSIVE METABOLIC PANEL - Abnormal; Notable for the following components:    Glucose 111 (*)     ALT 8 (*)     All other components within normal limits   URINALYSIS, REFLEX TO URINE CULTURE - Abnormal; Notable for the following components:    Color, UA Colorless (*)     All other components within normal limits    Narrative:     Specimen Source->Urine   LIPASE   LACTIC ACID, PLASMA   TROPONIN I     EKG Readings: (Independently Interpreted)   Hr 112, sinus tach, nl axis/intervals, no joanna/twi, non acute, no stemi.      Imaging Results              CT Abdomen Pelvis With Contrast (Final result)  Result time 12/22/22 03:11:47      Final result by Robert Gomez MD (12/22/22 03:11:47)                   Impression:      1.  Distended rectum with significant retained fecal burden which may relate to impaction.  There is slight rectal wall thickening and superimposed component of stercoral colitis not excluded.    2.  Retained stool throughout the colon suggesting constipation.    3.  Relatively stable appearing large hiatal hernia.    4.  Additional findings as above.      Electronically signed by: Robert Gomez MD  Date:    12/22/2022  Time:    03:11               Narrative:    EXAMINATION:  CT ABDOMEN PELVIS WITH CONTRAST    CLINICAL HISTORY:  LLQ abdominal pain;    TECHNIQUE:  Low dose axial images, sagittal and coronal reformations were obtained from the lung bases to the pubic symphysis following the IV administration of 60 mL of Omnipaque 350 .  Oral contrast was not given.    COMPARISON:  CT 07/19/2022    FINDINGS:  There are scattered bandlike opacities at the lung  bases suggesting atelectasis or scarring.  There is no significant pleural fluid.  The visualized portions of the heart appear normal.    The liver is normal in size and attenuation with no focal hepatic abnormality.  The gallbladder shows no evidence of stones or pericholecystic fluid.  There is no intra-or extrahepatic biliary ductal dilatation.    There is a large previously demonstrated hiatal hernia partially visualized.  The spleen, pancreas, and adrenal glands appear within normal limits.    The kidneys are normal in size and location and concentrate and excrete contrast properly.  There is no evidence of hydronephrosis. The urinary bladder is unremarkable. The uterus appears to be surgically absent.  There is a stable appearing nonspecific 3.8 x 1.6 cm left adnexal hypodensity, similar to prior study of 07/19/2022.  No significant free fluid appreciated in the pelvis.    The abdominal aorta is normal in course and caliber with mild atherosclerotic calcification along its course.  There is no retroperitoneal hematoma.    There is no evidence of small-bowel obstruction.  There is retained stool throughout the colon suggesting constipation.  The rectum is distended measuring 7.5 x 8.3 cm in transverse diameter with significant retained fecal burden.  There is slight wall thickening and superimposed component of stercoral colitis not excluded.  There is no CT evidence of acute appendicitis.  There is no free intraperitoneal air or portal venous gas.  There is a small fat containing umbilical hernia.    Osseous structures demonstrate degenerative changes of the lumbar spine.  The extraperitoneal soft tissues are unremarkable.                                       Medications   mineral oil enema 1 enema ( Rectal Canceled Entry 12/22/22 6170)   ondansetron injection 8 mg (8 mg Intravenous Given 12/22/22 6401)   sodium chloride 0.9% bolus 1,000 mL 1,000 mL (0 mLs Intravenous Stopped 12/22/22 4337)   HYDROmorphone  injection 0.5 mg (0.5 mg Intravenous Given 12/22/22 0301)   dicyclomine injection 20 mg (20 mg Intramuscular Given 12/22/22 0251)   iohexoL (OMNIPAQUE 350) injection 60 mL (60 mLs Intravenous Given 12/22/22 0239)   mineral oil enema 1 enema (1 enema Rectal Given 12/22/22 0418)   sodium phosphates 19-7 gram/118 mL enema 1 enema (1 enema Rectal Given 12/22/22 0815)     Medical Decision Making:   ED Management:  CT of the abdomen and pelvis revealed dilated rectum with stool.  Fleets enema was given but not effective (believed to be secondary to impaction) and patient was manually disimpacted.  Moderate amount of soft stool was removed from rectal vault.  Repeat fleets will be given and care is transferred to Dr. Torres at shift change pending reassessment and disposition after repeat enema.               Screening labs, pain/nausea control, cta abd/pelvis.     Pt will be signed out to Dr. Scott pending labs, and ct results.    This is doctor Torres dictating.  I assumed care this patient at 6:30 a.m. from Dr. Scott.  The patient basically presents with abdominal pain and nausea.  She is discovered to be constipated by CT.  Rectal exams were performed in the emergency room.  A mineral oil enema was used with limited success.  I will try Fleet's Phospho-Soda before discharge.  I instructed the patient on how to use warm water enemas.  I will order nausea medicine and a bowel prep.  I will encourage long-term use of Colace and MiraLax.  Patient has chronic pain in the right upper extremity and right leg after her stroke.  I will treat that with Tylenol.    Clinical Impression:   Final diagnoses:  [R10.9] Abdominal pain  [K59.00] Constipation, unspecified constipation type (Primary)  [R11.0] Nausea  [M79.601] Arm pain, diffuse, right - Chronic  [M79.604] Leg pain, diffuse, right - Chronic  [Z86.73] History of stroke  [G81.91] Right hemiparesis        ED Disposition Condition    Discharge Stable          ED Prescriptions        Medication Sig Dispense Start Date End Date Auth. Provider    polyethylene glycol (GOLYTELY) 236-22.74-6.74 -5.86 gram suspension (Expires today) Take 4,000 mLs (4 L total) by mouth once. for 1 dose 4,000 mL 12/22/2022 12/22/2022 Franki Torres MD    docusate sodium (COLACE) 100 MG capsule Take 1 capsule (100 mg total) by mouth 2 (two) times daily. 60 capsule 12/22/2022 -- Franki Torres MD    ondansetron (ZOFRAN) 4 MG tablet Take 1 tablet (4 mg total) by mouth every 8 (eight) hours as needed (Nausea and vomiting). 12 tablet 12/22/2022 -- Franki Torres MD    polyethylene glycol (GLYCOLAX) 17 gram PwPk Take 17 g by mouth 3 (three) times daily as needed. 24 packet 12/22/2022 -- Franki Torres MD          Follow-up Information       Follow up With Specialties Details Why Contact Info    Priyanka Castle MD Internal Medicine In 3 days  [address] [place] [address]  [phone]               Franki Torres MD  12/22/22 0778

## 2022-12-22 NOTE — DISCHARGE INSTRUCTIONS
No solid food today.  Clear liquids only today.  Please drink the 1 gal of GoLYTELY today.  This may be flavored with Crystal Light.  Tomorrow please begin Colace, 1 tablet twice a day.  If you develop diarrhea, you may reduce the dose to 1 tablet daily.  If you do not have a bowel movement in 24 hours, begin MiraLax as directed.  Please also begin warm water enemas, every 3 hours until you have complete relief a fullness in the rectum.  Zofran for nausea.  Tylenol, 1000 mg by mouth every 8 hours as needed for pain.

## 2022-12-23 ENCOUNTER — HOSPITAL ENCOUNTER (EMERGENCY)
Facility: HOSPITAL | Age: 57
Discharge: HOME | End: 2022-12-23
Attending: EMERGENCY MEDICINE
Payer: MEDICARE

## 2022-12-23 VITALS
OXYGEN SATURATION: 100 % | HEART RATE: 118 BPM | TEMPERATURE: 99 F | RESPIRATION RATE: 20 BRPM | DIASTOLIC BLOOD PRESSURE: 83 MMHG | SYSTOLIC BLOOD PRESSURE: 125 MMHG

## 2022-12-23 DIAGNOSIS — R56.9 SEIZURE: Primary | ICD-10-CM

## 2022-12-23 LAB
ALBUMIN SERPL BCP-MCNC: 3.5 G/DL (ref 3.5–5.2)
ALP SERPL-CCNC: 60 U/L (ref 55–135)
ALT SERPL W/O P-5'-P-CCNC: 7 U/L (ref 10–44)
ANION GAP SERPL CALC-SCNC: 7 MMOL/L (ref 8–16)
AST SERPL-CCNC: 16 U/L (ref 10–40)
BASOPHILS # BLD AUTO: 0.01 K/UL (ref 0–0.2)
BASOPHILS NFR BLD: 0.1 % (ref 0–1.9)
BILIRUB SERPL-MCNC: 0.7 MG/DL (ref 0.1–1)
BILIRUB UR QL STRIP: NEGATIVE
BUN SERPL-MCNC: 6 MG/DL (ref 6–20)
CALCIUM SERPL-MCNC: 9.1 MG/DL (ref 8.7–10.5)
CHLORIDE SERPL-SCNC: 104 MMOL/L (ref 95–110)
CLARITY UR REFRACT.AUTO: CLEAR
CO2 SERPL-SCNC: 27 MMOL/L (ref 23–29)
COLOR UR AUTO: COLORLESS
CREAT SERPL-MCNC: 0.7 MG/DL (ref 0.5–1.4)
DIFFERENTIAL METHOD: ABNORMAL
EOSINOPHIL # BLD AUTO: 0 K/UL (ref 0–0.5)
EOSINOPHIL NFR BLD: 0.4 % (ref 0–8)
ERYTHROCYTE [DISTWIDTH] IN BLOOD BY AUTOMATED COUNT: 12 % (ref 11.5–14.5)
EST. GFR  (NO RACE VARIABLE): >60 ML/MIN/1.73 M^2
GLUCOSE SERPL-MCNC: 125 MG/DL (ref 70–110)
GLUCOSE UR QL STRIP: NEGATIVE
HCT VFR BLD AUTO: 41.8 % (ref 37–48.5)
HCV AB SERPL QL IA: NORMAL
HGB BLD-MCNC: 14.2 G/DL (ref 12–16)
HGB UR QL STRIP: NEGATIVE
HIV 1+2 AB+HIV1 P24 AG SERPL QL IA: NORMAL
IMM GRANULOCYTES # BLD AUTO: 0.02 K/UL (ref 0–0.04)
IMM GRANULOCYTES NFR BLD AUTO: 0.3 % (ref 0–0.5)
KETONES UR QL STRIP: NEGATIVE
LEUKOCYTE ESTERASE UR QL STRIP: NEGATIVE
LYMPHOCYTES # BLD AUTO: 0.9 K/UL (ref 1–4.8)
LYMPHOCYTES NFR BLD: 11.6 % (ref 18–48)
MAGNESIUM SERPL-MCNC: 1.6 MG/DL (ref 1.6–2.6)
MCH RBC QN AUTO: 30.9 PG (ref 27–31)
MCHC RBC AUTO-ENTMCNC: 34 G/DL (ref 32–36)
MCV RBC AUTO: 91 FL (ref 82–98)
MONOCYTES # BLD AUTO: 0.3 K/UL (ref 0.3–1)
MONOCYTES NFR BLD: 3.6 % (ref 4–15)
NEUTROPHILS # BLD AUTO: 6.3 K/UL (ref 1.8–7.7)
NEUTROPHILS NFR BLD: 84 % (ref 38–73)
NITRITE UR QL STRIP: NEGATIVE
NRBC BLD-RTO: 0 /100 WBC
PH UR STRIP: 7 [PH] (ref 5–8)
PLATELET # BLD AUTO: 259 K/UL (ref 150–450)
PMV BLD AUTO: 9.5 FL (ref 9.2–12.9)
POTASSIUM SERPL-SCNC: 3.7 MMOL/L (ref 3.5–5.1)
PROT SERPL-MCNC: 7.3 G/DL (ref 6–8.4)
PROT UR QL STRIP: NEGATIVE
RBC # BLD AUTO: 4.59 M/UL (ref 4–5.4)
SODIUM SERPL-SCNC: 138 MMOL/L (ref 136–145)
SP GR UR STRIP: 1.01 (ref 1–1.03)
URN SPEC COLLECT METH UR: ABNORMAL
WBC # BLD AUTO: 7.52 K/UL (ref 3.9–12.7)

## 2022-12-23 PROCEDURE — 80177 DRUG SCRN QUAN LEVETIRACETAM: CPT | Performed by: STUDENT IN AN ORGANIZED HEALTH CARE EDUCATION/TRAINING PROGRAM

## 2022-12-23 PROCEDURE — 96361 HYDRATE IV INFUSION ADD-ON: CPT

## 2022-12-23 PROCEDURE — 96374 THER/PROPH/DIAG INJ IV PUSH: CPT

## 2022-12-23 PROCEDURE — 96375 TX/PRO/DX INJ NEW DRUG ADDON: CPT

## 2022-12-23 PROCEDURE — 99285 EMERGENCY DEPT VISIT HI MDM: CPT | Mod: ,,, | Performed by: EMERGENCY MEDICINE

## 2022-12-23 PROCEDURE — 80053 COMPREHEN METABOLIC PANEL: CPT | Performed by: STUDENT IN AN ORGANIZED HEALTH CARE EDUCATION/TRAINING PROGRAM

## 2022-12-23 PROCEDURE — 63600175 PHARM REV CODE 636 W HCPCS: Performed by: STUDENT IN AN ORGANIZED HEALTH CARE EDUCATION/TRAINING PROGRAM

## 2022-12-23 PROCEDURE — 99284 EMERGENCY DEPT VISIT MOD MDM: CPT | Mod: 25

## 2022-12-23 PROCEDURE — 99285 PR EMERGENCY DEPT VISIT,LEVEL V: ICD-10-PCS | Mod: ,,, | Performed by: EMERGENCY MEDICINE

## 2022-12-23 PROCEDURE — 86803 HEPATITIS C AB TEST: CPT | Performed by: PHYSICIAN ASSISTANT

## 2022-12-23 PROCEDURE — 83735 ASSAY OF MAGNESIUM: CPT | Performed by: STUDENT IN AN ORGANIZED HEALTH CARE EDUCATION/TRAINING PROGRAM

## 2022-12-23 PROCEDURE — 85025 COMPLETE CBC W/AUTO DIFF WBC: CPT | Performed by: STUDENT IN AN ORGANIZED HEALTH CARE EDUCATION/TRAINING PROGRAM

## 2022-12-23 PROCEDURE — 81003 URINALYSIS AUTO W/O SCOPE: CPT | Performed by: STUDENT IN AN ORGANIZED HEALTH CARE EDUCATION/TRAINING PROGRAM

## 2022-12-23 PROCEDURE — 87389 HIV-1 AG W/HIV-1&-2 AB AG IA: CPT | Performed by: PHYSICIAN ASSISTANT

## 2022-12-23 RX ORDER — LEVETIRACETAM 500 MG/5ML
1500 INJECTION, SOLUTION, CONCENTRATE INTRAVENOUS
Status: COMPLETED | OUTPATIENT
Start: 2022-12-23 | End: 2022-12-23

## 2022-12-23 RX ORDER — MORPHINE SULFATE 2 MG/ML
2 INJECTION, SOLUTION INTRAMUSCULAR; INTRAVENOUS
Status: COMPLETED | OUTPATIENT
Start: 2022-12-23 | End: 2022-12-23

## 2022-12-23 RX ADMIN — LEVETIRACETAM 1500 MG: 100 INJECTION, SOLUTION INTRAVENOUS at 12:12

## 2022-12-23 RX ADMIN — SODIUM CHLORIDE, POTASSIUM CHLORIDE, SODIUM LACTATE AND CALCIUM CHLORIDE 1000 ML: 600; 310; 30; 20 INJECTION, SOLUTION INTRAVENOUS at 12:12

## 2022-12-23 RX ADMIN — MORPHINE SULFATE 2 MG: 2 INJECTION, SOLUTION INTRAMUSCULAR; INTRAVENOUS at 12:12

## 2022-12-23 NOTE — DISCHARGE INSTRUCTIONS
Diagnosis:   1. Seizure      Home Care Instructions:  - Medications: Continue taking your home medications as prescribed. Please take Keppra at the same time every day to help ensure minimization of breakthrough seizures.     Follow-Up Plan:  - Follow-up with: Primary care doctor within 3  days  - Additional testing and/or evaluation will be directed by your primary doctor    Return to the Emergency Department for symptoms including but not limited to: worsening symptoms, severe back pain, shortness of breath or chest pain, vomiting with inability to hold down fluids, blood in vomit or poop, fevers greater than 100.4°F, passing out/fainting/unconsciousness, or other concerning symptoms.

## 2022-12-23 NOTE — ED NOTES
Pt diaper changed, given clean paper scrubs. Ready for d/c, awaiting daughter to bring car to ED entrance

## 2022-12-23 NOTE — ED PROVIDER NOTES
"Encounter Date: 12/23/2022       History     Chief Complaint   Patient presents with    Seizures     Pt has hx of stroke and deafness. Pt nonverbal at baseline. Hx of seizures s/t stroke.     Attempted to use video  services.  However, due to technical errors and inability to obtain in-person , patient's daughter acted as .    57 year old female with past medical history of Anemia, CVA s/p residual right-sided hemiplegia (12/2021), Seizures, Arthritis, Asthma, Deafness, Diabetes mellitus, Hyperlipidemia, and Hypertension, presents to the ED via EMS with complaint of seizure.  Patient's daughter reports that she was on a video call with the patient at approximately 11:00 a.m. when the patient was complaining about right wrist pain.  The patient was sitting on the sofa and then began seizing for a few minutes.  She states the patient not hit her head or fall to the ground.  Patient was postictal for several minutes afterward however has now returned to baseline.  Daughter reports that patient had a stroke class December.  She then had her 1st seizure during the 1st week of November approximately 6 weeks ago.  Patient was started on Keppra.  She then had a 2nd seizure on November 23rd.  Her Keppra was increased to 1500 mg b.i.d..  Daughter reports patient has been compliant with Keppra though she has not been taking it at the same time every day.  Daughter reports patient usually takes her morning dose around 10:00 a.m..  However, patient had not taken any prior to her seizure this morning which occurred at 11:00 a.m..  Family is very frustrated that they have not been able to get an appointment with Neurology sooner than February.  They are very concerned that they can not determine the "trigger" for the seizures.  They also report the patient has been experiencing chronic right-sided pain.  Despite multiple ED visits, they do not feel that enough has been done to find the source " of the patient's pain.  Patient denies fevers, chills, cough, congestion, chest pain or shortness of breath.    Of note patient was seen at [facility] ED yesterday for abdominal pain.  Labs and CT of abdomen were unremarkable.    Review of patient's allergies indicates:  No Known Allergies  Past Medical History:   Diagnosis Date    Anemia     Arthritis     Asthma     Deaf     Diabetes mellitus     Hyperlipidemia     Hypertension      Past Surgical History:   Procedure Laterality Date    COLONOSCOPY N/A 12/4/2020    Procedure: COLONOSCOPY;  Surgeon: Felicia Bishop MD;  Location: [facility];  Service: Endoscopy;  Laterality: N/A;    ESOPHAGOGASTRODUODENOSCOPY N/A 2/28/2019    Procedure: EGD (ESOPHAGOGASTRODUODENOSCOPY);  Surgeon: Yolanda Gary MD;  Location: [facility];  Service: Endoscopy;  Laterality: N/A;    ESOPHAGOGASTRODUODENOSCOPY N/A 12/4/2020    Procedure: EGD (ESOPHAGOGASTRODUODENOSCOPY);  Surgeon: Felicia Bishop MD;  Location: [facility];  Service: Endoscopy;  Laterality: N/A;    ESOPHAGOGASTRODUODENOSCOPY N/A 7/1/2021    Procedure: EGD (ESOPHAGOGASTRODUODENOSCOPY)-in March 2021;  Surgeon: Sharath Kohler MD;  Location: [facility];  Service: Endoscopy;  Laterality: N/A;  hearing impaired, will need  - Maria Dolores Harris assigned-MS  fully vaccinated-see immunization record    HYSTERECTOMY       Family History   Problem Relation Age of Onset    Diabetes Mother     Hypertension Mother     Colon cancer Neg Hx     Esophageal cancer Neg Hx      Social History     Tobacco Use    Smoking status: Never    Smokeless tobacco: Never   Substance Use Topics    Alcohol use: No    Drug use: No     Review of Systems   Constitutional:  Negative for chills and fever.   HENT:  Negative for congestion and rhinorrhea.    Eyes:  Negative for pain and visual disturbance.   Respiratory:  Negative for cough and shortness of breath.    Cardiovascular:  Negative for chest pain and palpitations.    Gastrointestinal:  Negative for abdominal pain and vomiting.   Genitourinary:  Negative for difficulty urinating and dysuria.   Musculoskeletal:  Positive for arthralgias and myalgias. Negative for gait problem and joint swelling.   Skin:  Negative for rash and wound.   Neurological:  Positive for seizures and weakness (Right-sided hemiplegia). Negative for numbness and headaches.     Physical Exam     Initial Vitals   BP Pulse Resp Temp SpO2   12/23/22 1128 12/23/22 1128 12/23/22 1128 12/23/22 1320 12/23/22 1128   (!) 80/42 (!) 135 20 98.7 °F (37.1 °C) 99 %      MAP       --                Vitals:    12/23/22 1320 12/23/22 1334 12/23/22 1409 12/23/22 1732   BP:  125/80 130/78 125/83   BP Location:   Left arm    Patient Position:   Lying    Pulse:  (!) 115 106 (!) 118   Resp:   20    Temp: 98.7 °F (37.1 °C)      TempSrc: Oral      SpO2:  100% 100% 100%       Physical Exam    Nursing note and vitals reviewed.  Constitutional: She is not diaphoretic. No distress.   HENT:   Head: Normocephalic and atraumatic.   Mouth/Throat: Oropharynx is clear and moist.   Eyes: Conjunctivae and EOM are normal.   Neck: Neck supple.   Normal range of motion.  Cardiovascular:  Normal rate, regular rhythm and normal heart sounds.           Pulmonary/Chest: Breath sounds normal. She has no wheezes. She has no rhonchi. She has no rales.   Abdominal: Abdomen is soft. She exhibits no distension. There is no abdominal tenderness.   Musculoskeletal:         General: No edema. Normal range of motion.      Cervical back: Normal range of motion and neck supple.     Neurological: She is alert and oriented to person, place, and time.   Skin: Skin is warm and dry. Capillary refill takes less than 2 seconds.       ED Course   Procedures  Labs Reviewed   CBC W/ AUTO DIFFERENTIAL - Abnormal; Notable for the following components:       Result Value    Lymph # 0.9 (*)     Gran % 84.0 (*)     Lymph % 11.6 (*)     Mono % 3.6 (*)     All other components  within normal limits   COMPREHENSIVE METABOLIC PANEL - Abnormal; Notable for the following components:    Glucose 125 (*)     ALT 7 (*)     Anion Gap 7 (*)     All other components within normal limits   URINALYSIS, REFLEX TO URINE CULTURE - Abnormal; Notable for the following components:    Color, UA Colorless (*)     All other components within normal limits    Narrative:     Specimen Source->Urine   HIV 1 / 2 ANTIBODY    Narrative:     Release to patient->Immediate   HEPATITIS C ANTIBODY    Narrative:     Release to patient->Immediate   MAGNESIUM   LEVETIRACETAM  (KEPPRA) LEVEL          Imaging Results              X-Ray Chest AP Portable (Final result)  Result time 12/23/22 16:46:51      Final result by Spencer Victoria MD (12/23/22 16:46:51)                   Impression:      1. Stable appearing chronic interstitial findings, no large focal consolidation.      Electronically signed by: Spencer Victoria MD  Date:    12/23/2022  Time:    16:46               Narrative:    EXAMINATION:  XR CHEST AP PORTABLE    CLINICAL HISTORY:  Unspecified convulsions    TECHNIQUE:  Single frontal view of the chest was performed.    COMPARISON:  07/19/2022    FINDINGS:  The cardiomediastinal silhouette is not enlarged, magnified by technique..  There is no pleural effusion.  The trachea is midline.  The lungs are symmetrically expanded bilaterally with coarse interstitial attenuation, similar to the previous exam.  No large focal consolidation seen.  There is no pneumothorax.  The osseous structures are remarkable for degenerative changes..                                       Medications   lactated ringers bolus 1,000 mL (0 mLs Intravenous Stopped 12/23/22 1523)   levETIRAcetam injection 1,500 mg (1,500 mg Intravenous Given 12/23/22 1247)   morphine injection 2 mg (2 mg Intravenous Given 12/23/22 1238)     Medical Decision Making:   History:   Old Medical Records: I decided to obtain old medical records.  Differential  Diagnosis:   Seizure 2/2 subtherapeutic AED vs infection vs dehydration vs electrolyte derangement  Clinical Tests:   Lab Tests: Ordered and Reviewed  Radiological Study: Ordered and Reviewed  Medical Tests: Ordered and Reviewed  ED Management:  Patient initially tachycardic to 140s.  She had already received 1 L IV fluid bolus by EMS.  Additional 1 L given in ED with improvement of heart rate to low 100s.  Per chart review, patient has baseline tachycardia 90s-100s. Morphine given for pain.  On reassessment, patient states pain was unaffected by morphine.  Family reports that patient's pain has not responded to narcotics, Tylenol or NSAIDs.  CBC, CMP, magnesium and UA are within normal limits.  Chest x-ray is unremarkable.  Patient also did not take her morning medications.  Her morning dose of Keppra was given in the ED.  Patient and family counseled on importance of taking Keppra at the same time every day to minimize breakthrough seizures.  Referral to pain clinic as well as urgent referral for Neurology.  Patient also encouraged to follow up with her PCP.  Patient and family verbalized understanding and agreement with plan.          Attending Attestation:   Physician Attestation Statement for Resident:  As the supervising MD   Physician Attestation Statement: I have personally seen and examined this patient.   I agree with the above history.  -:   As the supervising MD I agree with the above PE.     As the supervising MD I agree with the above treatment, course, plan, and disposition.                Attending ED Notes:   STAFF ATTENDING PHYSICIAN NOTE:  I have individually/jointly evaluated Aurora Ornelas and discussed their ED management with the resident physician. I have also reviewed their notes, assessments, and procedures documented.  I was present during all critical portions of any procedure(s) performed on Aurora Ornelas.   ____________________  Aime Staples MD, [facility]  Emergency Medicine Staff                    Clinical Impression:   Final diagnoses:  [R56.9] Seizure (Primary)        ED Disposition Condition    Discharge Stable          ED Prescriptions    None       Follow-up Information       Follow up With Specialties Details Why Contact Info    Priyanka Castle MD Internal Medicine Schedule an appointment as soon as possible for a visit   [address] [id number]  [phone]      [facility] - Emergency Dept Emergency Medicine  As needed, If symptoms worsen [address] [facility] [address] [phone]             Michela Reyes MD  Resident  12/24/22 0914       Chapin Staples MD  12/29/22 1735

## 2022-12-23 NOTE — ED NOTES
Patient identifiers for Aurora Ornelas 57 y.o. female checked and correct.  Chief Complaint   Patient presents with    Seizures     Pt has hx of stroke and deafness. Pt nonverbal at baseline. Hx of seizures s/t stroke.     Past Medical History:   Diagnosis Date    Anemia     Arthritis     Asthma     Deaf     Diabetes mellitus     Hyperlipidemia     Hypertension      Allergies reported: Review of patient's allergies indicates:  No Known Allergies    Appearance: Pt awake, alert & oriented to person, place & time. Pt in no acute distress at present time. Pt is clean and well groomed with clothes appropriately fastened.   Skin: Skin warm, dry & intact. Color consistent with ethnicity. Mucous membranes moist. No breakdown or brusing noted.   Musculoskeletal: Contraction to R arm,  Respiratory: Respirations spontaneous, even, and non-labored. Visible chest rise noted. Airway is open and patent. No accessory muscle use noted.   Neurologic: See NN  Cardiac: All peripheral pulses present. No Bilateral lower extremity edema. Cap refill is <3 seconds.  Abdomen: Abdomen  firm, non distended  : Pt  in diaper

## 2022-12-29 LAB — LEVETIRACETAM SERPL-MCNC: 39.2 UG/ML (ref 3–60)

## 2023-02-13 PROBLEM — I63.412 EMBOLIC STROKE INVOLVING LEFT MIDDLE CEREBRAL ARTERY: Status: RESOLVED | Noted: 2021-12-19 | Resolved: 2023-02-13

## 2023-02-13 PROBLEM — N39.0 UTI (URINARY TRACT INFECTION): Status: RESOLVED | Noted: 2022-11-07 | Resolved: 2023-02-13

## 2023-04-20 ENCOUNTER — HOSPITAL ENCOUNTER (EMERGENCY)
Facility: HOSPITAL | Age: 58
Discharge: HOME | End: 2023-04-20
Attending: EMERGENCY MEDICINE
Payer: MEDICARE

## 2023-04-20 VITALS
WEIGHT: 115 LBS | TEMPERATURE: 97 F | HEART RATE: 77 BPM | OXYGEN SATURATION: 100 % | RESPIRATION RATE: 17 BRPM | SYSTOLIC BLOOD PRESSURE: 184 MMHG | HEIGHT: 65 IN | BODY MASS INDEX: 19.16 KG/M2 | DIASTOLIC BLOOD PRESSURE: 98 MMHG

## 2023-04-20 DIAGNOSIS — I10 HYPERTENSION, UNSPECIFIED TYPE: ICD-10-CM

## 2023-04-20 DIAGNOSIS — R10.9 ABDOMINAL PAIN, UNSPECIFIED ABDOMINAL LOCATION: Primary | ICD-10-CM

## 2023-04-20 DIAGNOSIS — R07.9 CHEST PAIN: ICD-10-CM

## 2023-04-20 DIAGNOSIS — G44.209 TENSION HEADACHE: ICD-10-CM

## 2023-04-20 LAB
ALBUMIN SERPL BCP-MCNC: 3.8 G/DL (ref 3.5–5.2)
ALP SERPL-CCNC: 59 U/L (ref 55–135)
ALT SERPL W/O P-5'-P-CCNC: 8 U/L (ref 10–44)
ANION GAP SERPL CALC-SCNC: 11 MMOL/L (ref 8–16)
AST SERPL-CCNC: 13 U/L (ref 10–40)
BASOPHILS # BLD AUTO: 0 K/UL (ref 0–0.2)
BASOPHILS NFR BLD: 0 % (ref 0–1.9)
BILIRUB SERPL-MCNC: 0.6 MG/DL (ref 0.1–1)
BILIRUB UR QL STRIP: NEGATIVE
BNP SERPL-MCNC: 35 PG/ML (ref 0–99)
BUN SERPL-MCNC: 9 MG/DL (ref 6–20)
CALCIUM SERPL-MCNC: 9.6 MG/DL (ref 8.7–10.5)
CHLORIDE SERPL-SCNC: 104 MMOL/L (ref 95–110)
CLARITY UR: CLEAR
CO2 SERPL-SCNC: 27 MMOL/L (ref 23–29)
COLOR UR: COLORLESS
CREAT SERPL-MCNC: 0.7 MG/DL (ref 0.5–1.4)
DIFFERENTIAL METHOD: ABNORMAL
EOSINOPHIL # BLD AUTO: 0.1 K/UL (ref 0–0.5)
EOSINOPHIL NFR BLD: 1.7 % (ref 0–8)
ERYTHROCYTE [DISTWIDTH] IN BLOOD BY AUTOMATED COUNT: 11.7 % (ref 11.5–14.5)
EST. GFR  (NO RACE VARIABLE): >60 ML/MIN/1.73 M^2
GLUCOSE SERPL-MCNC: 85 MG/DL (ref 70–110)
GLUCOSE UR QL STRIP: NEGATIVE
HCT VFR BLD AUTO: 43 % (ref 37–48.5)
HGB BLD-MCNC: 14.7 G/DL (ref 12–16)
HGB UR QL STRIP: NEGATIVE
IMM GRANULOCYTES # BLD AUTO: 0.01 K/UL (ref 0–0.04)
IMM GRANULOCYTES NFR BLD AUTO: 0.3 % (ref 0–0.5)
KETONES UR QL STRIP: NEGATIVE
LEUKOCYTE ESTERASE UR QL STRIP: NEGATIVE
LIPASE SERPL-CCNC: 18 U/L (ref 4–60)
LYMPHOCYTES # BLD AUTO: 1.7 K/UL (ref 1–4.8)
LYMPHOCYTES NFR BLD: 46.8 % (ref 18–48)
MCH RBC QN AUTO: 30.9 PG (ref 27–31)
MCHC RBC AUTO-ENTMCNC: 34.2 G/DL (ref 32–36)
MCV RBC AUTO: 90 FL (ref 82–98)
MONOCYTES # BLD AUTO: 0.3 K/UL (ref 0.3–1)
MONOCYTES NFR BLD: 7.5 % (ref 4–15)
NEUTROPHILS # BLD AUTO: 1.6 K/UL (ref 1.8–7.7)
NEUTROPHILS NFR BLD: 43.7 % (ref 38–73)
NITRITE UR QL STRIP: NEGATIVE
NRBC BLD-RTO: 0 /100 WBC
PH UR STRIP: 7 [PH] (ref 5–8)
PLATELET # BLD AUTO: ABNORMAL K/UL (ref 150–450)
PLATELET BLD QL SMEAR: ABNORMAL
PMV BLD AUTO: ABNORMAL FL (ref 9.2–12.9)
POTASSIUM SERPL-SCNC: 4 MMOL/L (ref 3.5–5.1)
PROT SERPL-MCNC: 7.8 G/DL (ref 6–8.4)
PROT UR QL STRIP: NEGATIVE
RBC # BLD AUTO: 4.76 M/UL (ref 4–5.4)
SODIUM SERPL-SCNC: 142 MMOL/L (ref 136–145)
SP GR UR STRIP: >1.03 (ref 1–1.03)
TROPONIN I SERPL DL<=0.01 NG/ML-MCNC: <0.006 NG/ML (ref 0–0.03)
URN SPEC COLLECT METH UR: ABNORMAL
UROBILINOGEN UR STRIP-ACNC: NEGATIVE EU/DL
WBC # BLD AUTO: 3.61 K/UL (ref 3.9–12.7)

## 2023-04-20 PROCEDURE — 96374 THER/PROPH/DIAG INJ IV PUSH: CPT

## 2023-04-20 PROCEDURE — 96361 HYDRATE IV INFUSION ADD-ON: CPT

## 2023-04-20 PROCEDURE — 83690 ASSAY OF LIPASE: CPT | Performed by: EMERGENCY MEDICINE

## 2023-04-20 PROCEDURE — 63600175 PHARM REV CODE 636 W HCPCS: Performed by: EMERGENCY MEDICINE

## 2023-04-20 PROCEDURE — 25500020 PHARM REV CODE 255: Performed by: EMERGENCY MEDICINE

## 2023-04-20 PROCEDURE — 25000003 PHARM REV CODE 250: Performed by: EMERGENCY MEDICINE

## 2023-04-20 PROCEDURE — 81003 URINALYSIS AUTO W/O SCOPE: CPT | Performed by: EMERGENCY MEDICINE

## 2023-04-20 PROCEDURE — 96375 TX/PRO/DX INJ NEW DRUG ADDON: CPT

## 2023-04-20 PROCEDURE — 85025 COMPLETE CBC W/AUTO DIFF WBC: CPT | Performed by: EMERGENCY MEDICINE

## 2023-04-20 PROCEDURE — 99285 EMERGENCY DEPT VISIT HI MDM: CPT | Mod: 25

## 2023-04-20 PROCEDURE — 80053 COMPREHEN METABOLIC PANEL: CPT | Performed by: EMERGENCY MEDICINE

## 2023-04-20 PROCEDURE — 83880 ASSAY OF NATRIURETIC PEPTIDE: CPT | Performed by: EMERGENCY MEDICINE

## 2023-04-20 PROCEDURE — 84484 ASSAY OF TROPONIN QUANT: CPT | Performed by: EMERGENCY MEDICINE

## 2023-04-20 RX ORDER — HYDRALAZINE HYDROCHLORIDE 20 MG/ML
5 INJECTION INTRAMUSCULAR; INTRAVENOUS
Status: COMPLETED | OUTPATIENT
Start: 2023-04-20 | End: 2023-04-20

## 2023-04-20 RX ORDER — ACETAMINOPHEN 500 MG
1000 TABLET ORAL
Status: COMPLETED | OUTPATIENT
Start: 2023-04-20 | End: 2023-04-20

## 2023-04-20 RX ORDER — PANTOPRAZOLE SODIUM 40 MG/1
40 TABLET, DELAYED RELEASE ORAL DAILY
Qty: 30 TABLET | Refills: 1 | Status: SHIPPED | OUTPATIENT
Start: 2023-04-20 | End: 2024-04-19

## 2023-04-20 RX ORDER — PROCHLORPERAZINE EDISYLATE 5 MG/ML
5 INJECTION INTRAMUSCULAR; INTRAVENOUS
Status: COMPLETED | OUTPATIENT
Start: 2023-04-20 | End: 2023-04-20

## 2023-04-20 RX ORDER — PANTOPRAZOLE SODIUM 40 MG/1
40 TABLET, DELAYED RELEASE ORAL
Status: COMPLETED | OUTPATIENT
Start: 2023-04-20 | End: 2023-04-20

## 2023-04-20 RX ORDER — LIDOCAINE HYDROCHLORIDE 20 MG/ML
15 SOLUTION OROPHARYNGEAL ONCE
Status: COMPLETED | OUTPATIENT
Start: 2023-04-20 | End: 2023-04-20

## 2023-04-20 RX ORDER — MAG HYDROX/ALUMINUM HYD/SIMETH 200-200-20
30 SUSPENSION, ORAL (FINAL DOSE FORM) ORAL ONCE
Status: COMPLETED | OUTPATIENT
Start: 2023-04-20 | End: 2023-04-20

## 2023-04-20 RX ORDER — BUTALBITAL, ACETAMINOPHEN AND CAFFEINE 50; 325; 40 MG/1; MG/1; MG/1
1 TABLET ORAL EVERY 4 HOURS PRN
Qty: 20 TABLET | Refills: 0 | Status: SHIPPED | OUTPATIENT
Start: 2023-04-20 | End: 2023-05-20

## 2023-04-20 RX ORDER — DIPHENHYDRAMINE HYDROCHLORIDE 50 MG/ML
12.5 INJECTION INTRAMUSCULAR; INTRAVENOUS
Status: COMPLETED | OUTPATIENT
Start: 2023-04-20 | End: 2023-04-20

## 2023-04-20 RX ADMIN — PROCHLORPERAZINE EDISYLATE 5 MG: 5 INJECTION INTRAMUSCULAR; INTRAVENOUS at 01:04

## 2023-04-20 RX ADMIN — IOHEXOL 75 ML: 350 INJECTION, SOLUTION INTRAVENOUS at 01:04

## 2023-04-20 RX ADMIN — ALUMINUM HYDROXIDE, MAGNESIUM HYDROXIDE, AND SIMETHICONE 30 ML: 200; 200; 20 SUSPENSION ORAL at 01:04

## 2023-04-20 RX ADMIN — PANTOPRAZOLE SODIUM 40 MG: 40 TABLET, DELAYED RELEASE ORAL at 03:04

## 2023-04-20 RX ADMIN — HYDRALAZINE HYDROCHLORIDE 5 MG: 20 INJECTION INTRAMUSCULAR; INTRAVENOUS at 02:04

## 2023-04-20 RX ADMIN — SODIUM CHLORIDE 1000 ML: 9 INJECTION, SOLUTION INTRAVENOUS at 01:04

## 2023-04-20 RX ADMIN — DIPHENHYDRAMINE HYDROCHLORIDE 12.5 MG: 50 INJECTION, SOLUTION INTRAMUSCULAR; INTRAVENOUS at 01:04

## 2023-04-20 RX ADMIN — LIDOCAINE HYDROCHLORIDE 15 ML: 20 SOLUTION ORAL at 01:04

## 2023-04-20 RX ADMIN — ACETAMINOPHEN 1000 MG: 500 TABLET ORAL at 03:04

## 2023-04-20 NOTE — ED PROVIDER NOTES
Encounter Date: 4/20/2023    SCRIBE #1 NOTE: INichole, am scribing for, and in the presence of,  Renetta Drew MD. Other sections scribed: HPI, ROS, PE.     History     Chief Complaint   Patient presents with    Headache     Pt BIB EMS from home for a headache x1 hour. EMS reported Pt has hx of seizures and stroke. Pt denied dizziness or blurry vision. Pt denied taking any medications.      CC: Multiple complaints    HPI: History is provided by independent historian. History is extremely difficult to obtain given hearing barrier, despite use of  via [employer]. This is a 57 y.o. F who has a PMHx Seizure, CVA with residual R sided hemiparesis, HTN, HLD, DM who presents to the ED via EMS transportation for emergent evaluation of multiple complaints. Pt complains of acute abdominal pain that began after eating today. Pt denies a Hx of abdominal pain after eating. She points to the epigastrum as location of pain. She also denies a Hx of abdominal surgeries. Pt has an additional complaint of headache that began today. She describes the headache as a pounding sensation. She rates the headache an 8/10. She took Advil for the headache this morning with temporary relief. Pt also complains of CP that began yesterday. She has a Hx of stroke with residual effects on the right side. She reports paralysis on the right side since the stroke. The pt lives with her son, and his daughter. Pt states that she took her daily medications this morning at 8:00 am. She has no known drug allergies. Pt denies nausea, or vomiting.    The history is provided by the patient. A  was used ([employer]  [id number] used for interpretation.).   Review of patient's allergies indicates:  No Known Allergies  Past Medical History:   Diagnosis Date    Anemia     Arthritis     Asthma     Deaf     Diabetes mellitus     Hyperlipidemia     Hypertension      Past Surgical History:   Procedure Laterality Date     COLONOSCOPY N/A 12/4/2020    Procedure: COLONOSCOPY;  Surgeon: Felicia Bishop MD;  Location: [facility] ENDO;  Service: Endoscopy;  Laterality: N/A;    ESOPHAGOGASTRODUODENOSCOPY N/A 2/28/2019    Procedure: EGD (ESOPHAGOGASTRODUODENOSCOPY);  Surgeon: Yolanda Gary MD;  Location: [facility] ENDO;  Service: Endoscopy;  Laterality: N/A;    ESOPHAGOGASTRODUODENOSCOPY N/A 12/4/2020    Procedure: EGD (ESOPHAGOGASTRODUODENOSCOPY);  Surgeon: Felicia Bishop MD;  Location: [facility] ENDO;  Service: Endoscopy;  Laterality: N/A;    ESOPHAGOGASTRODUODENOSCOPY N/A 7/1/2021    Procedure: EGD (ESOPHAGOGASTRODUODENOSCOPY)-in March 2021;  Surgeon: Sharath Kohler MD;  Location: [facility] ENDO;  Service: Endoscopy;  Laterality: N/A;  hearing impaired, will need  - Maria Dolores Harris assigned-MS  fully vaccinated-see immunization record    HYSTERECTOMY       Family History   Problem Relation Age of Onset    Diabetes Mother     Hypertension Mother     Colon cancer Neg Hx     Esophageal cancer Neg Hx      Social History     Tobacco Use    Smoking status: Never    Smokeless tobacco: Never   Substance Use Topics    Alcohol use: No    Drug use: No     Review of Systems   Reason unable to perform ROS: due to hearing barrier, although  used.   Constitutional:  Negative for fever.   HENT:  Negative for facial swelling.    Respiratory:  Negative for shortness of breath.    Cardiovascular:  Positive for chest pain.   Gastrointestinal:  Positive for abdominal pain. Negative for nausea and vomiting.   Genitourinary:  Negative for difficulty urinating.   Neurological:  Positive for weakness (right sided, chronic), numbness (right sided, chronic) and headaches.     Physical Exam     Initial Vitals [04/20/23 1114]   BP Pulse Resp Temp SpO2   (!) 165/87 90 17 97.2 °F (36.2 °C) 100 %      MAP       --         Physical Exam    Nursing note and vitals reviewed.  Constitutional: She appears well-developed and well-nourished.  She is not diaphoretic. No distress.   HENT:   Mouth/Throat: Oropharynx is clear and moist.   Eyes: Conjunctivae and EOM are normal. Pupils are equal, round, and reactive to light.   Neck: Neck supple.   Cardiovascular:  Normal rate and regular rhythm.           Pulmonary/Chest: Breath sounds normal.   Abdominal: Abdomen is soft. Bowel sounds are normal. She exhibits no distension. There is abdominal tenderness in the epigastric area.   Genitourinary:    Genitourinary Comments: Rectal exam performed with RN as chaperone, normal rectal tone, soft, light brown stool present rectal vault.  There is no fecal impaction.  Prior to rectal exam, patient did have a bowel movement in the ED per RN.     Musculoskeletal:         General: No edema.      Cervical back: Neck supple.     Neurological: She is alert. GCS eye subscore is 4. GCS verbal subscore is 5. GCS motor subscore is 6.   There is hemiparesis of the right side with a contracture of the RUE. There is no facial droop.  Extraocular movements are intact.  Unable to assess speech.    Skin: Skin is warm and dry.   Psychiatric: She has a normal mood and affect.       ED Course   Procedures  Labs Reviewed   CBC W/ AUTO DIFFERENTIAL - Abnormal; Notable for the following components:       Result Value    WBC 3.61 (*)     Gran # (ANC) 1.6 (*)     Platelet Estimate Clumped (*)     All other components within normal limits   COMPREHENSIVE METABOLIC PANEL - Abnormal; Notable for the following components:    ALT 8 (*)     All other components within normal limits   URINALYSIS, REFLEX TO URINE CULTURE - Abnormal; Notable for the following components:    Color, UA Colorless (*)     Specific Gravity, UA >1.030 (*)     All other components within normal limits    Narrative:     Specimen Source->Urine   B-TYPE NATRIURETIC PEPTIDE   LIPASE   TROPONIN I          Imaging Results              CT Head Without Contrast (Final result)  Result time 04/20/23 14:14:22      Final result by  Yandel Sevilla Jr., MD (04/20/23 14:14:22)                   Impression:      No acute intracranial abnormality in this patient with chronic left MCA vascular territory infarct and changes of chronic microvascular ischemia.      Electronically signed by: Yandel Wilson Jr  Date:    04/20/2023  Time:    14:14               Narrative:    EXAMINATION:  CT HEAD WITHOUT CONTRAST    CLINICAL HISTORY:  Headache, new or worsening (Age >= 50y);history of CVA with R hemiparesis;    TECHNIQUE:  Low dose axial images were obtained through the head.  Coronal and sagittal reformations were also performed. Contrast was not administered.    COMPARISON:  11/08/2022    FINDINGS:  Stable appearance of extensive encephalomalacia throughout the left MCA vascular territory compatible with large chronic MCA vascular territory infarct.  There is associated ex vacuo dilatation of the left lateral ventricle.The remainder of the brain parenchyma is remarkable for periventricular hypodensity suspected chronic microvascular ischemia, similar to prior.  There is no evidence of intracranial hemorrhage or mass.    The visualized paranasal sinuses are clear.                                       CT Abdomen Pelvis With Contrast (Final result)  Result time 04/20/23 14:17:06      Final result by Yandel Sevilla Jr., MD (04/20/23 14:17:06)                   Impression:      Marked rectal fecal impaction    Large hiatal hernia    Right adrenal adenoma and stable left adrenal nodularity      Electronically signed by: Yandel Wilson Jr  Date:    04/20/2023  Time:    14:17               Narrative:    EXAMINATION:  CT ABDOMEN PELVIS WITH CONTRAST    CLINICAL HISTORY:  Nausea/vomiting;Epigastric pain;    TECHNIQUE:  Low dose axial images, sagittal and coronal reformations were obtained from the lung bases to the pubic symphysis following the IV administration of 75 mL of Omnipaque 350    COMPARISON:  12/22/2022    FINDINGS:  Abdomen:    - Lung  bases: Bibasilar pleuroparenchymal scarring.  Redemonstration of large hiatal hernia.    - Liver: Normal.    - Gallbladder and bile ducts: Unremarkable.    - Spleen: Unremarkable.    - Pancreas: Normal.    - Kidneys: No mass or hydronephrosis.    - Adrenals: Stable low-density 2.5 cm right adrenal adenoma and stable nonspecific nodularity of the left adrenal gland.    - Retroperitoneum:  No significant adenopathy.    - Vascular: Aortic atherosclerosis is present.    - Bowel/mesentery: Large rectal fecal impaction redemonstrated similar to prior.  Mild generalized colonic fecal stasis.  No small bowel dilatation or inflammation.    Pelvis:    Urinary bladder unremarkable.  Uterus appears surgically absent.  Benign simple left ovarian cyst.    Bones:  Unremarkable.                                       X-Ray Chest AP Portable (Final result)  Result time 04/20/23 12:39:23      Final result by Vladimir Camp MD (04/20/23 12:39:23)                   Impression:      See above      Electronically signed by: Vladimir Camp MD  Date:    04/20/2023  Time:    12:39               Narrative:    EXAMINATION:  XR CHEST AP PORTABLE    CLINICAL HISTORY:  chest pain;    TECHNIQUE:  Single frontal view of the chest was performed.    COMPARISON:  12/23/2022    FINDINGS:  Cardiac size is normal.  Lungs are clear.  No infiltrate is seen.                                       Medications   pantoprazole EC tablet 40 mg (has no administration in time range)   acetaminophen tablet 1,000 mg (has no administration in time range)   sodium chloride 0.9% bolus 1,000 mL 1,000 mL (1,000 mLs Intravenous New Bag 4/20/23 1354)   aluminum-magnesium hydroxide-simethicone 200-200-20 mg/5 mL suspension 30 mL (30 mLs Oral Given 4/20/23 1352)     And   LIDOcaine HCl 2% oral solution 15 mL (15 mLs Oral Given 4/20/23 1352)   prochlorperazine injection Soln 5 mg (5 mg Intravenous Given 4/20/23 1352)   diphenhydrAMINE injection 12.5 mg (12.5 mg  Intravenous Given 4/20/23 1353)   iohexoL (OMNIPAQUE 350) injection 75 mL (75 mLs Intravenous Given 4/20/23 1341)   hydrALAZINE injection 5 mg (5 mg Intravenous Given 4/20/23 1453)     Medical Decision Making:   Initial Assessment:   49-year-old female with history of seizure disorder, CVA with residual right-sided hemiparesis presents with multiple complaints.  Noted that patient is a very difficult historian despite use of video ASL .  She complains of headache, chest pain, abdominal pain.  It seems the headache and abdominal pain started this morning after eating.  She states she took Advil with temporary relief in the headache.  She points to the left side of the head is location.  Her abdomen hurts in the epigastrium, denies similar symptoms in the past, denies abdominal surgeries.  Also reports chest pain that started yesterday.  On exam, as patient has deafness, unable to assess speech, she answers questions appropriately but with some difficulty, right-sided hemiparesis with right upper extremity contracture.  Differential is broad given broad complaints, includes but not limited to pancreatitis, gastritis, GERD, head bleed, will check CT head, CT abdomen pelvis, labs, treat with IV fluids, Compazine and Benadryl.  Patient noted to have seen Neurology at [facility] in February.  At that time, noted diagnosis of seizure, chronic left MCA occlusion.  Started on aspirin.  Continued on Keppra.        Scribe Attestation:   Scribe #1: I performed the above scribed service and the documentation accurately describes the services I performed. I attest to the accuracy of the note.      ED Course as of 04/20/23 1523   Thu Apr 20, 2023   [id number] I reviewed the test results with the patient using the Grace .  I also with the patient's permission called the patient's daughter to give her updates and care plan.  Labs are within acceptable limits, specifically, no leukocytosis. Troponin and BNP negative.  Lipase WNL.  CT does not show any acute finding other than suspected fecal impaction.  CT head shows chronic findings, no acute changes.  I suspect GERD versus PUD versus gastritis, tension-type headache.  Will give dose of Tylenol here, I recommend follow-up with GI, initiation of PPI, reviewed dietary changes.  Patient states she understands and agrees with plan. [LH]      ED Course User Index  [LH] Renetta Drew MD               I, Renetta Drew MD, personally performed the services described in this documentation. All medical record entries made by the scribe were at my direction and in my presence. I have reviewed the chart and agree that the record reflects my personal performance and is accurate and complete.    This dictation has been generated using M-Modal Fluency Direct dictation; some phonetic errors may occur.       Clinical Impression:   Final diagnoses:  [R07.9] Chest pain  [R10.9] Abdominal pain, unspecified abdominal location (Primary)  [I10] Hypertension, unspecified type  [G44.209] Tension headache        ED Disposition Condition    Discharge Stable          ED Prescriptions       Medication Sig Dispense Start Date End Date Auth. Provider    butalbital-acetaminophen-caffeine -40 mg (FIORICET, ESGIC) -40 mg per tablet Take 1 tablet by mouth every 4 (four) hours as needed for Pain or Headaches. 20 tablet 4/20/2023 5/20/2023 Renetta Drew MD    pantoprazole (PROTONIX) 40 MG tablet Take 1 tablet (40 mg total) by mouth once daily. 30 tablet 4/20/2023 4/19/2024 Renetta Drew MD          Follow-up Information       Follow up With Specialties Details Why Contact Info Additional Information    Priyanka Castle MD Internal Medicine Schedule an appointment as soon as possible for a visit  To recheck your symptoms, To recheck your blood pressure [address] [place] [address] [phone]       [facility] Gastroenterology Schedule an appointment as soon as  possible for a visit   [address] [facility] [address]  [facility] [address] [phone] Please park in garage or surface lot and use Medical Office Bldg entrance. Check in at [address]    [facility] Emergency Dept Emergency Medicine  As needed, If symptoms worsen [address]  [facility] [address] [phone]     Burak Burger MD Cardiology Schedule an appointment as soon as possible for a visit   [address]  [address]  [facility] [address]  [phone]                Renetta Drew MD  04/20/23 1523

## 2023-04-20 NOTE — DISCHARGE INSTRUCTIONS
Thank you for coming to our Emergency Department today. It is important to remember that some problems are difficult to diagnose and may not be found during your Emergency Department visit. Be sure to follow up with your primary care doctor and review all labs/imaging/tests that were performed during this visit with them. Some labs/tests may be outside of the normal range and require non-emergent follow-up and further investigation to help diagnose/exclude/prevent complications or other medical conditions.    If you do not have a primary care doctor, you may contact the one listed on your discharge paperwork or you may also call the [facility] Appointment Desk at [phone] to schedule an appointment and establish care with one. It is important to your health that you have a primary care doctor.    Medicaid Escalation Line:   [phone] - Please contact this number if you are having difficulty getting follow up with a Primary Care Provider or Speciality Provider.     Please take all medications as directed. All medications may potentially have side-effects and it is impossible to predict which medications may give you side-effects or what side-effects (if any) they will give you.. If you feel that you are having a negative effect or side-effect of any medication you should immediately stop taking them and seek medical attention. If you feel that you are having a life-threatening reaction call [phone].    Return to the ER with any questions/concerns, new/concerning symptoms, worsening or failure to improve.     Do not drive, swim, climb to height, take a bath or make any important decisions for 24 hours if you have received any pain medications, sedatives or mood altering drugs during your ER visit.        BELOW THIS LINE ONLY APPLIES IF YOU HAVE A COVID TEST PENDING OR IF YOU HAVE BEEN DIAGNOSED WITH COVID:  Please access [facility] to review the results of your test. Until the results of your COVID test  return, you should isolate yourself so as not to potentially spread illness to others.   If your COVID test returns positive, you should isolate yourself so as not to spread illness to others. After five full days, if you are feeling better and you have not had fever for 24 hours, you can return to your typical daily activities, but you must wear a mask around others for an additional 5 days.   If your COVID test returns negative and you are either unvaccinated or more than six months out from your two-dose vaccine and are not yet boosted, you should still quarantine for 5 full days followed by strict mask use for an additional 5 full days.   If your COVID test returns negative and you have received your 2-dose initial vaccine as well as a booster, you should continue strict mask use for 10 full days after the exposure.  For all those exposed, best practice includes a test at day 5 after the exposure. This can be a home test or a test through one of the many testing centers throughout our community.   Masking is always advised to limit the spread of COVID. Cdc.gov is an excellent site to obtain the latest up to date recommendations regarding COVID and COVID testing.     CDC Testing and Quarantine Guidelines for patients with exposure to a known-positive COVID-19 person:  A close exposure is defined as anyone who has had an exposure (masked or unmasked) to a known COVID -19 positive person within 6 feet of someone for a cumulative total of 15 minutes or more over a 24-hour period.   Vaccinated and/or if you recently had a positive covid test within 90 days do NOT need to quarantine after contact with someone who had COVID-19 unless you develop symptoms.   Fully vaccinated people who have not had a positive test within 90 days, should get tested 3-5 days after their exposure, even if they don't have symptoms and wear a mask indoors in public for 14 days following exposure or until their test result is  negative.      Unvaccinated and/or NOT had a positive test within 90 days and meet close exposure  You are required by CDC guidelines to quarantine for at least 5 days from time of exposure followed by 5 days of strict masking. It is recommended, but not required to test after 5 days, unless you develop symptoms, in which case you should test at that time.  If you get tested after 5 days and your test is positive, your 5 day period of isolation starts the day of the positive test.    If your exposure does not meet the above definition, you can return to your normal daily activities to include social distancing, wearing a mask and frequent handwashing.      Here is a link to guidance from the CDC:  https://www.cdc.gov/media/releases/2021/s1227-isolation-quarantine-guidance.html      [facility]  Health Testing Sites:  https://ldh.[place].gov/page/3934      [facility] website with testing locations and guidance:  https://[email or web address].[email or web address]

## 2024-02-14 ENCOUNTER — HOSPITAL ENCOUNTER (EMERGENCY)
Facility: HOSPITAL | Age: 59
Discharge: HOME | End: 2024-02-15
Attending: STUDENT IN AN ORGANIZED HEALTH CARE EDUCATION/TRAINING PROGRAM
Payer: MEDICARE

## 2024-02-14 DIAGNOSIS — K52.9 GASTROENTERITIS: Primary | ICD-10-CM

## 2024-02-14 DIAGNOSIS — R07.9 CHEST PAIN: ICD-10-CM

## 2024-02-14 LAB
ALBUMIN SERPL BCP-MCNC: 3.6 G/DL (ref 3.5–5.2)
ALP SERPL-CCNC: 75 U/L (ref 55–135)
ALT SERPL W/O P-5'-P-CCNC: 25 U/L (ref 10–44)
ANION GAP SERPL CALC-SCNC: 9 MMOL/L (ref 8–16)
AST SERPL-CCNC: 24 U/L (ref 10–40)
BASOPHILS # BLD AUTO: 0.02 K/UL (ref 0–0.2)
BASOPHILS NFR BLD: 0.5 % (ref 0–1.9)
BILIRUB SERPL-MCNC: 0.3 MG/DL (ref 0.1–1)
BNP SERPL-MCNC: 19 PG/ML (ref 0–99)
BUN SERPL-MCNC: 9 MG/DL (ref 6–20)
CALCIUM SERPL-MCNC: 8.7 MG/DL (ref 8.7–10.5)
CHLORIDE SERPL-SCNC: 105 MMOL/L (ref 95–110)
CO2 SERPL-SCNC: 29 MMOL/L (ref 23–29)
CREAT SERPL-MCNC: 0.8 MG/DL (ref 0.5–1.4)
DIFFERENTIAL METHOD BLD: ABNORMAL
EOSINOPHIL # BLD AUTO: 0.1 K/UL (ref 0–0.5)
EOSINOPHIL NFR BLD: 3.1 % (ref 0–8)
ERYTHROCYTE [DISTWIDTH] IN BLOOD BY AUTOMATED COUNT: 11.1 % (ref 11.5–14.5)
EST. GFR  (NO RACE VARIABLE): >60 ML/MIN/1.73 M^2
GLUCOSE SERPL-MCNC: 115 MG/DL (ref 70–110)
HCT VFR BLD AUTO: 39.8 % (ref 37–48.5)
HGB BLD-MCNC: 13.3 G/DL (ref 12–16)
IMM GRANULOCYTES # BLD AUTO: 0 K/UL (ref 0–0.04)
IMM GRANULOCYTES NFR BLD AUTO: 0 % (ref 0–0.5)
LACTATE SERPL-SCNC: 1.2 MMOL/L (ref 0.5–2.2)
LIPASE SERPL-CCNC: 30 U/L (ref 4–60)
LYMPHOCYTES # BLD AUTO: 1.3 K/UL (ref 1–4.8)
LYMPHOCYTES NFR BLD: 29.9 % (ref 18–48)
MAGNESIUM SERPL-MCNC: 1.8 MG/DL (ref 1.6–2.6)
MCH RBC QN AUTO: 30.3 PG (ref 27–31)
MCHC RBC AUTO-ENTMCNC: 33.4 G/DL (ref 32–36)
MCV RBC AUTO: 91 FL (ref 82–98)
MONOCYTES # BLD AUTO: 0.3 K/UL (ref 0.3–1)
MONOCYTES NFR BLD: 7.1 % (ref 4–15)
NEUTROPHILS # BLD AUTO: 2.5 K/UL (ref 1.8–7.7)
NEUTROPHILS NFR BLD: 59.4 % (ref 38–73)
NRBC BLD-RTO: 0 /100 WBC
PLATELET # BLD AUTO: ABNORMAL K/UL (ref 150–450)
PMV BLD AUTO: ABNORMAL FL (ref 9.2–12.9)
POTASSIUM SERPL-SCNC: 3.8 MMOL/L (ref 3.5–5.1)
PROT SERPL-MCNC: 7.4 G/DL (ref 6–8.4)
RBC # BLD AUTO: 4.39 M/UL (ref 4–5.4)
SODIUM SERPL-SCNC: 143 MMOL/L (ref 136–145)
TROPONIN I SERPL DL<=0.01 NG/ML-MCNC: <0.006 NG/ML (ref 0–0.03)
WBC # BLD AUTO: 4.21 K/UL (ref 3.9–12.7)

## 2024-02-14 PROCEDURE — 85025 COMPLETE CBC W/AUTO DIFF WBC: CPT | Performed by: STUDENT IN AN ORGANIZED HEALTH CARE EDUCATION/TRAINING PROGRAM

## 2024-02-14 PROCEDURE — 83690 ASSAY OF LIPASE: CPT | Performed by: STUDENT IN AN ORGANIZED HEALTH CARE EDUCATION/TRAINING PROGRAM

## 2024-02-14 PROCEDURE — 25000003 PHARM REV CODE 250: Performed by: STUDENT IN AN ORGANIZED HEALTH CARE EDUCATION/TRAINING PROGRAM

## 2024-02-14 PROCEDURE — 99285 EMERGENCY DEPT VISIT HI MDM: CPT | Mod: 25

## 2024-02-14 PROCEDURE — 83605 ASSAY OF LACTIC ACID: CPT | Performed by: STUDENT IN AN ORGANIZED HEALTH CARE EDUCATION/TRAINING PROGRAM

## 2024-02-14 PROCEDURE — 93010 ELECTROCARDIOGRAM REPORT: CPT | Mod: ,,, | Performed by: INTERNAL MEDICINE

## 2024-02-14 PROCEDURE — 83735 ASSAY OF MAGNESIUM: CPT | Performed by: STUDENT IN AN ORGANIZED HEALTH CARE EDUCATION/TRAINING PROGRAM

## 2024-02-14 PROCEDURE — 84484 ASSAY OF TROPONIN QUANT: CPT | Performed by: STUDENT IN AN ORGANIZED HEALTH CARE EDUCATION/TRAINING PROGRAM

## 2024-02-14 PROCEDURE — 93005 ELECTROCARDIOGRAM TRACING: CPT

## 2024-02-14 PROCEDURE — 83880 ASSAY OF NATRIURETIC PEPTIDE: CPT | Performed by: STUDENT IN AN ORGANIZED HEALTH CARE EDUCATION/TRAINING PROGRAM

## 2024-02-14 PROCEDURE — 80053 COMPREHEN METABOLIC PANEL: CPT | Performed by: STUDENT IN AN ORGANIZED HEALTH CARE EDUCATION/TRAINING PROGRAM

## 2024-02-14 RX ORDER — PANTOPRAZOLE SODIUM 40 MG/10ML
40 INJECTION, POWDER, LYOPHILIZED, FOR SOLUTION INTRAVENOUS
Status: DISCONTINUED | OUTPATIENT
Start: 2024-02-14 | End: 2024-02-14

## 2024-02-14 RX ORDER — PANTOPRAZOLE SODIUM 40 MG/1
40 TABLET, DELAYED RELEASE ORAL DAILY
Qty: 30 TABLET | Refills: 0 | Status: SHIPPED | OUTPATIENT
Start: 2024-02-14 | End: 2024-03-15

## 2024-02-14 RX ORDER — LIDOCAINE HYDROCHLORIDE 20 MG/ML
15 SOLUTION OROPHARYNGEAL ONCE
Status: COMPLETED | OUTPATIENT
Start: 2024-02-14 | End: 2024-02-14

## 2024-02-14 RX ORDER — ONDANSETRON 4 MG/1
4 TABLET, ORALLY DISINTEGRATING ORAL EVERY 8 HOURS PRN
Qty: 12 TABLET | Refills: 0 | Status: SHIPPED | OUTPATIENT
Start: 2024-02-14

## 2024-02-14 RX ORDER — PANTOPRAZOLE SODIUM 40 MG/1
40 TABLET, DELAYED RELEASE ORAL DAILY
Status: DISCONTINUED | OUTPATIENT
Start: 2024-02-14 | End: 2024-02-15 | Stop reason: HOSPADM

## 2024-02-14 RX ORDER — PANTOPRAZOLE SODIUM 40 MG/1
40 TABLET, DELAYED RELEASE ORAL DAILY
Status: DISCONTINUED | OUTPATIENT
Start: 2024-02-15 | End: 2024-02-14

## 2024-02-14 RX ORDER — ALUMINUM HYDROXIDE, MAGNESIUM HYDROXIDE, AND SIMETHICONE 1200; 120; 1200 MG/30ML; MG/30ML; MG/30ML
30 SUSPENSION ORAL ONCE
Status: COMPLETED | OUTPATIENT
Start: 2024-02-14 | End: 2024-02-14

## 2024-02-14 RX ADMIN — LIDOCAINE HYDROCHLORIDE 15 ML: 20 SOLUTION OROPHARYNGEAL at 10:02

## 2024-02-14 RX ADMIN — PANTOPRAZOLE SODIUM 40 MG: 40 TABLET, DELAYED RELEASE ORAL at 11:02

## 2024-02-14 RX ADMIN — ALUMINUM HYDROXIDE, MAGNESIUM HYDROXIDE, AND DIMETHICONE 30 ML: 200; 20; 200 SUSPENSION ORAL at 10:02

## 2024-02-15 VITALS
SYSTOLIC BLOOD PRESSURE: 166 MMHG | WEIGHT: 115 LBS | HEIGHT: 65 IN | RESPIRATION RATE: 18 BRPM | TEMPERATURE: 99 F | DIASTOLIC BLOOD PRESSURE: 99 MMHG | HEART RATE: 78 BPM | BODY MASS INDEX: 19.16 KG/M2 | OXYGEN SATURATION: 99 %

## 2024-02-15 LAB
OHS QRS DURATION: 90 MS
OHS QTC CALCULATION: 435 MS

## 2024-02-15 NOTE — ED NOTES
Patient presents to ED reports x3 days of diarrhea and abd pain. Patient states has noticed black and red stool in diarrhea. Patient reports difficulty breathing, and all over pain.   Due to language barrier, an  was present during the history-taking and subsequent discussion (and for part of the physical exam) with this patient. ID # [id number]

## 2024-02-15 NOTE — DISCHARGE INSTRUCTIONS
Thank you for coming to our Emergency Department today. It is important to remember that some problems are difficult to diagnose and may not be found during your first visit. Be sure to follow up with your primary care doctor and review any labs/imaging that was performed with them. If you do not have a primary care doctor, you may contact the one listed on your discharge paperwork or you may also call the [facility] Appointment Desk at [phone] to schedule an appointment with one.     All medications may potentially have side effects and it is impossible to predict which medications may give you side effects. If you feel that you are having a negative effect of any medication you should immediately stop taking them and seek medical attention.    Return to the ER with any questions/concerns, new/concerning symptoms, worsening or failure to improve. Do not drive or make any important decisions for 24 hours if you have received any pain medications, sedatives or mood altering drugs during your ER visit.

## 2024-02-15 NOTE — ED PROVIDER NOTES
Encounter Date: 2/14/2024       History     Chief Complaint   Patient presents with    Abdominal Pain    Chest Pain     Patient arrives via EMS with chest pain that has been ongoing for about 1 hour, sharp and substernal. Abdominal pain is near navel, ongoing for the past 3 days. Denies NV, but does have diarrhea. Denies SOB, blood in stool, blood thinner use. Patient is deaf.     58 y.o.female who has a past medical history of  hearing impairment, stroke with right sided deficits, nonverbal at base line,  Diabetes mellitus, Hyperlipidemia, and Hypertension who presents to the emergency department due to nausea vomiting abdominal pain and diarrhea for the past 3 days.  Patient reports abdominal pain is generalized in nature, and stools are watery brown.  She reports having 2 episodes of nonbloody nonbilious emesis yesterday.  1 hour prior to ED presentation patient began experiencing epigastric/midsternal chest pain so activated EMS.  Currently reports her symptoms have subsided.  Denies any associated shortness of breath, denies any lower extremity swelling.  Denies any cough or hemoptysis.  Denies any fevers or chills.  Denies any antibiotic use or recent travel.  Denies any changes in her diet.    The history is provided by the patient. The history is limited by a language barrier. A  was used ([id number]).     Review of patient's allergies indicates:  No Known Allergies  Past Medical History:   Diagnosis Date    Anemia     Arthritis     Asthma     Deaf     Diabetes mellitus     Hyperlipidemia     Hypertension      Past Surgical History:   Procedure Laterality Date    COLONOSCOPY N/A 12/4/2020    Procedure: COLONOSCOPY;  Surgeon: Felicia Bishop MD;  Location: [facility];  Service: Endoscopy;  Laterality: N/A;    ESOPHAGOGASTRODUODENOSCOPY N/A 2/28/2019    Procedure: EGD (ESOPHAGOGASTRODUODENOSCOPY);  Surgeon: Yolanda Gary MD;  Location: [facility];  Service: Endoscopy;  Laterality: N/A;     ESOPHAGOGASTRODUODENOSCOPY N/A 12/4/2020    Procedure: EGD (ESOPHAGOGASTRODUODENOSCOPY);  Surgeon: Felicia Bishop MD;  Location: [facility];  Service: Endoscopy;  Laterality: N/A;    ESOPHAGOGASTRODUODENOSCOPY N/A 7/1/2021    Procedure: EGD (ESOPHAGOGASTRODUODENOSCOPY)-in March 2021;  Surgeon: Sharath Kohler MD;  Location: [facility];  Service: Endoscopy;  Laterality: N/A;  hearing impaired, will need  - Maria Dolores Harris assigned-MS  fully vaccinated-see immunization record    HYSTERECTOMY       Family History   Problem Relation Age of Onset    Diabetes Mother     Hypertension Mother     Colon cancer Neg Hx     Esophageal cancer Neg Hx      Social History     Tobacco Use    Smoking status: Never    Smokeless tobacco: Never   Substance Use Topics    Alcohol use: No    Drug use: No     Review of Systems   Constitutional:  Negative for fever.   HENT:  Negative for sore throat.    Respiratory:  Negative for shortness of breath.    Cardiovascular:  Negative for chest pain.   Gastrointestinal:  Positive for abdominal distention, diarrhea, nausea and vomiting.   Genitourinary:  Negative for dysuria.   Musculoskeletal:  Negative for back pain.   Skin:  Negative for rash.   Neurological:  Negative for weakness.   Hematological:  Does not bruise/bleed easily.       Physical Exam     Initial Vitals [02/14/24 2014]   BP Pulse Resp Temp SpO2   (!) 166/99 101 18 98.7 °F (37.1 °C) 96 %      MAP       --         Physical Exam    Nursing note and vitals reviewed.  Constitutional: She is not diaphoretic. No distress.   HENT:   Head: Normocephalic and atraumatic.   Eyes: Conjunctivae and EOM are normal. Pupils are equal, round, and reactive to light.   Neck:   Normal range of motion.  Pulmonary/Chest: Breath sounds normal. No respiratory distress.   Abdominal: Abdomen is soft. Bowel sounds are normal. She exhibits no distension. There is generalized abdominal tenderness.   Musculoskeletal:          General: No tenderness.      Cervical back: Normal range of motion.     Neurological: She is alert.   Skin: Skin is warm. Capillary refill takes less than 2 seconds.   Psychiatric: Her behavior is normal.         ED Course   Procedures  Labs Reviewed   CBC W/ AUTO DIFFERENTIAL - Abnormal; Notable for the following components:       Result Value    RDW 11.1 (*)     All other components within normal limits   COMPREHENSIVE METABOLIC PANEL - Abnormal; Notable for the following components:    Glucose 115 (*)     All other components within normal limits   TROPONIN I   B-TYPE NATRIURETIC PEPTIDE   LIPASE   LACTIC ACID, PLASMA   MAGNESIUM     EKG Readings: (Independently Interpreted)   Independent Interpretation of EKG:  Rhythm: Sinus   Rate: 85  QTC: 435  No STEMI        ECG Results              EKG 12-lead (Final result)        Collection Time Result Time QRS Duration OHS QTC Calculation    02/14/24 20:25:13 02/15/24 17:12:37 90 435                     Final result by Interface, Lab In [facility] (02/15/24 17:12:48)                   Narrative:    Test Reason : R07.9,    Vent. Rate : 085 BPM     Atrial Rate : 085 BPM     P-R Int : 134 ms          QRS Dur : 090 ms      QT Int : 366 ms       P-R-T Axes : 053 045 270 degrees     QTc Int : 435 ms    Normal sinus rhythm  ST and T wave abnormality, consider inferior ischemia  Abnormal ECG  When compared with ECG of 23-DEC-2022 11:56,  Significant changes have occurred  Confirmed by Shelley DAUGHERTY, Esperanza MCGUIRE ([id number]) on 2/15/2024 5:12:34 PM    Referred By: [email or web address]   SELF           Confirmed By:Esperanza Rosa MD                                  Imaging Results              X-Ray Chest AP Portable (Final result)  Result time 02/14/24 22:18:34      Final result by Haydee Diaz MD (02/14/24 22:18:34)                   Impression:      No acute intrathoracic abnormality detected.      Electronically signed by: Haydee Diaz  Date:    02/14/2024  Time:    22:18                Narrative:    EXAMINATION:  AP PORTABLE CHEST    CLINICAL HISTORY:  Chest pain, unspecified    TECHNIQUE:  AP portable chest radiograph was submitted.    COMPARISON:  04/20/2023    FINDINGS:  AP portable chest radiograph demonstrates a cardiac silhouette within normal limits.  There is no focal consolidation, pneumothorax, or pleural effusion.  There is mild levoscoliosis.  The bones are osteopenic.                                       Medications   aluminum-magnesium hydroxide-simethicone 200-200-20 mg/5 mL suspension 30 mL (30 mLs Oral Given 2/14/24 2232)     And   LIDOcaine viscous HCl 2% oral solution 15 mL (15 mLs Oral Given 2/14/24 2232)     Medical Decision Making:   History:   Old Medical Records: I decided to obtain old medical records.  Initial Assessment:   58 y.o.female who has a past medical history of  hearing impairment, stroke with right sided deficits, nonverbal at base line,  Diabetes mellitus, Hyperlipidemia, and Hypertension who presents to the emergency department due to nausea vomiting abdominal pain and diarrhea for the past 3 days.  Patient in no acute distress, vitals notable for mildly elevated blood pressure otherwise unremarkable.  EKG with no STEMI.  Abdominal exam with generalized tenderness no rebound or guarding.  Suspect patient's symptoms are secondary to gastroenteritis given presenting symptoms.  Per review of chart patient with a large hiatal hernia which I suspect may be causing her chest pain but will obtain a cardiac workup to rule out.  Will give patient a GI cocktail and Protonix.  Will reassess after labs and imaging.  Differential Diagnosis:   Differential Diagnosis includes, but is not limited to:  Bowel obstruction, incarcerated/strangulated hernia, ileus, appendicitis, cholecystitis, aspirated foreign body, esophageal food impaction, biliary colic, colitis/diverticulitis, gastroenteritis, esophagitis, hepatitis, pancreatitis, GERD, PUD, constipation, nephrolithiasis,  UTI/pyelonephritis.     Clinical Tests:   Lab Tests: Ordered and Reviewed  Radiological Study: Ordered and Reviewed  Medical Tests: Ordered and Reviewed             ED Course as of 02/15/24 2055   Wed Feb 14, 2024   2213 CBC auto differential(!)  CBC without significant leukocytosis, anemia (at baseline), or platelet abnormalities.   [AS]   2244 Chem 14 negative for hypo-or hyper natremia, kalemia , chloridemia, or other electrolyte abnormalities; BUN and creatinine (at baseline), ALT and AST were within normal limits indicating normal liver function.  Trop bnp and lipase wnl, LA normal  [AS]   2253 Pt is currently stable for discharge. I see no indication of an emergent process beyond that addressed during our encounter but have duly counseled the patient/family regarding the need for prompt follow-up as well as the indications that should prompt immediate return to the emergency room should new or worrisome developments occur. I discussed the ED work up and diagnostic findings with the patient/family. The patient/family has been provided with verbal and printed direction regarding our final diagnosis(es) as well as instructions regarding use of OTC and/or Rx medications intended to manage the patient's aforementioned conditions. The patient/family verbalized an understanding. The patient/family is asked if there are any questions or concerns. We discuss the case, until all issues are addressed to the patient/family's satisfaction. Patient/family understands and is agreeable to the plan.  [AS]      ED Course User Index  [AS] Bruna Jimenes MD          Medical Decision Making  Amount and/or Complexity of Data Reviewed  Labs: ordered. Decision-making details documented in ED Course.  Radiology: ordered.    Risk  OTC drugs.  Prescription drug management.           Clinical Impression:   Final diagnoses:  [R07.9] Chest pain  [K52.9] Gastroenteritis (Primary)          ED Disposition Condition    Discharge Stable           ED Prescriptions       Medication Sig Dispense Start Date End Date Auth. Provider    pantoprazole (PROTONIX) 40 MG tablet Take 1 tablet (40 mg total) by mouth once daily. 30 tablet 2/14/2024 3/15/2024 Bruna Jimenes MD    ondansetron (ZOFRAN-ODT) 4 MG TbDL Take 1 tablet (4 mg total) by mouth every 8 (eight) hours as needed. 12 tablet 2/14/2024 -- Bruna Jimenes MD          Follow-up Information       Follow up With Specialties Details Why Contact Info    Priyanka Castle MD Internal Medicine Schedule an appointment as soon as possible for a visit  for reassesment [address] [facility] [address]  [phone]      [facility] Emergency Dept Emergency Medicine  If symptoms worsen [address] [facility] [place] [address] [phone]            DISCLAIMER: This note was prepared with [employer] voice recognition transcription software. Garbled syntax, mangled pronouns, and other bizarre constructions may be attributed to that software system.     Bruna Jimenes MD  02/15/24 2055

## 2025-01-26 ENCOUNTER — HOSPITAL ENCOUNTER (EMERGENCY)
Facility: HOSPITAL | Age: 60
Discharge: HOME | End: 2025-01-26
Attending: STUDENT IN AN ORGANIZED HEALTH CARE EDUCATION/TRAINING PROGRAM
Payer: MEDICARE

## 2025-01-26 VITALS
BODY MASS INDEX: 19.16 KG/M2 | HEIGHT: 65 IN | RESPIRATION RATE: 17 BRPM | TEMPERATURE: 98 F | DIASTOLIC BLOOD PRESSURE: 92 MMHG | SYSTOLIC BLOOD PRESSURE: 173 MMHG | WEIGHT: 115 LBS | OXYGEN SATURATION: 100 % | HEART RATE: 85 BPM

## 2025-01-26 DIAGNOSIS — R10.13 EPIGASTRIC PAIN: Primary | ICD-10-CM

## 2025-01-26 DIAGNOSIS — R11.10 VOMITING: ICD-10-CM

## 2025-01-26 LAB
ALBUMIN SERPL BCP-MCNC: 3.2 G/DL (ref 3.5–5.2)
ALLENS TEST: ABNORMAL
ALP SERPL-CCNC: 79 U/L (ref 40–150)
ALT SERPL W/O P-5'-P-CCNC: 8 U/L (ref 10–44)
ANION GAP SERPL CALC-SCNC: 10 MMOL/L (ref 8–16)
ANION GAP SERPL CALC-SCNC: 16 MMOL/L (ref 8–16)
AST SERPL-CCNC: 18 U/L (ref 10–40)
BASOPHILS # BLD AUTO: 0 K/UL (ref 0–0.2)
BASOPHILS NFR BLD: 0 % (ref 0–1.9)
BILIRUB SERPL-MCNC: 0.5 MG/DL (ref 0.1–1)
BILIRUB UR QL STRIP: NEGATIVE
BUN SERPL-MCNC: 4 MG/DL (ref 6–30)
BUN SERPL-MCNC: 7 MG/DL (ref 6–20)
CALCIUM SERPL-MCNC: 7.7 MG/DL (ref 8.7–10.5)
CHLORIDE SERPL-SCNC: 109 MMOL/L (ref 95–110)
CHLORIDE SERPL-SCNC: 114 MMOL/L (ref 95–110)
CLARITY UR: CLEAR
CO2 SERPL-SCNC: 22 MMOL/L (ref 23–29)
COLOR UR: YELLOW
CREAT SERPL-MCNC: 0.4 MG/DL (ref 0.5–1.4)
CREAT SERPL-MCNC: 0.7 MG/DL (ref 0.5–1.4)
DIFFERENTIAL METHOD BLD: ABNORMAL
EOSINOPHIL # BLD AUTO: 0.1 K/UL (ref 0–0.5)
EOSINOPHIL NFR BLD: 2.2 % (ref 0–8)
ERYTHROCYTE [DISTWIDTH] IN BLOOD BY AUTOMATED COUNT: 11.7 % (ref 11.5–14.5)
EST. GFR  (NO RACE VARIABLE): >60 ML/MIN/1.73 M^2
GLUCOSE SERPL-MCNC: 70 MG/DL (ref 70–110)
GLUCOSE SERPL-MCNC: 85 MG/DL (ref 70–110)
GLUCOSE UR QL STRIP: NEGATIVE
HCT VFR BLD AUTO: 38.8 % (ref 37–48.5)
HCT VFR BLD CALC: 30 %PCV (ref 36–54)
HGB BLD-MCNC: 13.3 G/DL (ref 12–16)
HGB UR QL STRIP: NEGATIVE
IMM GRANULOCYTES # BLD AUTO: 0 K/UL (ref 0–0.04)
IMM GRANULOCYTES NFR BLD AUTO: 0 % (ref 0–0.5)
KETONES UR QL STRIP: NEGATIVE
LEUKOCYTE ESTERASE UR QL STRIP: NEGATIVE
LIPASE SERPL-CCNC: 17 U/L (ref 4–60)
LYMPHOCYTES # BLD AUTO: 1.4 K/UL (ref 1–4.8)
LYMPHOCYTES NFR BLD: 41.7 % (ref 18–48)
MAGNESIUM SERPL-MCNC: 1.6 MG/DL (ref 1.6–2.6)
MCH RBC QN AUTO: 31.7 PG (ref 27–31)
MCHC RBC AUTO-ENTMCNC: 34.3 G/DL (ref 32–36)
MCV RBC AUTO: 92 FL (ref 82–98)
MONOCYTES # BLD AUTO: 0.2 K/UL (ref 0.3–1)
MONOCYTES NFR BLD: 7.4 % (ref 4–15)
NEUTROPHILS # BLD AUTO: 1.6 K/UL (ref 1.8–7.7)
NEUTROPHILS NFR BLD: 48.7 % (ref 38–73)
NITRITE UR QL STRIP: NEGATIVE
NRBC BLD-RTO: 0 /100 WBC
PH UR STRIP: 7 [PH] (ref 5–8)
PLATELET # BLD AUTO: 184 K/UL (ref 150–450)
PMV BLD AUTO: 9.2 FL (ref 9.2–12.9)
POC IONIZED CALCIUM: 1.02 MMOL/L (ref 1.06–1.42)
POC TCO2 (MEASURED): 19 MMOL/L (ref 23–29)
POTASSIUM BLD-SCNC: 2.7 MMOL/L (ref 3.5–5.1)
POTASSIUM SERPL-SCNC: 4 MMOL/L (ref 3.5–5.1)
PROT SERPL-MCNC: 6.9 G/DL (ref 6–8.4)
PROT UR QL STRIP: NEGATIVE
RBC # BLD AUTO: 4.2 M/UL (ref 4–5.4)
SAMPLE: ABNORMAL
SITE: ABNORMAL
SODIUM BLD-SCNC: 145 MMOL/L (ref 136–145)
SODIUM SERPL-SCNC: 141 MMOL/L (ref 136–145)
SP GR UR STRIP: 1.01 (ref 1–1.03)
URN SPEC COLLECT METH UR: ABNORMAL
UROBILINOGEN UR STRIP-ACNC: ABNORMAL EU/DL
WBC # BLD AUTO: 3.24 K/UL (ref 3.9–12.7)

## 2025-01-26 PROCEDURE — 81003 URINALYSIS AUTO W/O SCOPE: CPT | Performed by: STUDENT IN AN ORGANIZED HEALTH CARE EDUCATION/TRAINING PROGRAM

## 2025-01-26 PROCEDURE — 96361 HYDRATE IV INFUSION ADD-ON: CPT

## 2025-01-26 PROCEDURE — 93010 ELECTROCARDIOGRAM REPORT: CPT | Mod: ,,, | Performed by: INTERNAL MEDICINE

## 2025-01-26 PROCEDURE — 83735 ASSAY OF MAGNESIUM: CPT | Performed by: STUDENT IN AN ORGANIZED HEALTH CARE EDUCATION/TRAINING PROGRAM

## 2025-01-26 PROCEDURE — 84132 ASSAY OF SERUM POTASSIUM: CPT

## 2025-01-26 PROCEDURE — 80053 COMPREHEN METABOLIC PANEL: CPT | Performed by: STUDENT IN AN ORGANIZED HEALTH CARE EDUCATION/TRAINING PROGRAM

## 2025-01-26 PROCEDURE — 83690 ASSAY OF LIPASE: CPT | Performed by: STUDENT IN AN ORGANIZED HEALTH CARE EDUCATION/TRAINING PROGRAM

## 2025-01-26 PROCEDURE — 82330 ASSAY OF CALCIUM: CPT

## 2025-01-26 PROCEDURE — 63600175 PHARM REV CODE 636 W HCPCS: Performed by: STUDENT IN AN ORGANIZED HEALTH CARE EDUCATION/TRAINING PROGRAM

## 2025-01-26 PROCEDURE — 96374 THER/PROPH/DIAG INJ IV PUSH: CPT

## 2025-01-26 PROCEDURE — 85025 COMPLETE CBC W/AUTO DIFF WBC: CPT | Performed by: STUDENT IN AN ORGANIZED HEALTH CARE EDUCATION/TRAINING PROGRAM

## 2025-01-26 PROCEDURE — 99284 EMERGENCY DEPT VISIT MOD MDM: CPT | Mod: 25

## 2025-01-26 PROCEDURE — 84295 ASSAY OF SERUM SODIUM: CPT

## 2025-01-26 PROCEDURE — 99900035 HC TECH TIME PER 15 MIN (STAT)

## 2025-01-26 PROCEDURE — 82565 ASSAY OF CREATININE: CPT

## 2025-01-26 PROCEDURE — 85014 HEMATOCRIT: CPT

## 2025-01-26 PROCEDURE — 93005 ELECTROCARDIOGRAM TRACING: CPT

## 2025-01-26 RX ORDER — LACOSAMIDE 50 MG/1
1 TABLET ORAL 2 TIMES DAILY
COMMUNITY
Start: 2024-12-23

## 2025-01-26 RX ORDER — ONDANSETRON HYDROCHLORIDE 2 MG/ML
4 INJECTION, SOLUTION INTRAVENOUS
Status: COMPLETED | OUTPATIENT
Start: 2025-01-26 | End: 2025-01-26

## 2025-01-26 RX ORDER — ONDANSETRON 4 MG/1
4 TABLET, ORALLY DISINTEGRATING ORAL EVERY 8 HOURS PRN
Qty: 12 TABLET | Refills: 0 | Status: SHIPPED | OUTPATIENT
Start: 2025-01-26

## 2025-01-26 RX ORDER — LEVETIRACETAM 1000 MG/1
1500 TABLET ORAL 2 TIMES DAILY
COMMUNITY

## 2025-01-26 RX ADMIN — SODIUM CHLORIDE, POTASSIUM CHLORIDE, SODIUM LACTATE AND CALCIUM CHLORIDE 1000 ML: 600; 310; 30; 20 INJECTION, SOLUTION INTRAVENOUS at 03:01

## 2025-01-26 RX ADMIN — ONDANSETRON 4 MG: 2 INJECTION INTRAMUSCULAR; INTRAVENOUS at 03:01

## 2025-01-26 NOTE — DISCHARGE INSTRUCTIONS
Thank you for coming to our Emergency Department today. It is important to remember that some problems or medical conditions are difficult to diagnose and may not be found during your Emergency Department visit.     Be sure to follow up with your primary care doctor and review all labs/imaging/tests that were performed during your ER visit with them. Some labs/tests may be outside of the normal range and require non-emergent follow-up and further investigation to help diagnose/exclude/prevent complications or other potentially serious medical conditions that were not addressed during your ER visit.    If you do not have a primary care doctor, you may contact the one listed on your discharge paperwork or you may also call the [facility] Appointment Desk at [phone] to schedule an appointment and establish care with one. It is important to your health that you have a primary care doctor.    Please take all medications as directed. All medications may potentially have side-effects and it is impossible to predict which medications may give you side-effects or what side-effects (if any) they will give you.. If you feel that you are having a negative effect or side-effect of any medication you should immediately stop taking them and seek medical attention. If you feel that you are having a life-threatening reaction call 911.    Return to the ER with any questions/concerns, new/concerning symptoms, worsening or failure to improve.     Do not drive, swim, climb to height, take a bath, operate heavy machinery, drink alcohol or take potentially sedating medications, sign any legal documents or make any important decisions for 24 hours if you have received any pain medications, sedatives or mood altering drugs during your ER visit or within 24 hours of taking them if they have been prescribed to you.     You can find additional resources for Dentists, hearing aids, durable medical equipment, low cost pharmacies and  other resources at https://[facility].org    BELOW THIS LINE ONLY APPLIES IF YOU HAVE A COVID TEST PENDING OR IF YOU HAVE BEEN DIAGNOSED WITH COVID:  Please access [facility] to review the results of your test. Until the results of your COVID test return, you should isolate yourself so as not to potentially spread illness to others.   If your COVID test returns positive, you should isolate yourself so as not to spread illness to others. After five full days, if you are feeling better and you have not had fever for 24 hours, you can return to your typical daily activities, but you must wear a mask around others for an additional 5 days.   If your COVID test returns negative and you are either unvaccinated or more than six months out from your two-dose vaccine and are not yet boosted, you should still quarantine for 5 full days followed by strict mask use for an additional 5 full days.   If your COVID test returns negative and you have received your 2-dose initial vaccine as well as a booster, you should continue strict mask use for 10 full days after the exposure.  For all those exposed, best practice includes a test at day 5 after the exposure. This can be a home test or a test through one of the many testing centers throughout our community.   Masking is always advised to limit the spread of COVID. Cdc.gov is an excellent site to obtain the latest up to date recommendations regarding COVID and COVID testing.     CDC Testing and Quarantine Guidelines for patients with exposure to a known-positive COVID-19 person:  A close exposure is defined as anyone who has had an exposure (masked or unmasked) to a known COVID -19 positive person within 6 feet of someone for a cumulative total of 15 minutes or more over a 24-hour period.   Vaccinated and/or if you recently had a positive covid test within 90 days do NOT need to quarantine after contact with someone who had COVID-19 unless you develop symptoms.   Fully vaccinated  people who have not had a positive test within 90 days, should get tested 3-5 days after their exposure, even if they don't have symptoms and wear a mask indoors in public for 14 days following exposure or until their test result is negative.      Unvaccinated and/or NOT had a positive test within 90 days and meet close exposure  You are required by CDC guidelines to quarantine for at least 5 days from time of exposure followed by 5 days of strict masking. It is recommended, but not required to test after 5 days, unless you develop symptoms, in which case you should test at that time.  If you get tested after 5 days and your test is positive, your 5 day period of isolation starts the day of the positive test.    If your exposure does not meet the above definition, you can return to your normal daily activities to include social distancing, wearing a mask and frequent handwashing.      Here is a link to guidance from the CDC:  https://www.cdc.gov/media/releases/2021/s1227-isolation-quarantine-guidance.html      [place] Dept Of Health Testing Sites:  https://ldh.la.gov/page/3934      [facility] website with testing locations and guidance:  [email or web address]

## 2025-01-26 NOTE — ED PROVIDER NOTES
Encounter Date: 1/26/2025    SCRIBE #1 NOTE: I, May Li, am scribing for, and in the presence of,  Avis Mace DO.       History     Chief Complaint   Patient presents with    Vomiting     Pt BIB EMS, c/o n/v. Pt reports feeling as though she is about to have a seizure. Language barrier due to ASL.      58 yo F w/ PMHx of DM, HLD, HTN, asthma, hypothyroidism, seizures on keppra presenting to the ED for concerns of R sided flank and abdominal pain since yesterday. She reports pain to her R flank, RUQ and epigastrium regions. She also reports a posterior headache. She rates her pain 6/10 in severity. She reports associated nausea, vomiting, nonbloody diarrhea. She also notes a cough and chest pain with deep inspiration. She does note recent sick contacts with family members but has been taking isolation precautions herself. No attempted tx. No other exacerbating or alleviating factors. Denies dysuria, congestion, or other associated symptoms. No hx of similar sx. No hx abdominal surgeries.      [id number] used.     The history is provided by the patient.     Review of patient's allergies indicates:  No Known Allergies  Past Medical History:   Diagnosis Date    Anemia     Arthritis     Asthma     Deaf     Diabetes mellitus     Hyperlipidemia     Hypertension      Past Surgical History:   Procedure Laterality Date    COLONOSCOPY N/A 12/4/2020    Procedure: COLONOSCOPY;  Surgeon: Felicia Bishop MD;  Location: [facility];  Service: Endoscopy;  Laterality: N/A;    ESOPHAGOGASTRODUODENOSCOPY N/A 2/28/2019    Procedure: EGD (ESOPHAGOGASTRODUODENOSCOPY);  Surgeon: Yolanda Gary MD;  Location: [facility];  Service: Endoscopy;  Laterality: N/A;    ESOPHAGOGASTRODUODENOSCOPY N/A 12/4/2020    Procedure: EGD (ESOPHAGOGASTRODUODENOSCOPY);  Surgeon: Felicia Bishop MD;  Location: [facility];  Service: Endoscopy;  Laterality: N/A;    ESOPHAGOGASTRODUODENOSCOPY N/A 7/1/2021    Procedure: EGD  (ESOPHAGOGASTRODUODENOSCOPY)-in March 2021;  Surgeon: Sharath Kohler MD;  Location: [facility];  Service: Endoscopy;  Laterality: N/A;  hearing impaired, will need  - Maria Dolores Harris assigned-MS  fully vaccinated-see immunization record    HYSTERECTOMY       Family History   Problem Relation Name Age of Onset    Diabetes Mother      Hypertension Mother      Colon cancer Neg Hx      Esophageal cancer Neg Hx       Social History     Tobacco Use    Smoking status: Never    Smokeless tobacco: Never   Substance Use Topics    Alcohol use: No    Drug use: No     Review of Systems   Constitutional:  Negative for chills and fever.   HENT:  Negative for congestion, rhinorrhea and sore throat.    Eyes:  Negative for visual disturbance.   Respiratory:  Positive for cough. Negative for shortness of breath.    Cardiovascular:  Positive for chest pain (w/ deep inspiration).   Gastrointestinal:  Positive for abdominal pain (RUQ and epigastric), diarrhea, nausea and vomiting. Negative for blood in stool and constipation.   Genitourinary:  Positive for flank pain (R). Negative for dysuria, frequency and hematuria.   Musculoskeletal:  Negative for back pain, neck pain and neck stiffness.   Skin:  Negative for rash.   Neurological:  Negative for dizziness, syncope, weakness and headaches.   Psychiatric/Behavioral:  Negative for confusion.        Physical Exam     Initial Vitals   BP Pulse Resp Temp SpO2   01/26/25 1352 01/26/25 1352 01/26/25 1352 01/26/25 1745 01/26/25 1352   (!) 190/97 88 16 98.2 °F (36.8 °C) 98 %      MAP       --                Physical Exam    Nursing note and vitals reviewed.  Constitutional: She appears well-developed and well-nourished. She is not diaphoretic.  Non-toxic appearance. She does not have a sickly appearance. She does not appear ill.   HENT:   Head: Normocephalic.   Right Ear: External ear normal.   Left Ear: External ear normal. Mouth/Throat: Oropharynx is clear and  moist.   Eyes: Conjunctivae and EOM are normal. Pupils are equal, round, and reactive to light. No scleral icterus.   Neck: Neck supple. No JVD present.   Normal range of motion.  Cardiovascular:  Normal rate, regular rhythm, normal heart sounds and intact distal pulses.     Exam reveals no gallop and no friction rub.       No murmur heard.  Pulmonary/Chest: Breath sounds normal. No stridor. No respiratory distress. She has no wheezes.   Abdominal: Abdomen is soft. Bowel sounds are normal. She exhibits no distension. There is no abdominal tenderness. There is no rebound and no guarding.   Musculoskeletal:         General: No tenderness or edema. Normal range of motion.      Cervical back: Normal range of motion and neck supple.     Neurological: She is alert and oriented to person, place, and time. She has normal strength. No cranial nerve deficit or sensory deficit. GCS score is 15. GCS eye subscore is 4. GCS verbal subscore is 5. GCS motor subscore is 6.   Moves all extremities, follows all commands, no focal neurologic deficits.      Skin: Skin is warm and dry. Capillary refill takes less than 2 seconds. No rash noted. No erythema.   Psychiatric: She has a normal mood and affect. Thought content normal.         ED Course   Procedures  Labs Reviewed   CBC W/ AUTO DIFFERENTIAL - Abnormal       Result Value    WBC 3.24 (*)     RBC 4.20      Hemoglobin 13.3      Hematocrit 38.8      MCV 92      MCH 31.7 (*)     MCHC 34.3      RDW 11.7      Platelets 184      MPV 9.2      Immature Granulocytes 0.0      Gran # (ANC) 1.6 (*)     Immature Grans (Abs) 0.00      Lymph # 1.4      Mono # 0.2 (*)     Eos # 0.1      Baso # 0.00      nRBC 0      Gran % 48.7      Lymph % 41.7      Mono % 7.4      Eosinophil % 2.2      Basophil % 0.0      Differential Method Automated     COMPREHENSIVE METABOLIC PANEL - Abnormal    Sodium 141      Potassium 4.0      Chloride 109      CO2 22 (*)     Glucose 85      BUN 7      Creatinine 0.7       Calcium 7.7 (*)     Total Protein 6.9      Albumin 3.2 (*)     Total Bilirubin 0.5      Alkaline Phosphatase 79      AST 18      ALT 8 (*)     eGFR >60      Anion Gap 10     URINALYSIS, REFLEX TO URINE CULTURE - Abnormal    Specimen UA Urine, Clean Catch      Color, UA Yellow      Appearance, UA Clear      pH, UA 7.0      Specific Gravity, UA 1.015      Protein, UA Negative      Glucose, UA Negative      Ketones, UA Negative      Bilirubin (UA) Negative      Occult Blood UA Negative      Nitrite, UA Negative      Urobilinogen, UA 2.0-3.0 (*)     Leukocytes, UA Negative      Narrative:     Specimen Source->Urine   ISTAT PROCEDURE - Abnormal    POC Glucose 70      POC BUN 4 (*)     POC Creatinine 0.4 (*)     POC Sodium 145      POC Potassium 2.7 (*)     POC Chloride 114 (*)     POC TCO2 (MEASURED) 19 (*)     POC Anion Gap 16      POC Ionized Calcium 1.02 (*)     POC Hematocrit 30 (*)     Sample VENOUS      Site Other      Allens Test N/A     LIPASE    Lipase 17     MAGNESIUM   MAGNESIUM    Magnesium 1.6       EKG Readings: (Independently Interpreted)   EKG obtained at 2:11 p.m. shows normal sinus rhythm with a heart rate of 80 beats per minute, normal axis and intervals, T-wave flattening in lead 1 and aVL.  No reciprocal changes.  No acute ST segment changes.  EKG grossly unchanged when compared to previous EKG from February 2024     ECG Results              EKG 12-lead (Final result)        Collection Time Result Time QRS Duration OHS QTC Calculation    01/26/25 14:11:30 01/28/25 22:51:21 86 459                     Final result by Interface, Lab In [facility] (01/28/25 22:51:29)                   Narrative:    Test Reason : R11.10,    Vent. Rate :  80 BPM     Atrial Rate :  80 BPM     P-R Int : 130 ms          QRS Dur :  86 ms      QT Int : 398 ms       P-R-T Axes :  49  59  80 degrees    QTcB Int : 459 ms    Normal sinus rhythm  Nonspecific T wave abnormality  Abnormal ECG  When compared with ECG of 14-Feb-2024  20:25,  No significant change was found  Confirmed by Esperanza Rosa ([id number]) on 1/28/2025 10:51:19 PM    Referred By: AAAREFERRAL SELF           Confirmed By: Esperanza Rosa                                     EKG 12-lead (Final result)  Result time 02/05/25 14:24:17      Final result by Unknown User (02/05/25 14:24:17)                                      Imaging Results    None          Medications   lactated ringers bolus 1,000 mL (0 mLs Intravenous Stopped 1/26/25 1728)   ondansetron injection 4 mg (4 mg Intravenous Given 1/26/25 1530)     Medical Decision Making   MDM  This is an emergent evaluation of a 59 y.o. female with PMHx of DM, HLD, HTN, asthma, hypothyroidism, seizures on keppra presenting to the ED for concerns of R sided flank and abdominal pain since yesterday.  Initial vitals in the ED  [01/26/25 1352]  BP: (!) 190/97  Pulse: 88  Resp: 16  Temp: n/a  SpO2: 98 % .     Physical exam noted above. DDx includes but is not limited to gastritis, electrolyte abnormal, uti, kidney stone, hepatitis, pancreatitis. Also considered but clinically less likely to be bowel obstruction, appendicitis, sepsis, dissection. Will obtain labs and imaging including CBC, CMP, lipase, UA, EKG. Will also provide Zofran for nausea and IV fluids. Will continue to monitor and frequently reassess pending results of labs, treatments and final disposition.    Patient is aware of plan and is amenable.     Avis Mace D.O  EMERGENCY MEDICINE  3:51 PM 01/26/2025      UPDATE  Labs unremarkable. She was treated with IV fluids and Zofran. On reassessment, her symptoms were resolved. The patient was able to tolerate PO. Given history, presentation, in the setting of a benign exam and work-up, symptoms are less likely due to a life threatening cause at this time. Will discharge with Zofran, PCP and GI  follow-up and ED return precautions. Patient is aware and agreeable to the plan.     Avis Mace D.O  EMERGENCY MEDICINE    5:35 PM 01/26/2025         This chart was completed using dictation software, as a result there may be some transcription errors      Amount and/or Complexity of Data Reviewed  Labs: ordered. Decision-making details documented in ED Course.    Risk  Prescription drug management.            Scribe Attestation:   Scribe #1: I performed the above scribed service and the documentation accurately describes the services I performed. I attest to the accuracy of the note.                           I, Avis Mace DO  , personally performed the services described in this documentation. All medical record entries made by the scribe were at my direction and in my presence. I have reviewed the chart and agree that the record reflects my personal performance and is accurate and complete.      DISCLAIMER: This note was prepared with E[employer]pect voice recognition transcription software. Garbled syntax, mangled pronouns, and other bizarre constructions may be attributed to that software system.      Clinical Impression:  Final diagnoses:  [R11.10] Vomiting  [R10.13] Epigastric pain (Primary)          ED Disposition Condition    Discharge Stable          ED Prescriptions       Medication Sig Dispense Start Date End Date Auth. Provider    ondansetron (ZOFRAN-ODT) 4 MG TbDL Take 1 tablet (4 mg total) by mouth every 8 (eight) hours as needed (nausea and vomiting). 12 tablet 1/26/2025 -- Avis Mace DO          Follow-up Information       Follow up With Specialties Details Why Contact Info    Priyanka Castle MD Internal Medicine Schedule an appointment as soon as possible for a visit in 3 days Emergency Room Follow-up [address] [address]  [phone]      [facility] [place], [place] Emergency Dept Emergency Medicine Go to  If symptoms worsen [address] [facility] [place] [address] [phone]    Your GI doctor  Schedule an appointment as soon as possible for  a visit in 2 days Emergency Room Follow-up              Avis Mace, DO  02/11/25 1412

## 2025-01-28 LAB
OHS QRS DURATION: 86 MS
OHS QTC CALCULATION: 459 MS